# Patient Record
Sex: FEMALE | Race: WHITE | Employment: OTHER | URBAN - NONMETROPOLITAN AREA
[De-identification: names, ages, dates, MRNs, and addresses within clinical notes are randomized per-mention and may not be internally consistent; named-entity substitution may affect disease eponyms.]

---

## 2017-07-07 RX ORDER — ESOMEPRAZOLE MAGNESIUM 40 MG/1
40 CAPSULE, DELAYED RELEASE ORAL
COMMUNITY
End: 2017-11-03 | Stop reason: SDUPTHER

## 2017-07-07 RX ORDER — ACYCLOVIR 50 MG/G
OINTMENT TOPICAL 4 TIMES DAILY PRN
COMMUNITY
End: 2017-09-11

## 2017-07-07 RX ORDER — GABAPENTIN 100 MG/1
CAPSULE ORAL NIGHTLY
COMMUNITY
End: 2017-08-11 | Stop reason: SDUPTHER

## 2017-07-07 RX ORDER — LOSARTAN POTASSIUM AND HYDROCHLOROTHIAZIDE 12.5; 1 MG/1; MG/1
1 TABLET ORAL DAILY
COMMUNITY
End: 2017-08-13 | Stop reason: SDUPTHER

## 2017-07-07 RX ORDER — EZETIMIBE AND SIMVASTATIN 10; 20 MG/1; MG/1
1 TABLET ORAL NIGHTLY
COMMUNITY
End: 2017-07-26 | Stop reason: SDUPTHER

## 2017-07-07 RX ORDER — CLOPIDOGREL BISULFATE 75 MG/1
75 TABLET ORAL DAILY
COMMUNITY
End: 2018-03-19 | Stop reason: SDUPTHER

## 2017-07-07 RX ORDER — PAROXETINE HYDROCHLORIDE 20 MG/1
20 TABLET, FILM COATED ORAL EVERY MORNING
COMMUNITY
End: 2017-12-13 | Stop reason: SDUPTHER

## 2017-07-07 RX ORDER — LEVOCETIRIZINE DIHYDROCHLORIDE 5 MG/1
5 TABLET, FILM COATED ORAL NIGHTLY
COMMUNITY
End: 2018-03-19 | Stop reason: SDUPTHER

## 2017-07-07 RX ORDER — CHLORDIAZEPOXIDE HYDROCHLORIDE AND CLIDINIUM BROMIDE 5; 2.5 MG/1; MG/1
1 CAPSULE ORAL 2 TIMES DAILY PRN
COMMUNITY
End: 2017-07-11 | Stop reason: SDUPTHER

## 2017-07-07 RX ORDER — TRAZODONE HYDROCHLORIDE 50 MG/1
50 TABLET ORAL NIGHTLY
COMMUNITY
End: 2018-01-09 | Stop reason: SDUPTHER

## 2017-07-07 RX ORDER — MULTIVIT WITH MINERALS/LUTEIN
TABLET ORAL DAILY
COMMUNITY

## 2017-07-07 RX ORDER — ONDANSETRON 4 MG/1
4 TABLET, ORALLY DISINTEGRATING ORAL EVERY 8 HOURS PRN
COMMUNITY
End: 2021-10-19 | Stop reason: SDUPTHER

## 2017-07-11 ENCOUNTER — OFFICE VISIT (OUTPATIENT)
Dept: INTERNAL MEDICINE | Age: 82
End: 2017-07-11
Payer: MEDICARE

## 2017-07-11 VITALS
SYSTOLIC BLOOD PRESSURE: 130 MMHG | OXYGEN SATURATION: 93 % | BODY MASS INDEX: 31.01 KG/M2 | DIASTOLIC BLOOD PRESSURE: 84 MMHG | HEART RATE: 81 BPM | HEIGHT: 63 IN | WEIGHT: 175 LBS

## 2017-07-11 DIAGNOSIS — I11.9 HYPERTENSIVE HEART DISEASE WITHOUT CHF: ICD-10-CM

## 2017-07-11 DIAGNOSIS — Z91.81 RISK FOR FALLS: ICD-10-CM

## 2017-07-11 DIAGNOSIS — G60.9 IDIOPATHIC PERIPHERAL NEUROPATHY: ICD-10-CM

## 2017-07-11 DIAGNOSIS — F33.1 MAJOR DEPRESSIVE DISORDER, RECURRENT EPISODE, MODERATE (HCC): ICD-10-CM

## 2017-07-11 PROCEDURE — G8419 CALC BMI OUT NRM PARAM NOF/U: HCPCS | Performed by: INTERNAL MEDICINE

## 2017-07-11 PROCEDURE — G8400 PT W/DXA NO RESULTS DOC: HCPCS | Performed by: INTERNAL MEDICINE

## 2017-07-11 PROCEDURE — 1036F TOBACCO NON-USER: CPT | Performed by: INTERNAL MEDICINE

## 2017-07-11 PROCEDURE — 1090F PRES/ABSN URINE INCON ASSESS: CPT | Performed by: INTERNAL MEDICINE

## 2017-07-11 PROCEDURE — 4040F PNEUMOC VAC/ADMIN/RCVD: CPT | Performed by: INTERNAL MEDICINE

## 2017-07-11 PROCEDURE — 1123F ACP DISCUSS/DSCN MKR DOCD: CPT | Performed by: INTERNAL MEDICINE

## 2017-07-11 PROCEDURE — 99213 OFFICE O/P EST LOW 20 MIN: CPT | Performed by: INTERNAL MEDICINE

## 2017-07-11 PROCEDURE — G8427 DOCREV CUR MEDS BY ELIG CLIN: HCPCS | Performed by: INTERNAL MEDICINE

## 2017-07-11 RX ORDER — CHLORDIAZEPOXIDE HYDROCHLORIDE AND CLIDINIUM BROMIDE 5; 2.5 MG/1; MG/1
1 CAPSULE ORAL 2 TIMES DAILY PRN
Qty: 120 CAPSULE | Refills: 1 | Status: SHIPPED | OUTPATIENT
Start: 2017-07-11 | End: 2017-09-11

## 2017-07-11 ASSESSMENT — PATIENT HEALTH QUESTIONNAIRE - PHQ9
2. FEELING DOWN, DEPRESSED OR HOPELESS: 0
SUM OF ALL RESPONSES TO PHQ9 QUESTIONS 1 & 2: 0
1. LITTLE INTEREST OR PLEASURE IN DOING THINGS: 0
SUM OF ALL RESPONSES TO PHQ QUESTIONS 1-9: 0

## 2017-07-11 ASSESSMENT — ENCOUNTER SYMPTOMS
RHINORRHEA: 0
COUGH: 0
NAUSEA: 0
SORE THROAT: 0
ABDOMINAL PAIN: 0
SHORTNESS OF BREATH: 0
TROUBLE SWALLOWING: 0

## 2017-07-19 ENCOUNTER — TELEPHONE (OUTPATIENT)
Dept: INTERNAL MEDICINE | Age: 82
End: 2017-07-19

## 2017-08-14 RX ORDER — GABAPENTIN 100 MG/1
100 CAPSULE ORAL NIGHTLY
Qty: 90 CAPSULE | Refills: 1 | Status: SHIPPED | OUTPATIENT
Start: 2017-08-14 | End: 2017-09-11 | Stop reason: SDUPTHER

## 2017-09-11 ENCOUNTER — OFFICE VISIT (OUTPATIENT)
Dept: INTERNAL MEDICINE | Age: 82
End: 2017-09-11
Payer: MEDICARE

## 2017-09-11 VITALS
HEIGHT: 66 IN | HEART RATE: 86 BPM | WEIGHT: 177 LBS | SYSTOLIC BLOOD PRESSURE: 122 MMHG | BODY MASS INDEX: 28.45 KG/M2 | DIASTOLIC BLOOD PRESSURE: 78 MMHG | OXYGEN SATURATION: 92 %

## 2017-09-11 DIAGNOSIS — E78.2 MIXED HYPERLIPIDEMIA: ICD-10-CM

## 2017-09-11 DIAGNOSIS — F41.8 ANXIETY ASSOCIATED WITH DEPRESSION: ICD-10-CM

## 2017-09-11 DIAGNOSIS — I10 HYPERTENSION, ESSENTIAL: ICD-10-CM

## 2017-09-11 PROCEDURE — G8399 PT W/DXA RESULTS DOCUMENT: HCPCS | Performed by: INTERNAL MEDICINE

## 2017-09-11 PROCEDURE — G8417 CALC BMI ABV UP PARAM F/U: HCPCS | Performed by: INTERNAL MEDICINE

## 2017-09-11 PROCEDURE — 99213 OFFICE O/P EST LOW 20 MIN: CPT | Performed by: INTERNAL MEDICINE

## 2017-09-11 PROCEDURE — G8427 DOCREV CUR MEDS BY ELIG CLIN: HCPCS | Performed by: INTERNAL MEDICINE

## 2017-09-11 PROCEDURE — 4040F PNEUMOC VAC/ADMIN/RCVD: CPT | Performed by: INTERNAL MEDICINE

## 2017-09-11 PROCEDURE — 1036F TOBACCO NON-USER: CPT | Performed by: INTERNAL MEDICINE

## 2017-09-11 PROCEDURE — 1123F ACP DISCUSS/DSCN MKR DOCD: CPT | Performed by: INTERNAL MEDICINE

## 2017-09-11 PROCEDURE — 1090F PRES/ABSN URINE INCON ASSESS: CPT | Performed by: INTERNAL MEDICINE

## 2017-09-11 RX ORDER — LORAZEPAM 0.5 MG/1
0.5 TABLET ORAL DAILY PRN
COMMUNITY
End: 2017-09-11 | Stop reason: SDUPTHER

## 2017-09-11 RX ORDER — GABAPENTIN 100 MG/1
100 CAPSULE ORAL NIGHTLY
Qty: 90 CAPSULE | Refills: 1 | Status: SHIPPED | OUTPATIENT
Start: 2017-09-11 | End: 2018-03-19 | Stop reason: SINTOL

## 2017-09-11 RX ORDER — LORAZEPAM 0.5 MG/1
0.5 TABLET ORAL DAILY PRN
Qty: 30 TABLET | Refills: 0 | Status: SHIPPED | OUTPATIENT
Start: 2017-09-11 | End: 2017-12-04 | Stop reason: SDUPTHER

## 2017-09-11 ASSESSMENT — ENCOUNTER SYMPTOMS
SHORTNESS OF BREATH: 0
RHINORRHEA: 0
ABDOMINAL PAIN: 0
TROUBLE SWALLOWING: 0
COUGH: 0
SORE THROAT: 0
NAUSEA: 0

## 2017-10-16 ENCOUNTER — TELEPHONE (OUTPATIENT)
Dept: INTERNAL MEDICINE | Age: 82
End: 2017-10-16

## 2017-10-30 ENCOUNTER — TELEPHONE (OUTPATIENT)
Dept: INTERNAL MEDICINE | Age: 82
End: 2017-10-30

## 2017-10-30 NOTE — TELEPHONE ENCOUNTER
Virgil from [facility] called stating that pt's insurance is requesting that she try either pravastatin, simvastatin, atorvastatin, or rouvastatin instead of vytorin 10/20. Phone # [phone], ref # [id number]. Please advise.

## 2017-10-31 RX ORDER — ATORVASTATIN CALCIUM 20 MG/1
20 TABLET, FILM COATED ORAL DAILY
Qty: 90 TABLET | Refills: 3 | Status: SHIPPED | OUTPATIENT
Start: 2017-10-31 | End: 2018-10-16 | Stop reason: SDUPTHER

## 2017-11-03 ENCOUNTER — TELEPHONE (OUTPATIENT)
Dept: INTERNAL MEDICINE | Age: 82
End: 2017-11-03

## 2017-11-03 RX ORDER — ESOMEPRAZOLE MAGNESIUM 40 MG/1
40 CAPSULE, DELAYED RELEASE ORAL DAILY
Qty: 90 CAPSULE | Refills: 3 | Status: SHIPPED | OUTPATIENT
Start: 2017-11-03 | End: 2019-10-17 | Stop reason: SDUPTHER

## 2017-11-09 ENCOUNTER — NURSE ONLY (OUTPATIENT)
Dept: INTERNAL MEDICINE | Age: 82
End: 2017-11-09
Payer: MEDICARE

## 2017-11-09 DIAGNOSIS — Z23 NEED FOR INFLUENZA VACCINATION: Primary | ICD-10-CM

## 2017-11-09 PROCEDURE — G0008 ADMIN INFLUENZA VIRUS VAC: HCPCS | Performed by: INTERNAL MEDICINE

## 2017-11-09 PROCEDURE — 90662 IIV NO PRSV INCREASED AG IM: CPT | Performed by: INTERNAL MEDICINE

## 2017-12-04 DIAGNOSIS — I10 HYPERTENSION, ESSENTIAL: ICD-10-CM

## 2017-12-04 DIAGNOSIS — E78.2 MIXED HYPERLIPIDEMIA: ICD-10-CM

## 2017-12-04 DIAGNOSIS — F41.8 ANXIETY ASSOCIATED WITH DEPRESSION: ICD-10-CM

## 2017-12-04 RX ORDER — LORAZEPAM 0.5 MG/1
0.5 TABLET ORAL DAILY PRN
Qty: 30 TABLET | Refills: 1 | Status: SHIPPED | OUTPATIENT
Start: 2017-12-04 | End: 2018-02-14 | Stop reason: SDUPTHER

## 2017-12-13 RX ORDER — PAROXETINE HYDROCHLORIDE 20 MG/1
TABLET, FILM COATED ORAL
Qty: 90 TABLET | Refills: 3 | Status: SHIPPED | OUTPATIENT
Start: 2017-12-13 | End: 2018-07-10 | Stop reason: ALTCHOICE

## 2018-01-03 ENCOUNTER — TELEPHONE (OUTPATIENT)
Dept: INTERNAL MEDICINE | Age: 83
End: 2018-01-03

## 2018-01-03 RX ORDER — BENZONATATE 100 MG/1
100 CAPSULE ORAL 3 TIMES DAILY PRN
Qty: 30 CAPSULE | Refills: 0 | Status: SHIPPED | OUTPATIENT
Start: 2018-01-03 | End: 2018-02-07 | Stop reason: SDUPTHER

## 2018-01-10 ENCOUNTER — TELEPHONE (OUTPATIENT)
Dept: INTERNAL MEDICINE | Age: 83
End: 2018-01-10

## 2018-01-19 ENCOUNTER — TELEPHONE (OUTPATIENT)
Dept: INTERNAL MEDICINE | Age: 83
End: 2018-01-19

## 2018-01-19 RX ORDER — DOXYCYCLINE HYCLATE 100 MG
100 TABLET ORAL 2 TIMES DAILY
Qty: 14 TABLET | Refills: 0 | Status: SHIPPED | OUTPATIENT
Start: 2018-01-19 | End: 2018-01-26

## 2018-01-19 NOTE — TELEPHONE ENCOUNTER
C/o sinus infection, teeth and ears are hurting, she has been taking mucinex and it is not helping, sorethroat also, request [facility]

## 2018-01-23 ENCOUNTER — TELEPHONE (OUTPATIENT)
Dept: INTERNAL MEDICINE | Age: 83
End: 2018-01-23

## 2018-01-23 RX ORDER — LEVOFLOXACIN 500 MG/1
500 TABLET, FILM COATED ORAL DAILY
Qty: 7 TABLET | Refills: 0 | Status: SHIPPED | OUTPATIENT
Start: 2018-01-23 | End: 2018-01-30

## 2018-01-29 ENCOUNTER — OFFICE VISIT (OUTPATIENT)
Dept: INTERNAL MEDICINE | Age: 83
End: 2018-01-29
Payer: MEDICARE

## 2018-01-29 VITALS
HEART RATE: 88 BPM | BODY MASS INDEX: 29.32 KG/M2 | DIASTOLIC BLOOD PRESSURE: 72 MMHG | SYSTOLIC BLOOD PRESSURE: 118 MMHG | WEIGHT: 176 LBS | HEIGHT: 65 IN | OXYGEN SATURATION: 94 % | RESPIRATION RATE: 18 BRPM

## 2018-01-29 DIAGNOSIS — K21.9 GASTROESOPHAGEAL REFLUX DISEASE WITHOUT ESOPHAGITIS: ICD-10-CM

## 2018-01-29 DIAGNOSIS — J01.10 ACUTE FRONTAL SINUSITIS, RECURRENCE NOT SPECIFIED: ICD-10-CM

## 2018-01-29 DIAGNOSIS — H53.9 VISUAL CHANGES: ICD-10-CM

## 2018-01-29 DIAGNOSIS — J01.00 ACUTE MAXILLARY SINUSITIS, RECURRENCE NOT SPECIFIED: Primary | ICD-10-CM

## 2018-01-29 PROCEDURE — 1036F TOBACCO NON-USER: CPT | Performed by: NURSE PRACTITIONER

## 2018-01-29 PROCEDURE — 99214 OFFICE O/P EST MOD 30 MIN: CPT | Performed by: NURSE PRACTITIONER

## 2018-01-29 PROCEDURE — 1123F ACP DISCUSS/DSCN MKR DOCD: CPT | Performed by: NURSE PRACTITIONER

## 2018-01-29 PROCEDURE — G8399 PT W/DXA RESULTS DOCUMENT: HCPCS | Performed by: NURSE PRACTITIONER

## 2018-01-29 PROCEDURE — G8427 DOCREV CUR MEDS BY ELIG CLIN: HCPCS | Performed by: NURSE PRACTITIONER

## 2018-01-29 PROCEDURE — 4040F PNEUMOC VAC/ADMIN/RCVD: CPT | Performed by: NURSE PRACTITIONER

## 2018-01-29 PROCEDURE — 96372 THER/PROPH/DIAG INJ SC/IM: CPT | Performed by: NURSE PRACTITIONER

## 2018-01-29 PROCEDURE — G8484 FLU IMMUNIZE NO ADMIN: HCPCS | Performed by: NURSE PRACTITIONER

## 2018-01-29 PROCEDURE — G8417 CALC BMI ABV UP PARAM F/U: HCPCS | Performed by: NURSE PRACTITIONER

## 2018-01-29 PROCEDURE — 1090F PRES/ABSN URINE INCON ASSESS: CPT | Performed by: NURSE PRACTITIONER

## 2018-01-29 RX ORDER — METHYLPREDNISOLONE ACETATE 80 MG/ML
80 INJECTION, SUSPENSION INTRA-ARTICULAR; INTRALESIONAL; INTRAMUSCULAR; SOFT TISSUE ONCE
Status: COMPLETED | OUTPATIENT
Start: 2018-01-29 | End: 2018-01-29

## 2018-01-29 RX ADMIN — METHYLPREDNISOLONE ACETATE 80 MG: 80 INJECTION, SUSPENSION INTRA-ARTICULAR; INTRALESIONAL; INTRAMUSCULAR; SOFT TISSUE at 12:47

## 2018-01-29 ASSESSMENT — ENCOUNTER SYMPTOMS
TROUBLE SWALLOWING: 0
ABDOMINAL DISTENTION: 0
EYE DISCHARGE: 0
SHORTNESS OF BREATH: 0
COUGH: 0
SINUS PRESSURE: 1
BLOOD IN STOOL: 0
ABDOMINAL PAIN: 0
CHOKING: 0
COLOR CHANGE: 0
EYE ITCHING: 0
STRIDOR: 0
CONSTIPATION: 0
DIARRHEA: 0
WHEEZING: 0
VOMITING: 0
NAUSEA: 0
SINUS PAIN: 1
SORE THROAT: 0

## 2018-01-29 NOTE — PROGRESS NOTES
Ataxia     Benign diastolic hypertension     Carpal tunnel syndrome     idiopathic peripheral    Cellulitis of face     Chronic back pain     COPD (chronic obstructive pulmonary disease) (HCC)     Depression     Disc degeneration, lumbosacral     Diverticulosis     Dysphagia     Esophageal reflux     Fever blister     Herpes simplex     Herpes zoster     Hypertension, essential     Hypertensive heart disease without CHF     Hypoxia     Idiopathic peripheral neuropathy     2017    Insomnia     Major depressive disorder, recurrent episode, moderate (HCC)     Mixed hyperlipidemia     Osteoporosis     Ovarian failure     Pulmonary fibrosis ([facility])     Sciatica     Sleep apnea     Transient cerebral ischemic attack     Urinary incontinence     Weakness of both legs       Past Surgical History:   Procedure Laterality Date    COLONOSCOPY  06/12/2015    Aubree, repeat as needed    HYSTERECTOMY      LUNG SEGMENTECTOMY      lung segmental resection       Family History   Problem Relation Age of Onset    Heart Failure Mother     Diabetes Mother     Heart Disease Mother     Diabetes Father     Heart Disease Father        Social History   Substance Use Topics    Smoking status: Never Smoker    Smokeless tobacco: Never Used    Alcohol use No      Current Outpatient Prescriptions   Medication Sig Dispense Refill    levofloxacin (LEVAQUIN) 500 MG tablet Take 1 tablet by mouth daily for 7 days 7 tablet 0    traZODone (DESYREL) 50 MG tablet TAKE 2 TABLETS AT BEDTIME 180 tablet 1    PARoxetine (PAXIL) 20 MG tablet TAKE 1 TABLET DAILY AS DIRECTED 90 tablet 3    LORazepam (ATIVAN) 0.5 MG tablet Take 1 tablet by mouth daily as needed for Anxiety .  30 tablet 1    esomeprazole (NEXIUM) 40 MG delayed release capsule Take 1 capsule by mouth daily 90 capsule 3    atorvastatin (LIPITOR) 20 MG tablet Take 1 tablet by mouth daily 90 tablet 3    gabapentin (NEURONTIN) 100 MG capsule Take 1 capsule by

## 2018-02-07 RX ORDER — BENZONATATE 100 MG/1
100 CAPSULE ORAL 3 TIMES DAILY PRN
Qty: 30 CAPSULE | Refills: 0 | Status: SHIPPED | OUTPATIENT
Start: 2018-02-07 | End: 2018-02-17

## 2018-02-14 DIAGNOSIS — I10 HYPERTENSION, ESSENTIAL: ICD-10-CM

## 2018-02-14 DIAGNOSIS — F41.8 ANXIETY ASSOCIATED WITH DEPRESSION: ICD-10-CM

## 2018-02-14 DIAGNOSIS — E78.2 MIXED HYPERLIPIDEMIA: ICD-10-CM

## 2018-02-14 RX ORDER — LORAZEPAM 0.5 MG/1
0.5 TABLET ORAL DAILY PRN
Qty: 30 TABLET | Refills: 1 | Status: SHIPPED | OUTPATIENT
Start: 2018-02-14 | End: 2018-03-19 | Stop reason: SDUPTHER

## 2018-03-06 NOTE — TELEPHONE ENCOUNTER
She has been taking her husbands voltaren gel, Dr Ami Rodarte had suggested she try it for her legs, she now wants a script for herself

## 2018-03-13 ENCOUNTER — TELEPHONE (OUTPATIENT)
Dept: INTERNAL MEDICINE | Age: 83
End: 2018-03-13

## 2018-03-13 RX ORDER — FLUCONAZOLE 150 MG/1
150 TABLET ORAL ONCE
Qty: 1 TABLET | Refills: 0 | Status: SHIPPED | OUTPATIENT
Start: 2018-03-13 | End: 2018-03-13

## 2018-03-19 ENCOUNTER — OFFICE VISIT (OUTPATIENT)
Dept: INTERNAL MEDICINE | Age: 83
End: 2018-03-19
Payer: MEDICARE

## 2018-03-19 VITALS
DIASTOLIC BLOOD PRESSURE: 86 MMHG | HEART RATE: 75 BPM | OXYGEN SATURATION: 94 % | HEIGHT: 65 IN | BODY MASS INDEX: 29.32 KG/M2 | WEIGHT: 176 LBS | SYSTOLIC BLOOD PRESSURE: 134 MMHG | RESPIRATION RATE: 18 BRPM

## 2018-03-19 DIAGNOSIS — I10 HYPERTENSION, ESSENTIAL: ICD-10-CM

## 2018-03-19 DIAGNOSIS — I11.9 HYPERTENSIVE HEART DISEASE WITHOUT CHF: Primary | ICD-10-CM

## 2018-03-19 DIAGNOSIS — R15.9 INCONTINENCE OF FECES, UNSPECIFIED FECAL INCONTINENCE TYPE: ICD-10-CM

## 2018-03-19 DIAGNOSIS — E78.2 MIXED HYPERLIPIDEMIA: ICD-10-CM

## 2018-03-19 DIAGNOSIS — F41.8 ANXIETY ASSOCIATED WITH DEPRESSION: ICD-10-CM

## 2018-03-19 DIAGNOSIS — I11.9 HYPERTENSIVE HEART DISEASE WITHOUT CHF: ICD-10-CM

## 2018-03-19 DIAGNOSIS — R41.3 DECLINE IN VERBAL MEMORY: ICD-10-CM

## 2018-03-19 DIAGNOSIS — R29.6 FALLS FREQUENTLY: ICD-10-CM

## 2018-03-19 DIAGNOSIS — F51.5 NIGHTMARES: ICD-10-CM

## 2018-03-19 LAB
ALBUMIN SERPL-MCNC: 4.2 G/DL (ref 3.5–5.2)
ALP BLD-CCNC: 63 U/L (ref 35–104)
ALT SERPL-CCNC: 23 U/L (ref 5–33)
ANION GAP SERPL CALCULATED.3IONS-SCNC: 11 MMOL/L (ref 7–19)
AST SERPL-CCNC: 41 U/L (ref 5–32)
BACTERIA: ABNORMAL /HPF
BILIRUB SERPL-MCNC: 0.5 MG/DL (ref 0.2–1.2)
BILIRUBIN URINE: ABNORMAL
BLOOD, URINE: NEGATIVE
BUN BLDV-MCNC: 20 MG/DL (ref 8–23)
CALCIUM SERPL-MCNC: 9.6 MG/DL (ref 8.8–10.2)
CHLORIDE BLD-SCNC: 102 MMOL/L (ref 98–111)
CHOLESTEROL, TOTAL: 216 MG/DL (ref 160–199)
CLARITY: ABNORMAL
CO2: 29 MMOL/L (ref 22–29)
COLOR: ABNORMAL
CREAT SERPL-MCNC: 0.7 MG/DL (ref 0.5–0.9)
EPITHELIAL CELLS, UA: 6 /HPF (ref 0–5)
GFR NON-AFRICAN AMERICAN: >60
GLUCOSE BLD-MCNC: 91 MG/DL (ref 74–109)
GLUCOSE URINE: NEGATIVE MG/DL
HCT VFR BLD CALC: 39.1 % (ref 37–47)
HDLC SERPL-MCNC: 71 MG/DL (ref 65–121)
HEMOGLOBIN: 12.8 G/DL (ref 12–16)
HYALINE CASTS: 7 /HPF (ref 0–8)
KETONES, URINE: NEGATIVE MG/DL
LDL CHOLESTEROL CALCULATED: 114 MG/DL
LEUKOCYTE ESTERASE, URINE: ABNORMAL
MCH RBC QN AUTO: 30.5 PG (ref 27–31)
MCHC RBC AUTO-ENTMCNC: 32.7 G/DL (ref 33–37)
MCV RBC AUTO: 93.1 FL (ref 81–99)
NITRITE, URINE: NEGATIVE
PDW BLD-RTO: 13.6 % (ref 11.5–14.5)
PH UA: 6
PLATELET # BLD: 299 K/UL (ref 130–400)
PMV BLD AUTO: 10.5 FL (ref 9.4–12.3)
POTASSIUM SERPL-SCNC: 4.6 MMOL/L (ref 3.5–5)
PROTEIN UA: NEGATIVE MG/DL
RBC # BLD: 4.2 M/UL (ref 4.2–5.4)
RBC UA: 12 /HPF (ref 0–4)
SODIUM BLD-SCNC: 142 MMOL/L (ref 136–145)
SPECIFIC GRAVITY UA: 1.02
TOTAL PROTEIN: 7.3 G/DL (ref 6.6–8.7)
TRIGL SERPL-MCNC: 153 MG/DL (ref 0–149)
TSH SERPL DL<=0.05 MIU/L-ACNC: 3.02 UIU/ML (ref 0.27–4.2)
URINE REFLEX TO CULTURE: YES
UROBILINOGEN, URINE: 0.2 E.U./DL
WBC # BLD: 8.6 K/UL (ref 4.8–10.8)
WBC UA: 11 /HPF (ref 0–5)

## 2018-03-19 PROCEDURE — 99214 OFFICE O/P EST MOD 30 MIN: CPT | Performed by: NURSE PRACTITIONER

## 2018-03-19 PROCEDURE — 1090F PRES/ABSN URINE INCON ASSESS: CPT | Performed by: NURSE PRACTITIONER

## 2018-03-19 PROCEDURE — 1036F TOBACCO NON-USER: CPT | Performed by: NURSE PRACTITIONER

## 2018-03-19 PROCEDURE — G8399 PT W/DXA RESULTS DOCUMENT: HCPCS | Performed by: NURSE PRACTITIONER

## 2018-03-19 PROCEDURE — G8427 DOCREV CUR MEDS BY ELIG CLIN: HCPCS | Performed by: NURSE PRACTITIONER

## 2018-03-19 PROCEDURE — 4040F PNEUMOC VAC/ADMIN/RCVD: CPT | Performed by: NURSE PRACTITIONER

## 2018-03-19 PROCEDURE — G8482 FLU IMMUNIZE ORDER/ADMIN: HCPCS | Performed by: NURSE PRACTITIONER

## 2018-03-19 PROCEDURE — G8417 CALC BMI ABV UP PARAM F/U: HCPCS | Performed by: NURSE PRACTITIONER

## 2018-03-19 PROCEDURE — 1123F ACP DISCUSS/DSCN MKR DOCD: CPT | Performed by: NURSE PRACTITIONER

## 2018-03-19 RX ORDER — CHLORDIAZEPOXIDE HYDROCHLORIDE AND CLIDINIUM BROMIDE 5; 2.5 MG/1; MG/1
1 CAPSULE ORAL NIGHTLY
Qty: 90 CAPSULE | Refills: 1 | Status: SHIPPED | OUTPATIENT
Start: 2018-03-19 | End: 2018-10-02 | Stop reason: SDUPTHER

## 2018-03-19 RX ORDER — CLOPIDOGREL BISULFATE 75 MG/1
75 TABLET ORAL DAILY
Qty: 90 TABLET | Refills: 1 | Status: SHIPPED | OUTPATIENT
Start: 2018-03-19 | End: 2018-11-01 | Stop reason: SDUPTHER

## 2018-03-19 RX ORDER — TRAZODONE HYDROCHLORIDE 50 MG/1
TABLET ORAL
Qty: 180 TABLET | Refills: 1 | Status: SHIPPED | OUTPATIENT
Start: 2018-03-19 | End: 2019-10-01 | Stop reason: ALTCHOICE

## 2018-03-19 RX ORDER — LEVOCETIRIZINE DIHYDROCHLORIDE 5 MG/1
5 TABLET, FILM COATED ORAL NIGHTLY
Qty: 90 TABLET | Refills: 1 | Status: SHIPPED | OUTPATIENT
Start: 2018-03-19 | End: 2018-09-15 | Stop reason: SDUPTHER

## 2018-03-19 RX ORDER — CHLORDIAZEPOXIDE HYDROCHLORIDE AND CLIDINIUM BROMIDE 5; 2.5 MG/1; MG/1
CAPSULE ORAL
COMMUNITY
Start: 2018-02-12 | End: 2018-03-19 | Stop reason: SDUPTHER

## 2018-03-19 RX ORDER — LORAZEPAM 0.5 MG/1
TABLET ORAL
Qty: 60 TABLET | Refills: 1 | Status: SHIPPED | OUTPATIENT
Start: 2018-03-19 | End: 2018-03-20 | Stop reason: SDUPTHER

## 2018-03-19 ASSESSMENT — ENCOUNTER SYMPTOMS
CONSTIPATION: 0
DIARRHEA: 0
BLOOD IN STOOL: 0
SHORTNESS OF BREATH: 0
COLOR CHANGE: 0
EYE DISCHARGE: 0
TROUBLE SWALLOWING: 0
EYE ITCHING: 0
STRIDOR: 0
VOMITING: 0
WHEEZING: 0
ABDOMINAL PAIN: 0
COUGH: 0
ABDOMINAL DISTENTION: 0
NAUSEA: 0
SORE THROAT: 0
CHOKING: 0

## 2018-03-19 NOTE — PROGRESS NOTES
[facility]  [facility] [facility] [address]  Dept: [phone]  Dept Fax: [phone]: [phone]    Jhoana Keyes is a 80 y.o. female who presents today for her medical conditions/complaints as noted below. Jhoana Keyes is c/o of Hypertension (Patient had had spells after physical therapy he BP has been elevated. Patient has been going for dizziness.)        HPI:     HPI   1.  HTN;  b/p has been going high; up to 175/100;  Usually after water therapy   2. Falls ;she has falln 4 times getting up at night;   she says she has been taking too much meds; Her daughter is now getting her in PT at [facility] with [facility] and she is now helping her with her meds;   3. Memory impairment -  This has worsened;    4. Side effects of the meds;  Nightmares/night terrors  5. Bowel incontinence; She has bowel incontinence when she is under stress more like IBS  But she thinks it is food related   6. Anxiety; She is using 1/2 of the ativan only at night to help her go to sleep   Chief Complaint   Patient presents with    Hypertension     Patient had had spells after physical therapy he BP has been elevated. Patient has been going for dizziness.        Past Medical History:   Diagnosis Date    Abnormal mammogram     Anxiety     Anxiety associated with depression     Artery disease, cerebral     Ataxia     Benign diastolic hypertension     Carpal tunnel syndrome     idiopathic peripheral    Cellulitis of face     Chronic back pain     COPD [facility]chronic obstructive pulmonary disease) ([facility])     Depression     Disc degeneration, lumbosacral     Diverticulosis     Dysphagia     Esophageal reflux     Fever blister     Herpes simplex     Herpes zoster     Hypertension, essential     Hypertensive heart disease without CHF     Hypoxia     Idiopathic peripheral neuropathy     2017    Insomnia     Major depressive disorder, recurrent losartan-hydrochlorothiazide (HYZAAR) 100-12.5 MG per tablet TAKE 1 TABLET DAILY 90 tablet 3    VYTORIN 10-20 MG per tablet TAKE 1 TABLET DAILY 90 tablet 3    aspirin 81 MG tablet Take 81 mg by mouth 2 times daily       Multiple Vitamins-Minerals (CENTRUM SILVER) TABS Take by mouth daily      diclofenac 1.5 % SOLN Place 40 drops onto the skin 3 times daily Apply 40 drops to lower extremity joints      ondansetron (ZOFRAN-ODT) 4 MG disintegrating tablet Take 4 mg by mouth every 8 hours as needed for Nausea or Vomiting      OXYGEN Inhale into the lungs 2L at [facility]       No current facility-administered medications for this visit. Allergies   Allergen Reactions    Augmentin [Amoxicillin-Pot Clavulanate]     Cefdinir     Effexor [Venlafaxine]     Erythromycin     Lortab [Hydrocodone-Acetaminophen]     Naprosyn [Naproxen]        Health Maintenance   Topic Date Due    Potassium monitoring  12/18/1932    Creatinine monitoring  12/18/1932    DTaP/Tdap/Td vaccine (1 - Tdap) 12/18/1951    Shingles Vaccine (1 of 2 - 2 Dose Series) 12/18/1982    DEXA (modify frequency per FRAX score)  Completed    Flu vaccine  Completed    Pneumococcal low/med risk  Completed       Subjective:      Review of Systems   Constitutional: Negative for fatigue, fever and unexpected weight change. HENT: Negative for ear discharge, ear pain, mouth sores, sore throat and trouble swallowing. Eyes: Negative for discharge, itching and visual disturbance. Respiratory: Negative for cough, choking, shortness of breath, wheezing and stridor. Cardiovascular: Negative for chest pain, palpitations and leg swelling. Gastrointestinal: Negative for abdominal distention, abdominal pain, blood in stool, constipation, diarrhea, nausea and vomiting. IBS   Endocrine: Negative for cold intolerance, polydipsia and polyuria. Genitourinary: Negative for difficulty urinating, dysuria, frequency and urgency.    Musculoskeletal: will help this and you need to use her rolling walker that they have gotten any  3. Memory impairment ; This has worsened but with the other meds changes we will wait on adding other meds  4. Nightmares; We will stop the gabapentin that should help with several things  5. Bowel incontinence  Likely stress related monitor what you eat anything or having increased stress use the when necessary lorazepam  6. Anxiety/ insomnia; We will taper off the trazadone and add routine lorazepam   Start tonight with 1/2 lorazepam and 1 trazadone; Do that for a week, then start 1 lorazepam and no trazadone;     You can use additional 1/2 of the lorazepam twice during the day for anxiety, elevation of b/p , stress or IBS     Fu in 4 weeks  With labs     Electronically signed by SONIA Vila on 3/19/2018 at 4:26 PM

## 2018-03-20 ENCOUNTER — TELEPHONE (OUTPATIENT)
Dept: INTERNAL MEDICINE | Age: 83
End: 2018-03-20

## 2018-03-20 DIAGNOSIS — I10 HYPERTENSION, ESSENTIAL: ICD-10-CM

## 2018-03-20 DIAGNOSIS — F41.8 ANXIETY ASSOCIATED WITH DEPRESSION: ICD-10-CM

## 2018-03-20 DIAGNOSIS — E78.2 MIXED HYPERLIPIDEMIA: ICD-10-CM

## 2018-03-20 RX ORDER — LORAZEPAM 0.5 MG/1
TABLET ORAL
Qty: 60 TABLET | Refills: 0 | Status: SHIPPED | OUTPATIENT
Start: 2018-03-20 | End: 2018-03-21 | Stop reason: SDUPTHER

## 2018-03-21 DIAGNOSIS — I10 HYPERTENSION, ESSENTIAL: ICD-10-CM

## 2018-03-21 DIAGNOSIS — F41.8 ANXIETY ASSOCIATED WITH DEPRESSION: ICD-10-CM

## 2018-03-21 DIAGNOSIS — E78.2 MIXED HYPERLIPIDEMIA: ICD-10-CM

## 2018-03-21 DIAGNOSIS — N30.00 ACUTE CYSTITIS WITHOUT HEMATURIA: Primary | ICD-10-CM

## 2018-03-21 LAB
ORGANISM: ABNORMAL
URINE CULTURE, ROUTINE: ABNORMAL
URINE CULTURE, ROUTINE: ABNORMAL

## 2018-03-21 RX ORDER — LORAZEPAM 0.5 MG/1
TABLET ORAL
Qty: 60 TABLET | Refills: 0 | Status: SHIPPED | OUTPATIENT
Start: 2018-03-21 | End: 2018-06-01 | Stop reason: SDUPTHER

## 2018-05-30 ENCOUNTER — TELEPHONE (OUTPATIENT)
Dept: INTERNAL MEDICINE | Age: 83
End: 2018-05-30

## 2018-05-30 RX ORDER — LEVOFLOXACIN 250 MG/1
250 TABLET ORAL DAILY
Qty: 7 TABLET | Refills: 0 | Status: SHIPPED | OUTPATIENT
Start: 2018-05-30 | End: 2018-06-06

## 2018-06-01 DIAGNOSIS — I10 HYPERTENSION, ESSENTIAL: ICD-10-CM

## 2018-06-01 DIAGNOSIS — E78.2 MIXED HYPERLIPIDEMIA: ICD-10-CM

## 2018-06-01 DIAGNOSIS — F41.8 ANXIETY ASSOCIATED WITH DEPRESSION: ICD-10-CM

## 2018-06-01 RX ORDER — LORAZEPAM 0.5 MG/1
TABLET ORAL
Qty: 30 TABLET | Refills: 0 | Status: SHIPPED | OUTPATIENT
Start: 2018-06-01 | End: 2018-06-19 | Stop reason: SDUPTHER

## 2018-06-19 DIAGNOSIS — F41.8 ANXIETY ASSOCIATED WITH DEPRESSION: ICD-10-CM

## 2018-06-19 DIAGNOSIS — E78.2 MIXED HYPERLIPIDEMIA: ICD-10-CM

## 2018-06-19 DIAGNOSIS — I10 HYPERTENSION, ESSENTIAL: ICD-10-CM

## 2018-06-19 RX ORDER — LORAZEPAM 0.5 MG/1
TABLET ORAL
Qty: 60 TABLET | Refills: 0 | Status: SHIPPED | OUTPATIENT
Start: 2018-06-19 | End: 2018-07-19 | Stop reason: SDUPTHER

## 2018-06-25 ENCOUNTER — OFFICE VISIT (OUTPATIENT)
Dept: INTERNAL MEDICINE | Age: 83
End: 2018-06-25
Payer: MEDICARE

## 2018-06-25 VITALS
BODY MASS INDEX: 29.25 KG/M2 | SYSTOLIC BLOOD PRESSURE: 128 MMHG | WEIGHT: 182 LBS | RESPIRATION RATE: 16 BRPM | HEIGHT: 66 IN | OXYGEN SATURATION: 95 % | HEART RATE: 86 BPM | DIASTOLIC BLOOD PRESSURE: 78 MMHG

## 2018-06-25 DIAGNOSIS — Z00.00 HEALTH CARE MAINTENANCE: ICD-10-CM

## 2018-06-25 DIAGNOSIS — G47.09 OTHER INSOMNIA: ICD-10-CM

## 2018-06-25 DIAGNOSIS — F33.1 MAJOR DEPRESSIVE DISORDER, RECURRENT EPISODE, MODERATE (HCC): Primary | ICD-10-CM

## 2018-06-25 DIAGNOSIS — K58.0 IRRITABLE BOWEL SYNDROME WITH DIARRHEA: ICD-10-CM

## 2018-06-25 DIAGNOSIS — I11.9 HYPERTENSIVE HEART DISEASE WITHOUT CHF: ICD-10-CM

## 2018-06-25 DIAGNOSIS — F41.8 ANXIETY ASSOCIATED WITH DEPRESSION: ICD-10-CM

## 2018-06-25 DIAGNOSIS — M89.9 DISORDER OF BONE: ICD-10-CM

## 2018-06-25 DIAGNOSIS — E55.9 VITAMIN D DEFICIENCY: ICD-10-CM

## 2018-06-25 PROCEDURE — 1123F ACP DISCUSS/DSCN MKR DOCD: CPT | Performed by: NURSE PRACTITIONER

## 2018-06-25 PROCEDURE — 1090F PRES/ABSN URINE INCON ASSESS: CPT | Performed by: NURSE PRACTITIONER

## 2018-06-25 PROCEDURE — 4040F PNEUMOC VAC/ADMIN/RCVD: CPT | Performed by: NURSE PRACTITIONER

## 2018-06-25 PROCEDURE — G8427 DOCREV CUR MEDS BY ELIG CLIN: HCPCS | Performed by: NURSE PRACTITIONER

## 2018-06-25 PROCEDURE — G8399 PT W/DXA RESULTS DOCUMENT: HCPCS | Performed by: NURSE PRACTITIONER

## 2018-06-25 PROCEDURE — 99214 OFFICE O/P EST MOD 30 MIN: CPT | Performed by: NURSE PRACTITIONER

## 2018-06-25 PROCEDURE — 1036F TOBACCO NON-USER: CPT | Performed by: NURSE PRACTITIONER

## 2018-06-25 PROCEDURE — G8417 CALC BMI ABV UP PARAM F/U: HCPCS | Performed by: NURSE PRACTITIONER

## 2018-06-25 RX ORDER — MIRTAZAPINE 7.5 MG/1
30 TABLET, FILM COATED ORAL NIGHTLY
COMMUNITY
End: 2019-10-01 | Stop reason: SDUPTHER

## 2018-06-25 RX ORDER — ESCITALOPRAM OXALATE 5 MG/1
5 TABLET ORAL DAILY
COMMUNITY
End: 2019-10-17 | Stop reason: SDUPTHER

## 2018-06-25 RX ORDER — MONTELUKAST SODIUM 4 MG/1
1 TABLET, CHEWABLE ORAL 2 TIMES DAILY
Qty: 60 TABLET | Refills: 3 | Status: SHIPPED | OUTPATIENT
Start: 2018-06-25 | End: 2019-03-26 | Stop reason: SDUPTHER

## 2018-06-25 ASSESSMENT — ENCOUNTER SYMPTOMS
COLOR CHANGE: 0
CONSTIPATION: 0
WHEEZING: 0
NAUSEA: 0
CHOKING: 0
SHORTNESS OF BREATH: 0
DIARRHEA: 0
STRIDOR: 0
EYE ITCHING: 0
EYE DISCHARGE: 0
BLOOD IN STOOL: 0
SORE THROAT: 0
ABDOMINAL DISTENTION: 0
VOMITING: 0
TROUBLE SWALLOWING: 0
ABDOMINAL PAIN: 0
COUGH: 0

## 2018-06-26 ENCOUNTER — HOSPITAL ENCOUNTER (OUTPATIENT)
Dept: GENERAL RADIOLOGY | Facility: HOSPITAL | Age: 83
Discharge: HOME | End: 2018-06-26
Attending: FAMILY MEDICINE

## 2018-06-26 PROCEDURE — 71046 X-RAY EXAM CHEST 2 VIEWS: CPT

## 2018-07-10 ENCOUNTER — TELEPHONE (OUTPATIENT)
Dept: INTERNAL MEDICINE | Age: 83
End: 2018-07-10

## 2018-07-10 DIAGNOSIS — N39.0 URINARY TRACT INFECTION WITH HEMATURIA, SITE UNSPECIFIED: Primary | ICD-10-CM

## 2018-07-10 DIAGNOSIS — R31.9 URINARY TRACT INFECTION WITH HEMATURIA, SITE UNSPECIFIED: Primary | ICD-10-CM

## 2018-07-10 NOTE — TELEPHONE ENCOUNTER
Did they get a culture? If now we need to get homw; She already has allergy to 3 abx;   So I dont want her to start something and then have to change it;    I would rather bring a urine for culture and treat after that is back on Friday as long as she is not having fever or chills

## 2018-07-10 NOTE — TELEPHONE ENCOUNTER
Daughter aware and is going to bring her tomorrow for culture. Daughter sates since [facility] started her on Remeron and stopped Paxil she has noticed the patient is having increased depression and anger issues. Daughter states she is not physically mean but very verbally mean. Daughter also states she is not sleeping at night.   Please advise

## 2018-07-12 DIAGNOSIS — N39.0 URINARY TRACT INFECTION WITH HEMATURIA, SITE UNSPECIFIED: ICD-10-CM

## 2018-07-12 DIAGNOSIS — R31.9 URINARY TRACT INFECTION WITH HEMATURIA, SITE UNSPECIFIED: ICD-10-CM

## 2018-07-14 LAB — URINE CULTURE, ROUTINE: NORMAL

## 2018-07-19 DIAGNOSIS — F41.8 ANXIETY ASSOCIATED WITH DEPRESSION: ICD-10-CM

## 2018-07-20 RX ORDER — LORAZEPAM 0.5 MG/1
TABLET ORAL
Qty: 60 TABLET | Refills: 0 | Status: SHIPPED | OUTPATIENT
Start: 2018-07-20 | End: 2019-10-01

## 2018-07-24 ENCOUNTER — TELEPHONE (OUTPATIENT)
Dept: INTERNAL MEDICINE | Age: 83
End: 2018-07-24

## 2018-09-05 RX ORDER — LOSARTAN POTASSIUM AND HYDROCHLOROTHIAZIDE 12.5; 1 MG/1; MG/1
1 TABLET ORAL DAILY
Qty: 90 TABLET | Refills: 3 | Status: SHIPPED | OUTPATIENT
Start: 2018-09-05 | End: 2020-01-06 | Stop reason: SDUPTHER

## 2018-09-05 NOTE — TELEPHONE ENCOUNTER
Pt needs a refill on the following medication which has been pended for you.     Requested Prescriptions     Pending Prescriptions Disp Refills    losartan-hydrochlorothiazide (HYZAAR) 100-12.5 MG per tablet 90 tablet 3     Sig: Take 1 tablet by mouth daily     Last appointment: 06/25/2018  Next appointment: 09/18/2018

## 2018-09-17 RX ORDER — LEVOCETIRIZINE DIHYDROCHLORIDE 5 MG/1
TABLET, FILM COATED ORAL
Qty: 90 TABLET | Refills: 1 | Status: SHIPPED | OUTPATIENT
Start: 2018-09-17 | End: 2019-03-15 | Stop reason: SDUPTHER

## 2018-10-16 RX ORDER — ATORVASTATIN CALCIUM 20 MG/1
20 TABLET, FILM COATED ORAL DAILY
Qty: 90 TABLET | Refills: 3 | Status: SHIPPED | OUTPATIENT
Start: 2018-10-16 | End: 2019-12-08 | Stop reason: SDUPTHER

## 2018-11-01 DIAGNOSIS — F41.8 ANXIETY ASSOCIATED WITH DEPRESSION: ICD-10-CM

## 2018-11-02 RX ORDER — CLOPIDOGREL BISULFATE 75 MG/1
TABLET ORAL
Qty: 90 TABLET | Refills: 1 | Status: SHIPPED | OUTPATIENT
Start: 2018-11-02 | End: 2020-01-06 | Stop reason: SDUPTHER

## 2018-11-02 RX ORDER — LORAZEPAM 0.5 MG/1
TABLET ORAL
Qty: 30 TABLET | Refills: 0 | Status: SHIPPED | OUTPATIENT
Start: 2018-11-02 | End: 2019-10-01 | Stop reason: SDUPTHER

## 2018-11-26 ENCOUNTER — TELEPHONE (OUTPATIENT)
Dept: INTERNAL MEDICINE | Age: 83
End: 2018-11-26

## 2018-11-26 DIAGNOSIS — Z12.39 SCREENING FOR MALIGNANT NEOPLASM OF BREAST: Primary | ICD-10-CM

## 2018-11-26 DIAGNOSIS — Z78.0 POST-MENOPAUSE: Primary | ICD-10-CM

## 2019-03-18 RX ORDER — LEVOCETIRIZINE DIHYDROCHLORIDE 5 MG/1
TABLET, FILM COATED ORAL
Qty: 90 TABLET | Refills: 1 | Status: SHIPPED | OUTPATIENT
Start: 2019-03-18 | End: 2020-01-06 | Stop reason: SDUPTHER

## 2019-03-26 RX ORDER — MONTELUKAST SODIUM 4 MG/1
1 TABLET, CHEWABLE ORAL 2 TIMES DAILY
Qty: 60 TABLET | Refills: 3 | Status: SHIPPED | OUTPATIENT
Start: 2019-03-26 | End: 2020-01-24

## 2019-04-05 ENCOUNTER — TELEPHONE (OUTPATIENT)
Dept: INTERNAL MEDICINE | Age: 84
End: 2019-04-05

## 2019-04-05 NOTE — TELEPHONE ENCOUNTER
Pain management called stating patient is going to start getting injections and need a brief letter from you stating it is ok to stop Plavix 7 days prior to injections

## 2019-04-09 LAB
ALBUMIN SERPL-MCNC: 4 G/DL (ref 3.5–5.2)
ALP BLD-CCNC: 66 U/L (ref 35–104)
ALT SERPL-CCNC: 20 U/L (ref 5–33)
ANION GAP SERPL CALCULATED.3IONS-SCNC: 14 MMOL/L (ref 7–19)
AST SERPL-CCNC: 42 U/L (ref 5–32)
BASOPHILS ABSOLUTE: 0 K/UL (ref 0–0.2)
BASOPHILS RELATIVE PERCENT: 0.2 % (ref 0–1)
BILIRUB SERPL-MCNC: 0.5 MG/DL (ref 0.2–1.2)
BUN BLDV-MCNC: 14 MG/DL (ref 8–23)
CALCIUM SERPL-MCNC: 9.3 MG/DL (ref 8.8–10.2)
CHLORIDE BLD-SCNC: 103 MMOL/L (ref 98–111)
CHOLESTEROL, TOTAL: 185 MG/DL (ref 160–199)
CO2: 25 MMOL/L (ref 22–29)
CREAT SERPL-MCNC: 0.8 MG/DL (ref 0.5–0.9)
EOSINOPHILS ABSOLUTE: 0.3 K/UL (ref 0–0.6)
EOSINOPHILS RELATIVE PERCENT: 2.3 % (ref 0–5)
GFR NON-AFRICAN AMERICAN: >60
GLUCOSE BLD-MCNC: 115 MG/DL (ref 74–109)
HCT VFR BLD CALC: 41.2 % (ref 37–47)
HDLC SERPL-MCNC: 49 MG/DL (ref 65–121)
HEMOGLOBIN: 13.1 G/DL (ref 12–16)
LDL CHOLESTEROL CALCULATED: 96 MG/DL
LYMPHOCYTES ABSOLUTE: 3.3 K/UL (ref 1.1–4.5)
LYMPHOCYTES RELATIVE PERCENT: 27.7 % (ref 20–40)
MCH RBC QN AUTO: 29.8 PG (ref 27–31)
MCHC RBC AUTO-ENTMCNC: 31.8 G/DL (ref 33–37)
MCV RBC AUTO: 93.6 FL (ref 81–99)
MONOCYTES ABSOLUTE: 1.2 K/UL (ref 0–0.9)
MONOCYTES RELATIVE PERCENT: 9.8 % (ref 0–10)
NEUTROPHILS ABSOLUTE: 7.1 K/UL (ref 1.5–7.5)
NEUTROPHILS RELATIVE PERCENT: 59.7 % (ref 50–65)
PDW BLD-RTO: 13.7 % (ref 11.5–14.5)
PLATELET # BLD: 305 K/UL (ref 130–400)
PMV BLD AUTO: 10.4 FL (ref 9.4–12.3)
POTASSIUM SERPL-SCNC: 3.7 MMOL/L (ref 3.5–5)
RBC # BLD: 4.4 M/UL (ref 4.2–5.4)
SODIUM BLD-SCNC: 142 MMOL/L (ref 136–145)
TOTAL PROTEIN: 7.4 G/DL (ref 6.6–8.7)
TRIGL SERPL-MCNC: 202 MG/DL (ref 0–149)
TSH SERPL DL<=0.05 MIU/L-ACNC: 4.34 UIU/ML (ref 0.27–4.2)
WBC # BLD: 11.9 K/UL (ref 4.8–10.8)

## 2019-04-26 ENCOUNTER — TRANSCRIBE ORDERS (OUTPATIENT)
Dept: ADMINISTRATIVE | Facility: HOSPITAL | Age: 84
End: 2019-04-26

## 2019-04-26 DIAGNOSIS — J44.9 OBSTRUCTIVE CHRONIC BRONCHITIS WITHOUT EXACERBATION (HCC): Primary | ICD-10-CM

## 2019-04-26 LAB
C-REACTIVE PROTEIN: 0.41 MG/DL (ref 0–0.5)
HBA1C MFR BLD: 5.8 % (ref 4–6)
SEDIMENTATION RATE, ERYTHROCYTE: 31 MM/HR (ref 0–25)

## 2019-05-01 ENCOUNTER — HOSPITAL ENCOUNTER (OUTPATIENT)
Dept: PULMONOLOGY | Facility: HOSPITAL | Age: 84
Discharge: HOME | End: 2019-05-01
Admitting: INTERNAL MEDICINE

## 2019-05-01 PROCEDURE — 94729 DIFFUSING CAPACITY: CPT | Performed by: INTERNAL MEDICINE

## 2019-05-01 PROCEDURE — 94729 DIFFUSING CAPACITY: CPT

## 2019-05-01 PROCEDURE — 94727 GAS DIL/WSHOT DETER LNG VOL: CPT

## 2019-05-01 PROCEDURE — 94727 GAS DIL/WSHOT DETER LNG VOL: CPT | Performed by: INTERNAL MEDICINE

## 2019-05-01 PROCEDURE — 94060 EVALUATION OF WHEEZING: CPT | Performed by: INTERNAL MEDICINE

## 2019-05-01 PROCEDURE — 94060 EVALUATION OF WHEEZING: CPT

## 2019-05-01 RX ORDER — ALBUTEROL SULFATE 2.5 MG/3ML
2.5 SOLUTION RESPIRATORY (INHALATION) ONCE
Status: COMPLETED | OUTPATIENT
Start: 2019-05-01 | End: 2019-05-01

## 2019-05-01 RX ADMIN — ALBUTEROL SULFATE 2.5 MG: 2.5 SOLUTION RESPIRATORY (INHALATION) at 11:10

## 2019-09-27 ENCOUNTER — NURSE TRIAGE (OUTPATIENT)
Dept: CALL CENTER | Facility: HOSPITAL | Age: 84
End: 2019-09-27

## 2019-09-27 NOTE — TELEPHONE ENCOUNTER
"Wanting to let Dr. Woo know they found lorazepam prescription and she is filling it. Was up all night hallucinating after took lunesta. She will call office Monday when open to discuss further. Is not going to take it anymore.     Reason for Disposition  • Caller has NON-URGENT medication question about med that PCP prescribed and triager unable to answer question    Additional Information  • Negative: Drug overdose and nurse unable to answer question  • Negative: Caller requesting information not related to medicine  • Negative: Caller requesting a prescription for Strep throat and has a positive culture result  • Negative: Rash while taking a medication or within 3 days of stopping it  • Negative: Immunization reaction suspected  • Negative: [1] Asthma and [2] having symptoms of asthma (cough, wheezing, etc)  • Negative: MORE THAN A DOUBLE DOSE of a prescription or over-the-counter (OTC) drug  • Negative: [1] DOUBLE DOSE (an extra dose or lesser amount) of over-the-counter (OTC) drug AND [2] any symptoms (e.g., dizziness, nausea, pain, sleepiness)  • Negative: [1] DOUBLE DOSE (an extra dose or lesser amount) of prescription drug AND [2] any symptoms (e.g., dizziness, nausea, pain, sleepiness)  • Negative: Took another person's prescription drug  • Negative: [1] DOUBLE DOSE (an extra dose or lesser amount) of prescription drug AND [2] NO symptoms (Exception: a double dose of antibiotics)  • Negative: Diabetes drug error or overdose (e.g., insulin or extra dose)  • Negative: [1] Request for URGENT new prescription or refill of \"essential\" medication (i.e., likelihood of harm to patient if not taken) AND [2] triager unable to fill per unit policy  • Negative: [1] Prescription not at pharmacy AND [2] was prescribed today by PCP  • Negative: Pharmacy calling with prescription questions and triager unable to answer question  • Negative: Caller has URGENT medication question about med that PCP prescribed and triager " "unable to answer question    Answer Assessment - Initial Assessment Questions  1. SYMPTOMS: \"Do you have any symptoms?\"      Hallucinations on lunesta for sleep aid and wanted to let MD know found and filled script had lost for lorazepam  2. SEVERITY: If symptoms are present, ask \"Are they mild, moderate or severe?\"      NA    Protocols used: MEDICATION QUESTION CALL-ADULT-      "

## 2019-10-01 ENCOUNTER — OFFICE VISIT (OUTPATIENT)
Dept: INTERNAL MEDICINE | Age: 84
End: 2019-10-01
Payer: MEDICARE

## 2019-10-01 VITALS
WEIGHT: 189 LBS | BODY MASS INDEX: 30.37 KG/M2 | DIASTOLIC BLOOD PRESSURE: 82 MMHG | RESPIRATION RATE: 18 BRPM | OXYGEN SATURATION: 96 % | HEART RATE: 84 BPM | SYSTOLIC BLOOD PRESSURE: 136 MMHG | HEIGHT: 66 IN

## 2019-10-01 DIAGNOSIS — E03.9 HYPOTHYROIDISM, UNSPECIFIED TYPE: Primary | ICD-10-CM

## 2019-10-01 DIAGNOSIS — M15.9 PRIMARY OSTEOARTHRITIS INVOLVING MULTIPLE JOINTS: ICD-10-CM

## 2019-10-01 DIAGNOSIS — Z00.00 HEALTHCARE MAINTENANCE: ICD-10-CM

## 2019-10-01 DIAGNOSIS — F41.8 ANXIETY ASSOCIATED WITH DEPRESSION: ICD-10-CM

## 2019-10-01 DIAGNOSIS — E78.2 MIXED HYPERLIPIDEMIA: ICD-10-CM

## 2019-10-01 DIAGNOSIS — I11.9 HYPERTENSIVE HEART DISEASE WITHOUT CHF: ICD-10-CM

## 2019-10-01 DIAGNOSIS — N32.81 OVERACTIVE BLADDER: ICD-10-CM

## 2019-10-01 DIAGNOSIS — Z23 NEED FOR INFLUENZA VACCINATION: ICD-10-CM

## 2019-10-01 DIAGNOSIS — Z78.0 POST-MENOPAUSAL: ICD-10-CM

## 2019-10-01 DIAGNOSIS — R41.3 MEMORY IMPAIRMENT: ICD-10-CM

## 2019-10-01 DIAGNOSIS — F51.01 PRIMARY INSOMNIA: Primary | ICD-10-CM

## 2019-10-01 DIAGNOSIS — Z12.31 ENCOUNTER FOR SCREENING MAMMOGRAM FOR MALIGNANT NEOPLASM OF BREAST: ICD-10-CM

## 2019-10-01 DIAGNOSIS — Z12.39 SCREENING FOR BREAST CANCER: ICD-10-CM

## 2019-10-01 DIAGNOSIS — G47.09 OTHER INSOMNIA: ICD-10-CM

## 2019-10-01 DIAGNOSIS — M89.9 DISORDER OF BONE, UNSPECIFIED: ICD-10-CM

## 2019-10-01 PROBLEM — M15.0 PRIMARY OSTEOARTHRITIS INVOLVING MULTIPLE JOINTS: Status: ACTIVE | Noted: 2019-10-01

## 2019-10-01 LAB
ALBUMIN SERPL-MCNC: 4.1 G/DL (ref 3.5–5.2)
ALP BLD-CCNC: 70 U/L (ref 35–104)
ALT SERPL-CCNC: 24 U/L (ref 5–33)
ANION GAP SERPL CALCULATED.3IONS-SCNC: 18 MMOL/L (ref 7–19)
AST SERPL-CCNC: 44 U/L (ref 5–32)
BACTERIA: NEGATIVE /HPF
BASOPHILS ABSOLUTE: 0 K/UL (ref 0–0.2)
BASOPHILS RELATIVE PERCENT: 0.4 % (ref 0–1)
BILIRUB SERPL-MCNC: 0.3 MG/DL (ref 0.2–1.2)
BILIRUBIN URINE: NEGATIVE
BLOOD, URINE: NEGATIVE
BUN BLDV-MCNC: 16 MG/DL (ref 8–23)
CALCIUM SERPL-MCNC: 9.7 MG/DL (ref 8.8–10.2)
CHLORIDE BLD-SCNC: 104 MMOL/L (ref 98–111)
CHOLESTEROL, TOTAL: 202 MG/DL (ref 160–199)
CLARITY: CLEAR
CO2: 25 MMOL/L (ref 22–29)
COLOR: ABNORMAL
CREAT SERPL-MCNC: 0.8 MG/DL (ref 0.5–0.9)
EOSINOPHILS ABSOLUTE: 0.3 K/UL (ref 0–0.6)
EOSINOPHILS RELATIVE PERCENT: 2.8 % (ref 0–5)
EPITHELIAL CELLS, UA: 3 /HPF (ref 0–5)
GFR NON-AFRICAN AMERICAN: >60
GLUCOSE BLD-MCNC: 128 MG/DL (ref 74–109)
GLUCOSE URINE: NEGATIVE MG/DL
HCT VFR BLD CALC: 40 % (ref 37–47)
HDLC SERPL-MCNC: 48 MG/DL (ref 65–121)
HEMOGLOBIN: 12.6 G/DL (ref 12–16)
HYALINE CASTS: 3 /HPF (ref 0–8)
IMMATURE GRANULOCYTES #: 0 K/UL
KETONES, URINE: NEGATIVE MG/DL
LDL CHOLESTEROL CALCULATED: 105 MG/DL
LEUKOCYTE ESTERASE, URINE: ABNORMAL
LYMPHOCYTES ABSOLUTE: 3.5 K/UL (ref 1.1–4.5)
LYMPHOCYTES RELATIVE PERCENT: 36 % (ref 20–40)
MCH RBC QN AUTO: 29.7 PG (ref 27–31)
MCHC RBC AUTO-ENTMCNC: 31.5 G/DL (ref 33–37)
MCV RBC AUTO: 94.3 FL (ref 81–99)
MONOCYTES ABSOLUTE: 1.1 K/UL (ref 0–0.9)
MONOCYTES RELATIVE PERCENT: 11 % (ref 0–10)
NEUTROPHILS ABSOLUTE: 4.8 K/UL (ref 1.5–7.5)
NEUTROPHILS RELATIVE PERCENT: 49.4 % (ref 50–65)
NITRITE, URINE: NEGATIVE
PDW BLD-RTO: 13.3 % (ref 11.5–14.5)
PH UA: 6 (ref 5–8)
PLATELET # BLD: 295 K/UL (ref 130–400)
PMV BLD AUTO: 10.6 FL (ref 9.4–12.3)
POTASSIUM SERPL-SCNC: 3.6 MMOL/L (ref 3.5–5)
PROTEIN UA: NEGATIVE MG/DL
RBC # BLD: 4.24 M/UL (ref 4.2–5.4)
RBC UA: 2 /HPF (ref 0–4)
SODIUM BLD-SCNC: 147 MMOL/L (ref 136–145)
SPECIFIC GRAVITY UA: 1.02 (ref 1–1.03)
TOTAL PROTEIN: 7.3 G/DL (ref 6.6–8.7)
TRIGL SERPL-MCNC: 245 MG/DL (ref 0–149)
TSH SERPL DL<=0.05 MIU/L-ACNC: 7.71 UIU/ML (ref 0.27–4.2)
URINE REFLEX TO CULTURE: YES
UROBILINOGEN, URINE: 0.2 E.U./DL
VITAMIN D 25-HYDROXY: 33.6 NG/ML
WBC # BLD: 9.7 K/UL (ref 4.8–10.8)
WBC UA: 13 /HPF (ref 0–5)

## 2019-10-01 PROCEDURE — 1090F PRES/ABSN URINE INCON ASSESS: CPT | Performed by: NURSE PRACTITIONER

## 2019-10-01 PROCEDURE — G8417 CALC BMI ABV UP PARAM F/U: HCPCS | Performed by: NURSE PRACTITIONER

## 2019-10-01 PROCEDURE — 1123F ACP DISCUSS/DSCN MKR DOCD: CPT | Performed by: NURSE PRACTITIONER

## 2019-10-01 PROCEDURE — 99214 OFFICE O/P EST MOD 30 MIN: CPT | Performed by: NURSE PRACTITIONER

## 2019-10-01 PROCEDURE — G0008 ADMIN INFLUENZA VIRUS VAC: HCPCS | Performed by: NURSE PRACTITIONER

## 2019-10-01 PROCEDURE — G8482 FLU IMMUNIZE ORDER/ADMIN: HCPCS | Performed by: NURSE PRACTITIONER

## 2019-10-01 PROCEDURE — 4040F PNEUMOC VAC/ADMIN/RCVD: CPT | Performed by: NURSE PRACTITIONER

## 2019-10-01 PROCEDURE — 90653 IIV ADJUVANT VACCINE IM: CPT | Performed by: NURSE PRACTITIONER

## 2019-10-01 PROCEDURE — 1036F TOBACCO NON-USER: CPT | Performed by: NURSE PRACTITIONER

## 2019-10-01 PROCEDURE — G8427 DOCREV CUR MEDS BY ELIG CLIN: HCPCS | Performed by: NURSE PRACTITIONER

## 2019-10-01 RX ORDER — LORAZEPAM 1 MG/1
TABLET ORAL
Qty: 30 TABLET | Refills: 0 | Status: SHIPPED | OUTPATIENT
Start: 2019-10-01 | End: 2019-10-31 | Stop reason: SDUPTHER

## 2019-10-01 RX ORDER — LEVOTHYROXINE SODIUM 0.03 MG/1
25 TABLET ORAL DAILY
Qty: 90 TABLET | Refills: 1 | Status: SHIPPED | OUTPATIENT
Start: 2019-10-01 | End: 2019-10-01 | Stop reason: SDUPTHER

## 2019-10-01 RX ORDER — ESZOPICLONE 1 MG/1
1 TABLET, FILM COATED ORAL NIGHTLY
COMMUNITY
End: 2019-10-01

## 2019-10-01 RX ORDER — TIZANIDINE 4 MG/1
4 TABLET ORAL EVERY 6 HOURS PRN
COMMUNITY
End: 2020-01-24

## 2019-10-01 RX ORDER — LEVOTHYROXINE SODIUM 0.03 MG/1
25 TABLET ORAL DAILY
Qty: 90 TABLET | Refills: 1 | Status: SHIPPED | OUTPATIENT
Start: 2019-10-01 | End: 2020-01-06 | Stop reason: SDUPTHER

## 2019-10-01 RX ORDER — MIRTAZAPINE 30 MG/1
30 TABLET, FILM COATED ORAL NIGHTLY
Qty: 90 TABLET | Refills: 1 | Status: SHIPPED | OUTPATIENT
Start: 2019-10-01 | End: 2020-01-06 | Stop reason: SDUPTHER

## 2019-10-01 ASSESSMENT — ENCOUNTER SYMPTOMS
CHOKING: 0
ABDOMINAL PAIN: 0
DIARRHEA: 0
SHORTNESS OF BREATH: 0
CONSTIPATION: 0
COLOR CHANGE: 0
BLOOD IN STOOL: 0
WHEEZING: 0
VOMITING: 0
NAUSEA: 0
EYE DISCHARGE: 0
STRIDOR: 0
ABDOMINAL DISTENTION: 0
TROUBLE SWALLOWING: 0
COUGH: 0
SORE THROAT: 0
EYE ITCHING: 0

## 2019-10-03 ENCOUNTER — TELEPHONE (OUTPATIENT)
Dept: INTERNAL MEDICINE | Age: 84
End: 2019-10-03

## 2019-10-03 LAB — URINE CULTURE, ROUTINE: NORMAL

## 2019-10-07 ENCOUNTER — TELEPHONE (OUTPATIENT)
Dept: INTERNAL MEDICINE | Age: 84
End: 2019-10-07

## 2019-10-17 ENCOUNTER — OFFICE VISIT (OUTPATIENT)
Dept: INTERNAL MEDICINE | Age: 84
End: 2019-10-17
Payer: MEDICARE

## 2019-10-17 VITALS — SYSTOLIC BLOOD PRESSURE: 134 MMHG | DIASTOLIC BLOOD PRESSURE: 86 MMHG | HEART RATE: 93 BPM | OXYGEN SATURATION: 96 %

## 2019-10-17 DIAGNOSIS — F33.1 MAJOR DEPRESSIVE DISORDER, RECURRENT EPISODE, MODERATE (HCC): ICD-10-CM

## 2019-10-17 DIAGNOSIS — G47.09 OTHER INSOMNIA: ICD-10-CM

## 2019-10-17 DIAGNOSIS — F33.1 MAJOR DEPRESSIVE DISORDER, RECURRENT EPISODE, MODERATE (HCC): Primary | ICD-10-CM

## 2019-10-17 DIAGNOSIS — N32.81 OVERACTIVE BLADDER: ICD-10-CM

## 2019-10-17 DIAGNOSIS — F41.8 ANXIETY ASSOCIATED WITH DEPRESSION: ICD-10-CM

## 2019-10-17 PROCEDURE — G8417 CALC BMI ABV UP PARAM F/U: HCPCS | Performed by: NURSE PRACTITIONER

## 2019-10-17 PROCEDURE — 99214 OFFICE O/P EST MOD 30 MIN: CPT | Performed by: NURSE PRACTITIONER

## 2019-10-17 PROCEDURE — G8482 FLU IMMUNIZE ORDER/ADMIN: HCPCS | Performed by: NURSE PRACTITIONER

## 2019-10-17 PROCEDURE — 1090F PRES/ABSN URINE INCON ASSESS: CPT | Performed by: NURSE PRACTITIONER

## 2019-10-17 PROCEDURE — 1036F TOBACCO NON-USER: CPT | Performed by: NURSE PRACTITIONER

## 2019-10-17 PROCEDURE — 1123F ACP DISCUSS/DSCN MKR DOCD: CPT | Performed by: NURSE PRACTITIONER

## 2019-10-17 PROCEDURE — 4040F PNEUMOC VAC/ADMIN/RCVD: CPT | Performed by: NURSE PRACTITIONER

## 2019-10-17 PROCEDURE — G8427 DOCREV CUR MEDS BY ELIG CLIN: HCPCS | Performed by: NURSE PRACTITIONER

## 2019-10-17 RX ORDER — ESCITALOPRAM OXALATE 10 MG/1
10 TABLET ORAL DAILY
Qty: 90 TABLET | Refills: 0 | Status: SHIPPED | OUTPATIENT
Start: 2019-10-17 | End: 2019-12-10 | Stop reason: SDUPTHER

## 2019-10-17 RX ORDER — ESOMEPRAZOLE MAGNESIUM 40 MG/1
40 CAPSULE, DELAYED RELEASE ORAL DAILY
Qty: 90 CAPSULE | Refills: 3 | Status: SHIPPED | OUTPATIENT
Start: 2019-10-17 | End: 2020-01-06 | Stop reason: SDUPTHER

## 2019-10-17 ASSESSMENT — ENCOUNTER SYMPTOMS
ABDOMINAL PAIN: 0
ABDOMINAL DISTENTION: 0
WHEEZING: 0
EYE ITCHING: 0
SORE THROAT: 0
BLOOD IN STOOL: 0
COUGH: 0
COLOR CHANGE: 0
SHORTNESS OF BREATH: 0
DIARRHEA: 0
EYE DISCHARGE: 0
VOMITING: 0
TROUBLE SWALLOWING: 0
CHOKING: 0
STRIDOR: 0
CONSTIPATION: 0
NAUSEA: 0

## 2019-10-30 ENCOUNTER — TELEPHONE (OUTPATIENT)
Dept: INTERNAL MEDICINE | Age: 84
End: 2019-10-30

## 2019-10-30 ENCOUNTER — OFFICE VISIT (OUTPATIENT)
Dept: INTERNAL MEDICINE | Age: 84
End: 2019-10-30
Payer: MEDICARE

## 2019-10-30 VITALS
HEIGHT: 66 IN | SYSTOLIC BLOOD PRESSURE: 134 MMHG | DIASTOLIC BLOOD PRESSURE: 76 MMHG | WEIGHT: 191 LBS | RESPIRATION RATE: 18 BRPM | HEART RATE: 85 BPM | BODY MASS INDEX: 30.7 KG/M2 | OXYGEN SATURATION: 92 %

## 2019-10-30 DIAGNOSIS — R55 SYNCOPE, UNSPECIFIED SYNCOPE TYPE: Primary | ICD-10-CM

## 2019-10-30 DIAGNOSIS — R41.82 ALTERED MENTAL STATUS, UNSPECIFIED ALTERED MENTAL STATUS TYPE: ICD-10-CM

## 2019-10-30 PROCEDURE — G8417 CALC BMI ABV UP PARAM F/U: HCPCS | Performed by: NURSE PRACTITIONER

## 2019-10-30 PROCEDURE — 4040F PNEUMOC VAC/ADMIN/RCVD: CPT | Performed by: NURSE PRACTITIONER

## 2019-10-30 PROCEDURE — 99213 OFFICE O/P EST LOW 20 MIN: CPT | Performed by: NURSE PRACTITIONER

## 2019-10-30 PROCEDURE — G8427 DOCREV CUR MEDS BY ELIG CLIN: HCPCS | Performed by: NURSE PRACTITIONER

## 2019-10-30 PROCEDURE — 1090F PRES/ABSN URINE INCON ASSESS: CPT | Performed by: NURSE PRACTITIONER

## 2019-10-30 PROCEDURE — 1036F TOBACCO NON-USER: CPT | Performed by: NURSE PRACTITIONER

## 2019-10-30 PROCEDURE — G8482 FLU IMMUNIZE ORDER/ADMIN: HCPCS | Performed by: NURSE PRACTITIONER

## 2019-10-30 PROCEDURE — 1123F ACP DISCUSS/DSCN MKR DOCD: CPT | Performed by: NURSE PRACTITIONER

## 2019-10-30 RX ORDER — ASPIRIN 81 MG/1
81 TABLET ORAL DAILY
Qty: 90 TABLET | Refills: 1
Start: 2019-10-30 | End: 2020-01-24

## 2019-10-30 ASSESSMENT — ENCOUNTER SYMPTOMS
SHORTNESS OF BREATH: 0
EYE DISCHARGE: 0
VOMITING: 0
COLOR CHANGE: 0
BLOOD IN STOOL: 0
EYE ITCHING: 0
TROUBLE SWALLOWING: 0
NAUSEA: 0
WHEEZING: 0
CONSTIPATION: 0
COUGH: 0
ABDOMINAL PAIN: 0
CHOKING: 0
SORE THROAT: 0
STRIDOR: 0
ABDOMINAL DISTENTION: 0
DIARRHEA: 0

## 2019-10-31 DIAGNOSIS — F51.01 PRIMARY INSOMNIA: ICD-10-CM

## 2019-11-04 RX ORDER — LORAZEPAM 1 MG/1
TABLET ORAL
Qty: 45 TABLET | Refills: 0 | Status: SHIPPED | OUTPATIENT
Start: 2019-11-04 | End: 2019-12-02 | Stop reason: SDUPTHER

## 2019-11-26 ENCOUNTER — HOSPITAL ENCOUNTER (OUTPATIENT)
Dept: MRI IMAGING | Age: 84
Discharge: HOME | End: 2019-11-26
Payer: MEDICARE

## 2019-11-26 ENCOUNTER — HOSPITAL ENCOUNTER (OUTPATIENT)
Dept: NEUROLOGY | Age: 84
Discharge: HOME | End: 2019-11-26
Payer: MEDICARE

## 2019-11-26 DIAGNOSIS — R55 SYNCOPE, UNSPECIFIED SYNCOPE TYPE: ICD-10-CM

## 2019-11-26 DIAGNOSIS — R41.82 ALTERED MENTAL STATUS, UNSPECIFIED ALTERED MENTAL STATUS TYPE: ICD-10-CM

## 2019-11-26 PROCEDURE — 70547 MR ANGIOGRAPHY NECK W/O DYE: CPT

## 2019-11-26 PROCEDURE — 70544 MR ANGIOGRAPHY HEAD W/O DYE: CPT

## 2019-12-01 ENCOUNTER — TELEPHONE (OUTPATIENT)
Dept: INTERNAL MEDICINE | Age: 84
End: 2019-12-01

## 2019-12-01 DIAGNOSIS — R11.2 NON-INTRACTABLE VOMITING WITH NAUSEA, UNSPECIFIED VOMITING TYPE: Primary | ICD-10-CM

## 2019-12-01 RX ORDER — PROMETHAZINE HYDROCHLORIDE 25 MG/1
25 TABLET ORAL EVERY 6 HOURS PRN
Qty: 12 TABLET | Refills: 1 | Status: SHIPPED | OUTPATIENT
Start: 2019-12-01 | End: 2019-12-08

## 2019-12-02 DIAGNOSIS — F51.01 PRIMARY INSOMNIA: ICD-10-CM

## 2019-12-02 RX ORDER — LORAZEPAM 1 MG/1
TABLET ORAL
Qty: 45 TABLET | Refills: 0 | Status: SHIPPED | OUTPATIENT
Start: 2019-12-02 | End: 2019-12-10 | Stop reason: SDUPTHER

## 2019-12-04 ENCOUNTER — HOSPITAL ENCOUNTER (OUTPATIENT)
Dept: NEUROLOGY | Age: 84
Discharge: HOME | End: 2019-12-04
Payer: MEDICARE

## 2019-12-04 DIAGNOSIS — F51.01 PRIMARY INSOMNIA: ICD-10-CM

## 2019-12-04 PROCEDURE — 95816 EEG AWAKE AND DROWSY: CPT

## 2019-12-04 PROCEDURE — 95816 EEG AWAKE AND DROWSY: CPT | Performed by: PSYCHIATRY & NEUROLOGY

## 2019-12-04 RX ORDER — LORAZEPAM 1 MG/1
TABLET ORAL
Qty: 45 TABLET | Refills: 0 | Status: SHIPPED | OUTPATIENT
Start: 2019-12-04 | End: 2020-01-06 | Stop reason: SDUPTHER

## 2019-12-09 RX ORDER — ATORVASTATIN CALCIUM 20 MG/1
10 TABLET, FILM COATED ORAL DAILY
Qty: 90 TABLET | Refills: 1 | Status: SHIPPED | OUTPATIENT
Start: 2019-12-09 | End: 2020-01-06 | Stop reason: SDUPTHER

## 2019-12-10 DIAGNOSIS — F51.01 PRIMARY INSOMNIA: ICD-10-CM

## 2019-12-10 RX ORDER — ESCITALOPRAM OXALATE 20 MG/1
20 TABLET ORAL DAILY
Qty: 90 TABLET | Refills: 1 | Status: SHIPPED | OUTPATIENT
Start: 2019-12-10 | End: 2020-01-06 | Stop reason: SDUPTHER

## 2019-12-10 RX ORDER — LORAZEPAM 1 MG/1
TABLET ORAL
Qty: 45 TABLET | Refills: 0 | Status: SHIPPED | OUTPATIENT
Start: 2019-12-10 | End: 2020-01-06 | Stop reason: SDUPTHER

## 2020-01-06 ENCOUNTER — OFFICE VISIT (OUTPATIENT)
Dept: INTERNAL MEDICINE | Age: 85
End: 2020-01-06
Payer: MEDICARE

## 2020-01-06 VITALS
WEIGHT: 186 LBS | HEART RATE: 84 BPM | BODY MASS INDEX: 29.89 KG/M2 | HEIGHT: 66 IN | OXYGEN SATURATION: 94 % | DIASTOLIC BLOOD PRESSURE: 85 MMHG | SYSTOLIC BLOOD PRESSURE: 138 MMHG

## 2020-01-06 DIAGNOSIS — E78.2 MIXED HYPERLIPIDEMIA: ICD-10-CM

## 2020-01-06 DIAGNOSIS — E03.9 HYPOTHYROIDISM, UNSPECIFIED TYPE: ICD-10-CM

## 2020-01-06 DIAGNOSIS — I11.9 HYPERTENSIVE HEART DISEASE WITHOUT CHF: ICD-10-CM

## 2020-01-06 PROBLEM — E03.8 OTHER SPECIFIED HYPOTHYROIDISM: Status: ACTIVE | Noted: 2020-01-06

## 2020-01-06 LAB
ALBUMIN SERPL-MCNC: 4.2 G/DL (ref 3.5–5.2)
ALP BLD-CCNC: 71 U/L (ref 35–104)
ALT SERPL-CCNC: 19 U/L (ref 5–33)
ANION GAP SERPL CALCULATED.3IONS-SCNC: 16 MMOL/L (ref 7–19)
AST SERPL-CCNC: 42 U/L (ref 5–32)
BILIRUB SERPL-MCNC: 0.5 MG/DL (ref 0.2–1.2)
BUN BLDV-MCNC: 15 MG/DL (ref 8–23)
CALCIUM SERPL-MCNC: 9.5 MG/DL (ref 8.8–10.2)
CHLORIDE BLD-SCNC: 103 MMOL/L (ref 98–111)
CO2: 27 MMOL/L (ref 22–29)
CREAT SERPL-MCNC: 0.8 MG/DL (ref 0.5–0.9)
GFR NON-AFRICAN AMERICAN: >60
GLUCOSE BLD-MCNC: 110 MG/DL (ref 74–109)
POTASSIUM SERPL-SCNC: 4.1 MMOL/L (ref 3.5–5)
SODIUM BLD-SCNC: 146 MMOL/L (ref 136–145)
TOTAL PROTEIN: 7.3 G/DL (ref 6.6–8.7)
TRIGL SERPL-MCNC: 190 MG/DL (ref 0–149)
TSH SERPL DL<=0.05 MIU/L-ACNC: 2.24 UIU/ML (ref 0.27–4.2)

## 2020-01-06 PROCEDURE — G8482 FLU IMMUNIZE ORDER/ADMIN: HCPCS | Performed by: NURSE PRACTITIONER

## 2020-01-06 PROCEDURE — 1123F ACP DISCUSS/DSCN MKR DOCD: CPT | Performed by: NURSE PRACTITIONER

## 2020-01-06 PROCEDURE — 4040F PNEUMOC VAC/ADMIN/RCVD: CPT | Performed by: NURSE PRACTITIONER

## 2020-01-06 PROCEDURE — 1036F TOBACCO NON-USER: CPT | Performed by: NURSE PRACTITIONER

## 2020-01-06 PROCEDURE — G0439 PPPS, SUBSEQ VISIT: HCPCS | Performed by: NURSE PRACTITIONER

## 2020-01-06 PROCEDURE — G8427 DOCREV CUR MEDS BY ELIG CLIN: HCPCS | Performed by: NURSE PRACTITIONER

## 2020-01-06 PROCEDURE — 1090F PRES/ABSN URINE INCON ASSESS: CPT | Performed by: NURSE PRACTITIONER

## 2020-01-06 PROCEDURE — G8417 CALC BMI ABV UP PARAM F/U: HCPCS | Performed by: NURSE PRACTITIONER

## 2020-01-06 PROCEDURE — 99214 OFFICE O/P EST MOD 30 MIN: CPT | Performed by: NURSE PRACTITIONER

## 2020-01-06 RX ORDER — LEVOTHYROXINE SODIUM 0.03 MG/1
25 TABLET ORAL DAILY
Qty: 90 TABLET | Refills: 1 | Status: SHIPPED | OUTPATIENT
Start: 2020-01-06 | End: 2020-05-21 | Stop reason: SDUPTHER

## 2020-01-06 RX ORDER — ATORVASTATIN CALCIUM 20 MG/1
10 TABLET, FILM COATED ORAL DAILY
Qty: 90 TABLET | Refills: 1 | Status: SHIPPED | OUTPATIENT
Start: 2020-01-06 | End: 2021-09-13 | Stop reason: SDUPTHER

## 2020-01-06 RX ORDER — LEVOCETIRIZINE DIHYDROCHLORIDE 5 MG/1
TABLET, FILM COATED ORAL
Qty: 90 TABLET | Refills: 1 | Status: SHIPPED | OUTPATIENT
Start: 2020-01-06 | End: 2020-07-27

## 2020-01-06 RX ORDER — MIRTAZAPINE 30 MG/1
30 TABLET, FILM COATED ORAL NIGHTLY
Qty: 90 TABLET | Refills: 1 | Status: SHIPPED | OUTPATIENT
Start: 2020-01-06 | End: 2020-09-02

## 2020-01-06 RX ORDER — CLOPIDOGREL BISULFATE 75 MG/1
TABLET ORAL
Qty: 90 TABLET | Refills: 1 | Status: SHIPPED | OUTPATIENT
Start: 2020-01-06 | End: 2020-04-12 | Stop reason: SDUPTHER

## 2020-01-06 RX ORDER — ESCITALOPRAM OXALATE 20 MG/1
20 TABLET ORAL DAILY
Qty: 90 TABLET | Refills: 1 | Status: SHIPPED | OUTPATIENT
Start: 2020-01-06 | End: 2020-05-21 | Stop reason: SDUPTHER

## 2020-01-06 RX ORDER — LORAZEPAM 1 MG/1
TABLET ORAL
Qty: 45 TABLET | Refills: 0 | Status: SHIPPED | OUTPATIENT
Start: 2020-01-06 | End: 2020-02-04 | Stop reason: SDUPTHER

## 2020-01-06 RX ORDER — ESOMEPRAZOLE MAGNESIUM 40 MG/1
40 CAPSULE, DELAYED RELEASE ORAL DAILY
Qty: 90 CAPSULE | Refills: 3 | Status: SHIPPED | OUTPATIENT
Start: 2020-01-06 | End: 2020-03-16 | Stop reason: SDUPTHER

## 2020-01-06 RX ORDER — LOSARTAN POTASSIUM AND HYDROCHLOROTHIAZIDE 12.5; 1 MG/1; MG/1
1 TABLET ORAL DAILY
Qty: 90 TABLET | Refills: 3 | Status: SHIPPED | OUTPATIENT
Start: 2020-01-06 | End: 2021-02-08

## 2020-01-06 ASSESSMENT — ENCOUNTER SYMPTOMS
VOMITING: 0
EYE DISCHARGE: 0
CONSTIPATION: 0
WHEEZING: 0
TROUBLE SWALLOWING: 0
CHOKING: 0
ABDOMINAL DISTENTION: 0
EYE ITCHING: 0
DIARRHEA: 0
ABDOMINAL PAIN: 0
STRIDOR: 0
COUGH: 0
SORE THROAT: 0
BLOOD IN STOOL: 0
NAUSEA: 0
SHORTNESS OF BREATH: 0
COLOR CHANGE: 0

## 2020-01-06 ASSESSMENT — LIFESTYLE VARIABLES: HOW OFTEN DO YOU HAVE A DRINK CONTAINING ALCOHOL: 0

## 2020-01-06 ASSESSMENT — PATIENT HEALTH QUESTIONNAIRE - PHQ9
SUM OF ALL RESPONSES TO PHQ QUESTIONS 1-9: 0
SUM OF ALL RESPONSES TO PHQ QUESTIONS 1-9: 0

## 2020-01-06 NOTE — PROGRESS NOTES
Pulmonary fibrosis ([facility] [address]     Sciatica     Sleep apnea     Transient cerebral ischemic attack     Urinary incontinence     Weakness of both legs       Past Surgical History:   Procedure Laterality Date    COLONOSCOPY  06/12/2015    Shiben, repeat as needed    HYSTERECTOMY      LUNG SEGMENTECTOMY      lung segmental resection       Vitals 1/6/2020 1/6/2020 10/30/2019 10/17/2019 10/1/2019 5/51/8906   SYSTOLIC [phone]   DIASTOLIC 85 85 76 86 82 78   Site - - - - - -   Pulse - 84 85 93 84 86   Resp - - 18 - 18 16   SpO2 - 94 92 96 96 95   Weight - 186 lb 191 lb - 189 lb 182 lb   Height - 5' 6\" 5' 6\" - 5' 6\" 5' 6\"   BMI (wt*703/ht~2) - 30.02 kg/m2 30.82 kg/m2 - 30.5 kg/m2 29.37 kg/m2       Family History   Problem Relation Age of Onset    Heart Failure Mother     Diabetes Mother     Heart Disease Mother     Diabetes Father     Heart Disease Father        Social History     Tobacco Use    Smoking status: Never Smoker    Smokeless tobacco: Never Used   Substance Use Topics    Alcohol use: No      Current Outpatient Medications   Medication Sig Dispense Refill    mirabegron (MYRBETRIQ) 50 MG TB24 Take 50 mg by mouth daily 90 tablet 1    levothyroxine (SYNTHROID) 25 MCG tablet Take 1 tablet by mouth daily Take on an empty stomach and you have to wait an hour after you take the medication before you can eat or drink anything or take other medications 90 tablet 1    escitalopram (LEXAPRO) 20 MG tablet Take 1 tablet by mouth daily 90 tablet 1    esomeprazole (NEXIUM) 40 MG delayed release capsule Take 1 capsule by mouth daily 90 capsule 3    losartan-hydrochlorothiazide (HYZAAR) 100-12.5 MG per tablet Take 1 tablet by mouth daily 90 tablet 3    clopidogrel (PLAVIX) 75 MG tablet TAKE 1 TABLET DAILY 90 tablet 1    atorvastatin (LIPITOR) 20 MG tablet Take 0.5 tablets by mouth daily 90 tablet 1    levocetirizine (XYZAL) 5 MG tablet TAKE 1 TABLET NIGHTLY 90 tablet 1    mirtazapine (REMERON) 30 pain, blood in stool, constipation, diarrhea, nausea and vomiting. Endocrine: Negative for cold intolerance, polydipsia and polyuria. Genitourinary: Negative for difficulty urinating, dysuria, frequency and urgency. Musculoskeletal: Negative for arthralgias and gait problem. Skin: Positive for rash. Negative for color change. Allergic/Immunologic: Negative for food allergies and immunocompromised state. Neurological: Negative for dizziness, tremors, syncope, speech difficulty, weakness and headaches. Hematological: Negative for adenopathy. Does not bruise/bleed easily. Psychiatric/Behavioral: Negative for confusion and hallucinations. Objective:     Physical Exam  Constitutional:       General: She is not in acute distress. Appearance: She is well-developed. HENT:      Head: Normocephalic and atraumatic. Eyes:      General: No scleral icterus. Right eye: No discharge. Left eye: No discharge. Pupils: Pupils are equal, round, and reactive to light. Neck:      Musculoskeletal: Normal range of motion and neck supple. Thyroid: No thyromegaly. Vascular: No JVD. Cardiovascular:      Rate and Rhythm: Normal rate and regular rhythm. Heart sounds: Normal heart sounds. No murmur. Pulmonary:      Effort: Pulmonary effort is normal. No respiratory distress. Breath sounds: Normal breath sounds. No wheezing or rales. Abdominal:      General: Bowel sounds are normal. There is no distension. Palpations: Abdomen is soft. There is no mass. Tenderness: There is no tenderness. There is no guarding or rebound. Musculoskeletal: Normal range of motion. General: No tenderness. Skin:     General: Skin is warm and dry. Findings: Rash present. No erythema. Comments: Has the pattern and similar appearance of shingles.   The lesions are very mild she has had her first shingles injection so this may just be a mild case as she escitalopram (LEXAPRO) 20 MG tablet     Sig: Take 1 tablet by mouth daily     Dispense:  90 tablet     Refill:  1    esomeprazole (NEXIUM) 40 MG delayed release capsule     Sig: Take 1 capsule by mouth daily     Dispense:  90 capsule     Refill:  3    losartan-hydrochlorothiazide (HYZAAR) 100-12.5 MG per tablet     Sig: Take 1 tablet by mouth daily     Dispense:  90 tablet     Refill:  3    clopidogrel (PLAVIX) 75 MG tablet     Sig: TAKE 1 TABLET DAILY     Dispense:  90 tablet     Refill:  1    atorvastatin (LIPITOR) 20 MG tablet     Sig: Take 0.5 tablets by mouth daily     Dispense:  90 tablet     Refill:  1    levocetirizine (XYZAL) 5 MG tablet     Sig: TAKE 1 TABLET NIGHTLY     Dispense:  90 tablet     Refill:  1    mirtazapine (REMERON) 30 MG tablet     Sig: Take 1 tablet by mouth nightly     Dispense:  90 tablet     Refill:  1    LORazepam (ATIVAN) 1 MG tablet     Sig: TAKE 1/2 TABLET BY MOUTH IN THE MORNING AND 1 TABLET BY MOUTH AT BEDTIME     Dispense:  45 tablet     Refill:  0         ORDERS:  Orders Placed This Encounter   Procedures    US THYROID    CBC Auto Differential    Comprehensive Metabolic Panel    Lipid Panel    Vitamin D 25 Hydroxy    Urinalysis Reflex to Culture    TSH without Reflex       Follow-up:  No follow-ups on file. PATIENT INSTRUCTIONS:  Patient Instructions   1. Rash; We are going to try shingles b gone   2. Anxiety stable on current dose of lorazepam   3. Hypothyroidism  Stable getting labs today   4. Htn;  Stable for now;   5. Depression; Stable for now, no changes     Electronically signed by SONIA Traore on 1/6/2020 at 3:06 PM    EMRDragon/transcription disclaimer:  Much of this encounter note is electronic transcription/translation of spoken language to printed texts. The electronic translation of spoken language may be erroneous, or at times,nonsensical words or phrases may be inadvertently transcribed.   Although I have reviewed the note for such ready to increase his/her physical activity level at this time    Hearing/Vision:  No exam data present  Hearing/Vision  Do you or your family notice any trouble with your hearing?: (!) Yes  Do you have difficulty driving, watching TV, or doing any of your daily activities because of your eyesight?: (!) Yes  Have you had an eye exam within the past year?: (!) No  Hearing/Vision Interventions:  · Hearing concerns:  patient declines any further evaluation/treatment for hearing issues    ADL:  ADLs  In the past 7 days, did you need help from others to perform any of the following everyday activities? Eating, dressing, grooming, bathing, toileting, or walking/balance?: None  In the past 7 days, did you need help from others to take care of any of the following?  Laundry, housekeeping, banking/finances, shopping, telephone use, food preparation, transportation, or taking medications?: (!) Laundry, Housekeeping, Banking/Finances, Shopping, Food Preparation, Transportation, Taking Medications  ADL Interventions:  · Patient declines any further evaluation/treatment for this issue  · her sister daughter and grandaukellyter are helping her    Personalized Preventive Plan   Current Health Maintenance Status  Immunization History   Administered Date(s) Administered    Influenza Virus Vaccine 11/02/2016    Influenza, High Dose (Fluzone 65 yrs and older) 11/09/2017    Influenza, Triv, inactivated, subunit, adjuvanted, IM (Fluad 65 yrs and older) 10/01/2019    Pneumococcal Conjugate 13-valent (Alejandro Culver) 11/02/2016    Pneumococcal Polysaccharide ([id number]) 09/16/2002, 12/01/2015        Health Maintenance   Topic Date Due    Annual Wellness Visit (AWV)  06/20/2019    DTaP/Tdap/Td vaccine (1 - Tdap) 01/23/2020 (Originally 12/18/1943)    Shingles Vaccine (1 of 2) 01/02/2021 (Originally 12/18/1982)    Lipid screen  10/01/2020    TSH testing  10/01/2020    Potassium monitoring  10/01/2020    Creatinine monitoring  10/01/2020  Flu vaccine  Completed    Pneumococcal 65+ years Vaccine  Completed     Recommendations for Preventive Services Due: see orders and patient instructions/AVS.  . Recommended screening schedule for the next 5-10 years is provided to the patient in written form: see Patient Liudmila Kwong was seen today for medicare awv and anxiety. Diagnoses and all orders for this visit:    Hypertension, essential  -     CBC Auto Differential; Future  -     Comprehensive Metabolic Panel; Future  -     Urinalysis Reflex to Culture; Future    Primary insomnia  -     LORazepam (ATIVAN) 1 MG tablet; TAKE 1/2 TABLET BY MOUTH IN THE MORNING AND 1 TABLET BY MOUTH AT BEDTIME    Major depressive disorder, recurrent episode, moderate (HCC)    Other specified hypothyroidism  -     TSH without Reflex; Future  -     US THYROID; Future    Mixed hyperlipidemia  -     Lipid Panel; Future    Primary osteoarthritis involving multiple joints  -     Vitamin D 25 Hydroxy; Future    Disorder of bone, unspecified   -     Vitamin D 25 Hydroxy; Future    Other orders  -     mirabegron (MYRBETRIQ) 50 MG TB24; Take 50 mg by mouth daily  -     levothyroxine (SYNTHROID) 25 MCG tablet; Take 1 tablet by mouth daily Take on an empty stomach and you have to wait an hour after you take the medication before you can eat or drink anything or take other medications  -     escitalopram (LEXAPRO) 20 MG tablet; Take 1 tablet by mouth daily  -     esomeprazole (NEXIUM) 40 MG delayed release capsule; Take 1 capsule by mouth daily  -     losartan-hydrochlorothiazide (HYZAAR) 100-12.5 MG per tablet; Take 1 tablet by mouth daily  -     clopidogrel (PLAVIX) 75 MG tablet; TAKE 1 TABLET DAILY  -     atorvastatin (LIPITOR) 20 MG tablet; Take 0.5 tablets by mouth daily  -     levocetirizine (XYZAL) 5 MG tablet; TAKE 1 TABLET NIGHTLY  -     mirtazapine (REMERON) 30 MG tablet;  Take 1 tablet by mouth nightly

## 2020-01-10 ENCOUNTER — HOSPITAL ENCOUNTER (OUTPATIENT)
Dept: ULTRASOUND IMAGING | Age: 85
Discharge: HOME | End: 2020-01-10
Payer: MEDICARE

## 2020-01-10 PROCEDURE — 76536 US EXAM OF HEAD AND NECK: CPT

## 2020-01-16 ENCOUNTER — TELEPHONE (OUTPATIENT)
Dept: OTOLARYNGOLOGY | Age: 85
End: 2020-01-16

## 2020-01-17 ENCOUNTER — OFFICE VISIT (OUTPATIENT)
Dept: OTOLARYNGOLOGY | Age: 85
End: 2020-01-17
Payer: MEDICARE

## 2020-01-17 VITALS
BODY MASS INDEX: 31.4 KG/M2 | HEIGHT: 65 IN | WEIGHT: 188.5 LBS | TEMPERATURE: 97.7 F | DIASTOLIC BLOOD PRESSURE: 82 MMHG | SYSTOLIC BLOOD PRESSURE: 132 MMHG

## 2020-01-17 PROCEDURE — G8417 CALC BMI ABV UP PARAM F/U: HCPCS | Performed by: NURSE PRACTITIONER

## 2020-01-17 PROCEDURE — G8482 FLU IMMUNIZE ORDER/ADMIN: HCPCS | Performed by: NURSE PRACTITIONER

## 2020-01-17 PROCEDURE — 1036F TOBACCO NON-USER: CPT | Performed by: NURSE PRACTITIONER

## 2020-01-17 PROCEDURE — 1123F ACP DISCUSS/DSCN MKR DOCD: CPT | Performed by: NURSE PRACTITIONER

## 2020-01-17 PROCEDURE — 4040F PNEUMOC VAC/ADMIN/RCVD: CPT | Performed by: NURSE PRACTITIONER

## 2020-01-17 PROCEDURE — G8427 DOCREV CUR MEDS BY ELIG CLIN: HCPCS | Performed by: NURSE PRACTITIONER

## 2020-01-17 PROCEDURE — 1090F PRES/ABSN URINE INCON ASSESS: CPT | Performed by: NURSE PRACTITIONER

## 2020-01-17 PROCEDURE — 99213 OFFICE O/P EST LOW 20 MIN: CPT | Performed by: NURSE PRACTITIONER

## 2020-01-17 ASSESSMENT — ENCOUNTER SYMPTOMS
GASTROINTESTINAL NEGATIVE: 1
EYES NEGATIVE: 1
RESPIRATORY NEGATIVE: 1
ALLERGIC/IMMUNOLOGIC NEGATIVE: 1

## 2020-01-17 NOTE — PROGRESS NOTES
Office Visit     Patient Care Team: Patient Care Team:  SONIA Quinteros as PCP - General (Nurse Practitioner Acute Care)  SONIA Quinteros as PCP - [facility] Empaneled Provider  Amy Oralia Gaucher, APRN - CNP as Nurse Practitioner (Otolaryngology)  Surgery: No surgery found No surgery found    Chief Complaint:  Thyroid Problem      Ivana Scott is a 80 y.o. female who is here for {AB follow up:76508}. These symptoms initially started {NUMBER:96024} {[place] [address] [address] (DAYS WEEK  MONTH):[id number]}. The symptoms have been *** in nature. The patient symptoms have been {Improving/worsening/no change:79052}. The symptoms are aggravated by {nothin}. The symptoms are alleviated by  {nothin}. ***     DATA REVIEWED THIS VISIT:   {ab review:03665}  ***     This data has been reviewed by SONIA Serna and treatment implemented as necessary.            Past Medical History:   Diagnosis Date    Abnormal mammogram     Anxiety     Anxiety associated with depression     Artery disease, cerebral     Ataxia     Benign diastolic hypertension     Carpal tunnel syndrome     idiopathic peripheral    Cellulitis of face     Chronic back pain     COPD (chronic obstructive pulmonary disease) (HCC)     Depression     Disc degeneration, lumbosacral     Diverticulosis     Dysphagia     Esophageal reflux     Fever blister     Herpes simplex     Herpes zoster     Hypertension, essential     Hypertensive heart disease without CHF     Hypoxia     Idiopathic peripheral neuropathy     2017    Insomnia     Major depressive disorder, recurrent episode, moderate (HCC)     Mixed hyperlipidemia     Osteoporosis     Ovarian failure     Pulmonary fibrosis (HCC)     Sciatica     Sleep apnea     Transient cerebral ischemic attack     Urinary incontinence     Weakness of both legs           Past Surgical History:   Procedure Laterality Date    COLONOSCOPY  2015    Aubree, repeat as needed    HYSTERECTOMY      LUNG SEGMENTECTOMY      lung segmental resection          Allergies   Allergen Reactions    Augmentin [Amoxicillin-Pot Clavulanate]     Cefdinir     Effexor [Venlafaxine]     Erythromycin     Lortab [Hydrocodone-Acetaminophen]     Naprosyn [Naproxen]           Family History   Problem Relation Age of Onset    Heart Failure Mother     Diabetes Mother     Heart Disease Mother     Diabetes Father     Heart Disease Father           Social History     Socioeconomic History    Marital status:       Spouse name: None    Number of children: None    Years of education: None    Highest education level: None   Occupational History    None   Social Needs    Financial resource strain: None    Food insecurity:     Worry: None     Inability: None    Transportation needs:     Medical: None     Non-medical: None   Tobacco Use    Smoking status: Never Smoker    Smokeless tobacco: Never Used   Substance and Sexual Activity    Alcohol use: No    Drug use: No    Sexual activity: None   Lifestyle    Physical activity:     Days per week: None     Minutes per session: None    Stress: None   Relationships    Social connections:     Talks on phone: None     Gets together: None     Attends Jewish service: None     Active member of club or organization: None     Attends meetings of clubs or organizations: None     Relationship status: None    Intimate partner violence:     Fear of current or ex partner: None     Emotionally abused: None     Physically abused: None     Forced sexual activity: None   Other Topics Concern    None   Social History Narrative    None          Current Outpatient Medications   Medication Sig Dispense Refill    mirabegron (MYRBETRIQ) 50 MG TB24 Take 50 mg by mouth daily 90 tablet 1    levothyroxine (SYNTHROID) 25 MCG tablet Take 1 tablet by mouth daily Take on an empty stomach and you have to wait an hour after you take the medication before you can eat or drink anything or take other medications 90 tablet 1    escitalopram (LEXAPRO) 20 MG tablet Take 1 tablet by mouth daily 90 tablet 1    esomeprazole (NEXIUM) 40 MG delayed release capsule Take 1 capsule by mouth daily 90 capsule 3    losartan-hydrochlorothiazide (HYZAAR) 100-12.5 MG per tablet Take 1 tablet by mouth daily 90 tablet 3    clopidogrel (PLAVIX) 75 MG tablet TAKE 1 TABLET DAILY 90 tablet 1    atorvastatin (LIPITOR) 20 MG tablet Take 0.5 tablets by mouth daily 90 tablet 1    levocetirizine (XYZAL) 5 MG tablet TAKE 1 TABLET NIGHTLY 90 tablet 1    mirtazapine (REMERON) 30 MG tablet Take 1 tablet by mouth nightly 90 tablet 1    LORazepam (ATIVAN) 1 MG tablet TAKE 1/2 TABLET BY MOUTH IN THE MORNING AND 1 TABLET BY MOUTH AT BEDTIME 45 tablet 0    chlordiazePOXIDE-clidinium (LIBRAX) 5-2.5 MG per capsule TAKE 1 CAPSULE BY MOUTH AT BEDTIME 30 capsule 0    Multiple Vitamins-Minerals (CENTRUM SILVER) TABS Take by mouth daily      OXYGEN Inhale into the lungs 2L at hs      aspirin EC 81 MG EC tablet Take 1 tablet by mouth daily (Patient not taking: Reported on 1/17/2020) 90 tablet 1    tiZANidine (ZANAFLEX) 4 MG tablet Take 4 mg by mouth every 6 hours as needed      diclofenac sodium (VOLTAREN) 1 % GEL Apply 4 g topically 3 times daily as needed for Pain (Patient not taking: Reported on 1/17/2020) 5 Tube 1    colestipol (COLESTID) 1 g tablet Take 1 tablet by mouth 2 times daily (Patient not taking: Reported on 1/17/2020) 60 tablet 3    aspirin 81 MG tablet Take 81 mg by mouth 2 times daily       diclofenac 1.5 % SOLN Place 40 drops onto the skin 3 times daily Apply 40 drops to lower extremity joints      ondansetron (ZOFRAN-ODT) 4 MG disintegrating tablet Take 4 mg by mouth every 8 hours as needed for Nausea or Vomiting       No current facility-administered medications for this visit.          Review of Systems    Physical Exam  /82   Temp 97.7 °F (36.5 °C)   Ht 5' 5\" (1.651 m)   Wt 188 lb 8 oz (85.5 kg)   BMI 31.37 kg/m²     CONSTITUTIONAL: well nourished, well-developed, alert, oriented, in no acute distress     COMMUNICATION AND VOICE: able to communicate normally, normal voice quality    HEAD: normocephalic, no lesions, atraumatic, no tenderness, no masses     EYES: ocular motility normal, eyelids normal, orbits normal, no proptosis, conjunctiva normal , pupils equal, round     FACE: appearance normal, no lesions, no tenderness, no deformities, facial motion symmetric. Kamaljit [employer]: parotid glands with no tenderness, no swelling, no masses, submandibular glands with normal size, nontender    EARS:  Hearing: hearing to conversational voice intact bilaterally     Ears:     Right Ear:     External: {ears- external:47799}     Otoscopy Ear Canal: {ears- otoscopy:96927}     Otoscopy TM: {TM- Speach L0262482       Left Ear:     External: {ears- external:69986}     Otoscopy Ear Canal: {ears- otoscopy:45154}     Otoscopy TM: {TM- Speach UTD:68086}      NOSE:    External nose is without deformity    Nose: {nose:55202}      Nasal membranes are without lesions, vestibule within normal limits. ORAL:    Oral:    Lips: {lips:83971} upper and lower lips without lesion    Teeth: {teeth:68696}    Oropharynx:{oropharynx:35977} Tonsils are normal to inspection without masses or lesions. Palate and uvula are normal.  Posterior pharynx without lesions or erythema. Oral         mucosa is moist without lesions. Tongue: {tongue:20377} tongue is normal to inspection without lesions, normal tongue mobility, lingual mucosa normal    Floor of mouth: Warthin ducts patent, mucosa normal       LARYNGEAL:      Hypopharynx: {hypopharynx:57240}     Larynx: {larynx- pe:29412}       NECK:     Inspection and Palpation: neck appearance normal, no masses or       Tenderness. Thyroid is normal without enlargement or nodules palpated. LYMPHATIC: No cervical lymphadenopathy noted.       CHEST/RESPIRATORY: normal respiratory effort, no shortness of breath. CARDIOVASCULAR: no cyanosis or edema       NEUROLOGICAL/PSYCHIATRIC: oriented to time, place and person,       mood normal, affect appropriate, CN II-XII intact grossly      Assessment/ Plan:       Diagnosis Orders   1. Thyroid nodule  US THYROID         Orders Placed This Encounter   Procedures    US THYROID     Standing Status:   Future     Standing Expiration Date:   7/17/2020     Order Specific Question:   Reason for exam:     Answer:   thyroid nodule       No orders of the defined types were placed in this encounter. There are no Patient Instructions on file for this visit. Return in about 6 months (around 7/17/2020).

## 2020-01-17 NOTE — PATIENT INSTRUCTIONS
Medical vs surgical options discussed - surgical management is not indicated     Due to size of nodules (subcentimeter), we will defer a biopsy at this time and check serial ultrasound in 6 months to determine stability of nodules.      Continue Synthroid    Call for problems or worsening symptoms

## 2020-01-17 NOTE — PROGRESS NOTES
Office Visit     Patient Care Team: Patient Care Team:  SONIA Quinteros as PCP - General (Nurse Practitioner Acute Care)  SONIA Qu[facility]os as PCP [facility]  Amy Oralia Gaucher, APRN - CNP as Nurse Practitioner (Otolaryngology)  Surgery: No surgery found No surgery found    Chief Complaint:  Thyroid Problem      Ivana Scott is a 80 y.o. female who is here for for evaluation of thyroid nodules. These symptoms initially started 1 week(s). The symptoms have been moderate in nature. The patient symptoms have been improving. The symptoms are aggravated by hypothyroidism. The symptoms are alleviated by  Synthroid. The patient is accompanied by her daughter today who returned from [place] a few months ago with her mom having a lot of problems. Her TSH was elevated and she was started on Synthroid. Due to her concern about working at [employer] and risk of cancer, a thyroid US was performed to evaluate for thyroid nodules as well. US revealed subcentimeter nodules without a prior comparison US. She denies globus sensation. She denies a family history of thyroid cancer but has had thyroid issues in her family. She had also had some weight gain but that has stabilized since starting Synthroid. DATA REVIEWED THIS VISIT:   Ultrasound, Labs    Narrative   EXAMINATION: US THYROID 1/10/2020 2:43 PM   HISTORY: Hypothyroidism   COMPARISON: None   FINDINGS:   Sonographic evaluation of the thyroid gland was performed in the   transverse and longitudinal projections. The right thyroid lobe is heterogeneous in appearance and measures 1.7   x 3.8 x 0.9 cm in size. No discrete nodules are demonstrated. The thyroid isthmus measures 2 mm in AP diameter and appears grossly   normal.   The left thyroid lobe is heterogeneous in appearance and measures 2.9   x 0.9 x 1.3 cm in size. A hypoechoic mass with an area of mural   nodularity measures 4 x 3 x 5 mm in size. An adjacent cyst measures 5   mm in size. Hypoechoic nodule inferiorly measures 6 x 6 x 6 mm in   size. Additional tiny nodules are noted on the left.       Impression   Marked   1. Small, heterogeneous thyroid gland. 2. Multiple left thyroid nodules, the largest of which measures   approximate 6 mm in size. Signed by Dr Glendy Thorne on 1/10/2020 2:45 PM     1/6/2020  2:34 PM - Ragini Salmeron Incoming Lab Results From [employer] Value Ref Range & Units Status Collected Lab   TSH 2.240                 This data has been reviewed by SONIA Tai and treatment implemented as necessary.            Past Medical History:   Diagnosis Date    Abnormal mammogram     Anxiety     Anxiety associated with depression     Artery disease, cerebral     Ataxia     Benign diastolic hypertension     Carpal tunnel syndrome     idiopathic peripheral    Cellulitis of face     Chronic back pain     COPD (chronic obstructive pulmonary disease) (HCC)     Depression     Disc degeneration, lumbosacral     Diverticulosis     Dysphagia     Esophageal reflux     Fever blister     Herpes simplex     Herpes zoster     Hypertension, essential     Hypertensive heart disease without CHF     Hypoxia     Idiopathic peripheral neuropathy     2017    Insomnia     Major depressive disorder, recurrent episode, moderate (HCC)     Mixed hyperlipidemia     Osteoporosis     Ovarian failure     Pulmonary fibrosis (HCC)     Sciatica     Sleep apnea     Transient cerebral ischemic attack     Urinary incontinence     Weakness of both legs           Past Surgical History:   Procedure Laterality Date    COLONOSCOPY  06/12/2015    Aubree, repeat as needed    HYSTERECTOMY      LUNG SEGMENTECTOMY      lung segmental resection          Allergies   Allergen Reactions    Augmentin [Amoxicillin-Pot Clavulanate]     Cefdinir     Effexor [Venlafaxine]     Erythromycin     Lortab [Hydrocodone-Acetaminophen]     Naprosyn [Naproxen]           Family History   Problem mouth daily 90 tablet 3    clopidogrel (PLAVIX) 75 MG tablet TAKE 1 TABLET DAILY 90 tablet 1    atorvastatin (LIPITOR) 20 MG tablet Take 0.5 tablets by mouth daily 90 tablet 1    levocetirizine (XYZAL) 5 MG tablet TAKE 1 TABLET NIGHTLY 90 tablet 1    mirtazapine (REMERON) 30 MG tablet Take 1 tablet by mouth nightly 90 tablet 1    LORazepam (ATIVAN) 1 MG tablet TAKE 1/2 TABLET BY MOUTH IN THE MORNING AND 1 TABLET BY MOUTH AT BEDTIME 45 tablet 0    chlordiazePOXIDE-clidinium (LIBRAX) 5-2.5 MG per capsule TAKE 1 CAPSULE BY MOUTH AT BEDTIME 30 capsule 0    Multiple Vitamins-Minerals (CENTRUM SILVER) TABS Take by mouth daily      OXYGEN Inhale into the lungs 2L at qhs      aspirin EC 81 MG EC tablet Take 1 tablet by mouth daily (Patient not taking: Reported on 1/17/2020) 90 tablet 1    tiZANidine (ZANAFLEX) 4 MG tablet Take 4 mg by mouth every 6 hours as needed      diclofenac sodium (VOLTAREN) 1 % GEL Apply 4 g topically 3 times daily as needed for Pain (Patient not taking: Reported on 1/17/2020) 5 Tube 1    colestipol (COLESTID) 1 g tablet Take 1 tablet by mouth 2 times daily (Patient not taking: Reported on 1/17/2020) 60 tablet 3    aspirin 81 MG tablet Take 81 mg by mouth 2 times daily       diclofenac 1.5 % SOLN Place 40 drops onto the skin 3 times daily Apply 40 drops to lower extremity joints      ondansetron (ZOFRAN-ODT) 4 MG disintegrating tablet Take 4 mg by mouth every 8 hours as needed for Nausea or Vomiting       No current facility-administered medications for this visit. Review of Systems   Constitutional: Positive for fatigue and unexpected weight change. HENT:        See HPI   Eyes: Negative. Respiratory: Negative. Cardiovascular: Negative. Gastrointestinal: Negative. Endocrine: Negative. Genitourinary: Negative. Musculoskeletal: Negative. Skin: Negative. Allergic/Immunologic: Negative. Neurological: Negative. Hematological: Negative. LYMPHATIC: No cervical lymphadenopathy noted. CHEST/RESPIRATORY: normal respiratory effort, no shortness of breath. CARDIOVASCULAR: no cyanosis or edema       NEUROLOGICAL/PSYCHIATRIC: oriented to time, place and person,       mood normal, affect appropriate, CN II-XII intact grossly      Assessment/ Plan:       Diagnosis Orders   1. Thyroid nodule - left  US THYROID   2. Hypothyroidism, unspecified type           Orders Placed This Encounter   Procedures    US THYROID     Standing Status:   Future     Standing Expiration Date:   7/17/2020     Order Specific Question:   Reason for exam:     Answer:   thyroid nodule       No orders of the defined types were placed in this encounter. Patient Instructions   Medical vs surgical options discussed - surgical management is not indicated     Due to size of nodules (subcentimeter), we will defer a biopsy at this time and check serial ultrasound in 6 months to determine stability of nodules. Continue Synthroid    Call for problems or worsening symptoms            Return in about 6 months (around 7/17/2020).

## 2020-01-24 ENCOUNTER — OFFICE VISIT (OUTPATIENT)
Dept: INTERNAL MEDICINE | Age: 85
End: 2020-01-24
Payer: MEDICARE

## 2020-01-24 VITALS — OXYGEN SATURATION: 92 % | HEART RATE: 82 BPM | SYSTOLIC BLOOD PRESSURE: 143 MMHG | DIASTOLIC BLOOD PRESSURE: 82 MMHG

## 2020-01-24 PROBLEM — H11.30 SUBCONJUNCTIVAL HEMORRHAGE: Status: ACTIVE | Noted: 2020-01-24

## 2020-01-24 PROCEDURE — 1123F ACP DISCUSS/DSCN MKR DOCD: CPT | Performed by: INTERNAL MEDICINE

## 2020-01-24 PROCEDURE — 1036F TOBACCO NON-USER: CPT | Performed by: INTERNAL MEDICINE

## 2020-01-24 PROCEDURE — 4040F PNEUMOC VAC/ADMIN/RCVD: CPT | Performed by: INTERNAL MEDICINE

## 2020-01-24 PROCEDURE — 1090F PRES/ABSN URINE INCON ASSESS: CPT | Performed by: INTERNAL MEDICINE

## 2020-01-24 PROCEDURE — G8417 CALC BMI ABV UP PARAM F/U: HCPCS | Performed by: INTERNAL MEDICINE

## 2020-01-24 PROCEDURE — G8427 DOCREV CUR MEDS BY ELIG CLIN: HCPCS | Performed by: INTERNAL MEDICINE

## 2020-01-24 PROCEDURE — 99213 OFFICE O/P EST LOW 20 MIN: CPT | Performed by: INTERNAL MEDICINE

## 2020-01-24 PROCEDURE — G8482 FLU IMMUNIZE ORDER/ADMIN: HCPCS | Performed by: INTERNAL MEDICINE

## 2020-01-24 ASSESSMENT — ENCOUNTER SYMPTOMS
EYE REDNESS: 1
EYE ITCHING: 1

## 2020-01-24 NOTE — PROGRESS NOTES
[facility] INTERNAL MEDICINE  [address] [address]  [address] [address]  Dept: [phone]  Dept Fax: [phone]: [phone]      Visit Date: 1/24/2020    Mendel Gilmore a 80 y.o. female who presents today for:  Chief Complaint   Patient presents with    Other     right eye red when she woke up this am, does have lense in that eye         HPI:     Marlena Valencia is in with a problem of the right eye. She woke up this morning and the whole white part of her eye was red. Her vision has been spared. She is got no evidence of any problems with that but it was quite concerned when her eye was so bloodshot this morning.     Past Medical History:   Diagnosis Date    Abnormal mammogram     Anxiety     Anxiety associated with depression     Artery disease, cerebral     Ataxia     Benign diastolic hypertension     Carpal tunnel syndrome     idiopathic peripheral    Cellulitis of face     Chronic back pain     COPD (chronic obstructive pulmonary disease) (HCC)     Depression     Disc degeneration, lumbosacral     Diverticulosis     Dysphagia     Esophageal reflux     Fever blister     Herpes simplex     Herpes zoster     Hypertension, essential     Hypertensive heart disease without CHF     Hypoxia     Idiopathic peripheral neuropathy     2017    Insomnia     Major depressive disorder, recurrent episode, moderate (HCC)     Mixed hyperlipidemia     Osteoporosis     Ovarian failure     Pulmonary fibrosis (HCC)     Sciatica     Sleep apnea     Transient cerebral ischemic attack     Urinary incontinence     Weakness of both legs       Past Surgical History:   Procedure Laterality Date    COLONOSCOPY  06/12/2015    Aubree, repeat as needed    HYSTERECTOMY      LUNG SEGMENTECTOMY      lung segmental resection       Family History   Problem Relation Age of Onset    Heart Failure Mother     Diabetes Mother     Heart Disease Mother     Diabetes Allergies   Allergen Reactions    Augmentin [Amoxicillin-Pot Clavulanate]     Cefdinir     Effexor [Venlafaxine]     Erythromycin     Lortab [Hydrocodone-Acetaminophen]     Naprosyn [Naproxen]          Subjective:     Review of Systems   Eyes: Positive for redness and itching. Objective:      BP (!) 143/82   Pulse 82   SpO2 92%    Physical Exam  Eyes:      General:         Left eye: No discharge. Extraocular Movements: Extraocular movements intact. Pupils: Pupils are equal, round, and reactive to light. Comments: Right eye conjunctival hemorrhage          Assessment:      Diagnosis Orders   1. Subconjunctival hemorrhage of right eye              Plan:     She is got a sub-conjunctival hemorrhage of the right eye most likely related to being on Plavix. Her vision is preserved. I am going to get her an appointment with the ophthalmology group for follow-up and I told her if something changes that her vision should change between now and the first of the week when she can get into see them to go to the ER and 1 of the group will be there to see her since they are on call but. Otherwise there is not anything other than to that to do she can stop her Plavix due to the fact that she has mini strokes and this will list have to be absorbed and I told her to take it easy the next few days. No follow-ups on file. Patient given educational materials- see patient instructions. Discussed use, benefit, and side effects of prescribedmedications. All patient questions answered. Pt voiced understanding. Reviewedhealth maintenance. Instructed to continue current medications, diet and exercise. Patient agreed with treatment plan. **This report has been created usingvoice recognition software. It may contain minor errors which are inherent in voicerecognition technology. **    Electronically signed by Christiane Espinoza MD on 1/24/2020 at 4:03 PM

## 2020-02-03 ENCOUNTER — TELEPHONE (OUTPATIENT)
Dept: INTERNAL MEDICINE CLINIC | Age: 85
End: 2020-02-03

## 2020-02-03 NOTE — TELEPHONE ENCOUNTER
[address] [address] nurse reporting that pt was not admitted to homecare due to not meeting the homebound requirement. Besides not being homebound, patient also did not want the homecare services that are provided by this agency. She stated that all she wants or needed help with was light housekeeping and someone to come wash her back.    Pt not admitted to [facility]

## 2020-02-04 RX ORDER — LORAZEPAM 1 MG/1
TABLET ORAL
Qty: 45 TABLET | Refills: 0 | Status: SHIPPED | OUTPATIENT
Start: 2020-02-04 | End: 2020-03-02 | Stop reason: SDUPTHER

## 2020-02-04 NOTE — TELEPHONE ENCOUNTER
[facility] Coad called requesting a refill of the below medication which has been pended for you:     Requested Prescriptions     Pending Prescriptions Disp Refills    LORazepam (ATIVAN) 1 MG tablet 45 tablet 0     Sig: TAKE 1/2 TABLET BY MOUTH IN THE MORNING AND 1 TABLET BY MOUTH AT BEDTIME       Last Appointment Date: 1/24/2020  Next Appointment Date: 4/6/2020    Allergies   Allergen Reactions    Augmentin [Amoxicillin-Pot Clavulanate]     Cefdinir     Effexor [Venlafaxine]     Erythromycin     Lortab [Hydrocodone-Acetaminophen]     Naprosyn [Naproxen]

## 2020-03-02 NOTE — TELEPHONE ENCOUNTER
Rosanna Gurrola called requesting a refill of the below medication which has been pended for you:     Requested Prescriptions     Pending Prescriptions Disp Refills    LORazepam (ATIVAN) 1 MG tablet 45 tablet 0     Sig: TAKE 1/2 TABLET BY MOUTH IN THE MORNING AND 1 TABLET BY MOUTH AT BEDTIME       Last Appointment Date: 1/24/2020  Next Appointment Date: 4/6/2020    Allergies   Allergen Reactions    Augmentin [Amoxicillin-Pot Clavulanate]     Cefdinir     Effexor [Venlafaxine]     Erythromycin     Lortab [Hydrocodone-Acetaminophen]     Naprosyn [Naproxen]

## 2020-03-03 RX ORDER — LORAZEPAM 1 MG/1
TABLET ORAL
Qty: 45 TABLET | Refills: 0 | Status: SHIPPED | OUTPATIENT
Start: 2020-03-03 | End: 2020-04-07 | Stop reason: SDUPTHER

## 2020-03-16 RX ORDER — ESOMEPRAZOLE MAGNESIUM 40 MG/1
40 CAPSULE, DELAYED RELEASE ORAL DAILY
Qty: 90 CAPSULE | Refills: 3 | Status: SHIPPED | OUTPATIENT
Start: 2020-03-16 | End: 2020-08-18 | Stop reason: SDUPTHER

## 2020-04-07 PROBLEM — K21.9 GERD (GASTROESOPHAGEAL REFLUX DISEASE): Status: ACTIVE | Noted: 2020-04-07

## 2020-04-07 PROBLEM — J43.8 OTHER EMPHYSEMA (HCC): Status: ACTIVE | Noted: 2020-04-07

## 2020-04-13 ENCOUNTER — TELEPHONE (OUTPATIENT)
Dept: INTERNAL MEDICINE | Age: 85
End: 2020-04-13

## 2020-04-13 RX ORDER — CLOPIDOGREL BISULFATE 75 MG/1
TABLET ORAL
Qty: 90 TABLET | Refills: 1 | Status: SHIPPED | OUTPATIENT
Start: 2020-04-13 | End: 2020-10-12

## 2020-04-27 ENCOUNTER — TELEPHONE (OUTPATIENT)
Dept: NEUROSURGERY | Age: 85
End: 2020-04-27

## 2020-04-29 ENCOUNTER — TELEMEDICINE (OUTPATIENT)
Dept: NEUROSURGERY | Age: 85
End: 2020-04-29
Payer: MEDICARE

## 2020-04-29 PROCEDURE — 1123F ACP DISCUSS/DSCN MKR DOCD: CPT | Performed by: NURSE PRACTITIONER

## 2020-04-29 PROCEDURE — 99214 OFFICE O/P EST MOD 30 MIN: CPT | Performed by: NURSE PRACTITIONER

## 2020-04-29 PROCEDURE — 4040F PNEUMOC VAC/ADMIN/RCVD: CPT | Performed by: NURSE PRACTITIONER

## 2020-04-29 PROCEDURE — G8427 DOCREV CUR MEDS BY ELIG CLIN: HCPCS | Performed by: NURSE PRACTITIONER

## 2020-04-29 PROCEDURE — 1090F PRES/ABSN URINE INCON ASSESS: CPT | Performed by: NURSE PRACTITIONER

## 2020-04-29 RX ORDER — ACETAMINOPHEN 500 MG
500 TABLET ORAL 2 TIMES DAILY
COMMUNITY

## 2020-04-29 NOTE — PROGRESS NOTES
file     Emotionally abused: Not on file     Physically abused: Not on file     Forced sexual activity: Not on file   Other Topics Concern    Not on file   Social History Narrative    Not on file       Current Outpatient Medications   Medication Sig Dispense Refill    acetaminophen (TYLENOL) 500 MG tablet Take 500 mg by mouth 2 times daily      clopidogrel (PLAVIX) 75 MG tablet TAKE 1 TABLET DAILY 90 tablet 1    mirabegron (MYRBETRIQ) 50 MG TB24 Take 50 mg by mouth daily 90 tablet 1    LORazepam (ATIVAN) 1 MG tablet TAKE 1/2 TABLET BY MOUTH IN THE MORNING AND 1 TABLET BY MOUTH AT BEDTIME 45 tablet 0    esomeprazole (NEXIUM) 40 MG delayed release capsule Take 1 capsule by mouth daily 90 capsule 3    levothyroxine (SYNTHROID) 25 MCG tablet Take 1 tablet by mouth daily Take on an empty stomach and you have to wait an hour after you take the medication before you can eat or drink anything or take other medications 90 tablet 1    escitalopram (LEXAPRO) 20 MG tablet Take 1 tablet by mouth daily 90 tablet 1    losartan-hydrochlorothiazide (HYZAAR) 100-12.5 MG per tablet Take 1 tablet by mouth daily 90 tablet 3    atorvastatin (LIPITOR) 20 MG tablet Take 0.5 tablets by mouth daily 90 tablet 1    levocetirizine (XYZAL) 5 MG tablet TAKE 1 TABLET NIGHTLY 90 tablet 1    mirtazapine (REMERON) 30 MG tablet Take 1 tablet by mouth nightly 90 tablet 1    diclofenac sodium (VOLTAREN) 1 % GEL Apply 4 g topically 3 times daily as needed for Pain 5 Tube 1    Multiple Vitamins-Minerals (CENTRUM SILVER) TABS Take by mouth daily      ondansetron (ZOFRAN-ODT) 4 MG disintegrating tablet Take 4 mg by mouth every 8 hours as needed for Nausea or Vomiting      OXYGEN Inhale into the lungs 2L at qhs      chlordiazePOXIDE-clidinium (LIBRAX) 5-2.5 MG per capsule TAKE 1 CAPSULE BY MOUTH AT BEDTIME (Patient not taking: Reported on 4/29/2020) 30 capsule 0    diclofenac 1.5 % SOLN Place 40 drops onto the skin 3 times daily Apply 40 drops to lower extremity joints       No current facility-administered medications for this visit. Allergies   Allergen Reactions    Augmentin [Amoxicillin-Pot Clavulanate]     Cefdinir     Effexor [Venlafaxine]     Erythromycin     Lortab [Hydrocodone-Acetaminophen]     Naprosyn [Naproxen]        PHYSICAL EXAMINATION:    Constitutional -   General appearance: No acute distress   EYES -   Conjunctiva normal  ENT-    No scars, masses, or lesions over external nose or ears  Cardiovascular -   No clubbing, cyanosis, or edema   Pulmonary-   Good expansion, normal effort without use of accessory muscles  Musculoskeletal -   No significant wasting of muscles noted  Gait as below, see gait exam in the neurologic exam  No gross bony deformities  Skin -   No rash, erythema, or pallor  Psychiatric -   Mood, affect, and behavior appear normal    Memory as below see mental status examination in the neurologic exam    NEUROLOGICAL EXAM    Mental status   [x]Awake, alert, oriented   [x]Affect attention and concentration appear appropriate  [x]Recent and remote memory appears unremarkable  [x]Speech normal without dysarthria or aphasia, comprehension and repetition intact. COMMENTS:    Cranial Nerves [x] PER, EOMI, no nystagmus, conjugate eye movements, no ptosis  [x]Face symmetric  [x]Tongue midline   [x]Shoulder shrug normal bilaterally  COMMENTS:   Motor   [x]Antigravity x 4 extremities  [x]Normal bulk and tone  [x]No tremor present  COMMENTS:        Coordination [x] HTS normal bilaterally   COMMENTS:       Gait                  []Normal steady gait    []Ataxic    []Spastic     []Magnetic     []Shuffling  COMMENTS: cautious, unsteady     [] OTHER:      Due to this being a TeleHealth encounter, evaluation of the following organ systems is limited: Vitals/Constitutional/EENT/Resp/CV/GI//MS/Neuro/Skin/Heme-Lymph-Imm.       LABS RECORD AND IMAGING REVIEW (As below and per HPI)    No results found for: [id number]  Lab Results   Component Value Date    WBC 9.7 10/01/2019    HGB 12.6 10/01/2019    HCT 40.0 10/01/2019    MCV 94.3 10/01/2019     10/01/2019     Lab Results   Component Value Date     (H) 01/06/2020    K 4.1 01/06/2020     01/06/2020    CO2 27 01/06/2020    BUN 15 01/06/2020    CREATININE 0.8 01/06/2020    GLUCOSE 110 (H) 01/06/2020    CALCIUM 9.5 01/06/2020    PROT 7.3 01/06/2020    LABALBU 4.2 01/06/2020    BILITOT 0.5 01/06/2020    ALKPHOS 71 01/06/2020    AST 42 (H) 01/06/2020    ALT 19 01/06/2020    LABGLOM >60 01/06/2020       Us Thyroid    Result Date: 1/10/2020  EXAMINATION: US THYROID 1/10/2020 2:43 PM HISTORY: Hypothyroidism COMPARISON: None FINDINGS: Sonographic evaluation of the thyroid gland was performed in the transverse and longitudinal projections. The right thyroid lobe is heterogeneous in appearance and measures 1.7 x 3.8 x 0.9 cm in size. No discrete nodules are demonstrated. The thyroid isthmus measures 2 mm in AP diameter and appears grossly normal. The left thyroid lobe is heterogeneous in appearance and measures 2.9 x 0.9 x 1.3 cm in size. A hypoechoic mass with an area of mural nodularity measures 4 x 3 x 5 mm in size. An adjacent cyst measures 5 mm in size. Hypoechoic nodule inferiorly measures 6 x 6 x 6 mm in size. Additional tiny nodules are noted on the left. Marked 1. Small, heterogeneous thyroid gland. 2. Multiple left thyroid nodules, the largest of which measures approximate 6 mm in size. Signed by Dr Jb Camarena on 1/10/2020 2:45 PM    EEG (12/2019)- normal     MRA head and neck (11/2019)- normal for age     ASSESSMENT:    Ash Chavez is a 80y.o. year old female evaluated via Telehealth encounter for evaluation of confusional episodes and left leg pain. Seizures felt less likely given normal EEG and history. Will plan for MRI brain to ensure no other changes but do not feel strongly about starting an AED.  Will plan for formal memory testing at

## 2020-05-04 ENCOUNTER — TELEPHONE (OUTPATIENT)
Dept: INTERNAL MEDICINE | Age: 85
End: 2020-05-04

## 2020-05-04 RX ORDER — LORAZEPAM 1 MG/1
TABLET ORAL
Qty: 45 TABLET | Refills: 0 | Status: SHIPPED | OUTPATIENT
Start: 2020-05-09 | End: 2020-06-14 | Stop reason: SDUPTHER

## 2020-05-11 ENCOUNTER — VIRTUAL VISIT (OUTPATIENT)
Dept: INTERNAL MEDICINE | Age: 85
End: 2020-05-11
Payer: MEDICARE

## 2020-05-11 PROCEDURE — 1036F TOBACCO NON-USER: CPT | Performed by: NURSE PRACTITIONER

## 2020-05-11 PROCEDURE — 1090F PRES/ABSN URINE INCON ASSESS: CPT | Performed by: NURSE PRACTITIONER

## 2020-05-11 PROCEDURE — G8417 CALC BMI ABV UP PARAM F/U: HCPCS | Performed by: NURSE PRACTITIONER

## 2020-05-11 PROCEDURE — 4040F PNEUMOC VAC/ADMIN/RCVD: CPT | Performed by: NURSE PRACTITIONER

## 2020-05-11 PROCEDURE — 1123F ACP DISCUSS/DSCN MKR DOCD: CPT | Performed by: NURSE PRACTITIONER

## 2020-05-11 PROCEDURE — 99213 OFFICE O/P EST LOW 20 MIN: CPT | Performed by: NURSE PRACTITIONER

## 2020-05-11 PROCEDURE — G8428 CUR MEDS NOT DOCUMENT: HCPCS | Performed by: NURSE PRACTITIONER

## 2020-05-11 RX ORDER — MECLIZINE HYDROCHLORIDE 25 MG/1
25 TABLET ORAL 3 TIMES DAILY PRN
Qty: 30 TABLET | Refills: 0 | Status: SHIPPED | OUTPATIENT
Start: 2020-05-11 | End: 2020-05-21

## 2020-05-11 RX ORDER — PROMETHAZINE HYDROCHLORIDE 25 MG/1
25 TABLET ORAL 4 TIMES DAILY PRN
Qty: 20 TABLET | Refills: 0 | Status: SHIPPED | OUTPATIENT
Start: 2020-05-11 | End: 2020-07-15 | Stop reason: SDUPTHER

## 2020-05-11 ASSESSMENT — ENCOUNTER SYMPTOMS
EYE DISCHARGE: 0
BLOOD IN STOOL: 0
VOMITING: 1
TROUBLE SWALLOWING: 0
CONSTIPATION: 0
SORE THROAT: 0
CHOKING: 0
EYE ITCHING: 0
COLOR CHANGE: 0
WHEEZING: 0
STRIDOR: 0
NAUSEA: 1
DIARRHEA: 1
ABDOMINAL PAIN: 0
SHORTNESS OF BREATH: 0
ABDOMINAL DISTENTION: 0
COUGH: 0

## 2020-05-11 NOTE — PROGRESS NOTES
[facility] INTERNAL MEDICINE  [address] [address]  [address] [address]  Dept: [phone]  Dept Fax: [phone]: [phone]    Fiona Ramos is a 80 y.o. female who were are performing tele health communication  for her medical conditions/complaints as noted below. Currently, our state is under umbrella of national state of emergency and we are using alternative methods of taking care of the needs of our patients so they are not exposed in our office; The patient has consented to this form of communication  Fiona Ramos is c/teresa   Dizziness    THE patient is at home  Edwige Service is at office  Others in attendance are her daughter PAt     HPI:     HPI   1. Dizziness  For the week she has had dizziness; Then yesterday she started with nausea with vomiting;   They have been using promethazine     Chief Complaint   Patient presents with    Dizziness       Past Medical History:   Diagnosis Date    Abnormal mammogram     Anxiety     Anxiety associated with depression     Artery disease, cerebral     Ataxia     Benign diastolic hypertension     Carpal tunnel syndrome     idiopathic peripheral    Cellulitis of face     Chronic back pain     COPD (chronic obstructive pulmonary disease) (HCC)     Depression     Disc degeneration, lumbosacral     Diverticulosis     Dysphagia     Esophageal reflux     Fever blister     Herpes simplex     Herpes zoster     Hypertension, essential     Hypertensive heart disease without CHF     Hypoxia     Idiopathic peripheral neuropathy     2017    Insomnia     Major depressive disorder, recurrent episode, moderate (HCC)     Mixed hyperlipidemia     Osteoporosis     Ovarian failure     Pulmonary fibrosis (HCC)     Sciatica     Sleep apnea     Transient cerebral ischemic attack     Urinary incontinence     Weakness of both legs       Past Surgical History:   Procedure Laterality Date    COLONOSCOPY

## 2020-05-13 ENCOUNTER — HOSPITAL ENCOUNTER (OUTPATIENT)
Dept: MRI IMAGING | Age: 85
Discharge: HOME | End: 2020-05-13
Payer: MEDICARE

## 2020-05-13 ENCOUNTER — HOSPITAL ENCOUNTER (OUTPATIENT)
Dept: GENERAL RADIOLOGY | Age: 85
Discharge: HOME | End: 2020-05-13
Payer: MEDICARE

## 2020-05-13 ENCOUNTER — APPOINTMENT (OUTPATIENT)
Dept: PAIN MANAGEMENT | Age: 85
End: 2020-05-13
Payer: MEDICARE

## 2020-05-13 DIAGNOSIS — E78.2 MIXED HYPERLIPIDEMIA: ICD-10-CM

## 2020-05-13 DIAGNOSIS — E55.9 VITAMIN D DEFICIENCY: ICD-10-CM

## 2020-05-13 DIAGNOSIS — I10 HYPERTENSION, ESSENTIAL: ICD-10-CM

## 2020-05-13 LAB
ALBUMIN SERPL-MCNC: 4.1 G/DL (ref 3.5–5.2)
ALP BLD-CCNC: 80 U/L (ref 35–104)
ALT SERPL-CCNC: 21 U/L (ref 5–33)
ANION GAP SERPL CALCULATED.3IONS-SCNC: 16 MMOL/L (ref 7–19)
AST SERPL-CCNC: 47 U/L (ref 5–32)
BACTERIA: NEGATIVE /HPF
BASOPHILS ABSOLUTE: 0 K/UL (ref 0–0.2)
BASOPHILS RELATIVE PERCENT: 0.2 % (ref 0–1)
BILIRUB SERPL-MCNC: 0.3 MG/DL (ref 0.2–1.2)
BILIRUBIN URINE: NEGATIVE
BLOOD, URINE: NEGATIVE
BUN BLDV-MCNC: 17 MG/DL (ref 8–23)
CALCIUM SERPL-MCNC: 9.4 MG/DL (ref 8.8–10.2)
CHLORIDE BLD-SCNC: 100 MMOL/L (ref 98–111)
CHOLESTEROL, TOTAL: 204 MG/DL (ref 160–199)
CLARITY: CLEAR
CO2: 25 MMOL/L (ref 22–29)
COLOR: YELLOW
CREAT SERPL-MCNC: 0.9 MG/DL (ref 0.5–0.9)
EOSINOPHILS ABSOLUTE: 0.3 K/UL (ref 0–0.6)
EOSINOPHILS RELATIVE PERCENT: 3 % (ref 0–5)
EPITHELIAL CELLS, UA: 1 /HPF (ref 0–5)
GFR NON-AFRICAN AMERICAN: 59
GLUCOSE BLD-MCNC: 140 MG/DL (ref 74–109)
GLUCOSE URINE: NEGATIVE MG/DL
HCT VFR BLD CALC: 40.6 % (ref 37–47)
HDLC SERPL-MCNC: 44 MG/DL (ref 65–121)
HEMOGLOBIN: 12.6 G/DL (ref 12–16)
HYALINE CASTS: 2 /HPF (ref 0–8)
IMMATURE GRANULOCYTES #: 0 K/UL
KETONES, URINE: NEGATIVE MG/DL
LDL CHOLESTEROL CALCULATED: 107 MG/DL
LEUKOCYTE ESTERASE, URINE: ABNORMAL
LYMPHOCYTES ABSOLUTE: 4.5 K/UL (ref 1.1–4.5)
LYMPHOCYTES RELATIVE PERCENT: 39.9 % (ref 20–40)
MCH RBC QN AUTO: 29 PG (ref 27–31)
MCHC RBC AUTO-ENTMCNC: 31 G/DL (ref 33–37)
MCV RBC AUTO: 93.5 FL (ref 81–99)
MONOCYTES ABSOLUTE: 1 K/UL (ref 0–0.9)
MONOCYTES RELATIVE PERCENT: 8.9 % (ref 0–10)
NEUTROPHILS ABSOLUTE: 5.4 K/UL (ref 1.5–7.5)
NEUTROPHILS RELATIVE PERCENT: 47.6 % (ref 50–65)
NITRITE, URINE: NEGATIVE
PDW BLD-RTO: 13.6 % (ref 11.5–14.5)
PH UA: 5.5 (ref 5–8)
PLATELET # BLD: 303 K/UL (ref 130–400)
PMV BLD AUTO: 10.5 FL (ref 9.4–12.3)
POTASSIUM SERPL-SCNC: 4 MMOL/L (ref 3.5–5)
PROTEIN UA: NEGATIVE MG/DL
RBC # BLD: 4.34 M/UL (ref 4.2–5.4)
RBC UA: 1 /HPF (ref 0–4)
SODIUM BLD-SCNC: 141 MMOL/L (ref 136–145)
SPECIFIC GRAVITY UA: 1.01 (ref 1–1.03)
TOTAL PROTEIN: 7.3 G/DL (ref 6.6–8.7)
TRIGL SERPL-MCNC: 265 MG/DL (ref 0–149)
TSH SERPL DL<=0.05 MIU/L-ACNC: 9.04 UIU/ML (ref 0.27–4.2)
UROBILINOGEN, URINE: 0.2 E.U./DL
VITAMIN D 25-HYDROXY: 28.9 NG/ML
WBC # BLD: 11.3 K/UL (ref 4.8–10.8)
WBC UA: 8 /HPF (ref 0–5)

## 2020-05-13 PROCEDURE — 73502 X-RAY EXAM HIP UNI 2-3 VIEWS: CPT

## 2020-05-13 PROCEDURE — 6360000004 HC RX CONTRAST MEDICATION: Performed by: NURSE PRACTITIONER

## 2020-05-13 PROCEDURE — 70553 MRI BRAIN STEM W/O & W/DYE: CPT

## 2020-05-13 PROCEDURE — A9577 INJ MULTIHANCE: HCPCS | Performed by: NURSE PRACTITIONER

## 2020-05-13 PROCEDURE — 73560 X-RAY EXAM OF KNEE 1 OR 2: CPT

## 2020-05-13 RX ADMIN — GADOBENATE DIMEGLUMINE 15 ML: 529 INJECTION, SOLUTION INTRAVENOUS at 12:45

## 2020-05-19 ENCOUNTER — TELEPHONE (OUTPATIENT)
Dept: NEUROLOGY | Age: 85
End: 2020-05-19

## 2020-05-21 RX ORDER — LEVOTHYROXINE SODIUM 0.03 MG/1
TABLET ORAL
Qty: 390 TABLET | Refills: 1 | Status: SHIPPED | OUTPATIENT
Start: 2020-05-21 | End: 2021-07-27

## 2020-05-21 RX ORDER — LEVOTHYROXINE SODIUM 0.03 MG/1
25 TABLET ORAL DAILY
Qty: 390 TABLET | Refills: 1 | Status: SHIPPED | OUTPATIENT
Start: 2020-05-21 | End: 2020-05-21 | Stop reason: SDUPTHER

## 2020-05-21 RX ORDER — ESCITALOPRAM OXALATE 20 MG/1
20 TABLET ORAL DAILY
Qty: 15 TABLET | Refills: 0 | Status: SHIPPED | OUTPATIENT
Start: 2020-05-21 | End: 2020-08-18 | Stop reason: SDUPTHER

## 2020-06-03 ENCOUNTER — TELEPHONE (OUTPATIENT)
Dept: INTERNAL MEDICINE | Age: 85
End: 2020-06-03

## 2020-06-03 NOTE — TELEPHONE ENCOUNTER
Harley Long called stating pt is still experiencing nausea and dizziness. Wants to know if pt needs to come in to get her thyroid levels checked again.

## 2020-06-03 NOTE — TELEPHONE ENCOUNTER
She has not really been on the thyroid medicine long enough to recheck to make any difference. And a thyroid level no higher than hers was is not going to really cause her to have nausea and dizziness.   Does she have Antivert and Phenergan that she can take at home you can certainly do that also asked Brodie Garcia what her mom's blood pressure is

## 2020-06-15 RX ORDER — LORAZEPAM 1 MG/1
TABLET ORAL
Qty: 45 TABLET | Refills: 0 | Status: SHIPPED | OUTPATIENT
Start: 2020-06-15 | End: 2020-07-15 | Stop reason: SDUPTHER

## 2020-07-15 RX ORDER — LORAZEPAM 1 MG/1
TABLET ORAL
Qty: 45 TABLET | Refills: 0 | Status: SHIPPED | OUTPATIENT
Start: 2020-07-15 | End: 2020-08-18 | Stop reason: SDUPTHER

## 2020-07-15 RX ORDER — PROMETHAZINE HYDROCHLORIDE 25 MG/1
25 TABLET ORAL 4 TIMES DAILY PRN
Qty: 20 TABLET | Refills: 0 | Status: SHIPPED | OUTPATIENT
Start: 2020-07-15 | End: 2020-07-22

## 2020-07-15 NOTE — TELEPHONE ENCOUNTER
Javy Borges called requesting a refill of the below medication which has been pended for you:     Requested Prescriptions      No prescriptions requested or ordered in this encounter       Last Appointment Date: 1/24/2020  Next Appointment Date: 8/12/2020    Allergies   Allergen Reactions    Augmentin [Amoxicillin-Pot Clavulanate]     Cefdinir     Effexor [Venlafaxine]     Erythromycin     Lortab [Hydrocodone-Acetaminophen]     Naprosyn [Naproxen]

## 2020-07-17 DIAGNOSIS — I10 HYPERTENSION, ESSENTIAL: ICD-10-CM

## 2020-07-17 DIAGNOSIS — R10.9 ABDOMINAL PAIN, UNSPECIFIED ABDOMINAL LOCATION: ICD-10-CM

## 2020-07-17 LAB
ALBUMIN SERPL-MCNC: 4.2 G/DL (ref 3.5–5.2)
ALP BLD-CCNC: 67 U/L (ref 35–104)
ALT SERPL-CCNC: 22 U/L (ref 5–33)
AMYLASE: 51 U/L (ref 28–100)
ANION GAP SERPL CALCULATED.3IONS-SCNC: 13 MMOL/L (ref 7–19)
AST SERPL-CCNC: 42 U/L (ref 5–32)
BILIRUB SERPL-MCNC: 0.4 MG/DL (ref 0.2–1.2)
BUN BLDV-MCNC: 19 MG/DL (ref 8–23)
CALCIUM SERPL-MCNC: 9.2 MG/DL (ref 8.8–10.2)
CHLORIDE BLD-SCNC: 104 MMOL/L (ref 98–111)
CO2: 28 MMOL/L (ref 22–29)
CREAT SERPL-MCNC: 0.8 MG/DL (ref 0.5–0.9)
GFR AFRICAN AMERICAN: >59
GFR NON-AFRICAN AMERICAN: >60
GLUCOSE BLD-MCNC: 118 MG/DL (ref 74–109)
HCT VFR BLD CALC: 39.3 % (ref 37–47)
HEMOGLOBIN: 12.7 G/DL (ref 12–16)
LIPASE: 37 U/L (ref 13–60)
MCH RBC QN AUTO: 30 PG (ref 27–31)
MCHC RBC AUTO-ENTMCNC: 32.3 G/DL (ref 33–37)
MCV RBC AUTO: 92.9 FL (ref 81–99)
PDW BLD-RTO: 14 % (ref 11.5–14.5)
PLATELET # BLD: 293 K/UL (ref 130–400)
PMV BLD AUTO: 11 FL (ref 9.4–12.3)
POTASSIUM SERPL-SCNC: 3.9 MMOL/L (ref 3.5–5)
RBC # BLD: 4.23 M/UL (ref 4.2–5.4)
SODIUM BLD-SCNC: 145 MMOL/L (ref 136–145)
TOTAL PROTEIN: 6.9 G/DL (ref 6.6–8.7)
WBC # BLD: 10.5 K/UL (ref 4.8–10.8)

## 2020-07-20 ENCOUNTER — OFFICE VISIT (OUTPATIENT)
Dept: INTERNAL MEDICINE | Age: 85
End: 2020-07-20
Payer: MEDICARE

## 2020-07-20 VITALS
DIASTOLIC BLOOD PRESSURE: 85 MMHG | WEIGHT: 192 LBS | SYSTOLIC BLOOD PRESSURE: 167 MMHG | BODY MASS INDEX: 32.78 KG/M2 | HEART RATE: 88 BPM | HEIGHT: 64 IN

## 2020-07-20 DIAGNOSIS — E03.8 OTHER SPECIFIED HYPOTHYROIDISM: ICD-10-CM

## 2020-07-20 LAB — TSH SERPL DL<=0.05 MIU/L-ACNC: 3.95 UIU/ML (ref 0.27–4.2)

## 2020-07-20 PROCEDURE — G8417 CALC BMI ABV UP PARAM F/U: HCPCS | Performed by: NURSE PRACTITIONER

## 2020-07-20 PROCEDURE — 1036F TOBACCO NON-USER: CPT | Performed by: NURSE PRACTITIONER

## 2020-07-20 PROCEDURE — G8427 DOCREV CUR MEDS BY ELIG CLIN: HCPCS | Performed by: NURSE PRACTITIONER

## 2020-07-20 PROCEDURE — 1123F ACP DISCUSS/DSCN MKR DOCD: CPT | Performed by: NURSE PRACTITIONER

## 2020-07-20 PROCEDURE — 4040F PNEUMOC VAC/ADMIN/RCVD: CPT | Performed by: NURSE PRACTITIONER

## 2020-07-20 PROCEDURE — 99214 OFFICE O/P EST MOD 30 MIN: CPT | Performed by: NURSE PRACTITIONER

## 2020-07-20 PROCEDURE — 1090F PRES/ABSN URINE INCON ASSESS: CPT | Performed by: NURSE PRACTITIONER

## 2020-07-20 RX ORDER — MONTELUKAST SODIUM 4 MG/1
1 TABLET, CHEWABLE ORAL 2 TIMES DAILY
Qty: 60 TABLET | Refills: 3 | Status: SHIPPED | OUTPATIENT
Start: 2020-07-20 | End: 2020-08-24 | Stop reason: SDUPTHER

## 2020-07-20 ASSESSMENT — ENCOUNTER SYMPTOMS
WHEEZING: 0
CHOKING: 0
EYE ITCHING: 0
CONSTIPATION: 0
ABDOMINAL DISTENTION: 0
NAUSEA: 1
BLOOD IN STOOL: 0
SHORTNESS OF BREATH: 0
TROUBLE SWALLOWING: 0
COLOR CHANGE: 0
SORE THROAT: 0
VOMITING: 1
STRIDOR: 0
COUGH: 0
EYE DISCHARGE: 0
ABDOMINAL PAIN: 0
DIARRHEA: 1

## 2020-07-20 NOTE — PROGRESS NOTES
[facility] INTERNAL MEDICINE  [address] [address] [address]  Dept: [phone]  Dept Fax: [phone]: [phone]    Jose Johnson is a 80 y.o. female who presents today for her medical conditions/complaints as noted below. Jose Johnson is c/teresa Hypertension (Patient is here for routine follow up visit and review of labs done 7/17/2020)        HPI:     HPI   1. Hemoptysis only 1 day last week; She reports a clot the size of a chicken liver   She has had this in the past with sinus infections but she has not had any issues; She has had increased SOB;    2. Hypertension blood pressure is little elevated today but she is kind of anxious about having the mask on being her in the office her daughter reports her blood pressure readings have been okay at home on losartan hydrochlorothiazide 100-12 0.5 daily  3. Vomiting for now reason;  3-4 days in a row she would have to take phenergan; Not really food related; mostly it is bile;   4.  Right shoulder pain that comes and goes;   5.  Jaundice in her eyes; her daughter says comes and goes;    6. Incontinent of stool ; She says everything she eats anything she has to have BM; At one time she was on colestid at one time ; she says she stopped it but she doesn't remember why she stopped;    7. Memory impairment; She is concerned about this as well but we do not want to add a medication right now with all the other symptoms she is having going on  8. Anxiety she is currently stable on lorazepam 1 mg half in the morning and 1 at bedtime  Controlled Substance Monitoring:    Acute and Chronic Pain Monitoring:   RX Monitoring 7/20/2020   Attestation The Prescription Monitoring Report for this patient was reviewed today. Periodic Controlled Substance Monitoring No signs of potential drug abuse or diversion identified. ;Possible medication side effects, risk of tolerance/dependence & alternative treatments discussed.          9.  Hypothyroidism  She is on 25 mcg  Takes 2 tabs 4 days a and 1 the other day   Chief Complaint   Patient presents with    Hypertension     Patient is here for routine follow up visit and review of labs done 7/17/2020       Past Medical History:   Diagnosis Date    Abnormal mammogram     Anxiety     Anxiety associated with depression     Artery disease, cerebral     Ataxia     Benign diastolic hypertension     Carpal tunnel syndrome     idiopathic peripheral    Cellulitis of face     Chronic back pain     COPD (chronic obstructive pulmonary disease) (HCC)     Depression     Disc degeneration, lumbosacral     Diverticulosis     Dysphagia     Esophageal reflux     Fever blister     Herpes simplex     Herpes zoster     Hypertension, essential     Hypertensive heart disease without CHF     Hypoxia     Idiopathic peripheral neuropathy     2017    Insomnia     Major depressive disorder, recurrent episode, moderate (HCC)     Mixed hyperlipidemia     Osteoporosis     Ovarian failure     Pulmonary fibrosis (HCC)     Sciatica     Sleep apnea     Transient cerebral ischemic attack     Urinary incontinence     Weakness of both legs       Past Surgical History:   Procedure Laterality Date    COLONOSCOPY  06/12/2015    Aubree, repeat as needed    HYSTERECTOMY      LUNG SEGMENTECTOMY      lung segmental resection       Vitals 7/20/2020 4/7/2020 1/24/2020 1/17/2020 1/6/2020 0/7/2410   SYSTOLIC 536 [phone] 658 272   DIASTOLIC 85 97 82 82 85 85   Site - - - - - -   Pulse 88 79 82 - - 84   Temp - - - 97.7 - -   Resp - - - - - -   SpO2 - - 92 - - 94   Weight 192 lb 186 lb - 188 lb 8 oz - 186 lb   Height 5' 4\" 5' 4\" - 5' 5\" - 5' 6\"   BMI (wt*703/ht~2) 32.95 kg/m2 31.92 kg/m2 - 31.36 kg/m2 - 30.02 kg/m2       Family History   Problem Relation Age of Onset    Heart Failure Mother     Diabetes Mother     Heart Disease Mother     Diabetes Father     Heart Disease facility-administered medications for this visit.       Allergies   Allergen Reactions    Augmentin [Amoxicillin-Pot Clavulanate]     Cefdinir     Effexor [Venlafaxine]     Erythromycin     Lortab [Hydrocodone-Acetaminophen]     Naprosyn [Naproxen]        Health Maintenance   Topic Date Due    DTaP/Tdap/Td vaccine (1 - Tdap) 08/10/2020 (Originally 12/18/1951)    Shingles Vaccine (1 of 2) 01/02/2021 (Originally 12/18/1982)    Flu vaccine (1) 09/01/2020    Annual Wellness Visit (AWV)  01/06/2021    Lipid screen  05/13/2021    TSH testing  05/13/2021    Potassium monitoring  07/17/2021    Creatinine monitoring  07/17/2021    Pneumococcal 65+ years Vaccine  Completed    Hepatitis A vaccine  Aged Out    Hepatitis B vaccine  Aged Out    Hib vaccine  Aged Out    Meningococcal (ACWY) vaccine  Aged Out       Lab Results   Component Value Date    LABA1C 5.8 04/26/2019     No results found for: PSA, PSADIA  TSH   Date Value Ref Range Status   05/13/2020 9.040 (H) 0.270 - 4.200 uIU/mL Final   ]  Lab Results   Component Value Date     07/17/2020    K 3.9 07/17/2020     07/17/2020    CO2 28 07/17/2020    BUN 19 07/17/2020    CREATININE 0.8 07/17/2020    GLUCOSE 118 (H) 07/17/2020    CALCIUM 9.2 07/17/2020    PROT 6.9 07/17/2020    LABALBU 4.2 07/17/2020    BILITOT 0.4 07/17/2020    ALKPHOS 67 07/17/2020    AST 42 (H) 07/17/2020    ALT 22 07/17/2020    LABGLOM >60 07/17/2020    GFRAA >59 07/17/2020     Lab Results   Component Value Date    CHOL 204 (H) 05/13/2020    CHOL 202 (H) 10/01/2019    CHOL 185 04/09/2019     Lab Results   Component Value Date    TRIG 265 (H) 05/13/2020    TRIG 190 (H) 01/06/2020    TRIG 245 (H) 10/01/2019     Lab Results   Component Value Date    HDL 44 (L) 05/13/2020    HDL 48 (L) 10/01/2019    HDL 49 (L) 04/09/2019     Lab Results   Component Value Date    LDLCALC 107 05/13/2020    LDLCALC 105 10/01/2019    LDLCALC 96 04/09/2019     Lab Results   Component Value Date    NA 145 07/17/2020    K 3.9 07/17/2020     07/17/2020    CO2 28 07/17/2020    BUN 19 07/17/2020    CREATININE 0.8 07/17/2020    GLUCOSE 118 07/17/2020    CALCIUM 9.2 07/17/2020      Lab Results   Component Value Date    WBC 10.5 07/17/2020    HGB 12.7 07/17/2020    HCT 39.3 07/17/2020    MCV 92.9 07/17/2020     07/17/2020    LABLYMP 2.86 09/25/2014    LYMPHOPCT 39.9 05/13/2020    RBC 4.23 07/17/2020    MCH 30.0 07/17/2020    MCHC 32.3 (L) 07/17/2020    RDW 14.0 07/17/2020     Lab Results   Component Value Date    VITD25 28.9 (L) 05/13/2020       Subjective:      Review of Systems   Constitutional: Negative for fatigue, fever and unexpected weight change. HENT: Negative for ear discharge, ear pain, mouth sores, sore throat and trouble swallowing. Eyes: Negative for discharge, itching and visual disturbance. Respiratory: Negative for cough, choking, shortness of breath, wheezing and stridor. Hemoptysis   Cardiovascular: Negative for chest pain, palpitations and leg swelling. Gastrointestinal: Positive for diarrhea, nausea and vomiting. Negative for abdominal distention, abdominal pain, blood in stool and constipation. Endocrine: Negative for cold intolerance, polydipsia and polyuria. Genitourinary: Negative for difficulty urinating, dysuria, frequency and urgency. Musculoskeletal: Positive for arthralgias. Negative for gait problem. Skin: Negative for color change and rash. Allergic/Immunologic: Negative for food allergies and immunocompromised state. Neurological: Positive for syncope. Negative for dizziness, tremors, speech difficulty, weakness and headaches. Hematological: Negative for adenopathy. Does not bruise/bleed easily. Psychiatric/Behavioral: Negative for confusion and hallucinations. The patient is nervous/anxious. Objective:     Physical Exam  Constitutional:       General: She is not in acute distress. Appearance: She is well-developed.    HENT: Head: Normocephalic and atraumatic. Eyes:      General: No scleral icterus. Right eye: No discharge. Left eye: No discharge. Pupils: Pupils are equal, round, and reactive to light. Neck:      Musculoskeletal: Normal range of motion and neck supple. Thyroid: No thyromegaly. Vascular: No JVD. Cardiovascular:      Rate and Rhythm: Normal rate and regular rhythm. Heart sounds: Normal heart sounds. No murmur. Pulmonary:      Effort: Pulmonary effort is normal. No respiratory distress. Breath sounds: Normal breath sounds. No wheezing or rales. Abdominal:      General: Bowel sounds are normal. There is no distension. Palpations: Abdomen is soft. There is no mass. Tenderness: There is no abdominal tenderness. There is no guarding or rebound. Musculoskeletal: Normal range of motion. General: No tenderness. Skin:     General: Skin is warm and dry. Findings: No erythema or rash. Neurological:      Mental Status: She is alert and oriented to person, place, and time. Cranial Nerves: No cranial nerve deficit. Coordination: Coordination normal.      Deep Tendon Reflexes: Reflexes are normal and symmetric. Reflexes normal.   Psychiatric:         Mood and Affect: Mood is not depressed. Behavior: Behavior normal.         Thought Content: Thought content normal.         Judgment: Judgment normal.       BP (!) 167/85   Pulse 88   Ht 5' 4\" (1.626 m)   Wt 192 lb (87.1 kg)   BMI 32.96 kg/m²     Assessment:       Diagnosis Orders   1. Diarrhea, unspecified type  CT ABDOMEN PELVIS W WO CONTRAST Additional Contrast? Oral   2. Hemoptysis  CT CHEST W WO CONTRAST   3. Acute pain of right shoulder  US GALLBLADDER RUQ   4. Other specified hypothyroidism  TSH without Reflex   5. Hypertension, essential     6. Nausea and vomiting, intractability of vomiting not specified, unspecified vomiting type     7. Memory impairment     8.  Anxiety associated with depression       Labs reviewed from 7/17/2020    Plan:        Patient given educational materials - see patient instructions. Discussed use, benefit, and side effects of prescribed medications. Allpatient questions answered. Pt voiced understanding. Reviewed health maintenance. Instructed to continue current medications, diet and exercise. Patient agreed with treatment plan. Follow up as directed. MEDICATIONS:  Orders Placed This Encounter   Medications    colestipol (COLESTID) 1 g tablet     Sig: Take 1 tablet by mouth 2 times daily     Dispense:  60 tablet     Refill:  3         ORDERS:  Orders Placed This Encounter   Procedures    CT ABDOMEN PELVIS W WO CONTRAST Additional Contrast? Oral    CT CHEST W WO CONTRAST    US GALLBLADDER RUQ    TSH without Reflex       Follow-up:  Return for keep fu appt, have labs done prior to appt. PATIENT INSTRUCTIONS:  Patient Instructions   1. Hemoptysis we will set up a CT of the chest  2. Hypertension we will just monitor at home for now especially with the recent syncopal episode  3. Vomiting continue use of Phenergan  4. Right shoulder pain we will further evaluate this with gallbladder testing  5. Jaundice labs are okay today  6. Incontinent of stool continue with Imodium and Colestid as needed we are going to do gallbladder testing to see if that study is reason for your diarrhea  7. Memory impairment we will just monitor for now would want to start any new medicines with all the medical issues she has going on  8. Anxiety continue with lorazepam as you are currently doing    Electronically signed by SONIA Hinojosa on 7/20/2020 at 12:18 PM    EMRDragon/transcription disclaimer:  Much of this encounter note is electronic transcription/translation of spoken language to printed texts. The electronic translation of spoken language may be erroneous, or at times,nonsensical words or phrases may be inadvertently transcribed.   Although I have reviewed the note for such errors, some may still exist.

## 2020-07-20 NOTE — PATIENT INSTRUCTIONS
1. Hemoptysis we will set up a CT of the chest  2. Hypertension we will just monitor at home for now especially with the recent syncopal episode  3. Vomiting continue use of Phenergan  4. Right shoulder pain we will further evaluate this with gallbladder testing  5. Jaundice labs are okay today  6. Incontinent of stool continue with Imodium and Colestid as needed we are going to do gallbladder testing to see if that study is reason for your diarrhea  7. Memory impairment we will just monitor for now would want to start any new medicines with all the medical issues she has going on  8.   Anxiety continue with lorazepam as you are currently doing

## 2020-07-24 ENCOUNTER — HOSPITAL ENCOUNTER (OUTPATIENT)
Dept: CT IMAGING | Age: 85
Discharge: HOME | End: 2020-07-24
Payer: MEDICARE

## 2020-07-24 PROCEDURE — 74178 CT ABD&PLV WO CNTR FLWD CNTR: CPT

## 2020-07-24 PROCEDURE — 6360000004 HC RX CONTRAST MEDICATION: Performed by: NURSE PRACTITIONER

## 2020-07-24 PROCEDURE — 71270 CT THORAX DX C-/C+: CPT

## 2020-07-24 RX ADMIN — IOPAMIDOL 75 ML: 755 INJECTION, SOLUTION INTRAVENOUS at 09:43

## 2020-07-24 NOTE — TELEPHONE ENCOUNTER
Kelsey Childers called requesting a refill of the below medication which has been pended for you:     Requested Prescriptions     Pending Prescriptions Disp Refills    levocetirizine (XYZAL) 5 MG tablet [Pharmacy Med Name: LEVOCETIRIZINE DIHYDROCHLORIDE TABS 5MG] 90 tablet 3     Sig: TAKE 1 TABLET NIGHTLY       Last Appointment Date: 7/20/2020  Next Appointment Date: 8/12/2020    Allergies   Allergen Reactions    Augmentin [Amoxicillin-Pot Clavulanate]     Cefdinir     Effexor [Venlafaxine]     Erythromycin     Lortab [Hydrocodone-Acetaminophen]     Naprosyn [Naproxen]

## 2020-07-27 ENCOUNTER — HOSPITAL ENCOUNTER (OUTPATIENT)
Dept: ULTRASOUND IMAGING | Age: 85
Discharge: HOME | End: 2020-07-27
Payer: MEDICARE

## 2020-07-27 PROCEDURE — 76536 US EXAM OF HEAD AND NECK: CPT

## 2020-07-27 PROCEDURE — 76705 ECHO EXAM OF ABDOMEN: CPT

## 2020-07-27 RX ORDER — LEVOCETIRIZINE DIHYDROCHLORIDE 5 MG/1
TABLET, FILM COATED ORAL
Qty: 90 TABLET | Refills: 3 | Status: SHIPPED | OUTPATIENT
Start: 2020-07-27 | End: 2021-01-20

## 2020-07-28 ENCOUNTER — TELEPHONE (OUTPATIENT)
Dept: NEUROSURGERY | Age: 85
End: 2020-07-28

## 2020-07-28 NOTE — TELEPHONE ENCOUNTER
Kait Block daughter  requests that nurse  return their call. The best time to reach her is Anytime. Patient daughter would like to get the patient test results. Thank you.

## 2020-08-17 ENCOUNTER — OFFICE VISIT (OUTPATIENT)
Dept: INTERNAL MEDICINE | Age: 85
End: 2020-08-17
Payer: MEDICARE

## 2020-08-17 VITALS
OXYGEN SATURATION: 90 % | HEIGHT: 64 IN | SYSTOLIC BLOOD PRESSURE: 142 MMHG | WEIGHT: 192 LBS | HEART RATE: 77 BPM | BODY MASS INDEX: 32.78 KG/M2 | DIASTOLIC BLOOD PRESSURE: 83 MMHG

## 2020-08-17 PROCEDURE — 1123F ACP DISCUSS/DSCN MKR DOCD: CPT | Performed by: NURSE PRACTITIONER

## 2020-08-17 PROCEDURE — 1036F TOBACCO NON-USER: CPT | Performed by: NURSE PRACTITIONER

## 2020-08-17 PROCEDURE — 99214 OFFICE O/P EST MOD 30 MIN: CPT | Performed by: NURSE PRACTITIONER

## 2020-08-17 PROCEDURE — 4040F PNEUMOC VAC/ADMIN/RCVD: CPT | Performed by: NURSE PRACTITIONER

## 2020-08-17 PROCEDURE — 1090F PRES/ABSN URINE INCON ASSESS: CPT | Performed by: NURSE PRACTITIONER

## 2020-08-17 PROCEDURE — G8427 DOCREV CUR MEDS BY ELIG CLIN: HCPCS | Performed by: NURSE PRACTITIONER

## 2020-08-17 PROCEDURE — G8417 CALC BMI ABV UP PARAM F/U: HCPCS | Performed by: NURSE PRACTITIONER

## 2020-08-17 NOTE — PATIENT INSTRUCTIONS
1. Hypertension; Likely anxiety   2. Anxiety ; buspar 5 mg 2 times a day as needed for anxiety   Continue with lorazepam   3.   SOB;  Stable no changes   4.  N&V;  HIDA scan set up

## 2020-08-17 NOTE — PROGRESS NOTES
[facility] INTERNAL MEDICINE  [address] [facility] [id number] [address]  Dept: [phone]  Dept Fax: [phone]: [phone]    Jose Johnson is a 80 y.o. female who presents today for her medical conditions/complaints as noted below. Jose Johnson is c/teresa Hypertension (Patient is here for routine follow up visit.) and Anxiety (Patient is here for routine follow up visit.)        HPI:     HPI   1. Hypertension; blood pressure is okay today up a little bit due to family situation she is currently taking losartan 100/12.5 daily  2. Anxiety ; she takes 1 mg of Ativan half in the morning and a tablet at bedtime this has helped her be calmer during the day and be able to rest at night  3. SOB; this is chronic for her she sees pulmonology she has inhalers  4. Nausea and vomiting her daughter thinks is this is mostly anxiety she did have a normal CT of her abdomen so we will get a HIDA scan to ensure that her gallbladder is functioning properly  5. Hyperlipidemia she is on Lipitor 20 daily no side effects of the meds she takes as directed  Chief Complaint   Patient presents with    Hypertension     Patient is here for routine follow up visit.  Anxiety     Patient is here for routine follow up visit.        Past Medical History:   Diagnosis Date    Abnormal mammogram     Anxiety     Anxiety associated with depression     Artery disease, cerebral     Ataxia     Benign diastolic hypertension     Carpal tunnel syndrome     idiopathic peripheral    Cellulitis of face     Chronic back pain     COPD (chronic obstructive pulmonary disease) (HCC)     Depression     Disc degeneration, lumbosacral     Diverticulosis     Dysphagia     Esophageal reflux     Fever blister     Herpes simplex     Herpes zoster     Hypertension, essential     Hypertensive heart disease without CHF     Hypoxia     Idiopathic peripheral neuropathy     2017    Insomnia     Major depressive disorder, recurrent episode, moderate (HCC)     Mixed hyperlipidemia     Osteoporosis     Ovarian failure     Pulmonary fibrosis ([facility] [address]     Sciatica     Sleep apnea     Transient cerebral ischemic attack     Urinary incontinence     Weakness of both legs       Past Surgical History:   Procedure Laterality Date    COLONOSCOPY  06/12/2015    Aubree, repeat as needed    HYSTERECTOMY      LUNG SEGMENTECTOMY      lung segmental resection       Vitals 8/17/2020 7/20/2020 4/7/2020 1/24/2020 1/17/2020 1/1/2392   SYSTOLIC [phone] 228 139 202   DIASTOLIC 83 85 97 82 82 85   Site - - - - - -   Pulse 77 88 79 82 - -   Temp - - - - 97.7 -   Resp - - - - - -   SpO2 90 - - 92 - -   Weight 192 lb 192 lb 186 lb - 188 lb 8 oz -   Height 5' 4\" 5' 4\" 5' 4\" - 5' 5\" -   BMI (wt*703/ht~2) 32.95 kg/m2 32.95 kg/m2 31.92 kg/m2 - 31.36 kg/m2 -       Family History   Problem Relation Age of Onset    Heart Failure Mother     Diabetes Mother     Heart Disease Mother     Diabetes Father     Heart Disease Father        Social History     Tobacco Use    Smoking status: Never Smoker    Smokeless tobacco: Never Used   Substance Use Topics    Alcohol use: No      Current Outpatient Medications   Medication Sig Dispense Refill    esomeprazole (NEXIUM) 40 MG delayed release capsule Take 1 capsule by mouth daily 90 capsule 3    escitalopram (LEXAPRO) 20 MG tablet Take 1 tablet by mouth daily for 15 days 15 tablet 0    LORazepam (ATIVAN) 1 MG tablet TAKE 1/2 TABLET BY MOUTH IN THE MORNING AND 1 TABLET BY MOUTH AT BEDTIME 45 tablet 0    busPIRone (BUSPAR) 5 MG tablet Take 1 tablet by mouth 2 times daily as needed (anxiety) 60 tablet 0    levocetirizine (XYZAL) 5 MG tablet TAKE 1 TABLET NIGHTLY 90 tablet 3    colestipol (COLESTID) 1 g tablet Take 1 tablet by mouth 2 times daily 60 tablet 3    levothyroxine (SYNTHROID) 25 MCG tablet 3 days a week take 1 tablet and 4 days a week take 2 tablets 390 tablet 1    leg swelling. Gastrointestinal: Negative for abdominal distention, abdominal pain, blood in stool, constipation, diarrhea, nausea and vomiting. Endocrine: Negative for cold intolerance, polydipsia and polyuria. Genitourinary: Negative for difficulty urinating, dysuria, frequency and urgency. Musculoskeletal: Positive for arthralgias. Negative for gait problem. Skin: Negative for color change and rash. Allergic/Immunologic: Negative for food allergies and immunocompromised state. Neurological: Negative for dizziness, tremors, syncope, speech difficulty, weakness and headaches. Hematological: Negative for adenopathy. Does not bruise/bleed easily. Psychiatric/Behavioral: Negative for confusion and hallucinations. The patient is nervous/anxious. Objective:     Physical Exam  Constitutional:       General: She is not in acute distress. Appearance: She is well-developed. HENT:      Head: Normocephalic and atraumatic. Eyes:      General: No scleral icterus. Right eye: No discharge. Left eye: No discharge. Pupils: Pupils are equal, round, and reactive to light. Neck:      Musculoskeletal: Normal range of motion and neck supple. Thyroid: No thyromegaly. Vascular: No JVD. Cardiovascular:      Rate and Rhythm: Normal rate and regular rhythm. Heart sounds: Normal heart sounds. No murmur. Pulmonary:      Effort: Pulmonary effort is normal. No respiratory distress. Breath sounds: Normal breath sounds. No wheezing or rales. Abdominal:      General: Bowel sounds are normal. There is no distension. Palpations: Abdomen is soft. There is no mass. Tenderness: There is no abdominal tenderness. There is no guarding or rebound. Musculoskeletal: Normal range of motion. General: No tenderness. Skin:     General: Skin is warm and dry. Findings: No erythema or rash.    Neurological:      Mental Status: She is alert and oriented to person, place, and time. Cranial Nerves: No cranial nerve deficit. Coordination: Coordination normal.      Deep Tendon Reflexes: Reflexes are normal and symmetric. Reflexes normal.   Psychiatric:         Mood and Affect: Mood is not depressed. Behavior: Behavior normal.         Thought Content: Thought content normal.         Judgment: Judgment normal.       BP (!) 142/83   Pulse 77   Ht 5' 4\" (1.626 m)   Wt 192 lb (87.1 kg)   SpO2 90%   BMI 32.96 kg/m²     Assessment:       Diagnosis Orders   1. Hypertension, essential  CBC Auto Differential    Comprehensive Metabolic Panel    Urinalysis Reflex to Culture    TSH without Reflex   2. Primary insomnia  LORazepam (ATIVAN) 1 MG tablet   3. Post-menopausal  ROLANDO DIGITAL SCREEN W OR WO CAD BILATERAL    DEXA BONE DENSITY 2 SITES   4. Mixed hyperlipidemia  Lipid Panel   5. Vitamin D deficiency  Vitamin D 25 Hydroxy   6. Encounter for screening mammogram for breast cancer  ROLANDO DIGITAL SCREEN W OR WO CAD BILATERAL     Labs reviewedfrom 7/17/2020  Diagnostics reviewed from *2015  Plan:        Patient given educational materials - see patient instructions. Discussed use, benefit, and side effects of prescribed medications. Allpatient questions answered. Pt voiced understanding. Reviewed health maintenance. Instructed to continue current medications, diet and exercise. Patient agreed with treatment plan. Follow up as directed.    MEDICATIONS:  Orders Placed This Encounter   Medications    esomeprazole (NEXIUM) 40 MG delayed release capsule     Sig: Take 1 capsule by mouth daily     Dispense:  90 capsule     Refill:  3    escitalopram (LEXAPRO) 20 MG tablet     Sig: Take 1 tablet by mouth daily for 15 days     Dispense:  15 tablet     Refill:  0    LORazepam (ATIVAN) 1 MG tablet     Sig: TAKE 1/2 TABLET BY MOUTH IN THE MORNING AND 1 TABLET BY MOUTH AT BEDTIME     Dispense:  45 tablet     Refill:  0    busPIRone (BUSPAR) 5 MG tablet     Sig: Take 1 tablet by mouth 2 times daily as needed (anxiety)     Dispense:  60 tablet     Refill:  0         ORDERS:  Orders Placed This Encounter   Procedures    ROLANDO DIGITAL SCREEN W OR WO CAD BILATERAL    DEXA BONE DENSITY 2 SITES    CBC Auto Differential    Comprehensive Metabolic Panel    Lipid Panel    Vitamin D 25 Hydroxy    Urinalysis Reflex to Culture    TSH without Reflex       Follow-up:  Return in about 3 months (around 11/17/2020) for have labs done prior to appt. PATIENT INSTRUCTIONS:  Patient Instructions   1. Hypertension; Likely anxiety   2. Anxiety ; buspar 5 mg 2 times a day as needed for anxiety   Continue with lorazepam   3. SOB;  Stable no changes   4.  N&V;  HIDA scan set up     Electronically signed by SONIA Stock on 8/18/2020 at 7:25 AM    EMRDragon/transcription disclaimer:  Much of this encounter note is electronic transcription/translation of spoken language to printed texts. The electronic translation of spoken language may be erroneous, or at times,nonsensical words or phrases may be inadvertently transcribed.   Although I have reviewed the note for such errors, some may still exist.

## 2020-08-18 ENCOUNTER — OFFICE VISIT (OUTPATIENT)
Dept: OTOLARYNGOLOGY | Age: 85
End: 2020-08-18
Payer: MEDICARE

## 2020-08-18 VITALS
DIASTOLIC BLOOD PRESSURE: 78 MMHG | WEIGHT: 198 LBS | SYSTOLIC BLOOD PRESSURE: 138 MMHG | HEIGHT: 64 IN | BODY MASS INDEX: 33.8 KG/M2

## 2020-08-18 PROBLEM — E04.1 THYROID NODULE: Status: ACTIVE | Noted: 2020-08-18

## 2020-08-18 PROCEDURE — G8417 CALC BMI ABV UP PARAM F/U: HCPCS | Performed by: PHYSICIAN ASSISTANT

## 2020-08-18 PROCEDURE — 1090F PRES/ABSN URINE INCON ASSESS: CPT | Performed by: PHYSICIAN ASSISTANT

## 2020-08-18 PROCEDURE — 1036F TOBACCO NON-USER: CPT | Performed by: PHYSICIAN ASSISTANT

## 2020-08-18 PROCEDURE — 4040F PNEUMOC VAC/ADMIN/RCVD: CPT | Performed by: PHYSICIAN ASSISTANT

## 2020-08-18 PROCEDURE — 1123F ACP DISCUSS/DSCN MKR DOCD: CPT | Performed by: PHYSICIAN ASSISTANT

## 2020-08-18 PROCEDURE — 99213 OFFICE O/P EST LOW 20 MIN: CPT | Performed by: PHYSICIAN ASSISTANT

## 2020-08-18 PROCEDURE — G8427 DOCREV CUR MEDS BY ELIG CLIN: HCPCS | Performed by: PHYSICIAN ASSISTANT

## 2020-08-18 RX ORDER — BUSPIRONE HYDROCHLORIDE 5 MG/1
5 TABLET ORAL 2 TIMES DAILY PRN
Qty: 60 TABLET | Refills: 0 | Status: SHIPPED | OUTPATIENT
Start: 2020-08-18 | End: 2020-08-27

## 2020-08-18 RX ORDER — ESCITALOPRAM OXALATE 20 MG/1
20 TABLET ORAL DAILY
Qty: 15 TABLET | Refills: 0 | Status: SHIPPED | OUTPATIENT
Start: 2020-08-18 | End: 2020-08-19

## 2020-08-18 RX ORDER — ESOMEPRAZOLE MAGNESIUM 40 MG/1
40 CAPSULE, DELAYED RELEASE ORAL DAILY
Qty: 90 CAPSULE | Refills: 3 | Status: SHIPPED | OUTPATIENT
Start: 2020-08-18 | End: 2020-09-15 | Stop reason: SDUPTHER

## 2020-08-18 RX ORDER — LORAZEPAM 1 MG/1
TABLET ORAL
Qty: 45 TABLET | Refills: 0 | Status: SHIPPED | OUTPATIENT
Start: 2020-08-18 | End: 2020-09-15 | Stop reason: SDUPTHER

## 2020-08-18 ASSESSMENT — ENCOUNTER SYMPTOMS
RHINORRHEA: 0
EYE DISCHARGE: 0
SINUS PAIN: 0
SORE THROAT: 0
STRIDOR: 0
TROUBLE SWALLOWING: 0
DIARRHEA: 0
EYE PAIN: 0
SHORTNESS OF BREATH: 0
CONSTIPATION: 0
WHEEZING: 0
COLOR CHANGE: 0
FACIAL SWELLING: 0
NAUSEA: 0
TROUBLE SWALLOWING: 0
COUGH: 0
ABDOMINAL PAIN: 0
VOICE CHANGE: 0
CHOKING: 0
ABDOMINAL DISTENTION: 0
SINUS PRESSURE: 0
VOMITING: 0
EYE DISCHARGE: 0
SORE THROAT: 0
EYE ITCHING: 0
BLOOD IN STOOL: 0

## 2020-08-18 NOTE — PROGRESS NOTES
times daily Apply 40 drops to lower extremity joints      ondansetron (ZOFRAN-ODT) 4 MG disintegrating tablet Take 4 mg by mouth every 8 hours as needed for Nausea or Vomiting      OXYGEN Inhale into the lungs 2L at qhs      atorvastatin (LIPITOR) 20 MG tablet Take 0.5 tablets by mouth daily 90 tablet 1    mirtazapine (REMERON) 30 MG tablet Take 1 tablet by mouth nightly 90 tablet 1    diclofenac sodium (VOLTAREN) 1 % GEL Apply 4 g topically 3 times daily as needed for Pain 5 Tube 1     No current facility-administered medications for this visit.         Past Surgical History:   Procedure Laterality Date    COLONOSCOPY  06/12/2015    Aubree, repeat as needed    HYSTERECTOMY      LUNG SEGMENTECTOMY      lung segmental resection       Past Medical History:   Diagnosis Date    Abnormal mammogram     Anxiety     Anxiety associated with depression     Artery disease, cerebral     Ataxia     Benign diastolic hypertension     Carpal tunnel syndrome     idiopathic peripheral    Cellulitis of face     Chronic back pain     COPD (chronic obstructive pulmonary disease) (HCC)     Depression     Disc degeneration, lumbosacral     Diverticulosis     Dysphagia     Esophageal reflux     Fever blister     Herpes simplex     Herpes zoster     Hypertension, essential     Hypertensive heart disease without CHF     Hypoxia     Idiopathic peripheral neuropathy     2017    Insomnia     Major depressive disorder, recurrent episode, moderate (HCC)     Mixed hyperlipidemia     Osteoporosis     Ovarian failure     Pulmonary fibrosis (HCC)     Sciatica     Sleep apnea     Transient cerebral ischemic attack     Urinary incontinence     Weakness of both legs        Family History   Problem Relation Age of Onset    Heart Failure Mother     Diabetes Mother     Heart Disease Mother     Diabetes Father     Heart Disease Father        Social History     Tobacco Use    Smoking status: Never Smoker    Smokeless tobacco: Never Used   Substance Use Topics    Alcohol use: No           REVIEW OF SYSTEMS:  all other systems reviewed and are negative  Review of Systems   Constitutional: Negative for chills and fever. HENT: Negative for congestion, dental problem, ear discharge, ear pain, facial swelling, hearing loss, nosebleeds, postnasal drip, rhinorrhea, sinus pressure, sinus pain, sneezing, sore throat, tinnitus, trouble swallowing and voice change. Eyes: Negative for pain and discharge. Respiratory:        + for hemoptysis           Comments:     PHYSICAL EXAM:    /78   Ht 5' 4\" (1.626 m)   Wt 198 lb (89.8 kg)   BMI 33.99 kg/m²   Body mass index is 33.99 kg/m². General Appearance: well developed  and well nourished  Head/ Face: normocephalic and atraumatic  Vocal Quality: good/ normal  Ears: Right Ear: External: external ears normal Otoscopy Ear Canal: canal clear Otoscopy TM: TM's normal and TM's mobile Left Ear: External: external ears normal Otoscopy Ear Canal: canal clear Otoscopy TM: TM's normal and TM's mobile  Hearing: grossly intact  Nose: nares normal and septum midline  Neck: supple and mass No  Thyroid: normal, tender No and nodules No    Assessment & Plan:    Problem List Items Addressed This Visit     Thyroid nodule - Primary     Stable left thyroid nodules compared to ultrasound 6 months ago  Plan: Due to the ultrasound remaining stable would recommend a follow-up ultrasound in 1 year with office visit for reevaluation. She is reminded to call if she has any questions or problems. No orders of the defined types were placed in this encounter. No orders of the defined types were placed in this encounter. Electronically signed by Rossana Rae PA-C on 8/18/20 at 11:40 AM CDT          Please note that this chart was generated using dragon dictation software.   Although every effort was made to ensure the accuracy of this automated transcription, some

## 2020-08-18 NOTE — ASSESSMENT & PLAN NOTE
Stable left thyroid nodules compared to ultrasound 6 months ago  Plan: Due to the ultrasound remaining stable would recommend a follow-up ultrasound in 1 year with office visit for reevaluation. She is reminded to call if she has any questions or problems.

## 2020-08-19 RX ORDER — ESCITALOPRAM OXALATE 20 MG/1
TABLET ORAL
Qty: 90 TABLET | Refills: 3 | Status: SHIPPED | OUTPATIENT
Start: 2020-08-19 | End: 2021-08-09

## 2020-08-19 NOTE — TELEPHONE ENCOUNTER
Tyson Jack called requesting a refill of the below medication which has been pended for you:     Requested Prescriptions     Pending Prescriptions Disp Refills    escitalopram (LEXAPRO) 20 MG tablet [Pharmacy Med Name: ESCITALOPRAM TABS 20MG] 90 tablet 3     Sig: TAKE 1 TABLET DAILY       Last Appointment Date: 8/17/2020  Next Appointment Date: 11/18/2020    Allergies   Allergen Reactions    Augmentin [Amoxicillin-Pot Clavulanate]     Cefdinir     Effexor [Venlafaxine]     Erythromycin     Lortab [Hydrocodone-Acetaminophen]     Naprosyn [Naproxen]

## 2020-08-24 ENCOUNTER — HOSPITAL ENCOUNTER (OUTPATIENT)
Dept: NUCLEAR MEDICINE | Age: 85
Discharge: HOME | End: 2020-08-26
Payer: MEDICARE

## 2020-08-24 PROCEDURE — A9537 TC99M MEBROFENIN: HCPCS | Performed by: NURSE PRACTITIONER

## 2020-08-24 PROCEDURE — 78227 HEPATOBIL SYST IMAGE W/DRUG: CPT

## 2020-08-24 PROCEDURE — 3430000000 HC RX DIAGNOSTIC RADIOPHARMACEUTICAL: Performed by: NURSE PRACTITIONER

## 2020-08-24 RX ORDER — MONTELUKAST SODIUM 4 MG/1
1 TABLET, CHEWABLE ORAL 2 TIMES DAILY
Qty: 60 TABLET | Refills: 3 | Status: SHIPPED | OUTPATIENT
Start: 2020-08-24 | End: 2021-01-19 | Stop reason: SDUPTHER

## 2020-08-24 RX ADMIN — Medication 5 MILLICURIE: at 13:53

## 2020-08-27 RX ORDER — BUSPIRONE HYDROCHLORIDE 5 MG/1
TABLET ORAL
Qty: 60 TABLET | Refills: 11 | Status: SHIPPED | OUTPATIENT
Start: 2020-08-27 | End: 2021-01-02 | Stop reason: SDUPTHER

## 2020-08-27 NOTE — TELEPHONE ENCOUNTER
Isidro Abdi called requesting a refill of the below medication which has been pended for you:     Requested Prescriptions     Pending Prescriptions Disp Refills    busPIRone (BUSPAR) 5 MG tablet [Pharmacy Med Name: BUSPIRONE HCL TABS 5MG] 60 tablet 11     Sig: TAKE 1 TABLET TWICE A DAY AS NEEDED FOR ANXIETY       Last Appointment Date: 8/17/2020  Next Appointment Date: 11/18/2020    Allergies   Allergen Reactions    Augmentin [Amoxicillin-Pot Clavulanate]     Cefdinir     Effexor [Venlafaxine]     Erythromycin     Lortab [Hydrocodone-Acetaminophen]     Naprosyn [Naproxen]

## 2020-08-31 ENCOUNTER — HOSPITAL ENCOUNTER (OUTPATIENT)
Dept: WOMENS IMAGING | Age: 85
Discharge: HOME | End: 2020-08-31
Payer: MEDICARE

## 2020-08-31 PROCEDURE — 77080 DXA BONE DENSITY AXIAL: CPT

## 2020-08-31 PROCEDURE — 77063 BREAST TOMOSYNTHESIS BI: CPT

## 2020-09-02 RX ORDER — MIRTAZAPINE 30 MG/1
TABLET, FILM COATED ORAL
Qty: 90 TABLET | Refills: 3 | Status: SHIPPED | OUTPATIENT
Start: 2020-09-02 | End: 2021-08-30

## 2020-09-02 NOTE — TELEPHONE ENCOUNTER
Aravind Chavez called requesting a refill of the below medication which has been pended for you:     Requested Prescriptions     Pending Prescriptions Disp Refills    mirtazapine (REMERON) 30 MG tablet [Pharmacy Med Name: MIRTAZAPINE TABS 30MG] 90 tablet 3     Sig: TAKE 1 TABLET NIGHTLY       Last Appointment Date: 8/17/2020  Next Appointment Date: 11/18/2020    Allergies   Allergen Reactions    Augmentin [Amoxicillin-Pot Clavulanate]     Cefdinir     Effexor [Venlafaxine]     Erythromycin     Lortab [Hydrocodone-Acetaminophen]     Naprosyn [Naproxen]

## 2020-09-15 ENCOUNTER — PATIENT MESSAGE (OUTPATIENT)
Dept: INTERNAL MEDICINE | Age: 85
End: 2020-09-15

## 2020-09-15 RX ORDER — LORAZEPAM 1 MG/1
TABLET ORAL
Qty: 45 TABLET | Refills: 0 | Status: SHIPPED | OUTPATIENT
Start: 2020-09-15 | End: 2020-10-19 | Stop reason: SDUPTHER

## 2020-09-15 NOTE — TELEPHONE ENCOUNTER
From: Colletta Chimes  To: SONIA Acevedo  Sent: 9/15/2020 2:48 PM CDT  Subject: Test Results Question    This message is being sent by Chris Mcmahon on behalf of Colletta Chimes. Could you please to me a favor and send Dr Jesenia Dunham a copy of all mom's test to have in her file. Also her insurance is refusing to fill her nexium until they hear from you that she can not take the generic.  I know you have send it before but they want it again

## 2020-09-15 NOTE — TELEPHONE ENCOUNTER
Krystal Dee called requesting a refill of the below medication which has been pended for you:     Requested Prescriptions     Pending Prescriptions Disp Refills    LORazepam (ATIVAN) 1 MG tablet 45 tablet 0     Sig: TAKE 1/2 TABLET BY MOUTH IN THE MORNING AND 1 TABLET BY MOUTH AT BEDTIME       Last Appointment Date: 8/17/2020  Next Appointment Date: 11/18/2020    Allergies   Allergen Reactions    Augmentin [Amoxicillin-Pot Clavulanate]     Biaxin [Clarithromycin]     Cefdinir     Effexor [Venlafaxine]     Erythromycin     Lortab [Hydrocodone-Acetaminophen]     Naprosyn [Naproxen]

## 2020-09-22 ENCOUNTER — OFFICE VISIT (OUTPATIENT)
Dept: GASTROENTEROLOGY | Age: 85
End: 2020-09-22
Payer: MEDICARE

## 2020-09-22 VITALS
BODY MASS INDEX: 31.65 KG/M2 | SYSTOLIC BLOOD PRESSURE: 150 MMHG | WEIGHT: 190 LBS | OXYGEN SATURATION: 93 % | HEIGHT: 65 IN | DIASTOLIC BLOOD PRESSURE: 80 MMHG | HEART RATE: 99 BPM

## 2020-09-22 PROBLEM — R19.5 DARK STOOLS: Status: ACTIVE | Noted: 2020-09-22

## 2020-09-22 PROBLEM — R15.9 INCONTINENCE OF FECES: Status: ACTIVE | Noted: 2020-09-22

## 2020-09-22 PROBLEM — R11.0 CHRONIC NAUSEA: Status: ACTIVE | Noted: 2020-09-22

## 2020-09-22 PROBLEM — R11.10 CHRONIC VOMITING: Status: ACTIVE | Noted: 2020-09-22

## 2020-09-22 PROBLEM — R19.8 RECTAL PRESSURE: Status: ACTIVE | Noted: 2020-09-22

## 2020-09-22 PROBLEM — Z79.02 ANTIPLATELET OR ANTITHROMBOTIC LONG-TERM USE: Status: ACTIVE | Noted: 2020-09-22

## 2020-09-22 PROBLEM — K52.9 CHRONIC DIARRHEA: Status: ACTIVE | Noted: 2020-09-22

## 2020-09-22 PROCEDURE — 1090F PRES/ABSN URINE INCON ASSESS: CPT | Performed by: NURSE PRACTITIONER

## 2020-09-22 PROCEDURE — 1036F TOBACCO NON-USER: CPT | Performed by: NURSE PRACTITIONER

## 2020-09-22 PROCEDURE — 99213 OFFICE O/P EST LOW 20 MIN: CPT | Performed by: NURSE PRACTITIONER

## 2020-09-22 PROCEDURE — 1123F ACP DISCUSS/DSCN MKR DOCD: CPT | Performed by: NURSE PRACTITIONER

## 2020-09-22 PROCEDURE — G8427 DOCREV CUR MEDS BY ELIG CLIN: HCPCS | Performed by: NURSE PRACTITIONER

## 2020-09-22 PROCEDURE — G8417 CALC BMI ABV UP PARAM F/U: HCPCS | Performed by: NURSE PRACTITIONER

## 2020-09-22 PROCEDURE — 4040F PNEUMOC VAC/ADMIN/RCVD: CPT | Performed by: NURSE PRACTITIONER

## 2020-09-22 RX ORDER — ASPIRIN 81 MG/1
81 TABLET ORAL DAILY
COMMUNITY

## 2020-09-22 ASSESSMENT — ENCOUNTER SYMPTOMS
ANAL BLEEDING: 0
VOMITING: 1
SORE THROAT: 0
ABDOMINAL PAIN: 0
DIARRHEA: 1
CONSTIPATION: 0
NAUSEA: 1
COUGH: 0
ABDOMINAL DISTENTION: 0
SHORTNESS OF BREATH: 1
BACK PAIN: 0
RECTAL PAIN: 0
TROUBLE SWALLOWING: 0
BLOOD IN STOOL: 0
VOICE CHANGE: 0

## 2020-09-22 NOTE — PROGRESS NOTES
Subjective:      Fiordaliza Lincoln is a80 y.o. female  Chief Complaint   Patient presents with    New Patient     rectal problems and nausea    Nausea       HPI  PCP: SONIA Bella  Referring Provider: Brandon Larkin MD  New pt referral.  From Sonja Dumont MD  For multiple GI complaints. C/o nausea and vomiting. Waxes and wanes. Started at Christmas 2019. Has been taking phenergan, this doesn't help. No identifiable triggers. Sometimes will come on first thing in the morning. Sometimes it comes on mid day. Reports she had GB testing and was advised this was all normal.    Reports she had an episode of throat clearing and spit up a blood clot. This happened about 3 months ago and has not happened since this time. Sees Dr Herminia Benites at [facility] for this. She has pulmonary fibrosis, wears oxygen with exertion and when she sleeps. C/o dark stools. This occurred a few months ago, and lasted for 3-4 weeks and resolved on its own. Stools were not sticky/tarry. Just dark in colon. Does not take oral iron and pepto bismol. CBC 7/2020 reveals normal H/H      C/o diarrhea and stool incontinence. Chronic for 10+ years. Currently on colestipol BID imodium prn and this is working great to keep stools formed. C/o rectal pressure. And feels she needs to constatnly have a BM. Even when she doesn't have to have a BM. This started 3-4 months ago. And is concerning to her. On plavix for hx of CVA/TIAs. This is prescribed by her PCP. Increased risk for anesthesia, with her hx of pulmonary fibrosis, oxygen dependency, COPD, HTN, and hx of CVAs requiring chronic antiplatelet therapy. GI scope reports in history per MA per OV policy, I reviewed this. Family HX:    Pt denies family hx of colon polyps, colon CA, inflammatory bowel dx, gastric CA and esophageal CA.     Past Medical History:   Diagnosis Date    Abnormal mammogram     Anxiety     Anxiety associated with depression     Artery disease, cerebral     Ataxia     Benign diastolic hypertension     Carpal tunnel syndrome     idiopathic peripheral    Cellulitis of face     Chronic back pain     COPD (chronic obstructive pulmonary disease) (HCC)     Depression     Disc degeneration, lumbosacral     Diverticulosis     Dysphagia     Esophageal reflux     Fever blister     Herpes simplex     Herpes zoster     Hypertension, essential     Hypertensive heart disease without CHF     Hypoxia     Idiopathic peripheral neuropathy     2017    Insomnia     Major depressive disorder, recurrent episode, moderate (HCC)     Mixed hyperlipidemia     Osteoporosis     Ovarian failure     Pulmonary fibrosis (HCC)     Sciatica     Sleep apnea     Transient cerebral ischemic attack     Urinary incontinence     Weakness of both legs           Past Surgical History:   Procedure Laterality Date    COLONOSCOPY  06/12/2015    Aubree, repeat as needed    HYSTERECTOMY      LUNG SEGMENTECTOMY      lung segmental resection       Social History     Socioeconomic History    Marital status:       Spouse name: None    Number of children: None    Years of education: None    Highest education level: None   Occupational History    None   Social Needs    Financial resource strain: None    Food insecurity     Worry: None     Inability: None    Transportation needs     Medical: None     Non-medical: None   Tobacco Use    Smoking status: Never Smoker    Smokeless tobacco: Never Used   Substance and Sexual Activity    Alcohol use: No    Drug use: No    Sexual activity: None   Lifestyle    Physical activity     Days per week: None     Minutes per session: None    Stress: None   Relationships    Social connections     Talks on phone: None     Gets together: None     Attends Anabaptism service: None     Active member of club or organization: None     Attends meetings of clubs or organizations: None     Relationship status: None    Intimate partner violence     Fear of current or ex partner: None     Emotionally abused: None     Physically abused: None     Forced sexual activity: None   Other Topics Concern    None   Social History Narrative    None       Allergies   Allergen Reactions    Augmentin [Amoxicillin-Pot Clavulanate]     Biaxin [Clarithromycin]     Cefdinir     Effexor [Venlafaxine]     Erythromycin     Lortab [Hydrocodone-Acetaminophen]     Naprosyn [Naproxen]        Current Outpatient Medications   Medication Sig Dispense Refill    aspirin 81 MG EC tablet Take 81 mg by mouth daily      LORazepam (ATIVAN) 1 MG tablet TAKE 1/2 TABLET BY MOUTH IN THE MORNING AND 1 TABLET BY MOUTH AT BEDTIME 45 tablet 0    NEXIUM 40 MG delayed release capsule Take 1 capsule by mouth daily 90 capsule 3    mirtazapine (REMERON) 30 MG tablet TAKE 1 TABLET NIGHTLY 90 tablet 3    busPIRone (BUSPAR) 5 MG tablet TAKE 1 TABLET TWICE A DAY AS NEEDED FOR ANXIETY 60 tablet 11    colestipol (COLESTID) 1 g tablet Take 1 tablet by mouth 2 times daily 60 tablet 3    escitalopram (LEXAPRO) 20 MG tablet TAKE 1 TABLET DAILY 90 tablet 3    levocetirizine (XYZAL) 5 MG tablet TAKE 1 TABLET NIGHTLY 90 tablet 3    levothyroxine (SYNTHROID) 25 MCG tablet 3 days a week take 1 tablet and 4 days a week take 2 tablets 390 tablet 1    acetaminophen (TYLENOL) 500 MG tablet Take 500 mg by mouth 2 times daily      clopidogrel (PLAVIX) 75 MG tablet TAKE 1 TABLET DAILY 90 tablet 1    losartan-hydrochlorothiazide (HYZAAR) 100-12.5 MG per tablet Take 1 tablet by mouth daily 90 tablet 3    atorvastatin (LIPITOR) 20 MG tablet Take 0.5 tablets by mouth daily 90 tablet 1    diclofenac sodium (VOLTAREN) 1 % GEL Apply 4 g topically 3 times daily as needed for Pain 5 Tube 1    Multiple Vitamins-Minerals (CENTRUM SILVER) TABS Take by mouth daily      diclofenac 1.5 % SOLN Place 40 drops onto the skin 3 times daily Apply 40 drops to lower extremity joints      ondansetron (ZOFRAN-ODT) 4 MG disintegrating tablet Take 4 mg by mouth every 8 hours as needed for Nausea or Vomiting      OXYGEN Inhale into the lungs 2L at [facility]       No current facility-administered medications for this visit. Review of Systems   Constitutional: Positive for fatigue (extreme per pt). Negative for appetite change, fever and unexpected weight change. HENT: Negative for sore throat, trouble swallowing and voice change. Respiratory: Positive for shortness of breath. Negative for cough. Cardiovascular: Negative for chest pain, palpitations and leg swelling. Gastrointestinal: Positive for diarrhea (chr/controlled), nausea and vomiting. Negative for abdominal distention, abdominal pain, anal bleeding, blood in stool, constipation and rectal pain. Genitourinary: Negative for hematuria. Musculoskeletal: Positive for arthralgias. Negative for back pain and neck pain. Neurological: Positive for weakness (generalized). Negative for dizziness, light-headedness and headaches. Hematological: Bruises/bleeds easily. Psychiatric/Behavioral: Negative for dysphoric mood and sleep disturbance. The patient is not nervous/anxious. All other systems reviewed and are negative. Objective:     Physical Exam  Vitals signs and nursing note reviewed. Constitutional:       Appearance: She is well-developed. Comments: BP (!) 150/80   Pulse 99   Ht 5' 5\" (1.651 m)   Wt 190 lb (86.2 kg)   SpO2 93%   BMI 31.62 kg/m²   O2 intact per n/c   Eyes:      General: No scleral icterus. Conjunctiva/sclera: Conjunctivae normal.      Pupils: Pupils are equal, round, and reactive to light. Neck:      Musculoskeletal: Normal range of motion and neck supple. Thyroid: No thyromegaly. Cardiovascular:      Rate and Rhythm: Normal rate and regular rhythm. Heart sounds: Normal heart sounds. No murmur. No friction rub. No gallop.     Pulmonary:      Effort: Pulmonary effort is normal. No

## 2020-09-22 NOTE — PATIENT INSTRUCTIONS
You are going to have an Endoscopy and here are some basic instructions:    Nothing to eat or drink after midnight EXCEPT:  PLEASE TAKE MEDICATION(S) FOR HIGH BLOOD PRESSURE, SEIZURES, HEART, AND THYROID WITH A SIP OF WATER AT LEAST 2 HOURS PRIOR TO ARRIVAL TIME.   YOU MAY ALSO TAKE ANY INHALERS YOU ARE PRESCRIBED. You will not be able to drive for 24 hours after the procedure due to sedation. Bring a  with you the day of the procedure. No aspirin, ibuprofen, naproxen, fish oil or vitamin E for 5 days before procedure. Continue current medications. If you are on blood thinners, clearance from the prescribing physician will be obtained before your procedure is scheduled. [email or web address] may be associated with discontinuation of your blood thinner and include, but not limited to, stroke, TIA, or cardiac event. If biopsies are taken during the procedure they will be sent to a pathologist for analysis. You will be notified by mail of the pathology results in 2-3 weeks. Your physician may also schedule a follow up appointment with the nurse practitioner to discuss pathology, symptoms or to check if you have had any problems related to your procedure. If you prefer not to return to the office after your procedure please discuss this with your physician on the day of your procedure. You are going to have a colonoscopy and here are some instructions: You will be given specific directions regarding restrictions to diet and bowel prep instructions including laxatives. Please read these instructions one week prior to your scheduled procedure to ensure that you are prepared. Follow prep instructions provided for bowel prep. Take all of the bowel prep as directed. If you are having problems with nausea, stop your prep for 30-45 min to allow the nausea to subside before resuming your prep.      Nothing to eat or drink after midnight the day of the procedure EXCEPT:  PLEASE TAKE MEDICATION(S) FOR HIGH BLOOD PRESSURE, THYROID, SEIZURES, AND HEART THE MORNING OF THE PROCEDURE WITH A SIP OF WATER  AT LEAST 2 HOURS PRIOR TO ARRIVAL TIME.   YOU MAY ALSO TAKE ANY INHALERS YOU ARE PRESCRIBED. You will not be able to drive for 24 hours after the procedure due to sedation. Bring a  with you the day of the procedure. No aspirin, ibuprofen, naproxen, fish oil or vitamin E for 5 days before procedure. If you are on blood thinners, clearance from the prescribing physician will be obtained before your procedure is scheduled. [email or web address] may be associated with discontinuation of your blood thinner and include, but not limited to, stroke, TIA, or cardiac event. If polyps are removed during the procedure they will be sent to a pathologist for analysis. You will be notified by mail of the pathology results in 2-3 weeks. Your physician may also schedule a follow up appointment with the nurse practitioner to discuss pathology, symptoms or to check if you have had any problems related to your procedure. If you prefer not to return to the office after your procedure please discuss this with your physician on the day of your colonoscopy. Final recommendations are based on the pathologist report. Your diet before a colonoscopy bowel preparation is very important to ensure a successful colon exam. It is recommended to consider certain changes to your diet three to four days prior to the procedure. Remember that your bowels need to be empty for the exam.    What foods are good to eat? Cut down on heavy solid foods three to four days before the procedure and start introducing lighter meals to your diet. The following food suggestions are a good part of your diet before a colonoscopy bowel preparation.   Light meat that is easily digestible such as chicken (without the skin)   Potatoes without skin   Cheese   Eggs   A light meal of steamed white fish   Light clear

## 2020-10-05 RX ORDER — MIRABEGRON 50 MG/1
TABLET, FILM COATED, EXTENDED RELEASE ORAL
Qty: 90 TABLET | Refills: 3 | Status: SHIPPED | OUTPATIENT
Start: 2020-10-05 | End: 2021-09-13 | Stop reason: SDUPTHER

## 2020-10-08 ENCOUNTER — TELEPHONE (OUTPATIENT)
Dept: GASTROENTEROLOGY | Age: 85
End: 2020-10-08

## 2020-10-08 NOTE — TELEPHONE ENCOUNTER
Patient: Long Wagner    YOB: 1932      Clearance for Endoscopy / Colonoscopy was received on October 8, 2020. Patient may discontinue the use of Plavix for five days prior to the procedure. There is NOT a Lovenox requirement. See attached clearance and scheduling forms.

## 2020-10-12 RX ORDER — CLOPIDOGREL BISULFATE 75 MG/1
TABLET ORAL
Qty: 90 TABLET | Refills: 3 | Status: SHIPPED | OUTPATIENT
Start: 2020-10-12 | End: 2021-09-13 | Stop reason: SDUPTHER

## 2020-10-16 ENCOUNTER — OFFICE VISIT (OUTPATIENT)
Age: 85
End: 2020-10-16

## 2020-10-16 VITALS — OXYGEN SATURATION: 94 % | TEMPERATURE: 97.5 F | HEART RATE: 91 BPM

## 2020-10-16 DIAGNOSIS — E78.2 MIXED HYPERLIPIDEMIA: ICD-10-CM

## 2020-10-16 DIAGNOSIS — E55.9 VITAMIN D DEFICIENCY: ICD-10-CM

## 2020-10-16 DIAGNOSIS — I10 HYPERTENSION, ESSENTIAL: ICD-10-CM

## 2020-10-16 LAB
ALBUMIN SERPL-MCNC: 4.4 G/DL (ref 3.5–5.2)
ALP BLD-CCNC: 73 U/L (ref 35–104)
ALT SERPL-CCNC: 24 U/L (ref 5–33)
ANION GAP SERPL CALCULATED.3IONS-SCNC: 14 MMOL/L (ref 7–19)
AST SERPL-CCNC: 43 U/L (ref 5–32)
BACTERIA: NEGATIVE /HPF
BASOPHILS ABSOLUTE: 0 K/UL (ref 0–0.2)
BASOPHILS RELATIVE PERCENT: 0.3 % (ref 0–1)
BILIRUB SERPL-MCNC: 0.3 MG/DL (ref 0.2–1.2)
BILIRUBIN URINE: NEGATIVE
BLOOD, URINE: NEGATIVE
BUN BLDV-MCNC: 19 MG/DL (ref 8–23)
CALCIUM SERPL-MCNC: 9.9 MG/DL (ref 8.8–10.2)
CHLORIDE BLD-SCNC: 103 MMOL/L (ref 98–111)
CHOLESTEROL, TOTAL: 189 MG/DL (ref 160–199)
CLARITY: ABNORMAL
CO2: 26 MMOL/L (ref 22–29)
COLOR: YELLOW
CREAT SERPL-MCNC: 0.8 MG/DL (ref 0.5–0.9)
CRYSTALS, UA: ABNORMAL /HPF
EOSINOPHILS ABSOLUTE: 0.3 K/UL (ref 0–0.6)
EOSINOPHILS RELATIVE PERCENT: 2.9 % (ref 0–5)
EPITHELIAL CELLS, UA: 3 /HPF (ref 0–5)
GFR AFRICAN AMERICAN: >59
GFR NON-AFRICAN AMERICAN: >60
GLUCOSE BLD-MCNC: 121 MG/DL (ref 74–109)
GLUCOSE URINE: NEGATIVE MG/DL
HCT VFR BLD CALC: 38.8 % (ref 37–47)
HDLC SERPL-MCNC: 50 MG/DL (ref 65–121)
HEMOGLOBIN: 12.6 G/DL (ref 12–16)
HYALINE CASTS: 5 /HPF (ref 0–8)
IMMATURE GRANULOCYTES #: 0 K/UL
KETONES, URINE: NEGATIVE MG/DL
LDL CHOLESTEROL CALCULATED: 89 MG/DL
LEUKOCYTE ESTERASE, URINE: ABNORMAL
LYMPHOCYTES ABSOLUTE: 2.9 K/UL (ref 1.1–4.5)
LYMPHOCYTES RELATIVE PERCENT: 32.4 % (ref 20–40)
MCH RBC QN AUTO: 29.9 PG (ref 27–31)
MCHC RBC AUTO-ENTMCNC: 32.5 G/DL (ref 33–37)
MCV RBC AUTO: 91.9 FL (ref 81–99)
MONOCYTES ABSOLUTE: 1 K/UL (ref 0–0.9)
MONOCYTES RELATIVE PERCENT: 10.8 % (ref 0–10)
NEUTROPHILS ABSOLUTE: 4.7 K/UL (ref 1.5–7.5)
NEUTROPHILS RELATIVE PERCENT: 53.3 % (ref 50–65)
NITRITE, URINE: NEGATIVE
PDW BLD-RTO: 13.2 % (ref 11.5–14.5)
PH UA: 5 (ref 5–8)
PLATELET # BLD: 300 K/UL (ref 130–400)
PMV BLD AUTO: 10.8 FL (ref 9.4–12.3)
POTASSIUM SERPL-SCNC: 3.8 MMOL/L (ref 3.5–5)
PROTEIN UA: NEGATIVE MG/DL
RBC # BLD: 4.22 M/UL (ref 4.2–5.4)
RBC UA: 5 /HPF (ref 0–4)
SODIUM BLD-SCNC: 143 MMOL/L (ref 136–145)
SPECIFIC GRAVITY UA: 1.02 (ref 1–1.03)
TOTAL PROTEIN: 7.3 G/DL (ref 6.6–8.7)
TRIGL SERPL-MCNC: 248 MG/DL (ref 0–149)
TSH SERPL DL<=0.05 MIU/L-ACNC: 3.64 UIU/ML (ref 0.27–4.2)
UROBILINOGEN, URINE: 0.2 E.U./DL
VITAMIN D 25-HYDROXY: 39.2 NG/ML
WBC # BLD: 8.9 K/UL (ref 4.8–10.8)
WBC UA: 73 /HPF (ref 0–5)

## 2020-10-18 LAB
ORGANISM: ABNORMAL
SARS-COV-2, NAA: NOT DETECTED
URINE CULTURE, ROUTINE: ABNORMAL
URINE CULTURE, ROUTINE: ABNORMAL

## 2020-10-19 ENCOUNTER — ANESTHESIA EVENT (OUTPATIENT)
Dept: ENDOSCOPY | Age: 85
End: 2020-10-19
Payer: MEDICARE

## 2020-10-19 ENCOUNTER — OFFICE VISIT (OUTPATIENT)
Dept: INTERNAL MEDICINE | Age: 85
End: 2020-10-19
Payer: MEDICARE

## 2020-10-19 VITALS
DIASTOLIC BLOOD PRESSURE: 68 MMHG | BODY MASS INDEX: 30.79 KG/M2 | HEART RATE: 90 BPM | WEIGHT: 185 LBS | SYSTOLIC BLOOD PRESSURE: 150 MMHG

## 2020-10-19 PROCEDURE — 1036F TOBACCO NON-USER: CPT | Performed by: NURSE PRACTITIONER

## 2020-10-19 PROCEDURE — G8484 FLU IMMUNIZE NO ADMIN: HCPCS | Performed by: NURSE PRACTITIONER

## 2020-10-19 PROCEDURE — G0008 ADMIN INFLUENZA VIRUS VAC: HCPCS | Performed by: NURSE PRACTITIONER

## 2020-10-19 PROCEDURE — 1123F ACP DISCUSS/DSCN MKR DOCD: CPT | Performed by: NURSE PRACTITIONER

## 2020-10-19 PROCEDURE — 4040F PNEUMOC VAC/ADMIN/RCVD: CPT | Performed by: NURSE PRACTITIONER

## 2020-10-19 PROCEDURE — 99214 OFFICE O/P EST MOD 30 MIN: CPT | Performed by: NURSE PRACTITIONER

## 2020-10-19 PROCEDURE — G8417 CALC BMI ABV UP PARAM F/U: HCPCS | Performed by: NURSE PRACTITIONER

## 2020-10-19 PROCEDURE — 90653 IIV ADJUVANT VACCINE IM: CPT | Performed by: NURSE PRACTITIONER

## 2020-10-19 PROCEDURE — 1090F PRES/ABSN URINE INCON ASSESS: CPT | Performed by: NURSE PRACTITIONER

## 2020-10-19 PROCEDURE — G8427 DOCREV CUR MEDS BY ELIG CLIN: HCPCS | Performed by: NURSE PRACTITIONER

## 2020-10-19 RX ORDER — VANCOMYCIN HYDROCHLORIDE 125 MG/1
125 CAPSULE ORAL 4 TIMES DAILY
Qty: 40 CAPSULE | Refills: 0 | Status: SHIPPED | OUTPATIENT
Start: 2020-10-19 | End: 2020-10-19

## 2020-10-19 RX ORDER — PANTOPRAZOLE SODIUM 40 MG/1
40 TABLET, DELAYED RELEASE ORAL
Qty: 30 TABLET | Refills: 5 | Status: SHIPPED | OUTPATIENT
Start: 2020-10-19 | End: 2020-11-19

## 2020-10-19 RX ORDER — VANCOMYCIN HYDROCHLORIDE 125 MG/1
125 CAPSULE ORAL 4 TIMES DAILY
Qty: 28 CAPSULE | Refills: 0 | Status: SHIPPED | OUTPATIENT
Start: 2020-10-19 | End: 2020-10-26

## 2020-10-19 RX ORDER — LORAZEPAM 1 MG/1
TABLET ORAL
Qty: 45 TABLET | Refills: 0 | Status: SHIPPED | OUTPATIENT
Start: 2020-10-19 | End: 2020-11-30 | Stop reason: SDUPTHER

## 2020-10-19 ASSESSMENT — ENCOUNTER SYMPTOMS
ABDOMINAL PAIN: 1
EYE ITCHING: 0
SORE THROAT: 0
WHEEZING: 0
TROUBLE SWALLOWING: 0
SHORTNESS OF BREATH: 1
BLOOD IN STOOL: 0
CONSTIPATION: 0
VOMITING: 0
ABDOMINAL DISTENTION: 0
COLOR CHANGE: 0
CHOKING: 0
EYE DISCHARGE: 0
NAUSEA: 0
COUGH: 0
DIARRHEA: 0
STRIDOR: 0

## 2020-10-19 NOTE — PROGRESS NOTES
[address] [facility]  [id number] [address] [id number] [place] [address]  Dept: [phone]  Dept Fax: [phone]: [phone]    Colin Rogers is a 80 y.o. female who presents today for her medical conditions/complaints as noted below. Colin Rogers is c/teresa Other (lab reviews)        HPI:     HPI   1. Anxiety stable on lorazepam;    2. Low abd pain last night; Today is not hurting at all;    3. Pulmonary fibrosis; she uses oxygen most of the time   4.  UTI; She has enterococcus   UTI;   This was likely the cause   Chief Complaint   Patient presents with    Other     lab reviews       Past Medical History:   Diagnosis Date    Abnormal mammogram     Anxiety     Anxiety associated with depression     Artery disease, cerebral     Ataxia     Benign diastolic hypertension     Carpal tunnel syndrome     idiopathic peripheral    Cellulitis of face     Chronic back pain     COPD (chronic obstructive pulmonary disease) (HCC)     Depression     Disc degeneration, lumbosacral     Diverticulosis     Dysphagia     Esophageal reflux     Fever blister     Herpes simplex     Herpes zoster     Hypertension, essential     Hypertensive heart disease without CHF     Hypoxia     Idiopathic peripheral neuropathy     2017    Insomnia     Major depressive disorder, recurrent episode, moderate (HCC)     Mixed hyperlipidemia     Osteoporosis     Ovarian failure     Pulmonary fibrosis (HCC)     Sciatica     Sleep apnea     Transient cerebral ischemic attack     Urinary incontinence     Weakness of both legs       Past Surgical History:   Procedure Laterality Date    COLONOSCOPY  06/12/2015    Aubree, repeat as needed    HYSTERECTOMY      LUNG SEGMENTECTOMY      lung segmental resection       Vitals 10/19/2020 10/16/2020 9/22/2020 9/22/2020 8/18/2020 1/41/2083   SYSTOLIC 906 - 237 [phone]   DIASTOLIC 68 - 80 80 78 83   Site - - - - - -   Pulse 90 91 - 99 - 77   Temp - 97.5 - - - -   Resp - - - - - -   SpO2 - 94 - 93 - 90   Weight 185 lb - - 190 lb 198 lb 192 lb   Height - - - 5' 5\" 5' 4\" 5' 4\"   Body mass index - - - 31.61 kg/m2 33.98 kg/m2 32.95 kg/m2       Family History   Problem Relation Age of Onset    Heart Failure Mother     Diabetes Mother     Heart Disease Mother     Diabetes Father     Heart Disease Father     Colon Polyps Neg Hx     Colon Cancer Neg Hx        Social History     Tobacco Use    Smoking status: Never Smoker    Smokeless tobacco: Never Used   Substance Use Topics    Alcohol use: No      Current Outpatient Medications   Medication Sig Dispense Refill    LORazepam (ATIVAN) 1 MG tablet TAKE 1/2 TABLET BY MOUTH IN THE MORNING AND 1 TABLET BY MOUTH AT BEDTIME 45 tablet 0    pantoprazole (PROTONIX) 40 MG tablet Take 1 tablet by mouth every morning (before breakfast) 30 tablet 5    vancomycin (VANCOCIN) 125 MG capsule Take 1 capsule by mouth 4 times daily for 7 days 28 capsule 0    clopidogrel (PLAVIX) 75 MG tablet TAKE 1 TABLET DAILY 90 tablet 3    MYRBETRIQ 50 MG TB24 TAKE 1 TABLET DAILY 90 tablet 3    aspirin 81 MG EC tablet Take 81 mg by mouth daily      NEXIUM 40 MG delayed release capsule Take 1 capsule by mouth daily 90 capsule 3    mirtazapine (REMERON) 30 MG tablet TAKE 1 TABLET NIGHTLY 90 tablet 3    busPIRone (BUSPAR) 5 MG tablet TAKE 1 TABLET TWICE A DAY AS NEEDED FOR ANXIETY 60 tablet 11    colestipol (COLESTID) 1 g tablet Take 1 tablet by mouth 2 times daily 60 tablet 3    escitalopram (LEXAPRO) 20 MG tablet TAKE 1 TABLET DAILY 90 tablet 3    levocetirizine (XYZAL) 5 MG tablet TAKE 1 TABLET NIGHTLY 90 tablet 3    levothyroxine (SYNTHROID) 25 MCG tablet 3 days a week take 1 tablet and 4 days a week take 2 tablets 390 tablet 1    acetaminophen (TYLENOL) 500 MG tablet Take 500 mg by mouth 2 times daily      losartan-hydrochlorothiazide (HYZAAR) 100-12.5 MG per tablet Take 1 tablet by mouth daily 90 tablet 3    atorvastatin (LIPITOR) 20 MG tablet Take 0.5 tablets by mouth daily 90 tablet 1    diclofenac sodium (VOLTAREN) 1 % GEL Apply 4 g topically 3 times daily as needed for Pain 5 Tube 1    Multiple Vitamins-Minerals (CENTRUM SILVER) TABS Take by mouth daily      diclofenac 1.5 % SOLN Place 40 drops onto the skin 3 times daily Apply 40 drops to lower extremity joints      ondansetron (ZOFRAN-ODT) 4 MG disintegrating tablet Take 4 mg by mouth every 8 hours as needed for Nausea or Vomiting      OXYGEN Inhale into the lungs 2L at [facility]       No current facility-administered medications for this visit.       Allergies   Allergen Reactions    Augmentin [Amoxicillin-Pot Clavulanate]     Biaxin [Clarithromycin]     Cefdinir     Effexor [Venlafaxine]     Erythromycin     Lortab [Hydrocodone-Acetaminophen]     Naprosyn [Naproxen]        Health Maintenance   Topic Date Due    DTaP/Tdap/Td vaccine (1 - Tdap) 12/18/1951    Flu vaccine (1) 09/01/2020    Shingles Vaccine (1 of 2) 01/02/2021 (Originally 12/18/1982)    Annual Wellness Visit (AWV)  01/06/2021    TSH testing  10/16/2021    Potassium monitoring  10/16/2021    Creatinine monitoring  10/16/2021    Pneumococcal 65+ years Vaccine  Completed    Hepatitis A vaccine  Aged Out    Hepatitis B vaccine  Aged Out    Hib vaccine  Aged Out    Meningococcal (ACWY) vaccine  Aged Out       Lab Results   Component Value Date    LABA1C 5.8 04/26/2019     No results found for: PSA, PSADIA  TSH   Date Value Ref Range Status   10/16/2020 3.640 0.270 - 4.200 uIU/mL Final   ]  Lab Results   Component Value Date     10/16/2020    K 3.8 10/16/2020     10/16/2020    CO2 26 10/16/2020    BUN 19 10/16/2020    CREATININE 0.8 10/16/2020    GLUCOSE 121 (H) 10/16/2020    CALCIUM 9.9 10/16/2020    PROT 7.3 10/16/2020    LABALBU 4.4 10/16/2020    BILITOT 0.3 10/16/2020    ALKPHOS 73 10/16/2020    AST 43 (H) 10/16/2020    ALT 24 10/16/2020    LABGLOM >60 10/16/2020 GFRAA >59 10/16/2020     Lab Results   Component Value Date    CHOL 189 10/16/2020    CHOL 204 (H) 05/13/2020    CHOL 202 (H) 10/01/2019     Lab Results   Component Value Date    TRIG 248 (H) 10/16/2020    TRIG 265 (H) 05/13/2020    TRIG 190 (H) 01/06/2020     Lab Results   Component Value Date    HDL 50 (L) 10/16/2020    HDL 44 (L) 05/13/2020    HDL 48 (L) 10/01/2019     Lab Results   Component Value Date    LDLCALC 89 10/16/2020    LDLCALC 107 05/13/2020    LDLCALC 105 10/01/2019     Lab Results   Component Value Date     10/16/2020    K 3.8 10/16/2020     10/16/2020    CO2 26 10/16/2020    BUN 19 10/16/2020    CREATININE 0.8 10/16/2020    GLUCOSE 121 10/16/2020    CALCIUM 9.9 10/16/2020      Lab Results   Component Value Date    WBC 8.9 10/16/2020    HGB 12.6 10/16/2020    HCT 38.8 10/16/2020    MCV 91.9 10/16/2020     10/16/2020    LABLYMP 2.86 09/25/2014    LYMPHOPCT 32.4 10/16/2020    RBC 4.22 10/16/2020    MCH 29.9 10/16/2020    MCHC 32.5 (L) 10/16/2020    RDW 13.2 10/16/2020     Lab Results   Component Value Date    VITD25 39.2 10/16/2020       Subjective:      Review of Systems   Constitutional: Negative for fatigue, fever and unexpected weight change. HENT: Negative for ear discharge, ear pain, mouth sores, sore throat and trouble swallowing. Eyes: Negative for discharge, itching and visual disturbance. Respiratory: Positive for shortness of breath. Negative for cough, choking, wheezing and stridor. Cardiovascular: Negative for chest pain, palpitations and leg swelling. Gastrointestinal: Positive for abdominal pain. Negative for abdominal distention, blood in stool, constipation, diarrhea, nausea and vomiting. Endocrine: Negative for cold intolerance, polydipsia and polyuria. Genitourinary: Negative for difficulty urinating, dysuria, frequency and urgency. UTI   Musculoskeletal: Negative for arthralgias and gait problem. Skin: Negative for color change and rash. Allergic/Immunologic: Negative for food allergies and immunocompromised state. Neurological: Negative for dizziness, tremors, syncope, speech difficulty, weakness and headaches. Hematological: Negative for adenopathy. Does not bruise/bleed easily. Psychiatric/Behavioral: Negative for confusion and hallucinations. The patient is nervous/anxious. Depression       Objective:     Physical Exam  Constitutional:       General: She is not in acute distress. Appearance: She is well-developed. HENT:      Head: Normocephalic and atraumatic. Eyes:      General: No scleral icterus. Right eye: No discharge. Left eye: No discharge. Pupils: Pupils are equal, round, and reactive to light. Neck:      Musculoskeletal: Normal range of motion and neck supple. Thyroid: No thyromegaly. Vascular: No JVD. Cardiovascular:      Rate and Rhythm: Normal rate and regular rhythm. Heart sounds: Normal heart sounds. No murmur. Pulmonary:      Effort: Pulmonary effort is normal. No respiratory distress. Breath sounds: Normal breath sounds. No wheezing or rales. Abdominal:      General: Bowel sounds are normal. There is no distension. Palpations: Abdomen is soft. There is no mass. Tenderness: There is no abdominal tenderness. There is no guarding or rebound. Musculoskeletal: Normal range of motion. General: No tenderness. Skin:     General: Skin is warm and dry. Findings: No erythema or rash. Neurological:      Mental Status: She is alert and oriented to person, place, and time. Cranial Nerves: No cranial nerve deficit. Coordination: Coordination normal.      Deep Tendon Reflexes: Reflexes are normal and symmetric. Reflexes normal.   Psychiatric:         Mood and Affect: Mood is not depressed. Behavior: Behavior normal.         Thought Content:  Thought content normal.         Judgment: Judgment normal.       BP (!) 150/68   Pulse 90   Wt 185 lb (83.9 kg)   BMI 30.79 kg/m²     Assessment:       Diagnosis Orders   1. Urinary tract infection without hematuria, site unspecified  Comprehensive Metabolic Panel   2. Primary insomnia  LORazepam (ATIVAN) 1 MG tablet   3. Generalized abdominal pain     4. Anxiety associated with depression       Labs reviewed from 10/16/2020    Plan:        Patient given educational materials - see patient instructions. Discussed use, benefit, and side effects of prescribed medications. Allpatient questions answered. Pt voiced understanding. Reviewed health maintenance. Instructed to continue current medications, diet and exercise. Patient agreed with treatment plan. Follow up as directed. MEDICATIONS:  Orders Placed This Encounter   Medications    LORazepam (ATIVAN) 1 MG tablet     Sig: TAKE 1/2 TABLET BY MOUTH IN THE MORNING AND 1 TABLET BY MOUTH AT BEDTIME     Dispense:  45 tablet     Refill:  0    pantoprazole (PROTONIX) 40 MG tablet     Sig: Take 1 tablet by mouth every morning (before breakfast)     Dispense:  30 tablet     Refill:  5    DISCONTD: vancomycin (VANCOCIN) 125 MG capsule     Sig: Take 1 capsule by mouth 4 times daily for 7 days     Dispense:  40 capsule     Refill:  0    vancomycin (VANCOCIN) 125 MG capsule     Sig: Take 1 capsule by mouth 4 times daily for 7 days     Dispense:  28 capsule     Refill:  0         ORDERS:  Orders Placed This Encounter   Procedures    Comprehensive Metabolic Panel       Follow-up:  No follow-ups on file. PATIENT INSTRUCTIONS:  Patient Instructions   1. Anxiety; Refill lorazepam  2.  abd pain; has c-scope this week   3. Pulmonary fibrosis;   Stable   4.  UTI; vancomycin 125  QID for 7 days   Get [facility] Wednesday when you get your scope done;      Electronically signed by SONIA Ryan on 10/19/2020 at 2:22 PM    [employer]agon/transcription disclaimer:  Much of this encounter note is electronic transcription/translation of spoken language to printed texts.  The electronic translation of spoken language may be erroneous, or at times,nonsensical words or phrases may be inadvertently transcribed.   Although I have reviewed the note for such errors, some may still exist.

## 2020-10-19 NOTE — PATIENT INSTRUCTIONS
1.  Anxiety; Refill lorazepam  2.  abd pain; has c-scope this week   3. Pulmonary fibrosis;   Stable   4.  UTI; vancomycin 125  QID for 7 days   Get [facility] Wednesday when you get your scope done;

## 2020-10-21 ENCOUNTER — HOSPITAL ENCOUNTER (OUTPATIENT)
Age: 85
Setting detail: OUTPATIENT SURGERY
Discharge: HOME | End: 2020-10-21
Attending: INTERNAL MEDICINE | Admitting: INTERNAL MEDICINE
Payer: MEDICARE

## 2020-10-21 ENCOUNTER — ANESTHESIA (OUTPATIENT)
Dept: ENDOSCOPY | Age: 85
End: 2020-10-21
Payer: MEDICARE

## 2020-10-21 VITALS
OXYGEN SATURATION: 96 % | RESPIRATION RATE: 1 BRPM | DIASTOLIC BLOOD PRESSURE: 87 MMHG | SYSTOLIC BLOOD PRESSURE: 188 MMHG

## 2020-10-21 VITALS
BODY MASS INDEX: 30.49 KG/M2 | TEMPERATURE: 98.3 F | HEIGHT: 65 IN | SYSTOLIC BLOOD PRESSURE: 179 MMHG | RESPIRATION RATE: 20 BRPM | HEART RATE: 75 BPM | DIASTOLIC BLOOD PRESSURE: 81 MMHG | OXYGEN SATURATION: 96 % | WEIGHT: 183 LBS

## 2020-10-21 PROCEDURE — 2580000003 HC RX 258: Performed by: INTERNAL MEDICINE

## 2020-10-21 PROCEDURE — 88305 TISSUE EXAM BY PATHOLOGIST: CPT

## 2020-10-21 PROCEDURE — 2709999900 HC NON-CHARGEABLE SUPPLY: Performed by: INTERNAL MEDICINE

## 2020-10-21 PROCEDURE — 45380 COLONOSCOPY AND BIOPSY: CPT | Performed by: INTERNAL MEDICINE

## 2020-10-21 PROCEDURE — 88342 IMHCHEM/IMCYTCHM 1ST ANTB: CPT

## 2020-10-21 PROCEDURE — 2500000003 HC RX 250 WO HCPCS: Performed by: NURSE ANESTHETIST, CERTIFIED REGISTERED

## 2020-10-21 PROCEDURE — 7100000011 HC PHASE II RECOVERY - ADDTL 15 MIN: Performed by: INTERNAL MEDICINE

## 2020-10-21 PROCEDURE — 7100000010 HC PHASE II RECOVERY - FIRST 15 MIN: Performed by: INTERNAL MEDICINE

## 2020-10-21 PROCEDURE — 3700000001 HC ADD 15 MINUTES (ANESTHESIA): Performed by: INTERNAL MEDICINE

## 2020-10-21 PROCEDURE — 3609012400 HC EGD TRANSORAL BIOPSY SINGLE/MULTIPLE: Performed by: INTERNAL MEDICINE

## 2020-10-21 PROCEDURE — 3700000000 HC ANESTHESIA ATTENDED CARE: Performed by: INTERNAL MEDICINE

## 2020-10-21 PROCEDURE — 43239 EGD BIOPSY SINGLE/MULTIPLE: CPT | Performed by: INTERNAL MEDICINE

## 2020-10-21 PROCEDURE — 3609027000 HC COLONOSCOPY: Performed by: INTERNAL MEDICINE

## 2020-10-21 RX ORDER — SODIUM CHLORIDE, SODIUM LACTATE, POTASSIUM CHLORIDE, CALCIUM CHLORIDE 600; 310; 30; 20 MG/100ML; MG/100ML; MG/100ML; MG/100ML
INJECTION, SOLUTION INTRAVENOUS CONTINUOUS
Status: DISCONTINUED | OUTPATIENT
Start: 2020-10-21 | End: 2020-10-21 | Stop reason: HOSPADM

## 2020-10-21 RX ORDER — LIDOCAINE HYDROCHLORIDE 20 MG/ML
INJECTION, SOLUTION INFILTRATION; PERINEURAL PRN
Status: DISCONTINUED | OUTPATIENT
Start: 2020-10-21 | End: 2020-10-21 | Stop reason: SDUPTHER

## 2020-10-21 RX ADMIN — LIDOCAINE HYDROCHLORIDE 40 MG: 20 INJECTION, SOLUTION INFILTRATION; PERINEURAL at 09:26

## 2020-10-21 RX ADMIN — SODIUM CHLORIDE, POTASSIUM CHLORIDE, SODIUM LACTATE AND CALCIUM CHLORIDE: 600; 310; 30; 20 INJECTION, SOLUTION INTRAVENOUS at 08:15

## 2020-10-21 ASSESSMENT — PAIN SCALES - GENERAL
PAINLEVEL_OUTOF10: 0
PAINLEVEL_OUTOF10: 0

## 2020-10-21 ASSESSMENT — PAIN - FUNCTIONAL ASSESSMENT: PAIN_FUNCTIONAL_ASSESSMENT: 0-10

## 2020-10-21 ASSESSMENT — ENCOUNTER SYMPTOMS: SHORTNESS OF BREATH: 1

## 2020-10-21 NOTE — ANESTHESIA PRE PROCEDURE
BP Readings from Last 3 Encounters:   10/21/20 (!) 169/80   10/19/20 (!) 150/68   09/22/20 (!) 150/80       NPO Status: Time of last liquid consumption: 0415                        Time of last solid consumption: 1200                        Date of last liquid consumption: 10/21/20                        Date of last solid food consumption: 10/19/20    BMI:   Wt Readings from Last 3 Encounters:   10/21/20 183 lb (83 kg)   10/19/20 185 lb (83.9 kg)   09/22/20 190 lb (86.2 kg)     Body mass index is 30.93 kg/m². CBC:   Lab Results   Component Value Date    WBC 8.9 10/16/2020    RBC 4.22 10/16/2020    HGB 12.6 10/16/2020    HCT 38.8 10/16/2020    MCV 91.9 10/16/2020    RDW 13.2 10/16/2020     10/16/2020       CMP:   Lab Results   Component Value Date     10/16/2020    K 3.8 10/16/2020     10/16/2020    CO2 26 10/16/2020    BUN 19 10/16/2020    CREATININE 0.8 10/16/2020    GFRAA >59 10/16/2020    LABGLOM >60 10/16/2020    GLUCOSE 121 10/16/2020    PROT 7.3 10/16/2020    CALCIUM 9.9 10/16/2020    BILITOT 0.3 10/16/2020    ALKPHOS 73 10/16/2020    AST 43 10/16/2020    ALT 24 10/16/2020       POC Tests: No results for input(s): POCGLU, POCNA, POCK, POCCL, POCBUN, POCHEMO, POCHCT in the last 72 hours.     Coags:   Lab Results   Component Value Date    PROTIME 13.2 09/25/2014    INR 1.03 09/25/2014       HCG (If Applicable): No results found for: PREGTESTUR, PREGSERUM, HCG, HCGQUANT     ABGs: No results found for: PHART, PO2ART, GHB0GON, CZS6GSP, BEART, J3KUQZDD     Type & Screen (If Applicable):  No results found for: LABABO, LABRH    Drug/Infectious Status (If Applicable):  No results found for: HIV, HEPCAB    COVID-19 Screening (If Applicable):   Lab Results   Component Value Date    COVID19 NOT DETECTED 10/16/2020         Anesthesia Evaluation  Patient summary reviewed and Nursing notes reviewed no history of anesthetic complications:   Airway: Mallampati: II  TM distance: >3 FB   Neck ROM: full  Mouth opening: < 3 FB Dental: normal exam         Pulmonary:normal exam    (+) COPD:  shortness of breath (chronic with exerction ):  sleep apnea:                            ROS comment: Lung wedge resection  Home O2 at night and with activity  Pulmonary fibrosis    Cardiovascular:    (+) hypertension:, CHF:, DIXON:,          Beta Blocker:  Not on Beta Blocker         Neuro/Psych:   (+) TIA, depression/anxiety              ROS comment: Peripheral neuropathy GI/Hepatic/Renal:   (+) GERD:, bowel prep,          ROS comment: bmi 30. Endo/Other:    (+) hypothyroidism, blood dyscrasia (plavix 10/14/20): anticoagulation therapy:., .                 Abdominal:           Vascular: negative vascular ROS. Anesthesia Plan      MAC     ASA 4       Induction: intravenous. Anesthetic plan and risks discussed with patient.                     SONIA Helms - CRNA   10/21/2020

## 2020-10-21 NOTE — OP NOTE
Endoscopic Procedure Note    Patient: Radha Cancel: 12/18/1932  Med Rec#: [id number] Acc#: [id number]     Primary Care Provider SONIA Gonzales  Referring Provider: Garcia SCOTT    Endoscopist: Amelia Tan MD    Date of Procedure:  10/21/2020    Procedure:   1. EGD with biopsy    Indications:   1. Dyspepsia   2. Diarrhea    Anesthesia:  Sedation was administered by anesthesia who monitored the patient during the procedure. Estimated Blood Loss: minimal    Procedure:   After reviewing the patient's chart and obtaining informed consent, the patient was placed in the left lateral decubitus position. A forward-viewing Olympus endoscope was lubricated and inserted through the mouth into the oropharynx. Under direct visualization, the upper esophagus was intubated. The scope was advanced to the level of the third portion of duodenum. Scope was slowly withdrawn with careful inspection of the mucosal surfaces. The scope was retroflexed for inspection of the gastric fundus and incisura. Findings and maneuvers are listed in impression below. The patient tolerated the procedure well. The scope was removed. There were no immediate complications. Findings:   Esophagus: normal  There is no hiatal hernia present. Stomach:  abnormal: mild mucosal changes suggestive of gastritis noted. There were multiple benign appearing polyps in the proximal stomach c/w fundic gland polyps. Not biopsied due to benign appearance. Gastric biopsies were taken from the antrum and body to rule out Helicobacter pylori infection. Duodenum: normal appearance - biopsied for histology. IMPRESSION:  1. S/p gastric and duodenal biopsies as above. RECOMMENDATIONS:    1. Await path results, the patient will be contacted in 7-10 days with biopsy results. 2.  Colonoscopy today  3. Continue current meds  4. Schedule f/u OV with HUGO SCOTT in 2-4 weeks to re-evaluate symptoms.      The results were discussed with

## 2020-10-21 NOTE — OP NOTE
Patient: Julissa Magallanes: 12/18/1932  Med Rec#: [id number] Acc#: [id number]   Primary Care Provider SONIA Feliz    Date of Procedure:  10/21/2020    Endoscopist: Alisha Omalley MD    Referring Provider: SONIA Feliz K Glendora Fear APRN    Operation Performed: Colonoscopy with biopsy    Indications: diarrhea    Anesthesia:  Sedation was administered by anesthesia who monitored the patient during the procedure. I met with Rodney Moya prior to procedure. We discussed the procedure itself, and I have discussed the risks of endoscopy (including-- but not limited to-- pain, discomfort, bleeding potentially requiring second endoscopic procedure and/or blood transfusion, organ perforation requiring operative repair, damage to organs near the colon, infection, aspiration, cardiopulmonary/allergic reaction), benefits, indications to endoscopy. Additionally, we discussed options other than colonoscopy. The patient expressed understanding. All questions answered. The patient decided to proceed with the procedure. Signed informed consent was placed on the chart. Blood Loss: minimal    Withdrawal time: n/a  Bowel Prep: adequate     Complications: no immediate complications    DESCRIPTION OF PROCEDURE:     A time out was performed. After written informed consent was obtained, the patient was placed in the left lateral position. The perianal area was inspected, and a digital rectal exam was performed. A rectal exam was performed: normal tone, no palpable lesions. At this point, a forward viewing Olympus colonoscope was inserted into the anus and carefully advanced to the cecum. The cecum was identified by the ileocecal valve and the appendiceal orifice. The colonoscope was then slowly withdrawn with careful inspection of the mucosa in a linear and circumferential fashion. The scope was retroflexed in the rectum. Suction was utilized during the procedure to remove as much air as possible from the bowel. The colonoscope was removed from the patient, and the procedure was terminated. Findings are listed below. Findings: The mucosa appeared normal throughout the entire examined colon  Random biopsies obtained for histology to r/o microscopic colitis. There was evidence of diverticular disease throughout the left colon. Retroflexion in the rectum was normal and revealed no further abnormalities        Recommendations:  1. Repeat colonoscopy: pending pathology - if all biopsied negative, I would treat for IBS-D and dietary changes to improve stool consistency and symptoms. 2. Await biopsy results-you will receive a letter with your results within 7-10 days    Findings and recommendations were discussed w/ the patient. A copy of the images was provided.     Pankaj Delcid MD  10/21/2020  9:59 AM

## 2020-10-21 NOTE — ANESTHESIA POSTPROCEDURE EVALUATION
Department of Anesthesiology  Postprocedure Note    Patient: Baron Fraire  MRN: [id number]  YOB: 1932  Date of evaluation: 10/21/2020  Time:  0949    Procedure Summary     Date:  10/21/20 Room / Location:  [address]    Anesthesia Start:  9883 Anesthesia Stop:      Procedures:       EGD BIOPSY (N/A )      COLONOSCOPY DIAGNOSTIC (N/A ) Diagnosis:  (NAUSEA, DARK STOOLS, RECTAL PRESSURE, CHR DIARRHEA / Waldo Yasmeen)    Surgeon:  Paxton Palacios MD Responsible Provider:  SONIA Lloyd CRNA    Anesthesia Type:  general, TIVA ASA Status:  3          Anesthesia Type: general, TIVA    Marbiel Phase I: Maribel Score: 10    Maribel Phase II:      Last vitals: Reviewed and per EMR flowsheets.        Anesthesia Post Evaluation    Patient location during evaluation: bedside  Patient participation: complete - patient participated  Level of consciousness: sleepy but conscious  Pain score: 0  Airway patency: patent  Nausea & Vomiting: no nausea and no vomiting  Complications: no  Cardiovascular status: hemodynamically stable and blood pressure returned to baseline  Respiratory status: acceptable and nasal cannula  Hydration status: stable

## 2020-10-23 NOTE — H&P
Patient Name: Fatuma Garner  : 1932  MRN: [id number]  DATE: 10/21/20    Allergies: Allergies   Allergen Reactions    Augmentin [Amoxicillin-Pot Clavulanate]     Biaxin [Clarithromycin]     Cefdinir     Effexor [Venlafaxine]     Erythromycin     Lortab [Hydrocodone-Acetaminophen]     Naprosyn [Naproxen]         ENDOSCOPY  History and Physical    Procedure:    [x] Diagnostic Colonoscopy       [] Screening Colonoscopy  [x] EGD      [] ERCP      [] EUS       [] Other    [x] Previous office notes/History and Physical reviewed from the patients chart. Please see EMR for further details of HPI. I have examined the patient's status immediately prior to the procedure and:      Indications/HPI:    []Abdominal Pain   []Cancer- GI/Lung     []Fhx of colon CA/polyps  []History of Polyps  []Barretts            []Melena  []Abnormal Imaging              []Dysphagia              []Persistent Pneumonia   []Anemia                            []Food Impaction        []History of Polyps  [] GI Bleed             []Pulmonary nodule/Mass   []Change in bowel habits []Heartburn/Reflux  []Rectal Bleed (BRBPR)  []Chest Pain - Non Cardiac []Heme (+) Stool []Ulcers  []Constipation  []Hemoptysis  []Varices  [x]Diarrhea  []Hypoxemia    []Nausea/Vomiting   []Screening   []Crohns/Colitis  []Other:     Anesthesia:   [x] MAC [] Moderate Sedation   [] General   [] None     ROS: 12 pt Review of Symptoms was negative unless mentioned above    Medications:   Prior to Admission medications    Medication Sig Start Date End Date Taking?  Authorizing Provider   LORazepam (ATIVAN) 1 MG tablet TAKE 1/2 TABLET BY MOUTH IN THE MORNING AND 1 TABLET BY MOUTH AT BEDTIME 10/19/20 11/20/20 Yes SONIA Zepeda   vancomycin (VANCOCIN) 125 MG capsule Take 1 capsule by mouth 4 times daily for 7 days 10/19/20 10/26/20 Yes SONIA Zepeda   MYRBETRIQ 50 MG TB24 TAKE 1 TABLET DAILY 10/5/20  Yes SONIA Zepeda   NEXIUM 40 MG delayed release capsule Take 1 capsule by mouth daily 9/15/20  Yes SONIA Pena   mirtazapine (REMERON) 30 MG tablet TAKE 1 TABLET NIGHTLY 9/2/20  Yes SONIA Pena   busPIRone (BUSPAR) 5 MG tablet TAKE 1 TABLET TWICE A DAY AS NEEDED FOR ANXIETY 8/27/20  Yes SONIA Pena   escitalopram (LEXAPRO) 20 MG tablet TAKE 1 TABLET DAILY 8/19/20  Yes SONIA Pena   levocetirizine (XYZAL) 5 MG tablet TAKE 1 TABLET NIGHTLY 7/27/20  Yes SONIA Pena   levothyroxine (SYNTHROID) 25 MCG tablet 3 days a week take 1 tablet and 4 days a week take 2 tablets 5/21/20  Yes SONIA Pena   acetaminophen (TYLENOL) 500 MG tablet Take 500 mg by mouth 2 times daily   Yes Historical Provider, MD   Multiple Vitamins-Minerals (CENTRUM SILVER) TABS Take by mouth daily   Yes Historical Provider, MD   diclofenac 1.5 % SOLN Place 40 drops onto the skin 3 times daily Apply 40 drops to lower extremity joints   Yes Historical Provider, MD   ondansetron (ZOFRAN-ODT) 4 MG disintegrating tablet Take 4 mg by mouth every 8 hours as needed for Nausea or Vomiting   Yes Historical Provider, MD   OXYGEN Inhale into the lungs 2L at [facility]   Yes Historical Provider, MD   pantoprazole (PROTONIX) 40 MG tablet Take 1 tablet by mouth every morning (before breakfast) 10/19/20   SONIA Pena   clopidogrel (PLAVIX) 75 MG tablet TAKE 1 TABLET DAILY 10/12/20   SONIA Pena   aspirin 81 MG EC tablet Take 81 mg by mouth daily    Historical Provider, MD   colestipol (COLESTID) 1 g tablet Take 1 tablet by mouth 2 times daily 8/24/20   SONIA Pena   losartan-hydrochlorothiazide [facility]) 100-12.5 MG per tablet Take 1 tablet by mouth daily 1/6/20   SONIA Pena   atorvastatin (LIPITOR) 20 MG tablet Take 0.5 tablets by mouth daily 1/6/20 9/22/20  SONIA Pena   diclofenac sodium (VOLTAREN) 1 % GEL Apply 4 g topically 3 times daily as needed for Pain 10/1/19 9/22/20  SONIA Pena       Past Medical History:  Past Medical History:   Diagnosis Date    Abnormal mammogram     Anxiety     Anxiety associated with depression     Artery disease, cerebral     Ataxia     Benign diastolic hypertension     Carpal tunnel syndrome     idiopathic peripheral    Cellulitis of face     Chronic back pain     COPD (chronic obstructive pulmonary disease) (HCC)     Depression     Disc degeneration, lumbosacral     Diverticulosis     Dysphagia     Esophageal reflux     Fever blister     Herpes simplex     Herpes zoster     Hypertension, essential     Hypertensive heart disease without CHF     Hypoxia     Idiopathic peripheral neuropathy     2017    Insomnia     Major depressive disorder, recurrent episode, moderate (HCC)     Mixed hyperlipidemia     Osteoporosis     Ovarian failure     Pulmonary fibrosis (HCC)     Sciatica     Sleep apnea     Transient cerebral ischemic attack     Urinary incontinence     Weakness of both legs        Past Surgical History:  Past Surgical History:   Procedure Laterality Date    COLONOSCOPY  06/12/2015    [place], repeat as needed    COLONOSCOPY  10/21/2020    Dr REANNA Spicer-Diverticular disease    COLONOSCOPY N/A 10/21/2020    COLONOSCOPY DIAGNOSTIC performed by Ortega Pelayo MD at [address] [facility]    HYSTERECTOMY      LUNG SEGMENTECTOMY      lung segmental resection    UPPER GASTROINTESTINAL ENDOSCOPY  10/21/2020    Dr Demarcus Spicer-Gastritis    UPPER GASTROINTESTINAL ENDOSCOPY N/A 10/21/2020    EGD BIOPSY performed by Ortega Pelayo MD at [address] [facility]       Social History:  Social History     Tobacco Use    Smoking status: Never Smoker    Smokeless tobacco: Never Used   Substance Use Topics    Alcohol use: No    Drug use: No       Vital Signs:   Vitals:    10/21/20 1005   BP: (!) 179/81   Pulse: 75   Resp: 20   Temp:    SpO2: 96%        Physical Exam:  Cardiac:  [x]WNL  []Comments:  Pulmonary:  [x]WNL   []Comments:  Neuro/Mental Status:  [x]WNL

## 2020-11-04 RX ORDER — ESOMEPRAZOLE MAGNESIUM 40 MG/1
40 CAPSULE, DELAYED RELEASE ORAL DAILY
Qty: 90 CAPSULE | Refills: 3 | Status: SHIPPED | OUTPATIENT
Start: 2020-11-04 | End: 2021-09-13 | Stop reason: SDUPTHER

## 2020-11-30 RX ORDER — LORAZEPAM 1 MG/1
TABLET ORAL
Qty: 45 TABLET | Refills: 0 | Status: SHIPPED | OUTPATIENT
Start: 2020-11-30 | End: 2021-01-02 | Stop reason: SDUPTHER

## 2020-12-14 RX ORDER — VALACYCLOVIR HYDROCHLORIDE 500 MG/1
500 TABLET, FILM COATED ORAL 2 TIMES DAILY
Qty: 14 TABLET | Refills: 2 | Status: SHIPPED | OUTPATIENT
Start: 2020-12-14 | End: 2021-04-08 | Stop reason: SDUPTHER

## 2020-12-14 RX ORDER — ACYCLOVIR 50 MG/G
OINTMENT TOPICAL
Qty: 5 G | Refills: 5 | Status: SHIPPED | OUTPATIENT
Start: 2020-12-14 | End: 2020-12-21

## 2021-01-02 DIAGNOSIS — F51.01 PRIMARY INSOMNIA: ICD-10-CM

## 2021-01-04 RX ORDER — BUSPIRONE HYDROCHLORIDE 5 MG/1
5 TABLET ORAL 2 TIMES DAILY PRN
Qty: 60 TABLET | Refills: 11 | Status: SHIPPED | OUTPATIENT
Start: 2021-01-04 | End: 2021-01-20 | Stop reason: SDUPTHER

## 2021-01-04 RX ORDER — LORAZEPAM 1 MG/1
TABLET ORAL
Qty: 45 TABLET | Refills: 0 | Status: SHIPPED | OUTPATIENT
Start: 2021-01-04 | End: 2021-02-02 | Stop reason: SDUPTHER

## 2021-01-04 NOTE — TELEPHONE ENCOUNTER
Guy Everett called to request a refill on her medication. Last office visit : 10/19/2020   Next office visit : 1/19/2021     Last UDS: No results found for: Othel Loge, LABBENZ, BUPRENUR, COCAIMETSCRU, GABAPENTIN, MDMA, METAMPU, OPIATESCREENURINE, OXTCOSU, PHENCYCLIDINESCREENURINE, PROPOXYPHENE, THCSCREENUR, Ladena Cocker 10/22/2020       Drug Contract 7/20/2020      Requested Prescriptions     Pending Prescriptions Disp Refills    busPIRone (BUSPAR) 5 MG tablet 60 tablet 11     Sig: Take 1 tablet by mouth 2 times daily as needed (anxiety)    LORazepam (ATIVAN) 1 MG tablet 45 tablet 0     Sig: TAKE 1/2 TABLET BY MOUTH IN THE MORNING AND 1 TABLET BY MOUTH AT BEDTIME         Please approve or refuse this medication.    Albert Armendariz

## 2021-01-07 DIAGNOSIS — N39.0 URINARY TRACT INFECTION WITHOUT HEMATURIA, SITE UNSPECIFIED: ICD-10-CM

## 2021-01-07 LAB
BACTERIA: ABNORMAL /HPF
BILIRUBIN URINE: NEGATIVE
BLOOD, URINE: ABNORMAL
CLARITY: ABNORMAL
COLOR: YELLOW
CRYSTALS, UA: ABNORMAL /HPF
EPITHELIAL CELLS, UA: 1 /HPF (ref 0–5)
GLUCOSE URINE: NEGATIVE MG/DL
HYALINE CASTS: 0 /HPF (ref 0–8)
KETONES, URINE: NEGATIVE MG/DL
LEUKOCYTE ESTERASE, URINE: ABNORMAL
NITRITE, URINE: NEGATIVE
PH UA: 5 (ref 5–8)
PROTEIN UA: ABNORMAL MG/DL
RBC UA: 3 /HPF (ref 0–4)
SPECIFIC GRAVITY UA: 1.02 (ref 1–1.03)
UROBILINOGEN, URINE: 0.2 E.U./DL
WBC UA: 239 /HPF (ref 0–5)

## 2021-01-08 ENCOUNTER — TELEPHONE (OUTPATIENT)
Dept: INTERNAL MEDICINE | Age: 86
End: 2021-01-08

## 2021-01-08 ENCOUNTER — TELEPHONE (OUTPATIENT)
Dept: GASTROENTEROLOGY | Age: 86
End: 2021-01-08

## 2021-01-08 RX ORDER — NITROFURANTOIN 25; 75 MG/1; MG/1
100 CAPSULE ORAL 2 TIMES DAILY
Qty: 20 CAPSULE | Refills: 0 | Status: SHIPPED | OUTPATIENT
Start: 2021-01-08 | End: 2021-01-18

## 2021-01-08 NOTE — TELEPHONE ENCOUNTER
Pt called about no show on 12/30 after receiving her no show letter. Pt stated she didn't know about appt because she usually has someone else make her appts for her. I told her to disregard the $30 payment and she said she will call back if she needs us.

## 2021-01-09 LAB
ORGANISM: ABNORMAL
URINE CULTURE, ROUTINE: ABNORMAL
URINE CULTURE, ROUTINE: ABNORMAL

## 2021-01-14 RX ORDER — FLUCONAZOLE 100 MG/1
100 TABLET ORAL DAILY
Qty: 4 TABLET | Refills: 0 | Status: SHIPPED | OUTPATIENT
Start: 2021-01-14 | End: 2021-01-16

## 2021-01-15 DIAGNOSIS — E78.2 MIXED HYPERLIPIDEMIA: ICD-10-CM

## 2021-01-15 DIAGNOSIS — E55.9 VITAMIN D DEFICIENCY: ICD-10-CM

## 2021-01-15 DIAGNOSIS — N39.0 URINARY TRACT INFECTION WITHOUT HEMATURIA, SITE UNSPECIFIED: ICD-10-CM

## 2021-01-15 DIAGNOSIS — F41.8 ANXIETY ASSOCIATED WITH DEPRESSION: ICD-10-CM

## 2021-01-15 DIAGNOSIS — B00.9 HERPES SIMPLEX: ICD-10-CM

## 2021-01-15 LAB
ALBUMIN SERPL-MCNC: 4.3 G/DL (ref 3.5–5.2)
ALP BLD-CCNC: 79 U/L (ref 35–104)
ALT SERPL-CCNC: 21 U/L (ref 5–33)
ANION GAP SERPL CALCULATED.3IONS-SCNC: 14 MMOL/L (ref 7–19)
AST SERPL-CCNC: 46 U/L (ref 5–32)
BASOPHILS ABSOLUTE: 0 K/UL (ref 0–0.2)
BASOPHILS RELATIVE PERCENT: 0.2 % (ref 0–1)
BILIRUB SERPL-MCNC: 0.4 MG/DL (ref 0.2–1.2)
BUN BLDV-MCNC: 19 MG/DL (ref 8–23)
CALCIUM SERPL-MCNC: 9.6 MG/DL (ref 8.8–10.2)
CHLORIDE BLD-SCNC: 103 MMOL/L (ref 98–111)
CO2: 26 MMOL/L (ref 22–29)
CREAT SERPL-MCNC: 0.8 MG/DL (ref 0.5–0.9)
EOSINOPHILS ABSOLUTE: 0.3 K/UL (ref 0–0.6)
EOSINOPHILS RELATIVE PERCENT: 2.8 % (ref 0–5)
GFR AFRICAN AMERICAN: >59
GFR NON-AFRICAN AMERICAN: >60
GLUCOSE BLD-MCNC: 119 MG/DL (ref 74–109)
HCT VFR BLD CALC: 39.9 % (ref 37–47)
HEMOGLOBIN: 12.7 G/DL (ref 12–16)
IMMATURE GRANULOCYTES #: 0 K/UL
LYMPHOCYTES ABSOLUTE: 3.6 K/UL (ref 1.1–4.5)
LYMPHOCYTES RELATIVE PERCENT: 34.8 % (ref 20–40)
MCH RBC QN AUTO: 30.5 PG (ref 27–31)
MCHC RBC AUTO-ENTMCNC: 31.8 G/DL (ref 33–37)
MCV RBC AUTO: 95.9 FL (ref 81–99)
MONOCYTES ABSOLUTE: 1 K/UL (ref 0–0.9)
MONOCYTES RELATIVE PERCENT: 9.1 % (ref 0–10)
NEUTROPHILS ABSOLUTE: 5.5 K/UL (ref 1.5–7.5)
NEUTROPHILS RELATIVE PERCENT: 52.8 % (ref 50–65)
PDW BLD-RTO: 13.7 % (ref 11.5–14.5)
PLATELET # BLD: 303 K/UL (ref 130–400)
PMV BLD AUTO: 11.2 FL (ref 9.4–12.3)
POTASSIUM SERPL-SCNC: 4.3 MMOL/L (ref 3.5–5)
RBC # BLD: 4.16 M/UL (ref 4.2–5.4)
SODIUM BLD-SCNC: 143 MMOL/L (ref 136–145)
TOTAL PROTEIN: 7.2 G/DL (ref 6.6–8.7)
TRIGL SERPL-MCNC: 183 MG/DL (ref 0–149)
TSH SERPL DL<=0.05 MIU/L-ACNC: 1.99 UIU/ML (ref 0.27–4.2)
VITAMIN D 25-HYDROXY: 38.8 NG/ML
WBC # BLD: 10.4 K/UL (ref 4.8–10.8)

## 2021-01-18 DIAGNOSIS — I10 HYPERTENSION, ESSENTIAL: ICD-10-CM

## 2021-01-18 LAB
BACTERIA: NEGATIVE /HPF
BILIRUBIN URINE: NEGATIVE
BLOOD, URINE: NEGATIVE
CLARITY: CLEAR
COLOR: YELLOW
CRYSTALS, UA: ABNORMAL /HPF
EPITHELIAL CELLS, UA: 1 /HPF (ref 0–5)
GLUCOSE URINE: NEGATIVE MG/DL
HYALINE CASTS: 2 /HPF (ref 0–8)
KETONES, URINE: NEGATIVE MG/DL
LEUKOCYTE ESTERASE, URINE: ABNORMAL
NITRITE, URINE: NEGATIVE
PH UA: 5 (ref 5–8)
PROTEIN UA: NEGATIVE MG/DL
RBC UA: 1 /HPF (ref 0–4)
SPECIFIC GRAVITY UA: 1.02 (ref 1–1.03)
UROBILINOGEN, URINE: 0.2 E.U./DL
WBC UA: 7 /HPF (ref 0–5)

## 2021-01-19 ENCOUNTER — PATIENT MESSAGE (OUTPATIENT)
Dept: INTERNAL MEDICINE | Age: 86
End: 2021-01-19

## 2021-01-19 RX ORDER — MONTELUKAST SODIUM 4 MG/1
1 TABLET, CHEWABLE ORAL 2 TIMES DAILY
Qty: 60 TABLET | Refills: 3 | Status: SHIPPED | OUTPATIENT
Start: 2021-01-19 | End: 2021-01-20 | Stop reason: SDUPTHER

## 2021-01-20 ENCOUNTER — OFFICE VISIT (OUTPATIENT)
Dept: INTERNAL MEDICINE | Age: 86
End: 2021-01-20
Payer: MEDICARE

## 2021-01-20 VITALS
HEART RATE: 85 BPM | BODY MASS INDEX: 30.66 KG/M2 | DIASTOLIC BLOOD PRESSURE: 60 MMHG | WEIGHT: 184 LBS | SYSTOLIC BLOOD PRESSURE: 118 MMHG | HEIGHT: 65 IN | RESPIRATION RATE: 20 BRPM | OXYGEN SATURATION: 95 %

## 2021-01-20 DIAGNOSIS — F33.41 RECURRENT MAJOR DEPRESSIVE DISORDER, IN PARTIAL REMISSION (HCC): ICD-10-CM

## 2021-01-20 DIAGNOSIS — I10 HYPERTENSION, ESSENTIAL: Primary | ICD-10-CM

## 2021-01-20 DIAGNOSIS — E78.2 MIXED HYPERLIPIDEMIA: ICD-10-CM

## 2021-01-20 DIAGNOSIS — F41.8 ANXIETY ASSOCIATED WITH DEPRESSION: ICD-10-CM

## 2021-01-20 DIAGNOSIS — Z00.00 ROUTINE GENERAL MEDICAL EXAMINATION AT A HEALTH CARE FACILITY: ICD-10-CM

## 2021-01-20 PROCEDURE — 1090F PRES/ABSN URINE INCON ASSESS: CPT | Performed by: NURSE PRACTITIONER

## 2021-01-20 PROCEDURE — G8482 FLU IMMUNIZE ORDER/ADMIN: HCPCS | Performed by: NURSE PRACTITIONER

## 2021-01-20 PROCEDURE — G8427 DOCREV CUR MEDS BY ELIG CLIN: HCPCS | Performed by: NURSE PRACTITIONER

## 2021-01-20 PROCEDURE — 1123F ACP DISCUSS/DSCN MKR DOCD: CPT | Performed by: NURSE PRACTITIONER

## 2021-01-20 PROCEDURE — 1036F TOBACCO NON-USER: CPT | Performed by: NURSE PRACTITIONER

## 2021-01-20 PROCEDURE — 99214 OFFICE O/P EST MOD 30 MIN: CPT | Performed by: NURSE PRACTITIONER

## 2021-01-20 PROCEDURE — G8417 CALC BMI ABV UP PARAM F/U: HCPCS | Performed by: NURSE PRACTITIONER

## 2021-01-20 PROCEDURE — 4040F PNEUMOC VAC/ADMIN/RCVD: CPT | Performed by: NURSE PRACTITIONER

## 2021-01-20 PROCEDURE — G0439 PPPS, SUBSEQ VISIT: HCPCS | Performed by: NURSE PRACTITIONER

## 2021-01-20 RX ORDER — FEXOFENADINE HCL 180 MG/1
180 TABLET ORAL DAILY
Qty: 30 TABLET | Refills: 0 | Status: SHIPPED | OUTPATIENT
Start: 2021-01-20 | End: 2021-02-23 | Stop reason: SDUPTHER

## 2021-01-20 RX ORDER — BUSPIRONE HYDROCHLORIDE 5 MG/1
5 TABLET ORAL 2 TIMES DAILY PRN
Qty: 60 TABLET | Refills: 11 | Status: SHIPPED | OUTPATIENT
Start: 2021-01-20 | End: 2021-04-26 | Stop reason: SDUPTHER

## 2021-01-20 RX ORDER — MONTELUKAST SODIUM 4 MG/1
1 TABLET, CHEWABLE ORAL 2 TIMES DAILY
Qty: 60 TABLET | Refills: 3 | Status: SHIPPED | OUTPATIENT
Start: 2021-01-20 | End: 2021-04-01

## 2021-01-20 ASSESSMENT — ENCOUNTER SYMPTOMS
TROUBLE SWALLOWING: 0
DIARRHEA: 0
CONSTIPATION: 0
COLOR CHANGE: 0
SHORTNESS OF BREATH: 0
EYE DISCHARGE: 0
SORE THROAT: 0
EYE ITCHING: 0
COUGH: 0
WHEEZING: 0
CHOKING: 0
BLOOD IN STOOL: 0
ABDOMINAL PAIN: 0
RHINORRHEA: 1
VOMITING: 0
NAUSEA: 0
STRIDOR: 0
ABDOMINAL DISTENTION: 0

## 2021-01-20 ASSESSMENT — PATIENT HEALTH QUESTIONNAIRE - PHQ9
SUM OF ALL RESPONSES TO PHQ QUESTIONS 1-9: 0
2. FEELING DOWN, DEPRESSED OR HOPELESS: 0
SUM OF ALL RESPONSES TO PHQ9 QUESTIONS 1 & 2: 0
1. LITTLE INTEREST OR PLEASURE IN DOING THINGS: 0
SUM OF ALL RESPONSES TO PHQ QUESTIONS 1-9: 0

## 2021-01-20 NOTE — PATIENT INSTRUCTIONS
Personalized Preventive Plan for Jannet Iglesias - 1/20/2021  Medicare offers a range of preventive health benefits. Some of the tests and screenings are paid in full while other may be subject to a deductible, co-insurance, and/or copay. Some of these benefits include a comprehensive review of your medical history including lifestyle, illnesses that may run in your family, and various assessments and screenings as appropriate. After reviewing your medical record and screening and assessments performed today your provider may have ordered immunizations, labs, imaging, and/or referrals for you. A list of these orders (if applicable) as well as your Preventive Care list are included within your After Visit Summary for your review. Other Preventive Recommendations:    · A preventive eye exam performed by an eye specialist is recommended every 1-2 years to screen for glaucoma; cataracts, macular degeneration, and other eye disorders. · A preventive dental visit is recommended every 6 months. · Try to get at least 150 minutes of exercise per week or 10,000 steps per day on a pedometer . · Order or download the FREE \"Exercise & Physical Activity: Your Everyday Guide\" from [employer]. Call 7-339.526.7716 or search [employer] online. · You need 8111-7469 mg of calcium and 5459-0612 IU of vitamin D per day. It is possible to meet your calcium requirement with diet alone, but a vitamin D supplement is usually necessary to meet this goal.  · When exposed to the sun, use a sunscreen that protects against both UVA and UVB radiation with an SPF of 30 or greater. Reapply every 2 to 3 hours or after sweating, drying off with a towel, or swimming. · Always wear a seat belt when traveling in a car. Always wear a helmet when riding a bicycle or motorcycle.

## 2021-01-20 NOTE — PROGRESS NOTES
Formerly [facility] PHYSICIAN SERVICES  [facility] INTERNAL MEDICINE  [address] [address] [place] [address]  Dept: [phone]  Dept Fax: [phone]: Tj Watkins (:  1932) is a 80 y.o. female,Established patient, here for evaluation of the following chief complaint(s): Medicare AWV      Royal Fabry is a 80 y.o. female who presents today for her medical conditions/complaints as noted below. Royal Fabry is c/teresa Medicare AWV        HPI:     HPI   1. Persistent sinus drainage; She spits up drainage all day; she is on xyal but it is not working any longer;   2. Anxiety  It is working ok; 1/2 in the am and 1 at bedtime   3. Depression; She is taking lexapro daily   4. Hypertension; She is taking losartan with hctz;    5. Hyperlipidemia;   She is on lipitor 20 daily   Chief Complaint   Patient presents with    Medicare AWV       Past Medical History:   Diagnosis Date    Abnormal mammogram     Anxiety     Anxiety associated with depression     Artery disease, cerebral     Ataxia     Benign diastolic hypertension     Carpal tunnel syndrome     idiopathic peripheral    Cellulitis of face     Chronic back pain     COPD (chronic obstructive pulmonary disease) (HCC)     Depression     Disc degeneration, lumbosacral     Diverticulosis     Dysphagia     Esophageal reflux     Fever blister     Herpes simplex     Herpes zoster     Hypertension, essential     Hypertensive heart disease without CHF     Hypoxia     Idiopathic peripheral neuropathy         Insomnia     Major depressive disorder, recurrent episode, moderate (HCC)     Mixed hyperlipidemia     Osteoporosis     Ovarian failure     Pulmonary fibrosis (HCC)     Sciatica     Sleep apnea     Transient cerebral ischemic attack     Urinary incontinence     Weakness of both legs       Past Surgical History:   Procedure Laterality Date    COLONOSCOPY  2015 [place], repeat as needed    COLONOSCOPY N/A 10/21/2020    Dr REANNA Spicer-Diverticular disease, random colon bx negative    HYSTERECTOMY      LUNG SEGMENTECTOMY      lung segmental resection    UPPER GASTROINTESTINAL ENDOSCOPY N/A 10/21/2020    Dr Goukl Ma Gastritis, otherwise normal, NEG h pylori, NEG celiac       Vitals 1/20/2021 10/21/2020 10/21/2020 10/21/2020 10/21/2020 88/85/8544   SYSTOLIC 422 004 - - 297 -   DIASTOLIC 60 81 - - 76 -   Pulse 85 75 - - 80 -   Temp - - - - - -   Resp 20 20 1 1 20 1   SpO2 95 96 - - 96 -   Weight 184 lb - - - - -   Height 5' 4.5\" - - - - -   Body mass index 31.09 kg/m2 - - - - -   Pain Level - 0 - - 0 -   Some recent data might be hidden       Family History   Problem Relation Age of Onset    Heart Failure Mother     Diabetes Mother     Heart Disease Mother     Diabetes Father     Heart Disease Father     Colon Polyps Neg Hx     Colon Cancer Neg Hx        Social History     Tobacco Use    Smoking status: Never Smoker    Smokeless tobacco: Never Used   Substance Use Topics    Alcohol use: No      Current Outpatient Medications   Medication Sig Dispense Refill    busPIRone (BUSPAR) 5 MG tablet Take 1 tablet by mouth 2 times daily as needed (anxiety) 60 tablet 11    colestipol (COLESTID) 1 g tablet Take 1 tablet by mouth 2 times daily 60 tablet 3    fexofenadine (ALLEGRA) 180 MG tablet Take 1 tablet by mouth daily 30 tablet 0    LORazepam (ATIVAN) 1 MG tablet TAKE 1/2 TABLET BY MOUTH IN THE MORNING AND 1 TABLET BY MOUTH AT BEDTIME 45 tablet 0    esomeprazole (NEXIUM) 40 MG delayed release capsule Take 1 capsule by mouth daily 90 capsule 3    clopidogrel (PLAVIX) 75 MG tablet TAKE 1 TABLET DAILY 90 tablet 3    MYRBETRIQ 50 MG TB24 TAKE 1 TABLET DAILY 90 tablet 3    aspirin 81 MG EC tablet Take 81 mg by mouth daily      mirtazapine (REMERON) 30 MG tablet TAKE 1 TABLET NIGHTLY 90 tablet 3    escitalopram (LEXAPRO) 20 MG tablet TAKE 1 TABLET DAILY 90 tablet 3  levothyroxine (SYNTHROID) 25 MCG tablet 3 days a week take 1 tablet and 4 days a week take 2 tablets 390 tablet 1    acetaminophen (TYLENOL) 500 MG tablet Take 500 mg by mouth 2 times daily 2 TABLET IN THE AM & 2 TABLETS PM      losartan-hydrochlorothiazide (HYZAAR) 100-12.5 MG per tablet Take 1 tablet by mouth daily 90 tablet 3    atorvastatin (LIPITOR) 20 MG tablet Take 0.5 tablets by mouth daily 90 tablet 1    diclofenac sodium (VOLTAREN) 1 % GEL Apply 4 g topically 3 times daily as needed for Pain 5 Tube 1    Multiple Vitamins-Minerals (CENTRUM SILVER) TABS Take by mouth daily      ondansetron (ZOFRAN-ODT) 4 MG disintegrating tablet Take 4 mg by mouth every 8 hours as needed for Nausea or Vomiting      OXYGEN Inhale into the lungs 2L at [facility]       No current facility-administered medications for this visit.       Allergies   Allergen Reactions    Augmentin [Amoxicillin-Pot Clavulanate]     Biaxin [Clarithromycin]     Cefdinir     Effexor [Venlafaxine]     Erythromycin     Lortab [Hydrocodone-Acetaminophen]     Naprosyn [Naproxen]        Health Maintenance   Topic Date Due    COVID-19 Vaccine (1 of 2) 12/18/1948    DTaP/Tdap/Td vaccine (1 - Tdap) 12/18/1951    Shingles Vaccine (1 of 2) 12/18/1982    Annual Wellness Visit (AWV)  06/20/2019    Lipid screen  10/16/2021    TSH testing  01/15/2022    Potassium monitoring  01/15/2022    Creatinine monitoring  01/15/2022    Flu vaccine  Completed    Pneumococcal 65+ years Vaccine  Completed    Hepatitis A vaccine  Aged Out    Hepatitis B vaccine  Aged Out    Hib vaccine  Aged Out    Meningococcal (ACWY) vaccine  Aged Out       Lab Results   Component Value Date    LABA1C 5.8 04/26/2019     No results found for: PSA, PSADIA  TSH   Date Value Ref Range Status   01/15/2021 1.990 0.270 - 4.200 uIU/mL Final   ]  Lab Results   Component Value Date     01/15/2021    K 4.3 01/15/2021     01/15/2021    CO2 26 01/15/2021 BUN 19 01/15/2021    CREATININE 0.8 01/15/2021    GLUCOSE 119 (H) 01/15/2021    CALCIUM 9.6 01/15/2021    PROT 7.2 01/15/2021    LABALBU 4.3 01/15/2021    BILITOT 0.4 01/15/2021    ALKPHOS 79 01/15/2021    AST 46 (H) 01/15/2021    ALT 21 01/15/2021    LABGLOM >60 01/15/2021    GFRAA >59 01/15/2021     Lab Results   Component Value Date    CHOL 189 10/16/2020    CHOL 204 (H) 05/13/2020    CHOL 202 (H) 10/01/2019     Lab Results   Component Value Date    TRIG 183 (H) 01/15/2021    TRIG 248 (H) 10/16/2020    TRIG 265 (H) 05/13/2020     Lab Results   Component Value Date    HDL 50 (L) 10/16/2020    HDL 44 (L) 05/13/2020    HDL 48 (L) 10/01/2019     Lab Results   Component Value Date    LDLCALC 89 10/16/2020    LDLCALC 107 05/13/2020    LDLCALC 105 10/01/2019     Lab Results   Component Value Date     01/15/2021    K 4.3 01/15/2021     01/15/2021    CO2 26 01/15/2021    BUN 19 01/15/2021    CREATININE 0.8 01/15/2021    GLUCOSE 119 01/15/2021    CALCIUM 9.6 01/15/2021      Lab Results   Component Value Date    WBC 10.4 01/15/2021    HGB 12.7 01/15/2021    HCT 39.9 01/15/2021    MCV 95.9 01/15/2021     01/15/2021    LABLYMP 2.86 09/25/2014    LYMPHOPCT 34.8 01/15/2021    RBC 4.16 (L) 01/15/2021    MCH 30.5 01/15/2021    MCHC 31.8 (L) 01/15/2021    RDW 13.7 01/15/2021     Lab Results   Component Value Date    VITD25 38.8 01/15/2021       Subjective:      Review of Systems   Constitutional: Negative for fatigue, fever and unexpected weight change. HENT: Positive for rhinorrhea. Negative for ear discharge, ear pain, mouth sores, sore throat and trouble swallowing. Eyes: Negative for discharge, itching and visual disturbance. Respiratory: Negative for cough, choking, shortness of breath, wheezing and stridor. Cardiovascular: Negative for chest pain, palpitations and leg swelling. Gastrointestinal: Negative for abdominal distention, abdominal pain, blood in stool, constipation, diarrhea, nausea and vomiting. Endocrine: Negative for cold intolerance, polydipsia and polyuria. Genitourinary: Negative for difficulty urinating, dysuria, frequency and urgency. Musculoskeletal: Negative for arthralgias and gait problem. Skin: Negative for color change and rash. Allergic/Immunologic: Negative for food allergies and immunocompromised state. Neurological: Negative for dizziness, tremors, syncope, speech difficulty, weakness and headaches. Hematological: Negative for adenopathy. Does not bruise/bleed easily. Psychiatric/Behavioral: Negative for confusion and hallucinations. Objective:     Physical Exam  Constitutional:       General: She is not in acute distress. Appearance: She is well-developed. HENT:      Head: Normocephalic and atraumatic. Eyes:      General: No scleral icterus. Right eye: No discharge. Left eye: No discharge. Pupils: Pupils are equal, round, and reactive to light. Neck:      Musculoskeletal: Normal range of motion and neck supple. Thyroid: No thyromegaly. Vascular: No JVD. Cardiovascular:      Rate and Rhythm: Normal rate and regular rhythm. Heart sounds: Normal heart sounds. No murmur. Pulmonary:      Effort: Pulmonary effort is normal. No respiratory distress. Breath sounds: Normal breath sounds. No wheezing or rales. Abdominal:      General: Bowel sounds are normal. There is no distension. Palpations: Abdomen is soft. There is no mass. Tenderness: There is no abdominal tenderness. There is no guarding or rebound. Musculoskeletal: Normal range of motion. General: No tenderness. Skin:     General: Skin is warm and dry. Findings: No erythema or rash. Neurological:      Mental Status: She is alert and oriented to person, place, and time. Cranial Nerves: No cranial nerve deficit. Coordination: Coordination normal.      Deep Tendon Reflexes: Reflexes are normal and symmetric. Reflexes normal.   Psychiatric:         Mood and Affect: Mood is not depressed. Behavior: Behavior normal.         Thought Content: Thought content normal.         Judgment: Judgment normal.       /60   Pulse 85   Resp 20   Ht 5' 4.5\" (1.638 m)   Wt 184 lb (83.5 kg)   SpO2 95%   BMI 31.10 kg/m²     Assessment:       Diagnosis Orders   1. Hypertension, essential     2. Anxiety associated with depression     3. Mixed hyperlipidemia     4. [facility]       Labs reviewed from 1/15/2020  D  Plan:        Patient given educational materials - see patient instructions. Discussed use, benefit, and side effects of prescribed medications. Allpatient questions answered. Pt voiced understanding. Reviewed health maintenance. Instructed to continue current medications, diet and exercise. Patient agreed with treatment plan. Follow up as directed. MEDICATIONS:  Orders Placed This Encounter   Medications    busPIRone (BUSPAR) 5 MG tablet     Sig: Take 1 tablet by mouth 2 times daily as needed (anxiety)     Dispense:  60 tablet     Refill:  11    colestipol (COLESTID) 1 g tablet     Sig: Take 1 tablet by mouth 2 times daily     Dispense:  60 tablet     Refill:  3    fexofenadine (ALLEGRA) 180 MG tablet     Sig: Take 1 tablet by mouth daily     Dispense:  30 tablet     Refill:  0         ORDERS:  No orders of the defined types were placed in this encounter. Follow-up:  Return in about 3 months (around 4/20/2021) for have labs done prior to appt. PATIENT INSTRUCTIONS:  There are no Patient Instructions on file for this visit.   Electronically signed by SONIA Marte on 1/20/2021 at 3:42 PM EMR[employer]agon/transcription disclaimer:  Much of this encounter note is electronic transcription/translation of spoken language to printed texts. The electronic translation of spoken language may be erroneous, or at times,nonsensical words or phrases may be inadvertently transcribed. Although I have reviewed the note for such errors, some may still exist.  Medicare Annual Wellness Visit  Name: Maricruz Malone Date: 2021   MRN: [id number] Sex: Female   Age: 80 y.o. Ethnicity: Non-/Non    : 1932 Race: Radha Coates is here for Medicare AWV    Screenings for behavioral, psychosocial and functional/safety risks, and cognitive dysfunction are all negative except as indicated below. These results, as well as other patient data from the [address] form, are documented in Flowsheets linked to this Encounter. Allergies   Allergen Reactions    Augmentin [Amoxicillin-Pot Clavulanate]     Biaxin [Clarithromycin]     Cefdinir     Effexor [Venlafaxine]     Erythromycin     Lortab [Hydrocodone-Acetaminophen]     Naprosyn [Naproxen]        Prior to Visit Medications    Medication Sig Taking?  Authorizing Provider   busPIRone (BUSPAR) 5 MG tablet Take 1 tablet by mouth 2 times daily as needed (anxiety) Yes SONIA Orozco   colestipol (COLESTID) 1 g tablet Take 1 tablet by mouth 2 times daily Yes SONIA Orozco   fexofenadine (ALLEGRA) 180 MG tablet Take 1 tablet by mouth daily Yes SONIA Orozco   LORazepam (ATIVAN) 1 MG tablet TAKE 1/2 TABLET BY MOUTH IN THE MORNING AND 1 TABLET BY MOUTH AT BEDTIME Yes SONIA Orozco   esomeprazole (NEXIUM) 40 MG delayed release capsule Take 1 capsule by mouth daily Yes SONIA Orozco   clopidogrel (PLAVIX) 75 MG tablet TAKE 1 TABLET DAILY Yes SONIA Orozco   MYRBETRIQ 50 MG TB24 TAKE 1 TABLET DAILY Yes SONIA Orozco aspirin 81 MG EC tablet Take 81 mg by mouth daily Yes Historical Provider, MD   mirtazapine (REMERON) 30 MG tablet TAKE 1 TABLET NIGHTLY Yes SONIA Orozco   escitalopram (LEXAPRO) 20 MG tablet TAKE 1 TABLET DAILY Yes SONIA Orozco   levothyroxine (SYNTHROID) 25 MCG tablet 3 days a week take 1 tablet and 4 days a week take 2 tablets Yes SONIA Orozco   acetaminophen (TYLENOL) 500 MG tablet Take 500 mg by mouth 2 times daily 2 TABLET IN THE AM & 2 TABLETS PM Yes Historical Provider, MD   losartan-hydrochlorothiazide (HYZAAR) 100-12.5 MG per tablet Take 1 tablet by mouth daily Yes SONIA Orozco   atorvastatin (LIPITOR) 20 MG tablet Take 0.5 tablets by mouth daily Yes SONIA Orozco   diclofenac sodium (VOLTAREN) 1 % GEL Apply 4 g topically 3 times daily as needed for Pain Yes SONIA Orozco   Multiple Vitamins-Minerals (CENTRUM SILVER) TABS Take by mouth daily Yes Historical Provider, MD   ondansetron (ZOFRAN-ODT) 4 MG disintegrating tablet Take 4 mg by mouth every 8 hours as needed for Nausea or Vomiting Yes Historical Provider, MD   OXYGEN Inhale into the lungs 2L at [facility] Yes Historical Provider, MD       Past Medical History:   Diagnosis Date    Abnormal mammogram     Anxiety     Anxiety associated with depression     Artery disease, cerebral     Ataxia     Benign diastolic hypertension     Carpal tunnel syndrome     idiopathic peripheral    Cellulitis of face     Chronic back pain     COPD (chronic obstructive pulmonary disease) ([address]     Depression     Disc degeneration, lumbosacral     Diverticulosis     Dysphagia     Esophageal reflux     Fever blister     Herpes simplex     Herpes zoster     Hypertension, essential     Hypertensive heart disease without CHF     Hypoxia     Idiopathic peripheral neuropathy     2017    Insomnia     Major depressive disorder, recurrent episode, moderate (HCC)     Mixed hyperlipidemia     Osteoporosis  Ovarian failure     Pulmonary fibrosis (HCC)     Sciatica     Sleep apnea     Transient cerebral ischemic attack     Urinary incontinence     Weakness of both legs        Past Surgical History:   Procedure Laterality Date    COLONOSCOPY  06/12/2015    [place], repeat as needed    COLONOSCOPY N/A 10/21/2020    Dr REANNA Spicer-Diverticular disease, random colon bx negative    HYSTERECTOMY      LUNG SEGMENTECTOMY      lung segmental resection    UPPER GASTROINTESTINAL ENDOSCOPY N/A 10/21/2020    Dr Eliezer Spicer-mild Gastritis, otherwise normal, NEG h pylori, NEG celiac       Family History   Problem Relation Age of Onset    Heart Failure Mother     Diabetes Mother     Heart Disease Mother     Diabetes Father     Heart Disease Father     Colon Polyps Neg Hx     Colon Cancer Neg Hx        CareTeam (Including outside providers/suppliers regularly involved in providing care):   Patient Care Team:  SONIA Godinez as PCP - General (Nurse Practitioner Acute Care)  SONIA God[facility]  SONIA Bettencourt as Advanced Practice Nurse (Neurology)  [facility] Readings from Last 3 Encounters:   01/20/21 184 lb (83.5 kg)   10/21/20 183 lb (83 kg)   10/19/20 185 lb (83.9 kg)     Vitals:    01/20/21 1502   BP: 118/60   Pulse: 85   Resp: 20   SpO2: 95%   Weight: 184 lb (83.5 kg)   Height: 5' 4.5\" (1.638 m)     Body mass index is 31.1 kg/m². Based upon direct observation of the patient, evaluation of cognition reveals global memory impairment noted.     General Appearance: alert and oriented to person, place and time, well developed and well- nourished, in no acute distress  Skin: warm and dry, no rash or erythema  Head: normocephalic and atraumatic  Eyes: pupils equal, round, and reactive to light, extraocular eye movements intact, conjunctivae normal ENT: tympanic membrane, external ear and ear canal normal bilaterally, nose without deformity, nasal mucosa and turbinates normal without polyps  Neck: supple and non-tender without mass, no thyromegaly or thyroid nodules, no cervical lymphadenopathy  Pulmonary/Chest: clear to auscultation bilaterally- no wheezes, rales or rhonchi, normal air movement, no respiratory distress  Cardiovascular: normal rate, regular rhythm, normal S1 and S2, no murmurs, rubs, clicks, or gallops, distal pulses intact, no carotid bruits  Abdomen: soft, non-tender, non-distended, normal bowel sounds, no masses or organomegaly  Breast: appear normal, no suspicious masses, no skin or nipple changes or axillary nodes  Extremities: no cyanosis, clubbing or edema  Musculoskeletal: normal range of motion, no joint swelling, deformity or tenderness  Neurologic: reflexes normal and symmetric, no cranial nerve deficit, gait, coordination and speech normal    Patient's complete Health Risk Assessment and screening values have been reviewed and are found in Flowsheets. The following problems were reviewed today and where indicated follow up appointments were made and/or referrals ordered. Positive Risk Factor Screenings with Interventions:     Fall Risk:  Timed Up and Go Test > 12 seconds? (Complete if either Fall Risk answers are Yes): (!) yes  2 or more falls in past year?: (!) yes  Fall with injury in past year?: no  Fall Risk Interventions:    · Home safety tips provided        General Health and ACP:  General  In general, how would you say your health is?: Fair  In the past 7 days, have you experienced any of the following?  New or Increased Pain, New or Increased Fatigue, Loneliness, Social Isolation, Stress or Anger?: None of These  Do you get the social and emotional support that you need?: Yes  Do you have a Living Will?: Yes  Advance Directives     Power of  Living Will ACP-Advance Directive ACP-Power of [employer] Not on File Filed on 10/21/20 Filed Not on File      General Health Risk Interventions:  · Social isolation: na    Health Habits/Nutrition:  Health Habits/Nutrition  Do you exercise for at least 20 minutes 2-3 times per week?: (!) No  Have you lost any weight without trying in the past 3 months?: No  Have you seen a dentist within the past year?: (!) No  Body mass index: (!) 31.09  Health Habits/Nutrition Interventions:  · Inadequate physical activity:  she is 88    Hearing/Vision:  No exam data present  Hearing/Vision  Do you or your family notice any trouble with your hearing?: (!) Yes  Do you have difficulty driving, watching TV, or doing any of your daily activities because of your eyesight?: (!) Yes  Have you had an eye exam within the past year?: (!) No  Hearing/Vision Interventions:  · Hearing concerns:  patient declines any further evaluation/treatment for hearing issues     ADL:  ADLs  In the past 7 days, did you need help from others to perform any of the following everyday activities? Eating, dressing, grooming, bathing, toileting, or walking/balance?: None  In the past 7 days, did you need help from others to take care of any of the following?  Laundry, housekeeping, banking/finances, shopping, telephone use, food preparation, transportation, or taking medications?: (!) Transportation, Taking Medications  ADL Interventions:  · Patient declines any further evaluation/treatment for this issue  She lives with her daughter who does everything for her   Personalized Preventive Plan   Current Health Maintenance Status  Immunization History   Administered Date(s) Administered    Influenza Virus Vaccine 11/02/2016    Influenza, High Dose (Fluzone 65 yrs and older) 11/09/2017    Influenza, Triv, inactivated, subunit, adjuvanted, IM (Fluad 65 yrs and older) 10/01/2019, 10/19/2020    Pneumococcal Conjugate 13-valent ([id number]) 11/02/2016    Pneumococcal Polysaccharide ([id number]) 09/16/2002, 12/01/2015 Health Maintenance   Topic Date Due    COVID-19 Vaccine (1 of 2) 12/18/1948    DTaP/Tdap/Td vaccine (1 - Tdap) 12/18/1951    Shingles Vaccine (1 of 2) 12/18/1982    Annual Wellness Visit (AWV)  06/20/2019    Lipid screen  10/16/2021    TSH testing  01/15/2022    Potassium monitoring  01/15/2022    Creatinine monitoring  01/15/2022    Flu vaccine  Completed    Pneumococcal 65+ years Vaccine  Completed    Hepatitis A vaccine  Aged Out    Hepatitis B vaccine  Aged Out    Hib vaccine  Aged Out    Meningococcal (ACWY) vaccine  Aged Out     Recommendations for [place] Due: see orders and patient instructions/AVS.  . Recommended screening schedule for the next 5-10 years is provided to the patient in written form: see Patient Stanislaw Tamez was seen today for medicare awv. Diagnoses and all orders for this visit:    Hypertension, essential    Anxiety associated with depression    Mixed hyperlipidemia    Recurrent major depressive disorder, in partial remission ([facility]    Other orders  -     busPIRone (BUSPAR) 5 MG tablet; Take 1 tablet by mouth 2 times daily as needed (anxiety)  -     colestipol (COLESTID) 1 g tablet; Take 1 tablet by mouth 2 times daily  -     fexofenadine (ALLEGRA) 180 MG tablet;  Take 1 tablet by mouth daily

## 2021-02-02 DIAGNOSIS — F51.01 PRIMARY INSOMNIA: ICD-10-CM

## 2021-02-02 RX ORDER — LORAZEPAM 1 MG/1
TABLET ORAL
Qty: 45 TABLET | Refills: 0 | Status: SHIPPED | OUTPATIENT
Start: 2021-02-02 | End: 2021-02-28 | Stop reason: SDUPTHER

## 2021-02-02 NOTE — TELEPHONE ENCOUNTER
[facility] called to request a refill on her medication. Last office visit : 1/20/2021   Next office visit : 4/20/2021     Last UDS: No results found for: Lorita Batch, LABBENZ, BUPRENUR, COCAIMETSCRU, GABAPENTIN, MDMA, METAMPU, OPIATESCREENURINE, OXTCOSU, PHENCYCLIDINESCREENURINE, PROPOXYPHENE, THCSCREENUR, TRICYUR    Last Joaquin: 02/02/2021  Medication Contract: 07/20/2020    Requested Prescriptions     Pending Prescriptions Disp Refills    LORazepam (ATIVAN) 1 MG tablet 45 tablet 0     Sig: TAKE 1/2 TABLET BY MOUTH IN THE MORNING AND 1 TABLET BY MOUTH AT BEDTIME         Please approve or refuse this medication.    Jaskaran Dietz

## 2021-02-08 RX ORDER — LOSARTAN POTASSIUM AND HYDROCHLOROTHIAZIDE 12.5; 1 MG/1; MG/1
TABLET ORAL
Qty: 90 TABLET | Refills: 3 | Status: SHIPPED | OUTPATIENT
Start: 2021-02-08 | End: 2021-09-13 | Stop reason: SDUPTHER

## 2021-02-08 NOTE — TELEPHONE ENCOUNTER
[facility] called requesting a refill of the below medication which has been pended for you:     Requested Prescriptions     Pending Prescriptions Disp Refills    losartan-hydroCHLOROthiazide (HYZAAR) 100-12.5 MG per tablet [Pharmacy Med Name: LOSARTAN/HCTZ TABS 100/12.5MG] 90 tablet 3     Sig: TAKE 1 TABLET DAILY       Last Appointment Date: 1/20/2021  Next Appointment Date: 4/20/2021    Allergies   Allergen Reactions    Augmentin [Amoxicillin-Pot Clavulanate]     Biaxin [Clarithromycin]     Cefdinir     Effexor [Venlafaxine]     Erythromycin     Lortab [Hydrocodone-Acetaminophen]     Naprosyn [Naproxen]

## 2021-02-14 ENCOUNTER — IMMUNIZATION (OUTPATIENT)
Age: 86
End: 2021-02-14
Payer: MEDICARE

## 2021-02-14 PROCEDURE — 0001A PR IMM ADMN SARSCOV2 30MCG/0.3ML DIL RECON 1ST DOSE: CPT | Performed by: FAMILY MEDICINE

## 2021-02-14 PROCEDURE — 91300 COVID-19, PFIZER VACCINE 30MCG/0.3ML DOSE: CPT | Performed by: FAMILY MEDICINE

## 2021-02-23 ENCOUNTER — PATIENT MESSAGE (OUTPATIENT)
Dept: INTERNAL MEDICINE | Age: 86
End: 2021-02-23

## 2021-02-23 RX ORDER — FEXOFENADINE HCL 180 MG/1
180 TABLET ORAL DAILY
Qty: 30 TABLET | Refills: 2 | Status: SHIPPED | OUTPATIENT
Start: 2021-02-23 | End: 2021-02-26 | Stop reason: SDUPTHER

## 2021-02-26 NOTE — TELEPHONE ENCOUNTER
Sasha Yost called requesting a refill of the below medication which has been pended for you:     Requested Prescriptions     Pending Prescriptions Disp Refills    fexofenadine (ALLEGRA) 180 MG tablet 90 tablet 2     Sig: Take 1 tablet by mouth daily       Last Appointment Date: 1/20/2021  Next Appointment Date: 4/20/2021    Allergies   Allergen Reactions    Augmentin [Amoxicillin-Pot Clavulanate]     Biaxin [Clarithromycin]     Cefdinir     Effexor [Venlafaxine]     Erythromycin     Lortab [Hydrocodone-Acetaminophen]     Naprosyn [Naproxen]

## 2021-02-28 DIAGNOSIS — F51.01 PRIMARY INSOMNIA: ICD-10-CM

## 2021-03-01 RX ORDER — FEXOFENADINE HCL 180 MG/1
180 TABLET ORAL DAILY
Qty: 90 TABLET | Refills: 2 | Status: SHIPPED | OUTPATIENT
Start: 2021-03-01 | End: 2021-05-30

## 2021-03-01 RX ORDER — LORAZEPAM 1 MG/1
TABLET ORAL
Qty: 45 TABLET | Refills: 0 | Status: SHIPPED | OUTPATIENT
Start: 2021-03-01 | End: 2021-04-03 | Stop reason: SDUPTHER

## 2021-03-07 ENCOUNTER — IMMUNIZATION (OUTPATIENT)
Age: 86
End: 2021-03-07
Payer: MEDICARE

## 2021-03-07 PROCEDURE — 0002A PR IMM ADMN SARSCOV2 30MCG/0.3ML DIL RECON 2ND DOSE: CPT | Performed by: FAMILY MEDICINE

## 2021-03-07 PROCEDURE — 91300 COVID-19, PFIZER VACCINE 30MCG/0.3ML DOSE: CPT | Performed by: FAMILY MEDICINE

## 2021-03-15 ENCOUNTER — TELEPHONE (OUTPATIENT)
Dept: INTERNAL MEDICINE | Age: 86
End: 2021-03-15

## 2021-03-15 RX ORDER — CIPROFLOXACIN 500 MG/1
500 TABLET, FILM COATED ORAL 2 TIMES DAILY
Qty: 14 TABLET | Refills: 0 | Status: SHIPPED | OUTPATIENT
Start: 2021-03-15 | End: 2021-03-22

## 2021-04-01 RX ORDER — MONTELUKAST SODIUM 4 MG/1
TABLET, CHEWABLE ORAL
Qty: 60 TABLET | Refills: 11 | Status: SHIPPED | OUTPATIENT
Start: 2021-04-01 | End: 2021-09-13 | Stop reason: SDUPTHER

## 2021-04-01 NOTE — TELEPHONE ENCOUNTER
Cecilio Cabrera called requesting a refill of the below medication which has been pended for you:     Requested Prescriptions     Pending Prescriptions Disp Refills    colestipol (COLESTID) 1 g tablet [Pharmacy Med Name: COLESTIPOL HCL TABS 1GM] 60 tablet 11     Sig: TAKE 1 TABLET TWICE A DAY       Last Appointment Date: 1/20/2021  Next Appointment Date: 4/20/2021    Allergies   Allergen Reactions    Augmentin [Amoxicillin-Pot Clavulanate]     Biaxin [Clarithromycin]     Cefdinir     Effexor [Venlafaxine]     Erythromycin     Lortab [Hydrocodone-Acetaminophen]     Naprosyn [Naproxen]

## 2021-04-03 DIAGNOSIS — F51.01 PRIMARY INSOMNIA: ICD-10-CM

## 2021-04-05 RX ORDER — LORAZEPAM 1 MG/1
TABLET ORAL
Qty: 45 TABLET | Refills: 0 | Status: SHIPPED | OUTPATIENT
Start: 2021-04-05 | End: 2021-05-13 | Stop reason: SDUPTHER

## 2021-04-05 NOTE — TELEPHONE ENCOUNTER
Ludy Almaguer called requesting a refill of the below medication which has been pended for you:     Requested Prescriptions     Pending Prescriptions Disp Refills    LORazepam (ATIVAN) 1 MG tablet 45 tablet 0     Sig: TAKE 1/2 TABLET BY MOUTH IN THE MORNING AND 1 TABLET BY MOUTH AT BEDTIME       Last Appointment Date: 1/20/2021  Next Appointment Date: 4/20/2021    Allergies   Allergen Reactions    Augmentin [Amoxicillin-Pot Clavulanate]     Biaxin [Clarithromycin]     Cefdinir     Effexor [Venlafaxine]     Erythromycin     Lortab [Hydrocodone-Acetaminophen]     Naprosyn [Naproxen]

## 2021-04-08 RX ORDER — VALACYCLOVIR HYDROCHLORIDE 500 MG/1
500 TABLET, FILM COATED ORAL 2 TIMES DAILY
Qty: 14 TABLET | Refills: 2 | Status: SHIPPED | OUTPATIENT
Start: 2021-04-08 | End: 2021-04-15

## 2021-04-08 NOTE — TELEPHONE ENCOUNTER
Ave called requesting a refill of the below medication which has been pended for you:     Requested Prescriptions      No prescriptions requested or ordered in this encounter       Last Appointment Date: 1/20/2021  Next Appointment Date: 4/26/2021    Allergies   Allergen Reactions    Augmentin [Amoxicillin-Pot Clavulanate]     Biaxin [Clarithromycin]     Cefdinir     Effexor [Venlafaxine]     Erythromycin     Lortab [Hydrocodone-Acetaminophen]     Naprosyn [Naproxen]

## 2021-04-26 ENCOUNTER — OFFICE VISIT (OUTPATIENT)
Dept: INTERNAL MEDICINE | Age: 86
End: 2021-04-26
Payer: MEDICARE

## 2021-04-26 VITALS
BODY MASS INDEX: 31.16 KG/M2 | DIASTOLIC BLOOD PRESSURE: 78 MMHG | HEART RATE: 95 BPM | SYSTOLIC BLOOD PRESSURE: 118 MMHG | HEIGHT: 65 IN | WEIGHT: 187 LBS | OXYGEN SATURATION: 94 %

## 2021-04-26 DIAGNOSIS — G47.34 NOCTURNAL HYPOXIA: ICD-10-CM

## 2021-04-26 DIAGNOSIS — R56.9 CONVULSIONS, UNSPECIFIED CONVULSION TYPE (HCC): ICD-10-CM

## 2021-04-26 DIAGNOSIS — R41.3 MEMORY LOSS: ICD-10-CM

## 2021-04-26 DIAGNOSIS — E03.8 OTHER SPECIFIED HYPOTHYROIDISM: ICD-10-CM

## 2021-04-26 DIAGNOSIS — E55.9 VITAMIN D DEFICIENCY: ICD-10-CM

## 2021-04-26 DIAGNOSIS — I10 HYPERTENSION, ESSENTIAL: Primary | ICD-10-CM

## 2021-04-26 DIAGNOSIS — I10 HYPERTENSION, ESSENTIAL: ICD-10-CM

## 2021-04-26 DIAGNOSIS — J43.8 OTHER EMPHYSEMA (HCC): ICD-10-CM

## 2021-04-26 LAB
ALBUMIN SERPL-MCNC: 4.2 G/DL (ref 3.5–5.2)
ALP BLD-CCNC: 70 U/L (ref 35–104)
ALT SERPL-CCNC: 17 U/L (ref 5–33)
ANION GAP SERPL CALCULATED.3IONS-SCNC: 12 MMOL/L (ref 7–19)
AST SERPL-CCNC: 38 U/L (ref 5–32)
BASOPHILS ABSOLUTE: 0 K/UL (ref 0–0.2)
BASOPHILS RELATIVE PERCENT: 0.2 % (ref 0–1)
BILIRUB SERPL-MCNC: 0.4 MG/DL (ref 0.2–1.2)
BUN BLDV-MCNC: 23 MG/DL (ref 8–23)
CALCIUM SERPL-MCNC: 9.6 MG/DL (ref 8.8–10.2)
CHLORIDE BLD-SCNC: 102 MMOL/L (ref 98–111)
CO2: 30 MMOL/L (ref 22–29)
CREAT SERPL-MCNC: 0.8 MG/DL (ref 0.5–0.9)
EOSINOPHILS ABSOLUTE: 0.3 K/UL (ref 0–0.6)
EOSINOPHILS RELATIVE PERCENT: 2.6 % (ref 0–5)
GFR AFRICAN AMERICAN: >59
GFR NON-AFRICAN AMERICAN: >60
GLUCOSE BLD-MCNC: 117 MG/DL (ref 74–109)
HCT VFR BLD CALC: 41.3 % (ref 37–47)
HEMOGLOBIN: 13 G/DL (ref 12–16)
IMMATURE GRANULOCYTES #: 0 K/UL
LYMPHOCYTES ABSOLUTE: 3.6 K/UL (ref 1.1–4.5)
LYMPHOCYTES RELATIVE PERCENT: 34.3 % (ref 20–40)
MCH RBC QN AUTO: 29.6 PG (ref 27–31)
MCHC RBC AUTO-ENTMCNC: 31.5 G/DL (ref 33–37)
MCV RBC AUTO: 94.1 FL (ref 81–99)
MONOCYTES ABSOLUTE: 1 K/UL (ref 0–0.9)
MONOCYTES RELATIVE PERCENT: 9.5 % (ref 0–10)
NEUTROPHILS ABSOLUTE: 5.6 K/UL (ref 1.5–7.5)
NEUTROPHILS RELATIVE PERCENT: 53 % (ref 50–65)
PDW BLD-RTO: 13.8 % (ref 11.5–14.5)
PLATELET # BLD: 327 K/UL (ref 130–400)
PMV BLD AUTO: 10.4 FL (ref 9.4–12.3)
POTASSIUM SERPL-SCNC: 3.6 MMOL/L (ref 3.5–5)
RBC # BLD: 4.39 M/UL (ref 4.2–5.4)
SODIUM BLD-SCNC: 144 MMOL/L (ref 136–145)
TOTAL PROTEIN: 7.5 G/DL (ref 6.6–8.7)
TSH SERPL DL<=0.05 MIU/L-ACNC: 3.48 UIU/ML (ref 0.27–4.2)
VITAMIN D 25-HYDROXY: 45.8 NG/ML
WBC # BLD: 10.6 K/UL (ref 4.8–10.8)

## 2021-04-26 PROCEDURE — 1123F ACP DISCUSS/DSCN MKR DOCD: CPT | Performed by: NURSE PRACTITIONER

## 2021-04-26 PROCEDURE — 99214 OFFICE O/P EST MOD 30 MIN: CPT | Performed by: NURSE PRACTITIONER

## 2021-04-26 PROCEDURE — G8417 CALC BMI ABV UP PARAM F/U: HCPCS | Performed by: NURSE PRACTITIONER

## 2021-04-26 PROCEDURE — 3023F SPIROM DOC REV: CPT | Performed by: NURSE PRACTITIONER

## 2021-04-26 PROCEDURE — 1090F PRES/ABSN URINE INCON ASSESS: CPT | Performed by: NURSE PRACTITIONER

## 2021-04-26 PROCEDURE — G8427 DOCREV CUR MEDS BY ELIG CLIN: HCPCS | Performed by: NURSE PRACTITIONER

## 2021-04-26 PROCEDURE — G8926 SPIRO NO PERF OR DOC: HCPCS | Performed by: NURSE PRACTITIONER

## 2021-04-26 PROCEDURE — 4040F PNEUMOC VAC/ADMIN/RCVD: CPT | Performed by: NURSE PRACTITIONER

## 2021-04-26 PROCEDURE — 1036F TOBACCO NON-USER: CPT | Performed by: NURSE PRACTITIONER

## 2021-04-26 RX ORDER — BUSPIRONE HYDROCHLORIDE 5 MG/1
5 TABLET ORAL 2 TIMES DAILY PRN
Qty: 60 TABLET | Refills: 11 | Status: SHIPPED | OUTPATIENT
Start: 2021-04-26 | End: 2021-07-22 | Stop reason: SDUPTHER

## 2021-04-26 RX ORDER — DONEPEZIL HYDROCHLORIDE 5 MG/1
5 TABLET, FILM COATED ORAL NIGHTLY
Qty: 30 TABLET | Refills: 0 | Status: SHIPPED | OUTPATIENT
Start: 2021-04-26 | End: 2021-06-09 | Stop reason: SDUPTHER

## 2021-04-26 ASSESSMENT — ENCOUNTER SYMPTOMS
BLOOD IN STOOL: 0
NAUSEA: 0
VOMITING: 0
CHOKING: 0
SORE THROAT: 0
ABDOMINAL PAIN: 0
WHEEZING: 0
TROUBLE SWALLOWING: 0
COLOR CHANGE: 0
ABDOMINAL DISTENTION: 0
EYE ITCHING: 0
EYE DISCHARGE: 0
SHORTNESS OF BREATH: 0
DIARRHEA: 0
COUGH: 0
STRIDOR: 0
CONSTIPATION: 0

## 2021-04-26 NOTE — PROGRESS NOTES
[address] [facility]  [address] [address]  [address] [address]  Dept: [phone]  Dept Fax: [phone]: [phone]    Gurdeep Anne (:  1932) is a 80 y.o. female,Established patient, here for evaluation of the following chief complaint(s): Hypertension (Patient is here for routine follow up visit. Labs done today)      Gurdeep Anne is a 80 y.o. female who presents today for her medical conditions/complaints as noted below. Gurdeep Anne is c/teresa Hypertension (Patient is here for routine follow up visit. Labs done today)        HPI:     Chief Complaint   Patient presents with    Hypertension     Patient is here for routine follow up visit. Labs done today     HPI   1. Hypertension; she si on no meds;   2. Sleep apnea ; sleep apnea  She never was able to tolerate it so she just quit trying   3. Memory loss; she is being forgetful of ST memory   4,  Anxiety  Stable on current dose of buspar; And lorazepam  CONTROLLED SUBSTANCE  Drug lorazepam  Diagnosis insomnia  Last [facility] 3/11/2021  Last UDS none age out  Pain contract last date 2020  Effective dose   yes     Changes this visit  none     5. Convulsions; But this was resolved and ruled out;     Past Medical History:   Diagnosis Date    Abnormal mammogram     Anxiety     Anxiety associated with depression     Artery disease, cerebral     Ataxia     Benign diastolic hypertension     Carpal tunnel syndrome     idiopathic peripheral    Cellulitis of face     Chronic back pain     COPD (chronic obstructive pulmonary disease) (HCC)     Depression     Disc degeneration, lumbosacral     Diverticulosis     Dysphagia     Esophageal reflux     Fever blister     Herpes simplex     Herpes zoster     Hypertension, essential     Hypertensive heart disease without CHF     Hypoxia     Idiopathic peripheral neuropathy     2017    Insomnia     Major depressive disorder, recurrent episode, moderate (HCC)     Mixed hyperlipidemia     Osteoporosis     Ovarian failure     Pulmonary fibrosis (HCC)     Sciatica     Sleep apnea     Transient cerebral ischemic attack     Urinary incontinence     Weakness of both legs       Past Surgical History:   Procedure Laterality Date    COLONOSCOPY  06/12/2015    Aubree, repeat as needed    COLONOSCOPY N/A 10/21/2020    Dr REANNA Spicer-Diverticular disease, random colon bx negative    HYSTERECTOMY      LUNG SEGMENTECTOMY      lung segmental resection    UPPER GASTROINTESTINAL ENDOSCOPY N/A 10/21/2020    Dr Emily Ames Gastritis, otherwise normal, NEG h pylori, NEG celiac       Vitals 4/26/2021 1/20/2021 10/21/2020 10/21/2020 10/21/2020 37/70/8003   SYSTOLIC 309 781 213 - - 999   DIASTOLIC 78 60 81 - - 76   Pulse 95 85 75 - - 80   Temp - - - - - -   Resp - 20 20 1 1 20   SpO2 94 95 96 - - 96   Weight 187 lb 184 lb - - - -   Height 5' 4.5\" 5' 4.5\" - - - -   Body mass index 31.6 kg/m2 31.09 kg/m2 - - - -   Pain Level - - 0 - - 0   Some recent data might be hidden       Family History   Problem Relation Age of Onset    Heart Failure Mother     Diabetes Mother     Heart Disease Mother     Diabetes Father     Heart Disease Father     Colon Polyps Neg Hx     Colon Cancer Neg Hx        Social History     Tobacco Use    Smoking status: Never Smoker    Smokeless tobacco: Never Used   Substance Use Topics    Alcohol use: No      Current Outpatient Medications   Medication Sig Dispense Refill    busPIRone (BUSPAR) 5 MG tablet Take 1 tablet by mouth 2 times daily as needed (anxiety) 60 tablet 11    donepezil (ARICEPT) 5 MG tablet Take 1 tablet by mouth nightly 30 tablet 0    LORazepam (ATIVAN) 1 MG tablet TAKE 1/2 TABLET BY MOUTH IN THE MORNING AND 1 TABLET BY MOUTH AT BEDTIME 45 tablet 0    colestipol (COLESTID) 1 g tablet TAKE 1 TABLET TWICE A DAY 60 tablet 11    fexofenadine (ALLEGRA) 180 MG tablet Take 1 tablet by mouth daily 90 tablet 2    losartan-hydroCHLOROthiazide (HYZAAR) 100-12.5 MG per tablet TAKE 1 TABLET DAILY 90 tablet 3    esomeprazole (NEXIUM) 40 MG delayed release capsule Take 1 capsule by mouth daily 90 capsule 3    clopidogrel (PLAVIX) 75 MG tablet TAKE 1 TABLET DAILY 90 tablet 3    MYRBETRIQ 50 MG TB24 TAKE 1 TABLET DAILY 90 tablet 3    aspirin 81 MG EC tablet Take 81 mg by mouth daily      mirtazapine (REMERON) 30 MG tablet TAKE 1 TABLET NIGHTLY 90 tablet 3    escitalopram (LEXAPRO) 20 MG tablet TAKE 1 TABLET DAILY 90 tablet 3    levothyroxine (SYNTHROID) 25 MCG tablet 3 days a week take 1 tablet and 4 days a week take 2 tablets 390 tablet 1    acetaminophen (TYLENOL) 500 MG tablet Take 500 mg by mouth 2 times daily 2 TABLET IN THE AM & 2 TABLETS PM      atorvastatin (LIPITOR) 20 MG tablet Take 0.5 tablets by mouth daily 90 tablet 1    diclofenac sodium (VOLTAREN) 1 % GEL Apply 4 g topically 3 times daily as needed for Pain 5 Tube 1    Multiple Vitamins-Minerals (CENTRUM SILVER) TABS Take by mouth daily      ondansetron (ZOFRAN-ODT) 4 MG disintegrating tablet Take 4 mg by mouth every 8 hours as needed for Nausea or Vomiting      OXYGEN Inhale into the lungs 2L at [facility]       No current facility-administered medications for this visit.       Allergies   Allergen Reactions    Augmentin [Amoxicillin-Pot Clavulanate]     Biaxin [Clarithromycin]     Cefdinir     Effexor [Venlafaxine]     Erythromycin     Lortab [Hydrocodone-Acetaminophen]     Naprosyn [Naproxen]        Health Maintenance   Topic Date Due    DTaP/Tdap/Td vaccine (1 - Tdap) 05/17/2021 (Originally 12/18/1951)    Shingles Vaccine (1 of 2) 04/26/2022 (Originally 12/18/1982)    Lipid screen  10/16/2021    Annual Wellness Visit (AWV)  01/21/2022    TSH testing  04/26/2022    Potassium monitoring  04/26/2022    Creatinine monitoring  04/26/2022    Flu vaccine  Completed    Pneumococcal 65+ years Vaccine  Completed    COVID-19 Vaccine  Completed    Hepatitis A vaccine  Aged Out    Hepatitis B vaccine  Aged Out    Hib vaccine  Aged Out    Meningococcal (ACWY) vaccine  Aged Out       Lab Results   Component Value Date    LABA1C 5.8 04/26/2019     No results found for: PSA, PSADIA  TSH   Date Value Ref Range Status   04/26/2021 3.480 0.270 - 4.200 uIU/mL Final   ]  Lab Results   Component Value Date     04/26/2021    K 3.6 04/26/2021     04/26/2021    CO2 30 (H) 04/26/2021    BUN 23 04/26/2021    CREATININE 0.8 04/26/2021    GLUCOSE 117 (H) 04/26/2021    CALCIUM 9.6 04/26/2021    PROT 7.5 04/26/2021    LABALBU 4.2 04/26/2021    BILITOT 0.4 04/26/2021    ALKPHOS 70 04/26/2021    AST 38 (H) 04/26/2021    ALT 17 04/26/2021    LABGLOM >60 04/26/2021    GFRAA >59 04/26/2021     Lab Results   Component Value Date    CHOL 189 10/16/2020    CHOL 204 (H) 05/13/2020    CHOL 202 (H) 10/01/2019     Lab Results   Component Value Date    TRIG 183 (H) 01/15/2021    TRIG 248 (H) 10/16/2020    TRIG 265 (H) 05/13/2020     Lab Results   Component Value Date    HDL 50 (L) 10/16/2020    HDL 44 (L) 05/13/2020    HDL 48 (L) 10/01/2019     Lab Results   Component Value Date    LDLCALC 89 10/16/2020    LDLCALC 107 05/13/2020    LDLCALC 105 10/01/2019     Lab Results   Component Value Date     04/26/2021    K 3.6 04/26/2021     04/26/2021    CO2 30 04/26/2021    BUN 23 04/26/2021    CREATININE 0.8 04/26/2021    GLUCOSE 117 04/26/2021    CALCIUM 9.6 04/26/2021      Lab Results   Component Value Date    WBC 10.6 04/26/2021    HGB 13.0 04/26/2021    HCT 41.3 04/26/2021    MCV 94.1 04/26/2021     04/26/2021    LABLYMP 2.86 09/25/2014    LYMPHOPCT 34.3 04/26/2021    RBC 4.39 04/26/2021    MCH 29.6 04/26/2021    MCHC 31.5 (L) 04/26/2021    RDW 13.8 04/26/2021     Lab Results   Component Value Date    VITD25 45.8 04/26/2021     Labs reviewed from 4/26/2021    Subjective:      Review of Systems   Constitutional: Negative for fatigue, fever and unexpected weight change. HENT: Negative for ear discharge, ear pain, mouth sores, sore throat and trouble swallowing. Eyes: Negative for discharge, itching and visual disturbance. Respiratory: Negative for cough, choking, shortness of breath, wheezing and stridor. Cardiovascular: Negative for chest pain, palpitations and leg swelling. Gastrointestinal: Negative for abdominal distention, abdominal pain, blood in stool, constipation, diarrhea, nausea and vomiting. Endocrine: Negative for cold intolerance, polydipsia and polyuria. Genitourinary: Negative for difficulty urinating, dysuria, frequency and urgency. Musculoskeletal: Negative for arthralgias and gait problem. Skin: Negative for color change and rash. Allergic/Immunologic: Negative for food allergies and immunocompromised state. Neurological: Negative for dizziness, tremors, syncope, speech difficulty, weakness and headaches. Sleep apnea   Hematological: Negative for adenopathy. Does not bruise/bleed easily. Psychiatric/Behavioral: Negative for confusion and hallucinations. The patient is nervous/anxious. Objective:     Physical Exam  Constitutional:       General: She is not in acute distress. Appearance: She is well-developed. HENT:      Head: Normocephalic and atraumatic. Eyes:      General: No scleral icterus. Right eye: No discharge. Left eye: No discharge. Pupils: Pupils are equal, round, and reactive to light. Neck:      Musculoskeletal: Normal range of motion and neck supple. Thyroid: No thyromegaly. Vascular: No JVD. Cardiovascular:      Rate and Rhythm: Normal rate and regular rhythm. Heart sounds: Normal heart sounds. No murmur. Pulmonary:      Effort: Pulmonary effort is normal. No respiratory distress. Breath sounds: Normal breath sounds. No wheezing or rales. Abdominal:      General: Bowel sounds are normal. There is no distension. Palpations: Abdomen is soft. There is no mass. Tenderness: There is no abdominal tenderness. There is no guarding or rebound. Musculoskeletal: Normal range of motion. General: No tenderness. Skin:     General: Skin is warm and dry. Findings: No erythema or rash. Neurological:      Mental Status: She is alert and oriented to person, place, and time. Cranial Nerves: No cranial nerve deficit. Coordination: Coordination normal.      Deep Tendon Reflexes: Reflexes are normal and symmetric. Reflexes normal.   Psychiatric:         Mood and Affect: Mood is not depressed. Behavior: Behavior normal.         Thought Content: Thought content normal.         Judgment: Judgment normal.       /78   Pulse 95   Ht 5' 4.5\" (1.638 m)   Wt 187 lb (84.8 kg)   SpO2 94%   BMI 31.60 kg/m²           Assessment:      Problem List     Convulsions (HCC)     Resolved          Hypertension, essential - Primary    Memory loss    Nocturnal hypoxia    Other emphysema (HCC)    Relevant Medications    fexofenadine (ALLEGRA) 180 MG tablet          Plan:        Patient given educational materials - see patient instructions. Discussed use, benefit, and side effects of prescribed medications. Allpatient questions answered. Pt voiced understanding. Reviewed health maintenance. Instructed to continue current medications, diet and exercise. Patient agreed with treatment plan. Follow up as directed. MEDICATIONS:  Orders Placed This Encounter   Medications    busPIRone (BUSPAR) 5 MG tablet     Sig: Take 1 tablet by mouth 2 times daily as needed (anxiety)     Dispense:  60 tablet     Refill:  11    donepezil (ARICEPT) 5 MG tablet     Sig: Take 1 tablet by mouth nightly     Dispense:  30 tablet     Refill:  0         ORDERS:  No orders of the defined types were placed in this encounter. Follow-up:  Return in about 3 months (around 7/26/2021). PATIENT INSTRUCTIONS:  Patient Instructions   1  Hypertension;  The current medical regimen is effective;  continue present plan and medications. 2.  Sleep apnea  Doesn't use mask but sleeps on oxygen every night;  3.  Memory loss; We will start aricept 5 mg daily ; after 30 days if no side effects call and I will increase to 10 mg daily   4. Anxiety  Stable with Buspar     Electronically signed by SONIA Vu on 4/26/2021 at 4:21 PM        EMRDragon/transcription disclaimer:  Much of this encounter note is electronic transcription/translation of spoken language to printed texts. The electronic translation of spoken language may be erroneous, or at times,nonsensical words or phrases may be inadvertently transcribed.   Although I have reviewed the note for such errors, some may still exist.

## 2021-04-26 NOTE — PATIENT INSTRUCTIONS
1  Hypertension; The current medical regimen is effective;  continue present plan and medications. 2.  Sleep apnea  Doesn't use mask but sleeps on oxygen every night;  3.  Memory loss; We will start aricept 5 mg daily ; after 30 days if no side effects call and I will increase to 10 mg daily   4.   Anxiety  Stable with Buspar

## 2021-05-12 DIAGNOSIS — F51.01 PRIMARY INSOMNIA: ICD-10-CM

## 2021-05-12 NOTE — TELEPHONE ENCOUNTER
Speedy Friend called requesting a refill of the below medication which has been pended for you:     Requested Prescriptions      No prescriptions requested or ordered in this encounter       Last Appointment Date: 4/26/2021  Next Appointment Date: 7/28/2021    Allergies   Allergen Reactions    Augmentin [Amoxicillin-Pot Clavulanate]     Biaxin [Clarithromycin]     Cefdinir     Effexor [Venlafaxine]     Erythromycin     Lortab [Hydrocodone-Acetaminophen]     Naprosyn [Naproxen]

## 2021-05-13 RX ORDER — LORAZEPAM 1 MG/1
TABLET ORAL
Qty: 45 TABLET | Refills: 0 | Status: SHIPPED | OUTPATIENT
Start: 2021-05-13 | End: 2021-05-26

## 2021-05-26 ENCOUNTER — VIRTUAL VISIT (OUTPATIENT)
Dept: INTERNAL MEDICINE | Age: 86
End: 2021-05-26
Payer: MEDICARE

## 2021-05-26 DIAGNOSIS — F51.01 PRIMARY INSOMNIA: ICD-10-CM

## 2021-05-26 DIAGNOSIS — F41.8 ANXIETY ASSOCIATED WITH DEPRESSION: Primary | ICD-10-CM

## 2021-05-26 DIAGNOSIS — G47.09 OTHER INSOMNIA: ICD-10-CM

## 2021-05-26 PROCEDURE — G8417 CALC BMI ABV UP PARAM F/U: HCPCS | Performed by: NURSE PRACTITIONER

## 2021-05-26 PROCEDURE — G8427 DOCREV CUR MEDS BY ELIG CLIN: HCPCS | Performed by: NURSE PRACTITIONER

## 2021-05-26 PROCEDURE — 4040F PNEUMOC VAC/ADMIN/RCVD: CPT | Performed by: NURSE PRACTITIONER

## 2021-05-26 PROCEDURE — 1123F ACP DISCUSS/DSCN MKR DOCD: CPT | Performed by: NURSE PRACTITIONER

## 2021-05-26 PROCEDURE — 99213 OFFICE O/P EST LOW 20 MIN: CPT | Performed by: NURSE PRACTITIONER

## 2021-05-26 PROCEDURE — 1090F PRES/ABSN URINE INCON ASSESS: CPT | Performed by: NURSE PRACTITIONER

## 2021-05-26 PROCEDURE — 1036F TOBACCO NON-USER: CPT | Performed by: NURSE PRACTITIONER

## 2021-05-26 RX ORDER — LORAZEPAM 1 MG/1
1 TABLET ORAL 2 TIMES DAILY
Qty: 60 TABLET | Refills: 0 | Status: SHIPPED | OUTPATIENT
Start: 2021-05-26 | End: 2021-06-09 | Stop reason: SDUPTHER

## 2021-05-26 ASSESSMENT — ENCOUNTER SYMPTOMS
WHEEZING: 0
COUGH: 0
COLOR CHANGE: 0
SHORTNESS OF BREATH: 0
NAUSEA: 0
EYE DISCHARGE: 0
ABDOMINAL PAIN: 0
ABDOMINAL DISTENTION: 0
SORE THROAT: 0
DIARRHEA: 0
STRIDOR: 0
EYE ITCHING: 0
BLOOD IN STOOL: 0
CONSTIPATION: 0
CHOKING: 0
VOMITING: 0
TROUBLE SWALLOWING: 0

## 2021-05-26 NOTE — ASSESSMENT & PLAN NOTE
Continue with the as needed lorazepam during the daytime and BuSpar as needed during the day as well.

## 2021-05-26 NOTE — PROGRESS NOTES
[facility] PHYSICIAN SERVICES  [facility] INTER[facility] MED[facility]  [address] [address] [address]  [address] [address]  Dept: [phone]  Dept Fax: [phone]: [phone]    Mariana Cheung (:  1932) is a 80 y.o. female,Established patient, here for evaluation of the following chief complaint(s): Anxiety      Mariana Cheung is a 80 y.o. female who presents today for her medical conditions/complaints as noted below. Mariana Cheung is c/teresa Anxiety        HPI:     Chief Complaint   Patient presents with    Anxiety     HPI   1. Anxiety  Uses lorazepam 1 mg take 1/2 in the am and 1 at bedtime; She also has   #2 insomnia she is having problems sleeping at night she had been using an lorazepam at night to help her resting was taking 1-1/2 at bedtime may not still not be able to fall asleep.     Past Medical History:   Diagnosis Date    Abnormal mammogram     Anxiety     Anxiety associated with depression     Artery disease, cerebral     Ataxia     Benign diastolic hypertension     Carpal tunnel syndrome     idiopathic peripheral    Cellulitis of face     Chronic back pain     COPD (chronic obstructive pulmonary disease) (HCC)     Depression     Disc degeneration, lumbosacral     Diverticulosis     Dysphagia     Esophageal reflux     Fever blister     Herpes simplex     Herpes zoster     Hypertension, essential     Hypertensive heart disease without CHF     Hypoxia     Idiopathic peripheral neuropathy     2017    Insomnia     Major depressive disorder, recurrent episode, moderate (HCC)     Mixed hyperlipidemia     Osteoporosis     Ovarian failure     Pulmonary fibrosis (HCC)     Sciatica     Sleep apnea     Transient cerebral ischemic attack     Urinary incontinence     Weakness of both legs       Past Surgical History:   Procedure Laterality Date    COLONOSCOPY  2015    Aubree, repeat as needed    COLONOSCOPY N/A 10/21/2020    Dr REANNA Spicer-Diverticular disease, random colon bx negative    HYSTERECTOMY      LUNG SEGMENTECTOMY      lung segmental resection    UPPER GASTROINTESTINAL ENDOSCOPY N/A 10/21/2020    Dr Gene Landrum Gastritis, otherwise normal, NEG h pylori, NEG celiac       Vitals 4/26/2021 1/20/2021 10/21/2020 10/21/2020 10/21/2020 44/20/3570   SYSTOLIC 953 548 863 - - 829   DIASTOLIC 78 60 81 - - 76   Pulse 95 85 75 - - 80   Temp - - - - - -   Resp - 20 20 1 1 20   SpO2 94 95 96 - - 96   Weight 187 lb 184 lb - - - -   Height 5' 4.5\" 5' 4.5\" - - - -   Body mass index 31.6 kg/m2 31.09 kg/m2 - - - -   Pain Level - - 0 - - 0   Some recent data might be hidden       Family History   Problem Relation Age of Onset    Heart Failure Mother     Diabetes Mother     Heart Disease Mother     Diabetes Father     Heart Disease Father     Colon Polyps Neg Hx     Colon Cancer Neg Hx        Social History     Tobacco Use    Smoking status: Never Smoker    Smokeless tobacco: Never Used   Substance Use Topics    Alcohol use: No      Current Outpatient Medications   Medication Sig Dispense Refill    LORazepam (ATIVAN) 1 MG tablet Take 1 tablet by mouth 2 times daily for 31 days.  TAKE 1/2 TABLET BY MOUTH IN THE MORNING AND 1 TABLET BY MOUTH AT BEDTIME 60 tablet 0    busPIRone (BUSPAR) 5 MG tablet Take 1 tablet by mouth 2 times daily as needed (anxiety) 60 tablet 11    donepezil (ARICEPT) 5 MG tablet Take 1 tablet by mouth nightly 30 tablet 0    colestipol (COLESTID) 1 g tablet TAKE 1 TABLET TWICE A DAY 60 tablet 11    fexofenadine (ALLEGRA) 180 MG tablet Take 1 tablet by mouth daily 90 tablet 2    losartan-hydroCHLOROthiazide (HYZAAR) 100-12.5 MG per tablet TAKE 1 TABLET DAILY 90 tablet 3    esomeprazole (NEXIUM) 40 MG delayed release capsule Take 1 capsule by mouth daily 90 capsule 3    clopidogrel (PLAVIX) 75 MG tablet TAKE 1 TABLET DAILY 90 tablet 3    MYRBETRIQ 50 MG TB24 TAKE 1 TABLET DAILY 90 tablet 3    aspirin 81 MG EC tablet Take 81 mg by mouth daily      04/26/2021    BUN 23 04/26/2021    CREATININE 0.8 04/26/2021    GLUCOSE 117 (H) 04/26/2021    CALCIUM 9.6 04/26/2021    PROT 7.5 04/26/2021    LABALBU 4.2 04/26/2021    BILITOT 0.4 04/26/2021    ALKPHOS 70 04/26/2021    AST 38 (H) 04/26/2021    ALT 17 04/26/2021    LABGLOM >60 04/26/2021    GFRAA >59 04/26/2021     Lab Results   Component Value Date    CHOL 189 10/16/2020    CHOL 204 (H) 05/13/2020    CHOL 202 (H) 10/01/2019     Lab Results   Component Value Date    TRIG 183 (H) 01/15/2021    TRIG 248 (H) 10/16/2020    TRIG 265 (H) 05/13/2020     Lab Results   Component Value Date    HDL 50 (L) 10/16/2020    HDL 44 (L) 05/13/2020    HDL 48 (L) 10/01/2019     Lab Results   Component Value Date    LDLCALC 89 10/16/2020    LDLCALC 107 05/13/2020    LDLCALC 105 10/01/2019     Lab Results   Component Value Date     04/26/2021    K 3.6 04/26/2021     04/26/2021    CO2 30 04/26/2021    BUN 23 04/26/2021    CREATININE 0.8 04/26/2021    GLUCOSE 117 04/26/2021    CALCIUM 9.6 04/26/2021      Lab Results   Component Value Date    WBC 10.6 04/26/2021    HGB 13.0 04/26/2021    HCT 41.3 04/26/2021    MCV 94.1 04/26/2021     04/26/2021    LABLYMP 2.86 09/25/2014    LYMPHOPCT 34.3 04/26/2021    RBC 4.39 04/26/2021    MCH 29.6 04/26/2021    MCHC 31.5 (L) 04/26/2021    RDW 13.8 04/26/2021     Lab Results   Component Value Date    VITD25 45.8 04/26/2021       Subjective:      Review of Systems   Constitutional: Negative for fatigue, fever and unexpected weight change. HENT: Negative for ear discharge, ear pain, mouth sores, sore throat and trouble swallowing. Eyes: Negative for discharge, itching and visual disturbance. Respiratory: Negative for cough, choking, shortness of breath, wheezing and stridor. Cardiovascular: Negative for chest pain, palpitations and leg swelling. Gastrointestinal: Negative for abdominal distention, abdominal pain, blood in stool, constipation, diarrhea, nausea and vomiting. Endocrine: Negative for cold intolerance, polydipsia and polyuria. Genitourinary: Negative for difficulty urinating, dysuria, frequency and urgency. Musculoskeletal: Negative for arthralgias and gait problem. Skin: Negative for color change and rash. Allergic/Immunologic: Negative for food allergies and immunocompromised state. Neurological: Negative for dizziness, tremors, syncope, speech difficulty, weakness and headaches. Insomnia   Hematological: Negative for adenopathy. Does not bruise/bleed easily. Psychiatric/Behavioral: Negative for confusion and hallucinations. The patient is nervous/anxious. Objective:     Physical Exam   Vital signs were not taken at this visit as no equipment was available; No PE with doxy   There were no vitals taken for this visit. Assessment:      Problem List     Anxiety associated with depression - Primary     Continue with the as needed lorazepam during the daytime and BuSpar as needed during the day as well. Relevant Medications    escitalopram (LEXAPRO) 20 MG tablet    mirtazapine (REMERON) 30 MG tablet    busPIRone (BUSPAR) 5 MG tablet    LORazepam (ATIVAN) 1 MG tablet    Other insomnia     We will increase lorazepam so she can have 1 mg twice daily or 2 mg total a day                Plan:        Patient given educational materials - see patient instructions. Discussed use, benefit, and side effects of prescribed medications. Allpatient questions answered. Pt voiced understanding. Reviewed health maintenance. Instructed to continue current medications, diet and exercise. Patient agreed with treatment plan. Follow up as directed. MEDICATIONS:  Orders Placed This Encounter   Medications    LORazepam (ATIVAN) 1 MG tablet     Sig: Take 1 tablet by mouth 2 times daily for 31 days.  TAKE 1/2 TABLET BY MOUTH IN THE MORNING AND 1 TABLET BY MOUTH AT BEDTIME     Dispense:  60 tablet     Refill:  0         ORDERS:  No orders of the defined types were placed in this encounter. Follow-up:  No follow-ups on file. PATIENT INSTRUCTIONS:  There are no Patient Instructions on file for this visit. Electronically signed by SONIA Lomax on 5/26/2021 at 1:43 PM        EMRDragon/transcription disclaimer:  Much of this encounter note is electronic transcription/translation of spoken language to printed texts. The electronic translation of spoken language may be erroneous, or at times,nonsensical words or phrases may be inadvertently transcribed.   Although I have reviewed the note for such errors, some may still exist.

## 2021-06-02 ENCOUNTER — TELEPHONE (OUTPATIENT)
Dept: INTERNAL MEDICINE | Age: 86
End: 2021-06-02

## 2021-06-02 RX ORDER — DIAPER,BRIEF,INFANT-TODD,DISP
EACH MISCELLANEOUS
Qty: 1 TUBE | Refills: 1 | Status: SHIPPED | OUTPATIENT
Start: 2021-06-02 | End: 2021-06-09

## 2021-06-02 NOTE — TELEPHONE ENCOUNTER
Daughter called saying mother has yeast under breast and vaginal area. Asking for med to be called in to [facility].

## 2021-06-09 DIAGNOSIS — F51.01 PRIMARY INSOMNIA: ICD-10-CM

## 2021-06-09 RX ORDER — DONEPEZIL HYDROCHLORIDE 5 MG/1
5 TABLET, FILM COATED ORAL NIGHTLY
Qty: 30 TABLET | Refills: 1 | Status: SHIPPED | OUTPATIENT
Start: 2021-06-09 | End: 2021-09-13 | Stop reason: SDUPTHER

## 2021-06-09 RX ORDER — LORAZEPAM 1 MG/1
1 TABLET ORAL 2 TIMES DAILY
Qty: 60 TABLET | Refills: 0 | Status: SHIPPED | OUTPATIENT
Start: 2021-06-09 | End: 2021-09-13 | Stop reason: SDUPTHER

## 2021-07-12 ENCOUNTER — OFFICE VISIT (OUTPATIENT)
Dept: INTERNAL MEDICINE | Facility: CLINIC | Age: 86
End: 2021-07-12

## 2021-07-12 VITALS
HEIGHT: 65 IN | BODY MASS INDEX: 29.47 KG/M2 | RESPIRATION RATE: 18 BRPM | WEIGHT: 176.9 LBS | HEART RATE: 84 BPM | TEMPERATURE: 98.6 F | SYSTOLIC BLOOD PRESSURE: 128 MMHG | DIASTOLIC BLOOD PRESSURE: 75 MMHG | OXYGEN SATURATION: 94 %

## 2021-07-12 DIAGNOSIS — Z77.018 BERYLLIUM EXPOSURE: ICD-10-CM

## 2021-07-12 DIAGNOSIS — I10 ESSENTIAL HYPERTENSION: Primary | ICD-10-CM

## 2021-07-12 DIAGNOSIS — J96.11 CHRONIC RESPIRATORY FAILURE WITH HYPOXIA (HCC): ICD-10-CM

## 2021-07-12 DIAGNOSIS — F41.9 ANXIETY: ICD-10-CM

## 2021-07-12 DIAGNOSIS — F51.01 PRIMARY INSOMNIA: ICD-10-CM

## 2021-07-12 DIAGNOSIS — J84.111 IDIOPATHIC INTERSTITIAL PNEUMONIA (HCC): ICD-10-CM

## 2021-07-12 DIAGNOSIS — J44.9 CHRONIC OBSTRUCTIVE PULMONARY DISEASE, UNSPECIFIED COPD TYPE (HCC): ICD-10-CM

## 2021-07-12 DIAGNOSIS — E78.2 MIXED HYPERLIPIDEMIA: ICD-10-CM

## 2021-07-12 PROBLEM — E04.1 THYROID NODULE: Status: ACTIVE | Noted: 2021-07-12

## 2021-07-12 PROBLEM — E03.8 OTHER SPECIFIED HYPOTHYROIDISM: Status: ACTIVE | Noted: 2021-07-12

## 2021-07-12 PROCEDURE — 99204 OFFICE O/P NEW MOD 45 MIN: CPT | Performed by: INTERNAL MEDICINE

## 2021-07-12 RX ORDER — LORAZEPAM 1 MG/1
1.5 TABLET ORAL DAILY
COMMUNITY
Start: 2021-06-10 | End: 2021-07-12 | Stop reason: SDUPTHER

## 2021-07-12 RX ORDER — DONEPEZIL HYDROCHLORIDE 5 MG/1
1 TABLET, FILM COATED ORAL NIGHTLY
COMMUNITY
Start: 2021-06-10

## 2021-07-12 RX ORDER — BUSPIRONE HYDROCHLORIDE 5 MG/1
5 TABLET ORAL 2 TIMES DAILY
COMMUNITY
Start: 2021-06-18

## 2021-07-12 RX ORDER — ESCITALOPRAM OXALATE 20 MG/1
20 TABLET ORAL DAILY
COMMUNITY
Start: 2021-05-16

## 2021-07-12 RX ORDER — LEVOTHYROXINE SODIUM 25 MCG
1 TABLET ORAL DAILY
COMMUNITY
Start: 2021-04-28

## 2021-07-12 RX ORDER — MIRTAZAPINE 30 MG/1
1 TABLET, FILM COATED ORAL DAILY
COMMUNITY
Start: 2021-05-30

## 2021-07-12 RX ORDER — LORAZEPAM 1 MG/1
1.5 TABLET ORAL NIGHTLY
Qty: 45 TABLET | Refills: 2 | Status: SHIPPED | OUTPATIENT
Start: 2021-07-12

## 2021-07-12 NOTE — PROGRESS NOTES
Chief Complaint   Patient presents with   • Establish Care   • Home Health Recert         History:  La Muller is a 88 y.o. female who presents today for evaluation of the above problems.      She presents today to establish care.  She has essential hypertension, hyperlipidemia, anxiety, cognitive impairment, COPD, idiopathic interstitial pneumonia, chronic respiratory failure with hypoxia on 2 L nasal cannula chronically among other issues who is here to establish care.    She is due for department of labor recertification.  This will be completed today and scanned into the computer.  She worked for the plant for 15 years.  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  She has never smoked.    Her previous primary care provider is Angelina Valdovinos.    She takes Ativan 1.5 mg nightly to help her sleep and for anxiety.    She is also had memory issues and is on Aricept 5 mg nightly.    Currently, living with her daughter.  Previously has done water aerobics at [facility] [place] feels like she does not have the strength to do now.    She has had TIAs and a stroke in the past.  She takes Lipitor 20 mg and Plavix.    She takes Myrbetriq for urinary urgency.    She had an annual wellness visit earlier this year at [facility], estimated around in January Impression 1. Subcentimeter complex nodules associated left lobe of thyroid gland   completely stable and unchanged from January 10, 2000.   Signed by Dr Evelio Toribio on 7/27/2020 3:05 PM    HPI    ROS:  Review of Systems   Constitutional: Positive for activity change and fatigue.   Respiratory: Positive for shortness of breath.    Musculoskeletal: Positive for arthralgias and gait problem.   Neurological: Positive for weakness.         Current Outpatient Medications:   •  atorvastatin (LIPITOR) 20 MG tablet, Take 20 mg by mouth., Disp: , Rfl:   •  busPIRone (BUSPAR) 5 MG tablet, Take 5  mg by mouth 2 (Two) Times a Day., Disp: , Rfl:   •  clopidogrel (PLAVIX) 75 MG tablet, Take 75 mg by mouth Daily., Disp: , Rfl:   •  colestipol (COLESTID) 1 g tablet, Take 1 g by mouth 2 (Two) Times a Day., Disp: , Rfl:   •  donepezil (ARICEPT) 5 MG tablet, Take 1 tablet by mouth Every Night., Disp: , Rfl:   •  escitalopram (LEXAPRO) 20 MG tablet, Take 20 mg by mouth Daily., Disp: , Rfl:   •  esomeprazole (nexIUM) 40 MG capsule, Take 40 mg by mouth., Disp: , Rfl:   •  LORazepam (ATIVAN) 1 MG tablet, Take 1.5 tablets by mouth Every Night., Disp: 45 tablet, Rfl: 2  •  losartan-hydrochlorothiazide (HYZAAR) 100-12.5 MG per tablet, TAKE 1 TABLET DAILY, Disp: , Rfl:   •  Mirabegron ER (MYRBETRIQ) 50 MG tablet sustained-release 24 hour 24 hr tablet, Take 50 mg by mouth Daily., Disp: , Rfl:   •  mirtazapine (REMERON) 30 MG tablet, Take 1 tablet by mouth Daily., Disp: , Rfl:   •  Multiple Vitamins-Minerals (CENTRUM SILVER) tablet, Take  by mouth., Disp: , Rfl:   •  O2 (OXYGEN), Inhale., Disp: , Rfl:   •  Synthroid 25 MCG tablet, Take 1 tablet by mouth Daily., Disp: , Rfl:     Lab Results   Component Value Date    GLUCOSE 117 (H) 04/26/2021    BUN 23 04/26/2021    CREATININE 0.8 04/26/2021    EGFRIFNONA >60 04/26/2021    EGFRIFAFRI >59 04/26/2021    K 3.6 04/26/2021    CO2 30 (H) 04/26/2021    CALCIUM 9.6 04/26/2021    ALBUMIN 4.2 04/26/2021    AST 38 (H) 04/26/2021    ALT 17 04/26/2021       WBC   Date Value Ref Range Status   04/26/2021 10.6 4.8 - 10.8 K/uL Final     RBC   Date Value Ref Range Status   04/26/2021 4.39 4.20 - 5.40 M/uL Final     Hemoglobin   Date Value Ref Range Status   04/26/2021 13.0 12.0 - 16.0 g/dL Final     Hematocrit   Date Value Ref Range Status   04/26/2021 41.3 37.0 - 47.0 % Final     MCV   Date Value Ref Range Status   04/26/2021 94.1 81.0 - 99.0 fL Final     MCH   Date Value Ref Range Status   04/26/2021 29.6 27.0 - 31.0 pg Final     MCHC   Date Value Ref Range Status   04/26/2021 31.5 (L) 33.0 -  "37.0 g/dL Final     RDW   Date Value Ref Range Status   04/26/2021 13.8 11.5 - 14.5 % Final     MPV   Date Value Ref Range Status   04/26/2021 10.4 9.4 - 12.3 fL Final     Platelets   Date Value Ref Range Status   04/26/2021 327 130 - 400 K/uL Final     Neutrophil Rel %   Date Value Ref Range Status   04/26/2021 53.0 50.0 - 65.0 % Final     Lymphocyte Rel %   Date Value Ref Range Status   04/26/2021 34.3 20.0 - 40.0 % Final     Monocyte Rel %   Date Value Ref Range Status   04/26/2021 9.5 0.0 - 10.0 % Final     Eosinophil Rel %   Date Value Ref Range Status   04/26/2021 2.6 0.0 - 5.0 % Final     Basophil Rel %   Date Value Ref Range Status   04/26/2021 0.2 0.0 - 1.0 % Final     Neutrophils Absolute   Date Value Ref Range Status   04/26/2021 5.6 1.5 - 7.5 K/uL Final     Lymphocytes Absolute   Date Value Ref Range Status   04/26/2021 3.6 1.1 - 4.5 K/uL Final     Monocytes Absolute   Date Value Ref Range Status   04/26/2021 1.00 (H) 0.00 - 0.90 K/uL Final     Eosinophils Absolute   Date Value Ref Range Status   04/26/2021 0.30 0.00 - 0.60 K/uL Final     Basophils Absolute   Date Value Ref Range Status   04/26/2021 0.00 0.00 - 0.20 K/uL Final     Immature Grans, Absolute   Date Value Ref Range Status   04/26/2021 0.0 K/uL Final         OBJECTIVE:  Visit Vitals  /75 (BP Location: Left arm, Patient Position: Sitting, Cuff Size: Adult)   Pulse 84   Temp 98.6 °F (37 °C) (Oral)   Resp 18   Ht 165.1 cm (65\")   Wt 80.2 kg (176 lb 14.4 oz)   SpO2 94%   BMI 29.44 kg/m²      Physical Exam  Vitals reviewed.   Constitutional:       General: She is not in acute distress.     Appearance: Normal appearance. She is well-developed. She is not diaphoretic.      Interventions: Nasal cannula in place.   HENT:      Head: Normocephalic and atraumatic.   Eyes:      Pupils: Pupils are equal, round, and reactive to light.   Neck:      Thyroid: No thyromegaly.      Trachea: Phonation normal.   Cardiovascular:      Rate and Rhythm: Normal " rate and regular rhythm.      Heart sounds: No murmur heard.     Pulmonary:      Effort: Pulmonary effort is normal. No accessory muscle usage or respiratory distress.      Breath sounds: Examination of the right-middle field reveals decreased breath sounds. Examination of the right-lower field reveals decreased breath sounds. Decreased breath sounds present. No wheezing or rhonchi.   Musculoskeletal:      Comments: Very slow getting up from a seated to standing position.  Once standing including estimated 25 seconds for her to initiate her walking.   Skin:     General: Skin is warm and dry.      Coloration: Skin is not pale.      Findings: No erythema.   Neurological:      Mental Status: She is alert.      Gait: Gait abnormal.   Psychiatric:         Behavior: Behavior normal.         Thought Content: Thought content normal.         Judgment: Judgment normal.         Assessment/Plan    Diagnoses and all orders for this visit:    1. Essential hypertension (Primary)    2. Mixed hyperlipidemia    3. Chronic respiratory failure with hypoxia (CMS/HCC)    4. Beryllium exposure    5. Anxiety  -     LORazepam (ATIVAN) 1 MG tablet; Take 1.5 tablets by mouth Every Night.  Dispense: 45 tablet; Refill: 2    6. Primary insomnia  -     LORazepam (ATIVAN) 1 MG tablet; Take 1.5 tablets by mouth Every Night.  Dispense: 45 tablet; Refill: 2    7. Idiopathic interstitial pneumonia (CMS/HCC)    8. Chronic obstructive pulmonary disease, unspecified COPD type (CMS/HCC)      We will continue her current regimen for hypertension as her blood pressure is very well controlled.  For now, we will continue her current dose of Lipitor.  She is not due for labs at this time but we will likely get labs in 3 months when she comes back follow-up.    She has anxiety and insomnia.  She has been on Ativan 1.5 mg nightly and I am sending a refill today.    She has home health order to her by the department of labor for the COPD, idiopathic interstitial  pneumonia and chronic respiratory failure with hypoxia.  I have completed paperwork today and will be scanned into the computer and sent over to [facility].     She has received both COVID-19 vaccines.    Follow-up in 3 months or sooner if indicated    A total of 50 minutes was spent total on the day of the visit.    Return in about 3 months (around 10/12/2021) for Recheck.    Patient was given instructions and counseling regarding his/her condition or for health maintenance advice. Please see specific information pulled into the AVS if appropriate.     ROOSEVELT Ballesteros MD   14:41 CDT  7/12/2021

## 2021-07-20 RX ORDER — LEVOCETIRIZINE DIHYDROCHLORIDE 5 MG/1
TABLET, FILM COATED ORAL
Qty: 90 TABLET | Refills: 3 | Status: SHIPPED | OUTPATIENT
Start: 2021-07-20 | End: 2021-09-13 | Stop reason: SDUPTHER

## 2021-07-20 NOTE — TELEPHONE ENCOUNTER
Haritha Allen called requesting a refill of the below medication which has been pended for you:     Requested Prescriptions     Pending Prescriptions Disp Refills    levocetirizine (XYZAL) 5 MG tablet [Pharmacy Med Name: LEVOCETIRIZINE DIHYDROCHLORIDE TABS 5MG] 90 tablet 3     Sig: TAKE 1 TABLET NIGHTLY       Last Appointment Date: 5/26/2021  Next Appointment Date: 7/28/2021    Allergies   Allergen Reactions    Augmentin [Amoxicillin-Pot Clavulanate]     Biaxin [Clarithromycin]     Cefdinir     Effexor [Venlafaxine]     Erythromycin     Lortab [Hydrocodone-Acetaminophen]     Naprosyn [Naproxen]

## 2021-07-21 ENCOUNTER — TELEPHONE (OUTPATIENT)
Dept: INTERNAL MEDICINE | Age: 86
End: 2021-07-21

## 2021-07-21 RX ORDER — CIPROFLOXACIN 500 MG/1
500 TABLET, FILM COATED ORAL 2 TIMES DAILY
Qty: 14 TABLET | Refills: 0 | Status: SHIPPED | OUTPATIENT
Start: 2021-07-21 | End: 2021-07-28

## 2021-07-21 NOTE — TELEPHONE ENCOUNTER
Patient's daughter called this morning stating patient woke up sick. Says is mostly in her throat and ears. She (daughter is RN) listened to her and says she has slight crackling in upper lungs. Wants to know if she needs to be seen or if something could be called in for her. Please advise.

## 2021-07-23 NOTE — TELEPHONE ENCOUNTER
Jonny Vazquez called requesting a refill of the below medication which has been pended for you:     Requested Prescriptions     Pending Prescriptions Disp Refills    busPIRone (BUSPAR) 5 MG tablet 60 tablet 11     Sig: Take 1 tablet by mouth 2 times daily as needed (anxiety)       Last Appointment Date: 5/26/2021  Next Appointment Date: 7/28/2021    Allergies   Allergen Reactions    Augmentin [Amoxicillin-Pot Clavulanate]     Biaxin [Clarithromycin]     Cefdinir     Effexor [Venlafaxine]     Erythromycin     Lortab [Hydrocodone-Acetaminophen]     Naprosyn [Naproxen]

## 2021-07-26 RX ORDER — BUSPIRONE HYDROCHLORIDE 5 MG/1
5 TABLET ORAL 2 TIMES DAILY PRN
Qty: 60 TABLET | Refills: 11 | Status: SHIPPED | OUTPATIENT
Start: 2021-07-26 | End: 2021-09-13 | Stop reason: SDUPTHER

## 2021-07-27 RX ORDER — LEVOTHYROXINE SODIUM 0.03 MG/1
TABLET ORAL
Qty: 390 TABLET | Refills: 3 | Status: SHIPPED | OUTPATIENT
Start: 2021-07-27 | End: 2021-09-13 | Stop reason: SDUPTHER

## 2021-07-27 NOTE — TELEPHONE ENCOUNTER
Swapna Day called requesting a refill of the below medication which has been pended for you:     Requested Prescriptions     Pending Prescriptions Disp Refills    levothyroxine (SYNTHROID) 25 MCG tablet [Pharmacy Med Name: L-THYROXINE (SYNTHROID) TABS 25MCG] 390 tablet 3     Sig: TAKE 1 TABLET 3 DAYS A WEEK AND TAKE 2 TABLETS 4 DAYS A WEEK       Last Appointment Date: 5/26/2021  Next Appointment Date: 7/28/2021    Allergies   Allergen Reactions    Augmentin [Amoxicillin-Pot Clavulanate]     Biaxin [Clarithromycin]     Cefdinir     Effexor [Venlafaxine]     Erythromycin     Lortab [Hydrocodone-Acetaminophen]     Naprosyn [Naproxen]

## 2021-07-28 ENCOUNTER — OFFICE VISIT (OUTPATIENT)
Dept: INTERNAL MEDICINE | Age: 86
End: 2021-07-28
Payer: MEDICARE

## 2021-07-28 VITALS
DIASTOLIC BLOOD PRESSURE: 78 MMHG | HEIGHT: 65 IN | BODY MASS INDEX: 30.41 KG/M2 | RESPIRATION RATE: 16 BRPM | SYSTOLIC BLOOD PRESSURE: 132 MMHG | HEART RATE: 90 BPM | OXYGEN SATURATION: 95 % | WEIGHT: 182.5 LBS

## 2021-07-28 DIAGNOSIS — E03.8 OTHER SPECIFIED HYPOTHYROIDISM: ICD-10-CM

## 2021-07-28 DIAGNOSIS — E55.9 VITAMIN D DEFICIENCY: ICD-10-CM

## 2021-07-28 DIAGNOSIS — E78.2 MIXED HYPERLIPIDEMIA: ICD-10-CM

## 2021-07-28 DIAGNOSIS — J43.8 OTHER EMPHYSEMA (HCC): Primary | ICD-10-CM

## 2021-07-28 DIAGNOSIS — E78.2 MIXED HYPERLIPIDEMIA: Primary | ICD-10-CM

## 2021-07-28 DIAGNOSIS — I10 HYPERTENSION, ESSENTIAL: ICD-10-CM

## 2021-07-28 DIAGNOSIS — G47.34 NOCTURNAL HYPOXIA: ICD-10-CM

## 2021-07-28 DIAGNOSIS — F41.8 ANXIETY ASSOCIATED WITH DEPRESSION: ICD-10-CM

## 2021-07-28 LAB
ALBUMIN SERPL-MCNC: 4 G/DL (ref 3.5–5.2)
ALP BLD-CCNC: 64 U/L (ref 35–104)
ALT SERPL-CCNC: 20 U/L (ref 5–33)
ANION GAP SERPL CALCULATED.3IONS-SCNC: 11 MMOL/L (ref 7–19)
AST SERPL-CCNC: 43 U/L (ref 5–32)
BILIRUB SERPL-MCNC: 0.3 MG/DL (ref 0.2–1.2)
BUN BLDV-MCNC: 22 MG/DL (ref 8–23)
CALCIUM SERPL-MCNC: 9.9 MG/DL (ref 8.8–10.2)
CHLORIDE BLD-SCNC: 103 MMOL/L (ref 98–111)
CHOLESTEROL, TOTAL: 168 MG/DL (ref 160–199)
CO2: 30 MMOL/L (ref 22–29)
CREAT SERPL-MCNC: 0.9 MG/DL (ref 0.5–0.9)
GFR AFRICAN AMERICAN: >59
GFR NON-AFRICAN AMERICAN: 59
GLUCOSE BLD-MCNC: 112 MG/DL (ref 74–109)
HDLC SERPL-MCNC: 51 MG/DL (ref 65–121)
LDL CHOLESTEROL CALCULATED: 84 MG/DL
POTASSIUM SERPL-SCNC: 4 MMOL/L (ref 3.5–5)
SODIUM BLD-SCNC: 144 MMOL/L (ref 136–145)
TOTAL PROTEIN: 7.3 G/DL (ref 6.6–8.7)
TRIGL SERPL-MCNC: 165 MG/DL (ref 0–149)
TSH SERPL DL<=0.05 MIU/L-ACNC: 1.77 UIU/ML (ref 0.27–4.2)

## 2021-07-28 PROCEDURE — 3023F SPIROM DOC REV: CPT | Performed by: NURSE PRACTITIONER

## 2021-07-28 PROCEDURE — 99214 OFFICE O/P EST MOD 30 MIN: CPT | Performed by: NURSE PRACTITIONER

## 2021-07-28 PROCEDURE — G8926 SPIRO NO PERF OR DOC: HCPCS | Performed by: NURSE PRACTITIONER

## 2021-07-28 PROCEDURE — 1036F TOBACCO NON-USER: CPT | Performed by: NURSE PRACTITIONER

## 2021-07-28 PROCEDURE — G8427 DOCREV CUR MEDS BY ELIG CLIN: HCPCS | Performed by: NURSE PRACTITIONER

## 2021-07-28 PROCEDURE — 1123F ACP DISCUSS/DSCN MKR DOCD: CPT | Performed by: NURSE PRACTITIONER

## 2021-07-28 PROCEDURE — 1090F PRES/ABSN URINE INCON ASSESS: CPT | Performed by: NURSE PRACTITIONER

## 2021-07-28 PROCEDURE — 4040F PNEUMOC VAC/ADMIN/RCVD: CPT | Performed by: NURSE PRACTITIONER

## 2021-07-28 PROCEDURE — G8417 CALC BMI ABV UP PARAM F/U: HCPCS | Performed by: NURSE PRACTITIONER

## 2021-07-28 NOTE — PROGRESS NOTES
[facility] PHYSICIAN SERVICES  [facility] INTERNAL MEDICINE  [address] [facility] [address] [address]  Dept: [phone]  Dept Fax: [phone]: [phone]    Bebo Salmon (:  1932) is a 80 y.o. female,Established patient, here for evaluation of the following chief complaint(s): Follow-up      Bebo Salmon is a 80 y.o. female who presents today for her medical conditions/complaints as noted below. Bebo Salmon is c/teresa Follow-up        HPI:     Chief Complaint   Patient presents with    Follow-up     HPI   1. Anxiety she is on lorazepam as needed. There was recently confusion with her daily recertification and she was told she had to go see Dr. Toya Lynne she did see him and that he refilled her lorazepam and it shows up on her Sally Pérezn but an explanation today her daughter explained everything to us as she had been going to total a lot for you a starts and they had stopped doing those when she contacted the company that suggested that she see Dr. Félix Watkins. When she went to see him he told her he would not do her recertification so she changed all of her medical care to him. She wants to remain with [address] [facility] so I have cleared with Dr. Karol Glover and she will do the recertification  and see her on a yearly basis. 2. COPD with oxygen dependence she just finished cipro she is doing better no shortness of breath she wears oxygen just at night mostly in the daytime as needed  3. Hypertension blood pressure is good she is controlled on losartan 100/12.5 hydrochlorothiazide  4. Hypothyroidism she is on 25 mcg 3 days a week and 2 tablets 4 days a week  5. Depression she is on Lexapro 20 mg she does well with that dose no recent changes    Controlled Substance Monitoring:    Acute and Chronic Pain Monitoring:   RX Monitoring 2020   Attestation The Prescription Monitoring Report for this patient was reviewed today.    Periodic Controlled Substance Monitoring No signs of potential drug abuse or diversion identified. ;Possible medication side effects, risk of tolerance/dependence & alternative treatments discussed.        CONTROLLED SUBSTANCE  Drug lorazepam  Diagnosis anxiety  Last Jabier Reas 7/28/2021  Last UDS 7/28/2021  Pain contract last date 7/28/2021  Effective dose   yes     Changes this visit    none       Past Medical History:   Diagnosis Date    Abnormal mammogram     Anxiety     Anxiety associated with depression     Artery disease, cerebral     Ataxia     Benign diastolic hypertension     Carpal tunnel syndrome     idiopathic peripheral    Cellulitis of face     Chronic back pain     COPD (chronic obstructive pulmonary disease) (HCC)     Depression     Disc degeneration, lumbosacral     Diverticulosis     Dysphagia     Esophageal reflux     Fever blister     Herpes simplex     Herpes zoster     Hypertension, essential     Hypertensive heart disease without CHF     Hypoxia     Idiopathic peripheral neuropathy     2017    Insomnia     Major depressive disorder, recurrent episode, moderate (HCC)     Mixed hyperlipidemia     Osteoporosis     Ovarian failure     Pulmonary fibrosis (HCC)     Sciatica     Sleep apnea     Transient cerebral ischemic attack     Urinary incontinence     Weakness of both legs       Past Surgical History:   Procedure Laterality Date    COLONOSCOPY  06/12/2015    Aubree, repeat as needed    COLONOSCOPY N/A 10/21/2020    Dr REANNA Spicer-Diverticular disease, random colon bx negative    HYSTERECTOMY      LUNG SEGMENTECTOMY      lung segmental resection    UPPER GASTROINTESTINAL ENDOSCOPY N/A 10/21/2020    Dr Xiomara Spicer-mild Gastritis, otherwise normal, NEG h pylori, NEG celiac       Vitals 7/28/2021 4/26/2021 1/20/2021 10/21/2020 10/21/2020 85/68/2602   SYSTOLIC 844 614 082 148 - -   DIASTOLIC 78 78 60 81 - -   Pulse 90 95 85 75 - -   Temp - - - - - -   Resp 16 - 20 20 1 1   SpO2 95 94 95 96 - -   Weight 182 lb 8 oz 187 lb 184 lb - - - Height 5' 4.5\" 5' 4.5\" 5' 4.5\" - - -   Body mass index 30.84 kg/m2 31.6 kg/m2 31.09 kg/m2 - - -   Pain Level - - - 0 - -   Some recent data might be hidden       Family History   Problem Relation Age of Onset    Heart Failure Mother     Diabetes Mother     Heart Disease Mother     Diabetes Father     Heart Disease Father     Colon Polyps Neg Hx     Colon Cancer Neg Hx        Social History     Tobacco Use    Smoking status: Never Smoker    Smokeless tobacco: Never Used   Substance Use Topics    Alcohol use: No      Current Outpatient Medications   Medication Sig Dispense Refill    levothyroxine (SYNTHROID) 25 MCG tablet TAKE 1 TABLET 3 DAYS A WEEK AND TAKE 2 TABLETS 4 DAYS A WEEK 390 tablet 3    busPIRone (BUSPAR) 5 MG tablet Take 1 tablet by mouth 2 times daily as needed (anxiety) 60 tablet 11    ciprofloxacin (CIPRO) 500 MG tablet Take 1 tablet by mouth 2 times daily for 7 days 14 tablet 0    levocetirizine (XYZAL) 5 MG tablet TAKE 1 TABLET NIGHTLY 90 tablet 3    donepezil (ARICEPT) 5 MG tablet Take 1 tablet by mouth nightly 30 tablet 1    colestipol (COLESTID) 1 g tablet TAKE 1 TABLET TWICE A DAY 60 tablet 11    losartan-hydroCHLOROthiazide (HYZAAR) 100-12.5 MG per tablet TAKE 1 TABLET DAILY 90 tablet 3    esomeprazole (NEXIUM) 40 MG delayed release capsule Take 1 capsule by mouth daily 90 capsule 3    clopidogrel (PLAVIX) 75 MG tablet TAKE 1 TABLET DAILY 90 tablet 3    MYRBETRIQ 50 MG TB24 TAKE 1 TABLET DAILY 90 tablet 3    aspirin 81 MG EC tablet Take 81 mg by mouth daily      mirtazapine (REMERON) 30 MG tablet TAKE 1 TABLET NIGHTLY 90 tablet 3    escitalopram (LEXAPRO) 20 MG tablet TAKE 1 TABLET DAILY 90 tablet 3    acetaminophen (TYLENOL) 500 MG tablet Take 500 mg by mouth 2 times daily 2 TABLET IN THE AM & 2 TABLETS PM      Multiple Vitamins-Minerals (CENTRUM SILVER) TABS Take by mouth daily      ondansetron (ZOFRAN-ODT) 4 MG disintegrating tablet Take 4 mg by mouth every 8 hours as needed for Nausea or Vomiting      OXYGEN Inhale into the lungs 2L at qhs      LORazepam (ATIVAN) 1 MG tablet Take 1 tablet by mouth 2 times daily for 31 days. TAKE 1/2 TABLET BY MOUTH IN THE MORNING AND 1 TABLET BY MOUTH AT BEDTIME 60 tablet 0    atorvastatin (LIPITOR) 20 MG tablet Take 0.5 tablets by mouth daily 90 tablet 1    diclofenac sodium (VOLTAREN) 1 % GEL Apply 4 g topically 3 times daily as needed for Pain 5 Tube 1     No current facility-administered medications for this visit.      Allergies   Allergen Reactions    Augmentin [Amoxicillin-Pot Clavulanate]     Biaxin [Clarithromycin]     Cefdinir     Effexor [Venlafaxine]     Erythromycin     Lortab [Hydrocodone-Acetaminophen]     Naprosyn [Naproxen]        Health Maintenance   Topic Date Due    DTaP/Tdap/Td vaccine (1 - Tdap) Never done    Shingles Vaccine (1 of 2) 04/26/2022 (Originally 12/18/1982)    Flu vaccine (1) 09/01/2021    Annual Wellness Visit (AWV)  01/21/2022    TSH testing  07/28/2022    Potassium monitoring  07/28/2022    Creatinine monitoring  07/28/2022    Pneumococcal 65+ years Vaccine  Completed    COVID-19 Vaccine  Completed    Hepatitis A vaccine  Aged Out    Hepatitis B vaccine  Aged Out    Hib vaccine  Aged Out    Meningococcal (ACWY) vaccine  Aged Out       Lab Results   Component Value Date    LABA1C 5.8 04/26/2019     No results found for: PSA, PSADIA  TSH   Date Value Ref Range Status   07/28/2021 1.770 0.270 - 4.200 uIU/mL Final   ]  Lab Results   Component Value Date     07/28/2021    K 4.0 07/28/2021     07/28/2021    CO2 30 (H) 07/28/2021    BUN 22 07/28/2021    CREATININE 0.9 07/28/2021    GLUCOSE 112 (H) 07/28/2021    CALCIUM 9.9 07/28/2021    PROT 7.3 07/28/2021    LABALBU 4.0 07/28/2021    BILITOT 0.3 07/28/2021    ALKPHOS 64 07/28/2021    AST 43 (H) 07/28/2021    ALT 20 07/28/2021    LABGLOM 59 (A) 07/28/2021    GFRAA >59 07/28/2021     Lab Results   Component Value Date    CHOL 168 immunocompromised state. Neurological: Negative for dizziness, tremors, syncope, speech difficulty, weakness and headaches. Hematological: Negative for adenopathy. Does not bruise/bleed easily. Psychiatric/Behavioral: Negative for confusion and hallucinations. The patient is nervous/anxious. Objective:     Physical Exam  Constitutional:       General: She is not in acute distress. Appearance: She is well-developed. HENT:      Head: Normocephalic and atraumatic. Eyes:      General: No scleral icterus. Right eye: No discharge. Left eye: No discharge. Pupils: Pupils are equal, round, and reactive to light. Neck:      Thyroid: No thyromegaly. Vascular: No JVD. Cardiovascular:      Rate and Rhythm: Normal rate and regular rhythm. Heart sounds: Normal heart sounds. No murmur heard. Pulmonary:      Effort: Pulmonary effort is normal. No respiratory distress. Breath sounds: Normal breath sounds. No wheezing or rales. Abdominal:      General: Bowel sounds are normal. There is no distension. Palpations: Abdomen is soft. There is no mass. Tenderness: There is no abdominal tenderness. There is no guarding or rebound. Musculoskeletal:         General: No tenderness. Normal range of motion. Cervical back: Normal range of motion and neck supple. Skin:     General: Skin is warm and dry. Findings: No erythema or rash. Neurological:      Mental Status: She is alert and oriented to person, place, and time. Cranial Nerves: No cranial nerve deficit. Coordination: Coordination normal.      Deep Tendon Reflexes: Reflexes are normal and symmetric. Reflexes normal.   Psychiatric:         Mood and Affect: Mood is not depressed. Behavior: Behavior normal.         Thought Content:  Thought content normal.         Judgment: Judgment normal.       /78   Pulse 90   Resp 16   Ht 5' 4.5\" (1.638 m)   Wt 182 lb 8 oz (82.8 kg)   SpO2 95%   BMI 30.84 kg/m²           Assessment:      Problem List     None          Plan:        Patient given educational materials - see patient instructions. Discussed use, benefit, and side effects of prescribed medications. Allpatient questions answered. Pt voiced understanding. Reviewed health maintenance. Instructed to continue current medications, diet and exercise. Patient agreed with treatment plan. Follow up as directed. MEDICATIONS:  No orders of the defined types were placed in this encounter. ORDERS:  No orders of the defined types were placed in this encounter. Follow-up:  No follow-ups on file. PATIENT INSTRUCTIONS:  There are no Patient Instructions on file for this visit. Electronically signed by SONIA Culp on 7/28/2021 at 3:35 PM        EMRDragon/transcription disclaimer:  Much of this encounter note is electronic transcription/translation of spoken language to printed texts. The electronic translation of spoken language may be erroneous, or at times,nonsensical words or phrases may be inadvertently transcribed.   Although I have reviewed the note for such errors, some may still exist.

## 2021-07-29 ASSESSMENT — ENCOUNTER SYMPTOMS
CONSTIPATION: 0
EYE ITCHING: 0
COLOR CHANGE: 0
VOMITING: 0
COUGH: 0
DIARRHEA: 0
TROUBLE SWALLOWING: 0
ABDOMINAL DISTENTION: 0
WHEEZING: 0
SORE THROAT: 0
EYE DISCHARGE: 0
BLOOD IN STOOL: 0
STRIDOR: 0
CHOKING: 0
NAUSEA: 0
SHORTNESS OF BREATH: 0
ABDOMINAL PAIN: 0

## 2021-07-29 NOTE — PATIENT INSTRUCTIONS
1.  Anxiety we will continue with lorazepam as needed  2. COPD stable with just completed antibiotics   #3 nocturnal hypoxia continue with nighttime oxygen  4. Hypertension stable on current meds no changes are necessary  5. Hypothyroidism stable current dose no changes necessary  6.   Depression stable with Lexapro

## 2021-08-09 RX ORDER — ESCITALOPRAM OXALATE 20 MG/1
TABLET ORAL
Qty: 90 TABLET | Refills: 3 | Status: SHIPPED | OUTPATIENT
Start: 2021-08-09 | End: 2021-08-16 | Stop reason: SDUPTHER

## 2021-08-09 NOTE — TELEPHONE ENCOUNTER
Grayson Carlisle called requesting a refill of the below medication which has been pended for you:     Requested Prescriptions     Pending Prescriptions Disp Refills    escitalopram (LEXAPRO) 20 MG tablet [Pharmacy Med Name: ESCITALOPRAM TABS 20MG] 90 tablet 3     Sig: TAKE 1 TABLET DAILY       Last Appointment Date: 7/28/2021  Next Appointment Date: 10/27/2021    Allergies   Allergen Reactions    Augmentin [Amoxicillin-Pot Clavulanate]     Biaxin [Clarithromycin]     Cefdinir     Effexor [Venlafaxine]     Erythromycin     Lortab [Hydrocodone-Acetaminophen]     Naprosyn [Naproxen]

## 2021-08-16 NOTE — TELEPHONE ENCOUNTER
Bandar Sharpe called requesting a refill of the below medication which has been pended for you:     Requested Prescriptions     Pending Prescriptions Disp Refills    escitalopram (LEXAPRO) 20 MG tablet 90 tablet 1     Sig: TAKE 1 TABLET DAILY       Last Appointment Date: 7/28/2021  Next Appointment Date: 10/27/2021    Allergies   Allergen Reactions    Augmentin [Amoxicillin-Pot Clavulanate]     Biaxin [Clarithromycin]     Cefdinir     Effexor [Venlafaxine]     Erythromycin     Lortab [Hydrocodone-Acetaminophen]     Naprosyn [Naproxen]

## 2021-08-17 RX ORDER — ESCITALOPRAM OXALATE 20 MG/1
TABLET ORAL
Qty: 90 TABLET | Refills: 1 | Status: SHIPPED | OUTPATIENT
Start: 2021-08-17 | End: 2021-09-13 | Stop reason: SDUPTHER

## 2021-08-30 RX ORDER — MIRTAZAPINE 30 MG/1
TABLET, FILM COATED ORAL
Qty: 90 TABLET | Refills: 3 | Status: SHIPPED | OUTPATIENT
Start: 2021-08-30 | End: 2021-09-13 | Stop reason: SDUPTHER

## 2021-08-30 NOTE — TELEPHONE ENCOUNTER
Stas Soto called requesting a refill of the below medication which has been pended for you:     Requested Prescriptions     Pending Prescriptions Disp Refills    mirtazapine (REMERON) 30 MG tablet [Pharmacy Med Name: MIRTAZAPINE TABS 30MG] 90 tablet 3     Sig: TAKE 1 TABLET NIGHTLY       Last Appointment Date: 7/28/2021  Next Appointment Date: 10/27/2021    Allergies   Allergen Reactions    Augmentin [Amoxicillin-Pot Clavulanate]     Biaxin [Clarithromycin]     Cefdinir     Effexor [Venlafaxine]     Erythromycin     Lortab [Hydrocodone-Acetaminophen]     Naprosyn [Naproxen]

## 2021-09-13 ENCOUNTER — OFFICE VISIT (OUTPATIENT)
Dept: INTERNAL MEDICINE | Age: 86
End: 2021-09-13
Payer: MEDICARE

## 2021-09-13 VITALS
BODY MASS INDEX: 47.8 KG/M2 | HEIGHT: 64 IN | DIASTOLIC BLOOD PRESSURE: 88 MMHG | HEART RATE: 77 BPM | SYSTOLIC BLOOD PRESSURE: 124 MMHG | RESPIRATION RATE: 16 BRPM | WEIGHT: 280 LBS | OXYGEN SATURATION: 93 %

## 2021-09-13 DIAGNOSIS — I10 HYPERTENSION, ESSENTIAL: Primary | ICD-10-CM

## 2021-09-13 DIAGNOSIS — F51.01 PRIMARY INSOMNIA: ICD-10-CM

## 2021-09-13 DIAGNOSIS — G47.34 NOCTURNAL HYPOXIA: ICD-10-CM

## 2021-09-13 DIAGNOSIS — F41.8 ANXIETY ASSOCIATED WITH DEPRESSION: ICD-10-CM

## 2021-09-13 PROCEDURE — 4040F PNEUMOC VAC/ADMIN/RCVD: CPT | Performed by: NURSE PRACTITIONER

## 2021-09-13 PROCEDURE — 99213 OFFICE O/P EST LOW 20 MIN: CPT | Performed by: NURSE PRACTITIONER

## 2021-09-13 PROCEDURE — 1036F TOBACCO NON-USER: CPT | Performed by: NURSE PRACTITIONER

## 2021-09-13 PROCEDURE — G8417 CALC BMI ABV UP PARAM F/U: HCPCS | Performed by: NURSE PRACTITIONER

## 2021-09-13 PROCEDURE — 90694 VACC AIIV4 NO PRSRV 0.5ML IM: CPT | Performed by: NURSE PRACTITIONER

## 2021-09-13 PROCEDURE — 1090F PRES/ABSN URINE INCON ASSESS: CPT | Performed by: NURSE PRACTITIONER

## 2021-09-13 PROCEDURE — 1123F ACP DISCUSS/DSCN MKR DOCD: CPT | Performed by: NURSE PRACTITIONER

## 2021-09-13 PROCEDURE — G0008 ADMIN INFLUENZA VIRUS VAC: HCPCS | Performed by: NURSE PRACTITIONER

## 2021-09-13 PROCEDURE — G8427 DOCREV CUR MEDS BY ELIG CLIN: HCPCS | Performed by: NURSE PRACTITIONER

## 2021-09-13 RX ORDER — CLOPIDOGREL BISULFATE 75 MG/1
TABLET ORAL
Qty: 90 TABLET | Refills: 3 | Status: SHIPPED | OUTPATIENT
Start: 2021-09-13 | End: 2021-10-05 | Stop reason: SDUPTHER

## 2021-09-13 RX ORDER — LOSARTAN POTASSIUM AND HYDROCHLOROTHIAZIDE 12.5; 1 MG/1; MG/1
TABLET ORAL
Qty: 90 TABLET | Refills: 3 | Status: SHIPPED | OUTPATIENT
Start: 2021-09-13 | End: 2021-10-05 | Stop reason: SDUPTHER

## 2021-09-13 RX ORDER — DONEPEZIL HYDROCHLORIDE 5 MG/1
5 TABLET, FILM COATED ORAL NIGHTLY
Qty: 30 TABLET | Refills: 1 | Status: SHIPPED | OUTPATIENT
Start: 2021-09-13 | End: 2021-10-05 | Stop reason: SDUPTHER

## 2021-09-13 RX ORDER — ATORVASTATIN CALCIUM 20 MG/1
10 TABLET, FILM COATED ORAL DAILY
Qty: 90 TABLET | Refills: 1 | Status: SHIPPED | OUTPATIENT
Start: 2021-09-13 | End: 2021-10-05 | Stop reason: SDUPTHER

## 2021-09-13 RX ORDER — MIRTAZAPINE 30 MG/1
TABLET, FILM COATED ORAL
Qty: 90 TABLET | Refills: 3 | Status: SHIPPED | OUTPATIENT
Start: 2021-09-13 | End: 2021-10-05 | Stop reason: SDUPTHER

## 2021-09-13 RX ORDER — ESOMEPRAZOLE MAGNESIUM 40 MG/1
40 CAPSULE, DELAYED RELEASE ORAL DAILY
Qty: 90 CAPSULE | Refills: 3 | Status: SHIPPED | OUTPATIENT
Start: 2021-09-13 | End: 2021-10-05 | Stop reason: SDUPTHER

## 2021-09-13 RX ORDER — LORAZEPAM 1 MG/1
1 TABLET ORAL EVERY 8 HOURS PRN
Qty: 60 TABLET | Refills: 0 | Status: SHIPPED | OUTPATIENT
Start: 2021-09-13 | End: 2021-09-14 | Stop reason: SDUPTHER

## 2021-09-13 RX ORDER — LEVOCETIRIZINE DIHYDROCHLORIDE 5 MG/1
5 TABLET, FILM COATED ORAL NIGHTLY
Qty: 90 TABLET | Refills: 3 | Status: SHIPPED | OUTPATIENT
Start: 2021-09-13

## 2021-09-13 RX ORDER — LEVOTHYROXINE SODIUM 0.03 MG/1
TABLET ORAL
Qty: 390 TABLET | Refills: 3 | Status: SHIPPED | OUTPATIENT
Start: 2021-09-13 | End: 2021-10-05 | Stop reason: SDUPTHER

## 2021-09-13 RX ORDER — BUSPIRONE HYDROCHLORIDE 5 MG/1
5 TABLET ORAL 2 TIMES DAILY PRN
Qty: 60 TABLET | Refills: 11 | Status: SHIPPED | OUTPATIENT
Start: 2021-09-13 | End: 2021-10-05 | Stop reason: SDUPTHER

## 2021-09-13 RX ORDER — ESCITALOPRAM OXALATE 20 MG/1
TABLET ORAL
Qty: 90 TABLET | Refills: 1 | Status: SHIPPED | OUTPATIENT
Start: 2021-09-13 | End: 2021-10-05 | Stop reason: SDUPTHER

## 2021-09-13 RX ORDER — MONTELUKAST SODIUM 4 MG/1
TABLET, CHEWABLE ORAL
Qty: 60 TABLET | Refills: 11 | Status: SHIPPED | OUTPATIENT
Start: 2021-09-13 | End: 2021-10-05 | Stop reason: SDUPTHER

## 2021-09-13 SDOH — ECONOMIC STABILITY: FOOD INSECURITY: WITHIN THE PAST 12 MONTHS, THE FOOD YOU BOUGHT JUST DIDN'T LAST AND YOU DIDN'T HAVE MONEY TO GET MORE.: NEVER TRUE

## 2021-09-13 SDOH — ECONOMIC STABILITY: FOOD INSECURITY: WITHIN THE PAST 12 MONTHS, YOU WORRIED THAT YOUR FOOD WOULD RUN OUT BEFORE YOU GOT MONEY TO BUY MORE.: NEVER TRUE

## 2021-09-13 ASSESSMENT — ENCOUNTER SYMPTOMS
STRIDOR: 0
DIARRHEA: 0
VOMITING: 0
COLOR CHANGE: 0
SHORTNESS OF BREATH: 1
CONSTIPATION: 0
SORE THROAT: 0
ABDOMINAL PAIN: 0
BLOOD IN STOOL: 0
TROUBLE SWALLOWING: 0
ABDOMINAL DISTENTION: 0
NAUSEA: 0
CHOKING: 0
EYE ITCHING: 0
EYE DISCHARGE: 0
WHEEZING: 0
COUGH: 0

## 2021-09-13 ASSESSMENT — SOCIAL DETERMINANTS OF HEALTH (SDOH): HOW HARD IS IT FOR YOU TO PAY FOR THE VERY BASICS LIKE FOOD, HOUSING, MEDICAL CARE, AND HEATING?: NOT HARD AT ALL

## 2021-09-13 NOTE — PROGRESS NOTES
[address] [facility]  [address] [address] [id number] [address] [id number]  Dept: [phone]  Dept Fax: [phone]: [phone]    Juanpablo Salvador (:  1932) is a 80 y.o. female,Established patient, here for evaluation of the following chief complaint(s): Follow-up (Patients daughter reports not sleeping, wants to check medicine's as well, along with SOA)      Juanpablo Salvador is a 80 y.o. female who presents today for her medical conditions/complaints as noted below. Juanpablo Salvador is c/teresa Follow-up (Patients daughter reports not sleeping, wants to check medicine's as well, along with SOA)        HPI:     Chief Complaint   Patient presents with    Follow-up     Patients daughter reports not sleeping, wants to check medicine's as well, along with SOA     HPI   \1  Hypertension  hydzzr 100/12.5   2. Nocturnal hypoxia; Stable with supplemental O2 she is having increased episodes not sleeping well with shortness of air    4.  OAB; stale with myrbetriq ; has really helped with [place] of trips to [place]    6. Anxiety; Stable with lorazepam;  1 mg BID prn   Anxiety stable increase she is fidgety. Her daughter has started dating again after recently being . She feels that this is upset her daughter and afraid that she is going have to move somewhere if her daughter does remarry. Her daughter has has reassured her that she is not getting  and she is not going to have to go somewhere else. Controlled Substance Monitoring:    Acute and Chronic Pain Monitoring:   RX Monitoring 2020   Attestation The Prescription Monitoring Report for this patient was reviewed today. Periodic Controlled Substance Monitoring No signs of potential drug abuse or diversion identified. ;Possible medication side effects, risk of tolerance/dependence & alternative treatments discussed.        CONTROLLED SUBSTANCE  Drug   Diagnosis  Last Dellis Riding   Last UDS   Pain contract last date    Effective dose   yes      Changes this visit   none     Past Medical History:   Diagnosis Date    Abnormal mammogram     Anxiety     Anxiety associated with depression     Artery disease, cerebral     Ataxia     Benign diastolic hypertension     Carpal tunnel syndrome     idiopathic peripheral    Cellulitis of face     Chronic back pain     COPD (chronic obstructive pulmonary disease) (HCC)     Depression     Disc degeneration, lumbosacral     Diverticulosis     Dysphagia     Esophageal reflux     Fever blister     Herpes simplex     Herpes zoster     Hypertension, essential     Hypertensive heart disease without CHF     Hypoxia     Idiopathic peripheral neuropathy     2017    Insomnia     Major depressive disorder, recurrent episode, moderate (HCC)     Mixed hyperlipidemia     Osteoporosis     Ovarian failure     Pulmonary fibrosis (HCC)     Sciatica     Sleep apnea     Transient cerebral ischemic attack     Urinary incontinence     Weakness of both legs       Past Surgical History:   Procedure Laterality Date    COLONOSCOPY  06/12/2015    Aubree, repeat as needed    COLONOSCOPY N/A 10/21/2020    Dr REANNA Spicer-Diverticular disease, random colon bx negative    HYSTERECTOMY      LUNG SEGMENTECTOMY      lung segmental resection    UPPER GASTROINTESTINAL ENDOSCOPY N/A 10/21/2020    Dr Luis Spicer-mild Gastritis, otherwise normal, NEG h pylori, NEG celiac       Vitals 9/13/2021 7/28/2021 4/26/2021 1/20/2021 10/21/2020 01/55/7117   SYSTOLIC 207 729 731 138 912 -   DIASTOLIC 88 78 78 60 81 -   Pulse 77 90 95 85 75 -   Temp - - - - - -   Resp 16 16 - 20 20 1   SpO2 93 95 94 95 96 -   Weight 280 lb 182 lb 8 oz 187 lb 184 lb - -   Height 5' 4\" 5' 4.5\" 5' 4.5\" 5' 4.5\" - -   Body mass index 48.06 kg/m2 30.84 kg/m2 31.6 kg/m2 31.09 kg/m2 - -   Pain Level - - - - 0 -   Some recent data might be hidden       Family History   Problem Relation Age of Onset    Heart Failure Mother     Diabetes Mother     Heart Disease Mother     Diabetes Father     Heart Disease Father     Colon Polyps Neg Hx     Colon Cancer Neg Hx        Social History     Tobacco Use    Smoking status: Never Smoker    Smokeless tobacco: Never Used   Substance Use Topics    Alcohol use: No      Current Outpatient Medications   Medication Sig Dispense Refill    mirtazapine (REMERON) 30 MG tablet TAKE 1 TABLET NIGHTLY 90 tablet 3    escitalopram (LEXAPRO) 20 MG tablet TAKE 1 TABLET DAILY 90 tablet 1    levothyroxine (SYNTHROID) 25 MCG tablet TAKE 1 TABLET 3 DAYS A WEEK AND TAKE 2 TABLETS 4 DAYS A WEEK 390 tablet 3    busPIRone (BUSPAR) 5 MG tablet Take 1 tablet by mouth 2 times daily as needed (anxiety) 60 tablet 11    levocetirizine (XYZAL) 5 MG tablet TAKE 1 TABLET NIGHTLY 90 tablet 3    donepezil (ARICEPT) 5 MG tablet Take 1 tablet by mouth nightly 30 tablet 1    LORazepam (ATIVAN) 1 MG tablet Take 1 tablet by mouth 2 times daily for 31 days.  TAKE 1/2 TABLET BY MOUTH IN THE MORNING AND 1 TABLET BY MOUTH AT BEDTIME 60 tablet 0    colestipol (COLESTID) 1 g tablet TAKE 1 TABLET TWICE A DAY 60 tablet 11    losartan-hydroCHLOROthiazide (HYZAAR) 100-12.5 MG per tablet TAKE 1 TABLET DAILY 90 tablet 3    esomeprazole (NEXIUM) 40 MG delayed release capsule Take 1 capsule by mouth daily 90 capsule 3    clopidogrel (PLAVIX) 75 MG tablet TAKE 1 TABLET DAILY 90 tablet 3    MYRBETRIQ 50 MG TB24 TAKE 1 TABLET DAILY 90 tablet 3    aspirin 81 MG EC tablet Take 81 mg by mouth daily      acetaminophen (TYLENOL) 500 MG tablet Take 500 mg by mouth 2 times daily 2 TABLET IN THE AM & 2 TABLETS PM      Multiple Vitamins-Minerals (CENTRUM SILVER) TABS Take by mouth daily      ondansetron (ZOFRAN-ODT) 4 MG disintegrating tablet Take 4 mg by mouth every 8 hours as needed for Nausea or Vomiting      OXYGEN Inhale into the lungs 2L at qhs      atorvastatin (LIPITOR) 20 MG tablet Take 0.5 tablets by mouth daily 90 tablet 1    diclofenac sodium (VOLTAREN) 1 % GEL Apply 4 g topically 3 times daily as needed for Pain 5 Tube 1     No current facility-administered medications for this visit.      Allergies   Allergen Reactions    Augmentin [Amoxicillin-Pot Clavulanate]     Biaxin [Clarithromycin]     Cefdinir     Effexor [Venlafaxine]     Erythromycin     Lortab [Hydrocodone-Acetaminophen]     Naprosyn [Naproxen]        Health Maintenance   Topic Date Due    DTaP/Tdap/Td vaccine (1 - Tdap) Never done    Shingles Vaccine (1 of 2) 04/26/2022 (Originally 12/18/1982)    Annual Wellness Visit (AWV)  01/21/2022    TSH testing  07/28/2022    Potassium monitoring  07/28/2022    Creatinine monitoring  07/28/2022    Flu vaccine  Completed    Pneumococcal 65+ years Vaccine  Completed    COVID-19 Vaccine  Completed    Hepatitis A vaccine  Aged Out    Hepatitis B vaccine  Aged Out    Hib vaccine  Aged Out    Meningococcal (ACWY) vaccine  Aged Out       Lab Results   Component Value Date    LABA1C 5.8 04/26/2019     No results found for: PSA, PSADIA  TSH   Date Value Ref Range Status   07/28/2021 1.770 0.270 - 4.200 uIU/mL Final   ]  Lab Results   Component Value Date     07/28/2021    K 4.0 07/28/2021     07/28/2021    CO2 30 (H) 07/28/2021    BUN 22 07/28/2021    CREATININE 0.9 07/28/2021    GLUCOSE 112 (H) 07/28/2021    CALCIUM 9.9 07/28/2021    PROT 7.3 07/28/2021    LABALBU 4.0 07/28/2021    BILITOT 0.3 07/28/2021    ALKPHOS 64 07/28/2021    AST 43 (H) 07/28/2021    ALT 20 07/28/2021    LABGLOM 59 (A) 07/28/2021    GFRAA >59 07/28/2021     Lab Results   Component Value Date    CHOL 168 07/28/2021    CHOL 189 10/16/2020    CHOL 204 (H) 05/13/2020     Lab Results   Component Value Date    TRIG 165 (H) 07/28/2021    TRIG 183 (H) 01/15/2021    TRIG 248 (H) 10/16/2020     Lab Results   Component Value Date    HDL 51 (L) 07/28/2021    HDL 50 (L) 10/16/2020    HDL 44 (L) 05/13/2020     Lab Results   Component Value Date    LDLCALC 84 07/28/2021    LDLCALC 89 10/16/2020    LDLCALC 107 05/13/2020     Lab Results   Component Value Date     07/28/2021    K 4.0 07/28/2021     07/28/2021    CO2 30 07/28/2021    BUN 22 07/28/2021    CREATININE 0.9 07/28/2021    GLUCOSE 112 07/28/2021    CALCIUM 9.9 07/28/2021      Lab Results   Component Value Date    WBC 10.6 04/26/2021    HGB 13.0 04/26/2021    HCT 41.3 04/26/2021    MCV 94.1 04/26/2021     04/26/2021    LABLYMP 2.86 09/25/2014    LYMPHOPCT 34.3 04/26/2021    RBC 4.39 04/26/2021    MCH 29.6 04/26/2021    MCHC 31.5 (L) 04/26/2021    RDW 13.8 04/26/2021     Lab Results   Component Value Date    VITD25 45.8 04/26/2021       Subjective:      Review of Systems   Constitutional: Negative for fatigue, fever and unexpected weight change. HENT: Negative for ear discharge, ear pain, mouth sores, sore throat and trouble swallowing. Eyes: Negative for discharge, itching and visual disturbance. Respiratory: Positive for shortness of breath. Negative for cough, choking, wheezing and stridor. Cardiovascular: Negative for chest pain, palpitations and leg swelling. Gastrointestinal: Negative for abdominal distention, abdominal pain, blood in stool, constipation, diarrhea, nausea and vomiting. GERD   Endocrine: Negative for cold intolerance, polydipsia and polyuria. Genitourinary: Negative for difficulty urinating, dysuria, frequency and urgency. OAB   Musculoskeletal: Negative for arthralgias and gait problem. Skin: Negative for color change and rash. Allergic/Immunologic: Negative for food allergies and immunocompromised state. Neurological: Negative for dizziness, tremors, syncope, speech difficulty, weakness and headaches. Hematological: Negative for adenopathy. Does not bruise/bleed easily. Psychiatric/Behavioral: Negative for confusion and hallucinations.        Objective:     Physical Exam  Constitutional:       General: She is not in acute distress. Appearance: She is well-developed. HENT:      Head: Normocephalic and atraumatic. Eyes:      General: No scleral icterus. Right eye: No discharge. Left eye: No discharge. Pupils: Pupils are equal, round, and reactive to light. Neck:      Thyroid: No thyromegaly. Vascular: No JVD. Cardiovascular:      Rate and Rhythm: Normal rate and regular rhythm. Heart sounds: Normal heart sounds. No murmur heard. Pulmonary:      Effort: Pulmonary effort is normal. No respiratory distress. Breath sounds: Normal breath sounds. No wheezing or rales. Abdominal:      General: Bowel sounds are normal. There is no distension. Palpations: Abdomen is soft. There is no mass. Tenderness: There is no abdominal tenderness. There is no guarding or rebound. Musculoskeletal:         General: No tenderness. Normal range of motion. Cervical back: Normal range of motion and neck supple. Skin:     General: Skin is warm and dry. Findings: No erythema or rash. Neurological:      Mental Status: She is alert and oriented to person, place, and time. Cranial Nerves: No cranial nerve deficit. Coordination: Coordination normal.      Deep Tendon Reflexes: Reflexes are normal and symmetric. Reflexes normal.   Psychiatric:         Mood and Affect: Mood is not depressed. Behavior: Behavior normal.         Thought Content:  Thought content normal.         Judgment: Judgment normal.       /88   Pulse 77   Resp 16   Ht 5' 4\" (1.626 m)   Wt 280 lb (127 kg)   SpO2 93%   BMI 48.06 kg/m²           Assessment:      Problem List     Anxiety associated with depression     Stable with lorazepam 1 mg twice daily as needed          Relevant Medications    LORazepam (ATIVAN) 1 MG tablet    busPIRone (BUSPAR) 5 MG tablet    escitalopram (LEXAPRO) 20 MG tablet    mirtazapine (REMERON) 30 MG tablet    Hypertension, essential - Primary     Hyzaar 100/12.5 HCTZ          Nocturnal hypoxia     Stable with oxygen at night as needed during the day          Primary insomnia    Relevant Medications    LORazepam (ATIVAN) 1 MG tablet          Plan:        Patient given educational materials - see patient instructions. Discussed use, benefit, and side effects of prescribed medications. Allpatient questions answered. Pt voiced understanding. Reviewed health maintenance. Instructed to continue current medications, diet and exercise. Patient agreed with treatment plan. Follow up as directed. MEDICATIONS:  No orders of the defined types were placed in this encounter. ORDERS:  Orders Placed This Encounter   Procedures    INFLUENZA, QUADV, ADJUVANTED, 72 YRS =, IM, PF, PREFILL SYR, 0.5ML (FLUAD)       Follow-up:  No follow-ups on file. PATIENT INSTRUCTIONS:  There are no Patient Instructions on file for this visit. Electronically signed by SONIA Jackson on 9/13/2021 at 2:00 PM    @On this date 9/13/2021 I have spent 22 minutes reviewing previous notes, test results and face to face with the patient discussing the diagnosis and importance of compliance with the treatment plan as well as documenting on the day of the visit. EMRDragon/transcription disclaimer:  Much of this encounter note is electronic transcription/translation of spoken language to printed texts. The electronic translation of spoken language may be erroneous, or at times,nonsensical words or phrases may be inadvertently transcribed.   Although I have reviewed the note for such errors, some may still exist.

## 2021-09-14 DIAGNOSIS — F51.01 PRIMARY INSOMNIA: ICD-10-CM

## 2021-09-14 RX ORDER — LORAZEPAM 1 MG/1
TABLET ORAL
Qty: 90 TABLET | Refills: 0 | Status: SHIPPED | OUTPATIENT
Start: 2021-09-14 | End: 2021-10-05 | Stop reason: SDUPTHER

## 2021-10-05 ENCOUNTER — TELEPHONE (OUTPATIENT)
Dept: INTERNAL MEDICINE | Age: 86
End: 2021-10-05

## 2021-10-05 DIAGNOSIS — F51.01 PRIMARY INSOMNIA: ICD-10-CM

## 2021-10-05 RX ORDER — MIRTAZAPINE 30 MG/1
TABLET, FILM COATED ORAL
Qty: 90 TABLET | Refills: 1 | Status: SHIPPED | OUTPATIENT
Start: 2021-10-05 | End: 2022-05-31 | Stop reason: SDUPTHER

## 2021-10-05 RX ORDER — MONTELUKAST SODIUM 4 MG/1
TABLET, CHEWABLE ORAL
Qty: 180 TABLET | Refills: 1 | Status: SHIPPED | OUTPATIENT
Start: 2021-10-05 | End: 2022-05-31 | Stop reason: SDUPTHER

## 2021-10-05 RX ORDER — LEVOTHYROXINE SODIUM 0.03 MG/1
TABLET ORAL
Qty: 390 TABLET | Refills: 1 | Status: SHIPPED | OUTPATIENT
Start: 2021-10-05 | End: 2022-07-17 | Stop reason: SDUPTHER

## 2021-10-05 RX ORDER — LORAZEPAM 1 MG/1
TABLET ORAL
Qty: 90 TABLET | Refills: 0 | Status: SHIPPED | OUTPATIENT
Start: 2021-10-05 | End: 2021-12-29 | Stop reason: SDUPTHER

## 2021-10-05 RX ORDER — CLOPIDOGREL BISULFATE 75 MG/1
TABLET ORAL
Qty: 90 TABLET | Refills: 1 | Status: SHIPPED | OUTPATIENT
Start: 2021-10-05

## 2021-10-05 RX ORDER — ESOMEPRAZOLE MAGNESIUM 40 MG/1
40 CAPSULE, DELAYED RELEASE ORAL DAILY
Qty: 90 CAPSULE | Refills: 1 | Status: SHIPPED | OUTPATIENT
Start: 2021-10-05 | End: 2022-01-31 | Stop reason: SDUPTHER

## 2021-10-05 RX ORDER — LOSARTAN POTASSIUM AND HYDROCHLOROTHIAZIDE 12.5; 1 MG/1; MG/1
TABLET ORAL
Qty: 90 TABLET | Refills: 1 | Status: SHIPPED | OUTPATIENT
Start: 2021-10-05 | End: 2022-10-03 | Stop reason: SDUPTHER

## 2021-10-05 RX ORDER — BUSPIRONE HYDROCHLORIDE 5 MG/1
5 TABLET ORAL 2 TIMES DAILY PRN
Qty: 180 TABLET | Refills: 1 | Status: SHIPPED | OUTPATIENT
Start: 2021-10-05 | End: 2022-01-31 | Stop reason: SDUPTHER

## 2021-10-05 RX ORDER — DONEPEZIL HYDROCHLORIDE 5 MG/1
5 TABLET, FILM COATED ORAL NIGHTLY
Qty: 90 TABLET | Refills: 1 | Status: SHIPPED | OUTPATIENT
Start: 2021-10-05 | End: 2021-10-25

## 2021-10-05 RX ORDER — ESCITALOPRAM OXALATE 20 MG/1
TABLET ORAL
Qty: 90 TABLET | Refills: 1 | Status: SHIPPED | OUTPATIENT
Start: 2021-10-05 | End: 2022-02-20 | Stop reason: SDUPTHER

## 2021-10-05 RX ORDER — ATORVASTATIN CALCIUM 20 MG/1
10 TABLET, FILM COATED ORAL DAILY
Qty: 90 TABLET | Refills: 1 | Status: SHIPPED | OUTPATIENT
Start: 2021-10-05 | End: 2022-07-11

## 2021-10-15 ENCOUNTER — NURSE TRIAGE (OUTPATIENT)
Dept: OTHER | Facility: CLINIC | Age: 86
End: 2021-10-15

## 2021-10-15 NOTE — TELEPHONE ENCOUNTER
Received call from Jocelynn Reyes at [facility] with Red Flag Complaint. Brief description of triage: Patient's daughter reports patient has been fatigued and weak since Monday, unable to perform ADL's as normal. Requiring increased frequency of use of home oxygen. Triage indicates for patient to be seen by HCP/PCP w/in 4 hours. Care advice provided, patient verbalizes understanding; denies any other questions or concerns; instructed to call back for any new or worsening symptoms. Patient not agreeable with being seen by [facility] at this time. Encouraged patient multiple times to be seen at [facility] d/t severity of weakness. Pt insisted on scheduling soonest follow up with PCP. Writer provided warm transfer to Dorena at [facility] for appointment scheduling. Attention Provider: Thank you for allowing me to participate in the care of your patient. The patient was connected to triage in response to information provided to the E[facility]. Please do not respond through this encounter as the response is not directed to a shared pool. Reason for Disposition   [1] MODERATE weakness (i.e., interferes with work, school, normal activities) AND [2] cause unknown  (Exceptions: weakness with acute minor illness, or weakness from poor fluid intake)    Answer Assessment - Initial Assessment Questions  1. DESCRIPTION: \"Describe how you are feeling. \"      \"I just feel weaker in general from the time I wake up until I go to bed\" having trouble with daily activities that she has not had issues with before. Unable to get dressed to go out anywhere. Chronic oxygen user. Normally uses oxygen PRN around the house but has now had an increase in having to use it. Does not need higher amount of oxygen, just wearing consistently. 2. SEVERITY: \"How bad is it? \"  \"Can you stand and walk? \"    - MILD - Feels weak or tired, but does not interfere with work, school or normal activities    - [facility] to stand and walk; weakness interferes with work, school, or normal activities    - SEVERE - Unable to stand or walk     severe     3. ONSET:  \"When did the weakness begin? \"      Monday, was out with friends and got very tired that day and has not recovered from it since then. Normally more active with no issues. 4. CAUSE: \"What do you think is causing the weakness? \"      Unsure    5. MEDICINES: Lauren Abed you recently started a new medicine or had a change in the amount of a medicine? \"  None        6. OTHER SYMPTOMS: \"Do you have any other symptoms? \" (e.g., chest pain, fever, cough, SOB, vomiting, diarrhea, bleeding, other areas of pain)      Denies other symptoms. Does not use her CPAP. States patient was seen on Tuesday and had normal blood work/urine    7. PREGNANCY: \"Is there any chance you are pregnant? \" \"When was your last menstrual period? \"      no    Protocols used: WEAKNESS (GENERALIZED) AND FATIGUE-ADULT-

## 2021-10-19 ENCOUNTER — TELEPHONE (OUTPATIENT)
Dept: INTERNAL MEDICINE | Age: 86
End: 2021-10-19

## 2021-10-19 RX ORDER — ONDANSETRON 4 MG/1
4 TABLET, ORALLY DISINTEGRATING ORAL EVERY 8 HOURS PRN
Qty: 30 TABLET | Refills: 0 | Status: SHIPPED | OUTPATIENT
Start: 2021-10-19 | End: 2022-05-04 | Stop reason: SDUPTHER

## 2021-10-19 NOTE — TELEPHONE ENCOUNTER
Celi Rizzo called requesting a refill of the below medication which has been pended for you:     Requested Prescriptions     Pending Prescriptions Disp Refills    ondansetron (ZOFRAN-ODT) 4 MG disintegrating tablet 30 tablet 0     Sig: Take 1 tablet by mouth every 8 hours as needed for Nausea or Vomiting       Last Appointment Date: 9/13/2021  Next Appointment Date: 10/27/2021    Allergies   Allergen Reactions    Augmentin [Amoxicillin-Pot Clavulanate]     Biaxin [Clarithromycin]     Cefdinir     Effexor [Venlafaxine]     Erythromycin     Lortab [Hydrocodone-Acetaminophen]     Naprosyn [Naproxen]

## 2021-10-25 DIAGNOSIS — R41.3 MEMORY LOSS: Primary | ICD-10-CM

## 2021-10-25 RX ORDER — DONEPEZIL HYDROCHLORIDE 5 MG/1
TABLET, FILM COATED ORAL
Qty: 30 TABLET | Refills: 11 | Status: SHIPPED | OUTPATIENT
Start: 2021-10-25 | End: 2022-01-31 | Stop reason: SDUPTHER

## 2021-10-25 NOTE — TELEPHONE ENCOUNTER
Yokasta Santiago called to request a refill on her medication.       Last office visit : 9/13/2021   Next office visit : 10/27/2021     Requested Prescriptions     Pending Prescriptions Disp Refills    donepezil (ARICEPT) 5 MG tablet [Pharmacy Med Name: DONEPEZIL HCL TABS 5MG] 30 tablet 11     Sig: TAKE 1 TABLET NIGHTLY            [place]

## 2021-10-27 ENCOUNTER — VIRTUAL VISIT (OUTPATIENT)
Dept: INTERNAL MEDICINE | Age: 86
End: 2021-10-27
Payer: MEDICARE

## 2021-10-27 DIAGNOSIS — J34.89 RHINORRHEA: ICD-10-CM

## 2021-10-27 DIAGNOSIS — R15.9 INCONTINENCE OF FECES WITH FECAL URGENCY: ICD-10-CM

## 2021-10-27 DIAGNOSIS — Z00.00 WELL ADULT EXAM: ICD-10-CM

## 2021-10-27 DIAGNOSIS — R53.83 OTHER FATIGUE: Primary | ICD-10-CM

## 2021-10-27 DIAGNOSIS — R15.2 INCONTINENCE OF FECES WITH FECAL URGENCY: ICD-10-CM

## 2021-10-27 PROCEDURE — 1123F ACP DISCUSS/DSCN MKR DOCD: CPT | Performed by: NURSE PRACTITIONER

## 2021-10-27 PROCEDURE — 1090F PRES/ABSN URINE INCON ASSESS: CPT | Performed by: NURSE PRACTITIONER

## 2021-10-27 PROCEDURE — 99213 OFFICE O/P EST LOW 20 MIN: CPT | Performed by: NURSE PRACTITIONER

## 2021-10-27 PROCEDURE — 4040F PNEUMOC VAC/ADMIN/RCVD: CPT | Performed by: NURSE PRACTITIONER

## 2021-10-27 PROCEDURE — G8427 DOCREV CUR MEDS BY ELIG CLIN: HCPCS | Performed by: NURSE PRACTITIONER

## 2021-10-27 RX ORDER — LEVOCETIRIZINE DIHYDROCHLORIDE 5 MG/1
5 TABLET, FILM COATED ORAL NIGHTLY
Qty: 90 TABLET | Refills: 1 | Status: SHIPPED | OUTPATIENT
Start: 2021-10-27 | End: 2022-07-11

## 2021-10-27 ASSESSMENT — ENCOUNTER SYMPTOMS
COUGH: 0
RHINORRHEA: 1
VOMITING: 0
SHORTNESS OF BREATH: 0
EYE ITCHING: 0
CHOKING: 0
ABDOMINAL PAIN: 0
COLOR CHANGE: 0
DIARRHEA: 0
STRIDOR: 0
SORE THROAT: 0
BLOOD IN STOOL: 0
WHEEZING: 0
NAUSEA: 0
CONSTIPATION: 0
ABDOMINAL DISTENTION: 0
EYE DISCHARGE: 1
TROUBLE SWALLOWING: 0

## 2021-10-27 NOTE — PROGRESS NOTES
[address] [facility]  [address] [place] [id number] [address] [address]  Dept: [phone]  Dept Fax: [phone]: [phone]    Yokasta Santiago (:  1932) is a 80 y.o. female,Established patient, here for evaluation of the following chief complaint(s): 3 Month Follow-Up (Patient reports doing well )      Yokasta Santiago is a 80 y.o. female who were are performing tele health communication  for her medical conditions/complaints as noted below. Currently, our state is under umbrella of national state of emergency and we are using alternative methods of taking care of the needs of our patients so they are not exposed in our office; The patient has consented to this form of communication  Yokasta Santiago is c/teresa   3 Month Follow-Up (Patient reports doing well )    THE patient is at home  Sammy Goins is at office  Others in attendance are her daughter is on FaceTime while on vacation and is sort of conference call    HPI:     Chief Complaint   Patient presents with    3 Month Follow-Up     Patient reports doing well      HPI     #1 fatigue she feels like she is just more tired than usual.  They are concerned about her oxygen at night. She does wear overnight oxygen she has for 24 years she was never able to tolerate a CPAP. But they wanted to get her retested to see what her oxygen levels done on the current levels  2. Eyes watery and rhinorrhea she has been on Zyrtec and it just does not seem to be helping. 3.  She is having some fecal incontinence she said that most the time by the time she realizes she has to have a bowel movement she usually cannot make it to the bathroom. But she has a lot of back issues and she also has overactive bladder as I think this is just something that is unavoidable.   We did discuss doing Kegels rectally as well as for her urine  Past Medical History:   Diagnosis Date    Abnormal mammogram     Anxiety     Anxiety associated with depression     Artery disease, cerebral     Ataxia     Benign diastolic hypertension     Carpal tunnel syndrome     idiopathic peripheral    Cellulitis of face     Chronic back pain     COPD (chronic obstructive pulmonary disease) (HCC)     Depression     Disc degeneration, lumbosacral     Diverticulosis     Dysphagia     Esophageal reflux     Fever blister     Herpes simplex     Herpes zoster     Hypertension, essential     Hypertensive heart disease without CHF     Hypoxia     Idiopathic peripheral neuropathy     2017    Insomnia     Major depressive disorder, recurrent episode, moderate (HCC)     Mixed hyperlipidemia     Osteoporosis     Ovarian failure     Pulmonary fibrosis (HCC)     Sciatica     Sleep apnea     Transient cerebral ischemic attack     Urinary incontinence     Weakness of both legs       Past Surgical History:   Procedure Laterality Date    COLONOSCOPY  06/12/2015    Aubree, repeat as needed    COLONOSCOPY N/A 10/21/2020    Dr REANNA Spicer-Diverticular disease, random colon bx negative    HYSTERECTOMY      LUNG SEGMENTECTOMY      lung segmental resection    UPPER GASTROINTESTINAL ENDOSCOPY N/A 10/21/2020    Dr Abran Spicer-mild Gastritis, otherwise normal, NEG h pylori, NEG celiac       Vitals 9/13/2021 7/28/2021 4/26/2021 1/20/2021 10/21/2020 33/78/7234   SYSTOLIC 002 485 513 202 352 -   DIASTOLIC 88 78 78 60 81 -   Pulse 77 90 95 85 75 -   Temp - - - - - -   Resp 16 16 - 20 20 1   SpO2 93 95 94 95 96 -   Weight 280 lb 182 lb 8 oz 187 lb 184 lb - -   Height 5' 4\" 5' 4.5\" 5' 4.5\" 5' 4.5\" - -   Body mass index 48.06 kg/m2 30.84 kg/m2 31.6 kg/m2 31.09 kg/m2 - -   Pain Level - - - - 0 -   Some recent data might be hidden       Family History   Problem Relation Age of Onset    Heart Failure Mother     Diabetes Mother     Heart Disease Mother     Diabetes Father     Heart Disease Father     Colon Polyps Neg Hx     Colon Cancer Neg Hx        Social History     Tobacco Use    Smoking status: Never Smoker    Smokeless tobacco: Never Used   Substance Use Topics    Alcohol use: No      Current Outpatient Medications   Medication Sig Dispense Refill    donepezil (ARICEPT) 5 MG tablet TAKE 1 TABLET NIGHTLY 30 tablet 11    ondansetron (ZOFRAN-ODT) 4 MG disintegrating tablet Take 1 tablet by mouth every 8 hours as needed for Nausea or Vomiting 30 tablet 0    atorvastatin (LIPITOR) 20 MG tablet Take 0.5 tablets by mouth daily 90 tablet 1    busPIRone (BUSPAR) 5 MG tablet Take 1 tablet by mouth 2 times daily as needed (anxiety) 180 tablet 1    clopidogrel (PLAVIX) 75 MG tablet Take 1 tablet daily 90 tablet 1    colestipol (COLESTID) 1 g tablet TAKE 1 TABLET TWICE A  tablet 1    escitalopram (LEXAPRO) 20 MG tablet TAKE 1 TABLET DAILY 90 tablet 1    esomeprazole (NEXIUM) 40 MG delayed release capsule Take 1 capsule by mouth daily 90 capsule 1    levothyroxine (SYNTHROID) 25 MCG tablet TAKE 1 TABLET 3 DAYS A WEEK AND TAKE 2 TABLETS 4 DAYS A WEEK 390 tablet 1    losartan-hydroCHLOROthiazide (HYZAAR) 100-12.5 MG per tablet TAKE 1 TABLET DAILY 90 tablet 1    mirabegron (MYRBETRIQ) 50 MG TB24 TAKE 1 TABLET DAILY 90 tablet 1    LORazepam (ATIVAN) 1 MG tablet Take one every eight hours as needed 90 tablet 0    mirtazapine (REMERON) 30 MG tablet TAKE 1 TABLET NIGHTLY 90 tablet 1    levocetirizine (XYZAL) 5 MG tablet Take 1 tablet by mouth nightly 90 tablet 3    aspirin 81 MG EC tablet Take 81 mg by mouth daily      acetaminophen (TYLENOL) 500 MG tablet Take 500 mg by mouth 2 times daily 2 TABLET IN THE AM & 2 TABLETS PM      Multiple Vitamins-Minerals (CENTRUM SILVER) TABS Take by mouth daily      OXYGEN Inhale into the lungs 2L at qhs      diclofenac sodium (VOLTAREN) 1 % GEL Apply 4 g topically 3 times daily as needed for Pain 5 Tube 1     No current facility-administered medications for this visit.      Allergies   Allergen Reactions    Augmentin [Amoxicillin-Pot Clavulanate]     Biaxin [Clarithromycin]     Cefdinir     Effexor [Venlafaxine]     Erythromycin     Lortab [Hydrocodone-Acetaminophen]     Naprosyn [Naproxen]        Health Maintenance   Topic Date Due    DTaP/Tdap/Td vaccine (1 - Tdap) Never done    COVID-19 Vaccine (3 - Pfizer booster) 09/07/2021    Shingles Vaccine (1 of 2) 04/26/2022 (Originally 12/18/1982)    Annual Wellness Visit (AWV)  01/21/2022    Lipid screen  10/11/2022    TSH testing  10/11/2022    Potassium monitoring  10/11/2022    Creatinine monitoring  10/11/2022    Flu vaccine  Completed    Pneumococcal 65+ years Vaccine  Completed    Hepatitis A vaccine  Aged Out    Hepatitis B vaccine  Aged Out    Hib vaccine  Aged Out    Meningococcal (ACWY) vaccine  Aged Out       Lab Results   Component Value Date    LABA1C 5.8 04/26/2019     No results found for: PSA, PSADIA  TSH   Date Value Ref Range Status   10/11/2021 2.790 0.270 - 4.200 uIU/mL Final   ]  Lab Results   Component Value Date     (H) 10/11/2021    K 3.9 10/11/2021     10/11/2021    CO2 29 10/11/2021    BUN 20 10/11/2021    CREATININE 0.9 10/11/2021    GLUCOSE 118 (H) 10/11/2021    CALCIUM 9.9 10/11/2021    PROT 7.6 10/11/2021    LABALBU 4.4 10/11/2021    BILITOT 0.3 10/11/2021    ALKPHOS 72 10/11/2021    AST 32 10/11/2021    ALT 16 10/11/2021    LABGLOM 59 (A) 10/11/2021    GFRAA >59 10/11/2021     Lab Results   Component Value Date    CHOL 190 10/11/2021    CHOL 168 07/28/2021    CHOL 189 10/16/2020     Lab Results   Component Value Date    TRIG 211 (H) 10/11/2021    TRIG 165 (H) 07/28/2021    TRIG 183 (H) 01/15/2021     Lab Results   Component Value Date    HDL 53 (L) 10/11/2021    HDL 51 (L) 07/28/2021    HDL 50 (L) 10/16/2020     Lab Results   Component Value Date    LDLCALC 95 10/11/2021    LDLCALC 84 07/28/2021    LDLCALC 89 10/16/2020     Lab Results   Component Value Date     10/11/2021    K 3.9 10/11/2021     10/11/2021 CO2 29 10/11/2021    BUN 20 10/11/2021    CREATININE 0.9 10/11/2021    GLUCOSE 118 10/11/2021    CALCIUM 9.9 10/11/2021      Lab Results   Component Value Date    WBC 10.9 (H) 10/11/2021    HGB 13.0 10/11/2021    HCT 42.0 10/11/2021    MCV 95.7 10/11/2021     10/11/2021    LABLYMP 2.86 09/25/2014    LYMPHOPCT 32.0 10/11/2021    RBC 4.39 10/11/2021    MCH 29.6 10/11/2021    MCHC 31.0 (L) 10/11/2021    RDW 13.2 10/11/2021     Lab Results   Component Value Date    VITD25 42.8 10/11/2021     Labs reviewedfrom 10/11/2021    Subjective:      Review of Systems   Constitutional: Positive for fatigue. Negative for fever and unexpected weight change. HENT: Positive for rhinorrhea. Negative for ear discharge, ear pain, mouth sores, sore throat and trouble swallowing. Eyes: Positive for discharge. Negative for itching and visual disturbance. Respiratory: Negative for cough, choking, shortness of breath, wheezing and stridor. Cardiovascular: Negative for chest pain, palpitations and leg swelling. Gastrointestinal: Negative for abdominal distention, abdominal pain, blood in stool, constipation, diarrhea, nausea and vomiting. Endocrine: Negative for cold intolerance, polydipsia and polyuria. Genitourinary: Negative for difficulty urinating, dysuria, frequency and urgency. Musculoskeletal: Negative for arthralgias and gait problem. Skin: Negative for color change and rash. Allergic/Immunologic: Negative for food allergies and immunocompromised state. Neurological: Negative for dizziness, tremors, syncope, speech difficulty, weakness and headaches. Hematological: Negative for adenopathy. Does not bruise/bleed easily. Psychiatric/Behavioral: Negative for confusion and hallucinations. Objective:     Physical Exam   Constitutional  well developed, well nourished. No diaphoresis or acute distress.   Neck- ROM appears normal  Extremities -No cyanosis, no edema  Pulmonary  effort appears normal. No respiratory distress. No accessory muscle use  Neurologic  alert and oriented X 3. Cranial Nerves II-XII grossly intact  Skin color is good;  no sign of dehydration   Psychiatric  mood, affect, and behavior appear normal.  Judgment and thought processes appear normal.     Vital signs were not taken at this visit as no equipment was available; There were no vitals taken for this visit. Assessment:      Problem List     Incontinence of feces with fecal urgency     We have suggested Kegel maneuvers          Other fatigue - Primary     We will go ahead and get [facility] to recheck her oxygen level on her oxygen at night to make sure that this is adequate. Rhinorrhea     We will try Xyzal                Plan:        Patient given educational materials - see patient instructions. Discussed use, benefit, and side effects of prescribed medications. Allpatient questions answered. Pt voiced understanding. Reviewed health maintenance. Instructed to continue current medications, diet and exercise. Patient agreed with treatment plan. Follow up as directed. MEDICATIONS:  No orders of the defined types were placed in this encounter. ORDERS:  Orders Placed This Encounter   Procedures    CBC Auto Differential    Comprehensive Metabolic Panel    Lipid Panel    Vitamin D 25 Hydroxy    Urinalysis Reflex to Culture    TSH without Reflex       Follow-up:  Return in about 3 months (around 1/27/2022) for have labs done prior to appt. Following the remote visit today we will call you when we are available to reschedule your follow up appt      PATIENT INSTRUCTIONS:  Patient Instructions   1. Fatigue we will get her oxygen level rechecked with a pulse ox overnight to ensure that that is not the issue. 2.  Rhinorrhea and watery eyes most likely related allergies we will switch over to size all of send a prescription for that  3.   Fecal incontinence mostly just Kegel type exercises are all

## 2021-10-27 NOTE — ASSESSMENT & PLAN NOTE
We will go ahead and get [facility] to recheck her oxygen level on her oxygen at night to make sure that this is adequate.

## 2021-10-27 NOTE — PATIENT INSTRUCTIONS
1.  Fatigue we will get her oxygen level rechecked with a pulse ox overnight to ensure that that is not the issue. 2.  Rhinorrhea and watery eyes most likely related allergies we will switch over to size all of send a prescription for that  3.   Fecal incontinence mostly just Kegel type exercises are all negative able to do to fix that

## 2021-11-02 ENCOUNTER — TRANSCRIBE ORDERS (OUTPATIENT)
Dept: ADMINISTRATIVE | Facility: HOSPITAL | Age: 86
End: 2021-11-02

## 2021-11-02 DIAGNOSIS — R06.02 SHORTNESS OF BREATH: ICD-10-CM

## 2021-11-02 DIAGNOSIS — J84.10 POSTINFLAMMATORY PULMONARY FIBROSIS (HCC): ICD-10-CM

## 2021-11-02 DIAGNOSIS — J84.10 PULMONARY FIBROSIS (HCC): Primary | ICD-10-CM

## 2021-11-22 ENCOUNTER — APPOINTMENT (OUTPATIENT)
Dept: CARDIOLOGY | Facility: HOSPITAL | Age: 86
End: 2021-11-22

## 2021-11-23 ENCOUNTER — HOSPITAL ENCOUNTER (OUTPATIENT)
Dept: CARDIOLOGY | Facility: HOSPITAL | Age: 86
Discharge: HOME | End: 2021-11-23

## 2021-11-23 DIAGNOSIS — J84.10 POSTINFLAMMATORY PULMONARY FIBROSIS (HCC): ICD-10-CM

## 2021-11-23 DIAGNOSIS — R06.02 SHORTNESS OF BREATH: ICD-10-CM

## 2021-11-23 NOTE — NURSING NOTE
Patient arrived for stress testing.  Very wobbly gait and hesitation between steps and with stopping and starting of ambulation.  Daughter states she is unable to perform treadmill test.  [facility] notified and they will call the patient when test is rescheduled.

## 2021-12-13 ENCOUNTER — TRANSCRIBE ORDERS (OUTPATIENT)
Dept: ADMINISTRATIVE | Facility: HOSPITAL | Age: 86
End: 2021-12-13

## 2021-12-13 DIAGNOSIS — J84.10 POSTINFLAMMATORY PULMONARY FIBROSIS (HCC): Primary | ICD-10-CM

## 2021-12-23 ENCOUNTER — TELEPHONE (OUTPATIENT)
Dept: INTERNAL MEDICINE | Age: 86
End: 2021-12-23

## 2021-12-23 NOTE — TELEPHONE ENCOUNTER
S/w pt, c/o runny nose, blood on q-tip when cleaning out ears. Cannot make 10:45, no other openings today. Please advise.

## 2021-12-23 NOTE — TELEPHONE ENCOUNTER
Patient called in stating she has bilateral ear fullness and noticed red spots on qtip when she tries to clean. Thought that it maybe blood as it came out of both ears. No cold symptoms? Patient would like to speak with someone before having to come in the office?     Please call and advise

## 2021-12-29 ENCOUNTER — TELEPHONE (OUTPATIENT)
Dept: INTERNAL MEDICINE | Age: 86
End: 2021-12-29

## 2021-12-29 DIAGNOSIS — F51.01 PRIMARY INSOMNIA: ICD-10-CM

## 2021-12-29 RX ORDER — LORAZEPAM 1 MG/1
TABLET ORAL
Qty: 90 TABLET | Refills: 0 | Status: SHIPPED | OUTPATIENT
Start: 2021-12-29 | End: 2022-01-17

## 2022-01-17 DIAGNOSIS — F51.01 PRIMARY INSOMNIA: ICD-10-CM

## 2022-01-17 RX ORDER — LORAZEPAM 1 MG/1
TABLET ORAL
Qty: 90 TABLET | Refills: 0 | Status: SHIPPED | OUTPATIENT
Start: 2022-01-17 | End: 2022-01-31 | Stop reason: SDUPTHER

## 2022-01-17 NOTE — TELEPHONE ENCOUNTER
Cameron Sheppard called to request a refill on her medication. Last office visit : 10/27/2021   Next office visit : 2/3/2022     Last UDS: No results found for: Doralee Dapper, LABBENZ, BUPRENUR, COCAIMETSCRU, GABAPENTIN, MDMA, METAMPU, OPIATESCREENURINE, OXTCOSU, PHENCYCLIDINESCREENURINE, PROPOXYPHENE, THCSCREENUR, TRICYUR    Last Joaquin: 1/17/22  Medication Contract: 7/28/21   Last Fill: 12/30/2021    Requested Prescriptions     Pending Prescriptions Disp Refills    LORazepam (ATIVAN) 1 MG tablet [Pharmacy Med Name: LORAZEPAM 1MG TABLET] 90 tablet 0     Sig: TAKE 1 TABLET BY MOUTH EVERY 8 HOURS AS NEEDED         Please approve or refuse this medication.    Evin Schooling

## 2022-01-20 ENCOUNTER — TRANSCRIBE ORDERS (OUTPATIENT)
Dept: LAB | Facility: HOSPITAL | Age: 87
End: 2022-01-20

## 2022-01-20 DIAGNOSIS — Z01.812 PRE-PROCEDURE LAB EXAM: Primary | ICD-10-CM

## 2022-01-20 NOTE — TELEPHONE ENCOUNTER
----- Message from Michial Mcburney, MA sent at 1/20/2022  4:31 PM CST -----  Subject: Refill Request    QUESTIONS  Name of Medication? Other - Nystatin powder  Patient-reported dosage and instructions? as needed  How many days do you have left? 0  Preferred Pharmacy? [facility] phone number (if available)? [phone]  ---------------------------------------------------------------------------  --------------  CALL BACK INFO  What is the best way for the office to contact you? OK to leave message on   voicemail  Preferred Call Back Phone Number?  [phone]

## 2022-01-20 NOTE — TELEPHONE ENCOUNTER
Shellie Brooks called requesting a refill of the below medication which has been pended for you:     Requested Prescriptions     Pending Prescriptions Disp Refills    nystatin (MYCOSTATIN) 695655 UNIT/GM powder 1 each 0     Sig: Apply 3 times daily.        Last Appointment Date: 10/27/2021  Next Appointment Date: 2/3/2022    Allergies   Allergen Reactions    Augmentin [Amoxicillin-Pot Clavulanate]     Biaxin [Clarithromycin]     Cefdinir     Effexor [Venlafaxine]     Erythromycin     Lortab [Hydrocodone-Acetaminophen]     Naprosyn [Naproxen]

## 2022-01-22 ENCOUNTER — LAB (OUTPATIENT)
Dept: LAB | Facility: HOSPITAL | Age: 87
End: 2022-01-22

## 2022-01-22 LAB — SARS-COV-2 ORF1AB RESP QL NAA+PROBE: NOT DETECTED

## 2022-01-22 PROCEDURE — C9803 HOPD COVID-19 SPEC COLLECT: HCPCS | Performed by: INTERNAL MEDICINE

## 2022-01-22 PROCEDURE — U0004 COV-19 TEST NON-CDC HGH THRU: HCPCS | Performed by: INTERNAL MEDICINE

## 2022-01-24 ENCOUNTER — HOSPITAL ENCOUNTER (OUTPATIENT)
Dept: PULMONOLOGY | Facility: HOSPITAL | Age: 87
Discharge: HOME | End: 2022-01-24
Admitting: INTERNAL MEDICINE

## 2022-01-24 DIAGNOSIS — J84.10 POSTINFLAMMATORY PULMONARY FIBROSIS: ICD-10-CM

## 2022-01-24 PROCEDURE — 94010 BREATHING CAPACITY TEST: CPT | Performed by: INTERNAL MEDICINE

## 2022-01-24 PROCEDURE — 94726 PLETHYSMOGRAPHY LUNG VOLUMES: CPT | Performed by: INTERNAL MEDICINE

## 2022-01-24 PROCEDURE — 94729 DIFFUSING CAPACITY: CPT | Performed by: INTERNAL MEDICINE

## 2022-01-24 PROCEDURE — 94729 DIFFUSING CAPACITY: CPT

## 2022-01-24 PROCEDURE — 94010 BREATHING CAPACITY TEST: CPT

## 2022-01-24 PROCEDURE — 94726 PLETHYSMOGRAPHY LUNG VOLUMES: CPT

## 2022-01-24 RX ORDER — NYSTATIN 100000 [USP'U]/G
POWDER TOPICAL
Qty: 1 EACH | Refills: 0 | Status: SHIPPED | OUTPATIENT
Start: 2022-01-24

## 2022-01-31 ENCOUNTER — TELEPHONE (OUTPATIENT)
Dept: INTERNAL MEDICINE | Age: 87
End: 2022-01-31

## 2022-01-31 DIAGNOSIS — F51.01 PRIMARY INSOMNIA: ICD-10-CM

## 2022-01-31 DIAGNOSIS — R41.3 MEMORY LOSS: ICD-10-CM

## 2022-01-31 DIAGNOSIS — F41.8 ANXIETY ASSOCIATED WITH DEPRESSION: Primary | ICD-10-CM

## 2022-01-31 RX ORDER — DONEPEZIL HYDROCHLORIDE 5 MG/1
5 TABLET, FILM COATED ORAL NIGHTLY
Qty: 30 TABLET | Refills: 11 | Status: SHIPPED | OUTPATIENT
Start: 2022-01-31 | End: 2022-04-11

## 2022-01-31 RX ORDER — LORAZEPAM 1 MG/1
TABLET ORAL
Qty: 90 TABLET | Refills: 0 | Status: SHIPPED | OUTPATIENT
Start: 2022-01-31 | End: 2022-02-27 | Stop reason: SDUPTHER

## 2022-01-31 RX ORDER — ESOMEPRAZOLE MAGNESIUM 40 MG/1
40 CAPSULE, DELAYED RELEASE ORAL DAILY
Qty: 90 CAPSULE | Refills: 1 | Status: SHIPPED | OUTPATIENT
Start: 2022-01-31 | End: 2022-05-04 | Stop reason: SDUPTHER

## 2022-01-31 RX ORDER — BUSPIRONE HYDROCHLORIDE 5 MG/1
5 TABLET ORAL 2 TIMES DAILY PRN
Qty: 180 TABLET | Refills: 1 | Status: SHIPPED | OUTPATIENT
Start: 2022-01-31 | End: 2022-04-11

## 2022-01-31 NOTE — TELEPHONE ENCOUNTER
Patient didn't get refill from pharmacy on aricept, lorazepam, buspar, and omeprazole. Patient would like for me to call the pharmacy.

## 2022-01-31 NOTE — TELEPHONE ENCOUNTER
Requested Prescriptions     Pending Prescriptions Disp Refills    busPIRone (BUSPAR) 5 MG tablet 180 tablet 1     Sig: Take 1 tablet by mouth 2 times daily as needed (anxiety)    esomeprazole (NEXIUM) 40 MG delayed release capsule 90 capsule 1     Sig: Take 1 capsule by mouth daily    LORazepam (ATIVAN) 1 MG tablet 90 tablet 0     Sig: TAKE 1 TABLET BY MOUTH EVERY 8 HOURS AS NEEDED    donepezil (ARICEPT) 5 MG tablet 30 tablet 11     Sig: Take 1 tablet by mouth nightly     Christina Talbot 1/17/22  Drug Contract 7/28/21  S

## 2022-02-02 ENCOUNTER — OFFICE VISIT (OUTPATIENT)
Dept: INTERNAL MEDICINE | Age: 87
End: 2022-02-02
Payer: MEDICARE

## 2022-02-02 VITALS
OXYGEN SATURATION: 93 % | SYSTOLIC BLOOD PRESSURE: 120 MMHG | BODY MASS INDEX: 30.05 KG/M2 | HEIGHT: 64 IN | WEIGHT: 176 LBS | DIASTOLIC BLOOD PRESSURE: 70 MMHG | HEART RATE: 84 BPM

## 2022-02-02 DIAGNOSIS — J43.8 OTHER EMPHYSEMA (HCC): Primary | ICD-10-CM

## 2022-02-02 DIAGNOSIS — J84.111 IDIOPATHIC INTERSTITIAL PNEUMONIA (HCC): ICD-10-CM

## 2022-02-02 DIAGNOSIS — J96.11 CHRONIC RESPIRATORY FAILURE WITH HYPOXIA (HCC): ICD-10-CM

## 2022-02-02 PROCEDURE — 1036F TOBACCO NON-USER: CPT | Performed by: INTERNAL MEDICINE

## 2022-02-02 PROCEDURE — 4040F PNEUMOC VAC/ADMIN/RCVD: CPT | Performed by: INTERNAL MEDICINE

## 2022-02-02 PROCEDURE — 99213 OFFICE O/P EST LOW 20 MIN: CPT | Performed by: INTERNAL MEDICINE

## 2022-02-02 PROCEDURE — 1123F ACP DISCUSS/DSCN MKR DOCD: CPT | Performed by: INTERNAL MEDICINE

## 2022-02-02 PROCEDURE — 1090F PRES/ABSN URINE INCON ASSESS: CPT | Performed by: INTERNAL MEDICINE

## 2022-02-02 PROCEDURE — 3023F SPIROM DOC REV: CPT | Performed by: INTERNAL MEDICINE

## 2022-02-02 PROCEDURE — G8427 DOCREV CUR MEDS BY ELIG CLIN: HCPCS | Performed by: INTERNAL MEDICINE

## 2022-02-02 PROCEDURE — G8417 CALC BMI ABV UP PARAM F/U: HCPCS | Performed by: INTERNAL MEDICINE

## 2022-02-02 PROCEDURE — G8484 FLU IMMUNIZE NO ADMIN: HCPCS | Performed by: INTERNAL MEDICINE

## 2022-02-02 ASSESSMENT — PATIENT HEALTH QUESTIONNAIRE - PHQ9
5. POOR APPETITE OR OVEREATING: 1
SUM OF ALL RESPONSES TO PHQ9 QUESTIONS 1 & 2: 1
2. FEELING DOWN, DEPRESSED OR HOPELESS: 0
SUM OF ALL RESPONSES TO PHQ QUESTIONS 1-9: 3
8. MOVING OR SPEAKING SO SLOWLY THAT OTHER PEOPLE COULD HAVE NOTICED. OR THE OPPOSITE, BEING SO FIGETY OR RESTLESS THAT YOU HAVE BEEN MOVING AROUND A LOT MORE THAN USUAL: 0
SUM OF ALL RESPONSES TO PHQ QUESTIONS 1-9: 3
4. FEELING TIRED OR HAVING LITTLE ENERGY: 1
3. TROUBLE FALLING OR STAYING ASLEEP: 0
1. LITTLE INTEREST OR PLEASURE IN DOING THINGS: 1
10. IF YOU CHECKED OFF ANY PROBLEMS, HOW DIFFICULT HAVE THESE PROBLEMS MADE IT FOR YOU TO DO YOUR WORK, TAKE CARE OF THINGS AT HOME, OR GET ALONG WITH OTHER PEOPLE: 0
7. TROUBLE CONCENTRATING ON THINGS, SUCH AS READING THE NEWSPAPER OR WATCHING TELEVISION: 0
6. FEELING BAD ABOUT YOURSELF - OR THAT YOU ARE A FAILURE OR HAVE LET YOURSELF OR YOUR FAMILY DOWN: 0
SUM OF ALL RESPONSES TO PHQ QUESTIONS 1-9: 3
9. THOUGHTS THAT YOU WOULD BE BETTER OFF DEAD, OR OF HURTING YOURSELF: 0
SUM OF ALL RESPONSES TO PHQ QUESTIONS 1-9: 3

## 2022-02-02 NOTE — PROGRESS NOTES
Chief Complaint   Patient presents with    Follow-up     DOL recert. 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 Patient is very upset today she was supposed to get to moved to the [place] [address] this is following through due to something with her insurance.   She continues at her baseline she denies fevers chills cough or congestion again she is very upset about finding out about her assisted living not working out    Past Medical History:   Diagnosis Date    Abnormal mammogram     Anxiety     Anxiety associated with depression     Artery disease, cerebral     Ataxia     Benign diastolic hypertension     Carpal tunnel syndrome     idiopathic peripheral    Cellulitis of face     Chronic back pain     COPD (chronic obstructive pulmonary disease) (HCC)     Depression     Disc degeneration, lumbosacral     Diverticulosis     Dysphagia     Esophageal reflux     Fever blister     Herpes simplex     Herpes zoster     Hypertension, essential     Hypertensive heart disease without CHF     Hypoxia     Idiopathic peripheral neuropathy     2017    Insomnia     Major depressive disorder, recurrent episode, moderate (HCC)     Mixed hyperlipidemia     Osteoporosis     Ovarian failure     Pulmonary fibrosis (HCC)     Sciatica     Sleep apnea     Transient cerebral ischemic attack     Urinary incontinence     Weakness of both legs        Past Surgical History:   Procedure Laterality Date    COLONOSCOPY  06/12/2015    Aubree, repeat as needed    COLONOSCOPY N/A 10/21/2020    Dr REANNA Spicer-Diverticular disease, random colon bx negative    HYSTERECTOMY      LUNG SEGMENTECTOMY      lung segmental resection    UPPER GASTROINTESTINAL ENDOSCOPY N/A 10/21/2020    Dr Bárbara Spicer-mild Gastritis, otherwise normal, NEG h pylori, NEG celiac       Family History Problem Relation Age of Onset    Heart Failure Mother     Diabetes Mother     Heart Disease Mother     Diabetes Father     Heart Disease Father     Colon Polyps Neg Hx     Colon Cancer Neg Hx        Social History     Socioeconomic History    Marital status:      Spouse name: Not on file    Number of children: Not on file    Years of education: Not on file    Highest education level: Not on file   Occupational History    Not on file   Tobacco Use    Smoking status: Never Smoker    Smokeless tobacco: Never Used   Vaping Use    Vaping Use: Never used   Substance and Sexual Activity    Alcohol use: No    Drug use: No    Sexual activity: Not on file   Other Topics Concern    Not on file   Social History Narrative    Not on file     Social Determinants of Health     Financial Resource Strain: Low Risk     Difficulty of Paying Living Expenses: Not hard at all   Food Insecurity: No Food Insecurity    Worried About [address] in the Last Year: Never true    [address] [employer] in the Last Year: Never true   Transportation Needs:     Lack of Transportation (Medical): Not on file    Lack of Transportation (Non-Medical):  Not on file   Physical Activity:     Days of Exercise per Week: Not on file    Minutes of Exercise per Session: Not on file   Stress:     Feeling of Stress : Not on file   Social Connections:     Frequency of Communication with Friends and Family: Not on file    Frequency of Social Gatherings with Friends and Family: Not on file    Attends Islam Services: Not on file    Active Member of Clubs or Organizations: Not on file    Attends Club or Organization Meetings: Not on file    Marital Status: Not on file   Intimate Partner Violence:     Fear of Current or Ex-Partner: Not on file    Emotionally Abused: Not on file    Physically Abused: Not on file    Sexually Abused: Not on file   Housing Stability:     Unable to Pay for Housing in the Last Year: Not on file    Number of Places Lived in the Last Year: Not on file    Unstable Housing in the Last Year: Not on file       Allergies   Allergen Reactions    Augmentin [Amoxicillin-Pot Clavulanate]     Biaxin [Clarithromycin]     Cefdinir     Effexor [Venlafaxine]     Erythromycin     Lortab [Hydrocodone-Acetaminophen]     Naprosyn [Naproxen]        Current Outpatient Medications   Medication Sig Dispense Refill    busPIRone (BUSPAR) 5 MG tablet Take 1 tablet by mouth 2 times daily as needed (anxiety) 180 tablet 1    esomeprazole (NEXIUM) 40 MG delayed release capsule Take 1 capsule by mouth daily 90 capsule 1    LORazepam (ATIVAN) 1 MG tablet TAKE 1 TABLET BY MOUTH EVERY 8 HOURS AS NEEDED 90 tablet 0    donepezil (ARICEPT) 5 MG tablet Take 1 tablet by mouth nightly 30 tablet 11    nystatin (MYCOSTATIN) 425645 UNIT/GM powder Apply 3 times daily.  1 each 0    levocetirizine (XYZAL) 5 MG tablet Take 1 tablet by mouth nightly 90 tablet 1    ondansetron (ZOFRAN-ODT) 4 MG disintegrating tablet Take 1 tablet by mouth every 8 hours as needed for Nausea or Vomiting 30 tablet 0    atorvastatin (LIPITOR) 20 MG tablet Take 0.5 tablets by mouth daily 90 tablet 1    clopidogrel (PLAVIX) 75 MG tablet Take 1 tablet daily 90 tablet 1    colestipol (COLESTID) 1 g tablet TAKE 1 TABLET TWICE A  tablet 1    escitalopram (LEXAPRO) 20 MG tablet TAKE 1 TABLET DAILY 90 tablet 1    levothyroxine (SYNTHROID) 25 MCG tablet TAKE 1 TABLET 3 DAYS A WEEK AND TAKE 2 TABLETS 4 DAYS A WEEK 390 tablet 1    losartan-hydroCHLOROthiazide (HYZAAR) 100-12.5 MG per tablet TAKE 1 TABLET DAILY 90 tablet 1    mirabegron (MYRBETRIQ) 50 MG TB24 TAKE 1 TABLET DAILY 90 tablet 1    mirtazapine (REMERON) 30 MG tablet TAKE 1 TABLET NIGHTLY 90 tablet 1    levocetirizine (XYZAL) 5 MG tablet Take 1 tablet by mouth nightly 90 tablet 3    aspirin 81 MG EC tablet Take 81 mg by mouth daily      acetaminophen (TYLENOL) 500 MG tablet Take 500 mg by mouth 2 times daily 2 TABLET IN THE AM & 2 TABLETS PM      diclofenac sodium (VOLTAREN) 1 % GEL Apply 4 g topically 3 times daily as needed for Pain 5 Tube 1    Multiple Vitamins-Minerals (CENTRUM SILVER) TABS Take by mouth daily      OXYGEN Inhale into the lungs 2L at [facility] [place]       No current facility-administered medications for this visit. Review of Systems    /70   Pulse 84   Ht 5' 4\" (1.626 m)   Wt 176 lb (79.8 kg)   SpO2 93%   BMI 30.21 kg/m²   BP Readings from Last 7 Encounters:   02/02/22 120/70   09/13/21 124/88   07/28/21 132/78   04/26/21 118/78   01/20/21 118/60   10/21/20 (!) 188/87   10/21/20 (!) 179/81     Wt Readings from Last 7 Encounters:   02/02/22 176 lb (79.8 kg)   09/13/21 280 lb (127 kg)   07/28/21 182 lb 8 oz (82.8 kg)   04/26/21 187 lb (84.8 kg)   01/20/21 184 lb (83.5 kg)   10/21/20 183 lb (83 kg)   10/19/20 185 lb (83.9 kg)     BMI Readings from Last 7 Encounters:   02/02/22 30.21 kg/m²   09/13/21 48.06 kg/m²   07/28/21 30.84 kg/m²   04/26/21 31.60 kg/m²   01/20/21 31.10 kg/m²   10/21/20 30.93 kg/m²   10/19/20 30.79 kg/m²     Resp Readings from Last 7 Encounters:   09/13/21 16   07/28/21 16   01/20/21 20   10/21/20 (!) 1   10/21/20 20   10/30/19 18   10/01/19 18       Physical Exam  Constitutional:       General: She is not in acute distress. Eyes:      General: No scleral icterus. Cardiovascular:      Heart sounds: Normal heart sounds. Pulmonary:      Breath sounds: Normal breath sounds. Musculoskeletal:      Cervical back: Neck supple. Lymphadenopathy:      Cervical: No cervical adenopathy. Skin:     Findings: No rash. Results for orders placed or performed in visit on 10/11/21   Culture, Urine    Specimen: Urine, clean catch   Result Value Ref Range    Urine Culture, Routine       <50,000 CFU/ml mixed skin/urogenital harish.  No further workup   TSH without Reflex   Result Value Ref Range    TSH 2.790 0.270 - 4.200 uIU/mL   Urinalysis Reflex to Culture

## 2022-02-14 PROBLEM — J84.111 IDIOPATHIC INTERSTITIAL PNEUMONIA (HCC): Status: ACTIVE | Noted: 2022-02-14

## 2022-02-14 PROBLEM — J96.11 CHRONIC RESPIRATORY FAILURE WITH HYPOXIA (HCC): Status: ACTIVE | Noted: 2022-02-14

## 2022-02-21 RX ORDER — ESCITALOPRAM OXALATE 20 MG/1
TABLET ORAL
Qty: 90 TABLET | Refills: 1 | Status: SHIPPED | OUTPATIENT
Start: 2022-02-21 | End: 2022-05-04 | Stop reason: SDUPTHER

## 2022-02-21 NOTE — TELEPHONE ENCOUNTER
Shellie Brooks called requesting a refill of the below medication which has been pended for you:     Requested Prescriptions     Pending Prescriptions Disp Refills    escitalopram (LEXAPRO) 20 MG tablet 90 tablet 1     Sig: TAKE 1 TABLET DAILY       Last Appointment Date: 10/27/2021  Next Appointment Date: Visit date not found    Allergies   Allergen Reactions    Augmentin [Amoxicillin-Pot Clavulanate]     Biaxin [Clarithromycin]     Cefdinir     Effexor [Venlafaxine]     Erythromycin     Lortab [Hydrocodone-Acetaminophen]     Naprosyn [Naproxen]

## 2022-02-27 DIAGNOSIS — F51.01 PRIMARY INSOMNIA: ICD-10-CM

## 2022-02-28 RX ORDER — LORAZEPAM 1 MG/1
TABLET ORAL
Qty: 90 TABLET | Refills: 0 | Status: SHIPPED | OUTPATIENT
Start: 2022-02-28 | End: 2022-03-25 | Stop reason: SDUPTHER

## 2022-02-28 NOTE — TELEPHONE ENCOUNTER
Padmaja Gavin called to request a refill on her medication. Last office visit : 2/2/2022   Next office visit : Visit date not found     Last UDS: No results found for: Yamila Shellie, LABBENZ, BUPRENUR, COCAIMETSCRU, GABAPENTIN, MDMA, METAMPU, OPIATESCREENURINE, OXTCOSU, PHENCYCLIDINESCREENURINE, PROPOXYPHENE, THCSCREENUR, TRICYUR    Last Joaquin: 2/28/22  Medication Contract: 7/28/21   Last Fill: 1/27/22    Requested Prescriptions     Pending Prescriptions Disp Refills    LORazepam (ATIVAN) 1 MG tablet 90 tablet 0     Sig: TAKE 1 TABLET BY MOUTH EVERY 8 HOURS AS NEEDED         Please approve or refuse this medication.    Sebastián Knox

## 2022-03-07 ENCOUNTER — OFFICE VISIT (OUTPATIENT)
Dept: INTERNAL MEDICINE | Facility: CLINIC | Age: 87
End: 2022-03-07

## 2022-03-07 VITALS
HEIGHT: 65 IN | WEIGHT: 161 LBS | TEMPERATURE: 98.3 F | HEART RATE: 101 BPM | RESPIRATION RATE: 18 BRPM | SYSTOLIC BLOOD PRESSURE: 122 MMHG | DIASTOLIC BLOOD PRESSURE: 70 MMHG | OXYGEN SATURATION: 94 % | BODY MASS INDEX: 26.82 KG/M2

## 2022-03-07 DIAGNOSIS — Z77.018 BERYLLIUM EXPOSURE: ICD-10-CM

## 2022-03-07 DIAGNOSIS — J44.9 CHRONIC OBSTRUCTIVE PULMONARY DISEASE, UNSPECIFIED COPD TYPE: ICD-10-CM

## 2022-03-07 DIAGNOSIS — J84.111 IDIOPATHIC INTERSTITIAL PNEUMONIA: ICD-10-CM

## 2022-03-07 DIAGNOSIS — J96.11 CHRONIC RESPIRATORY FAILURE WITH HYPOXIA: Primary | ICD-10-CM

## 2022-03-07 PROCEDURE — 99213 OFFICE O/P EST LOW 20 MIN: CPT | Performed by: INTERNAL MEDICINE

## 2022-03-07 NOTE — PROGRESS NOTES
"Chief Complaint   Patient presents with   • Follow-up   • Other     DOL PAPERWORK         History:  La Muller is a 89 y.o. female who presents today for evaluation of the above problems.      She presents today for follow-up on [employer] paperwork and recertification.    She presents today with Ivette, her  who provides assistance with history    Today, she is feeling weak and shaky.  Her breathing is worse today as well.  She states this is related to being more active today than what she was typically used to.  Additionally, she did not bring her home oxygen with her today.  Her O2 level is good today in the office.    Her [employer] is [employer].  She has had difficulty in her living situation.  She is not happy living with her daughter and granddaughter and I do not feel she was getting very good care.  Therefore, she went to live with her sister and this created more strife within the family.  She is now back living with her daughter.  However, she does not feel this is a safe option for her.  Physically, she is worried about falling in the home as there are 2 very large dogs and she fears she may trip.  She is not comfortable her happy living there.     She has looked at moving to the [facility] and feels this is a very good option for her.  She states it will help her health \"90 to 95%\" she also states she will feel like she will be light out of her cage because she will have \"my own little place that is nice and clean\".  She can have her meals brought to her or go out to the dining room if she feels.  She also feels she will have great peace of mind and will not have to worry if she [place].    It should also be noted that she has seen a couple other primary care providers since last time I saw her in July.  She has made a decision to get her medical care here at our office.    HPI      ROS:  Review of Systems   Constitutional: Positive for activity change and fatigue. "   Respiratory: Positive for shortness of breath.    Musculoskeletal: Positive for gait problem.   Neurological: Positive for dizziness, tremors and weakness. Negative for light-headedness.         Current Outpatient Medications:   •  atorvastatin (LIPITOR) 20 MG tablet, Take 20 mg by mouth., Disp: , Rfl:   •  busPIRone (BUSPAR) 5 MG tablet, Take 5 mg by mouth 2 (Two) Times a Day., Disp: , Rfl:   •  clopidogrel (PLAVIX) 75 MG tablet, Take 75 mg by mouth Daily., Disp: , Rfl:   •  colestipol (COLESTID) 1 g tablet, Take 1 g by mouth 2 (Two) Times a Day., Disp: , Rfl:   •  donepezil (ARICEPT) 5 MG tablet, Take 1 tablet by mouth Every Night., Disp: , Rfl:   •  escitalopram (LEXAPRO) 20 MG tablet, Take 20 mg by mouth Daily., Disp: , Rfl:   •  esomeprazole (nexIUM) 40 MG capsule, Take 40 mg by mouth., Disp: , Rfl:   •  LORazepam (ATIVAN) 1 MG tablet, Take 1.5 tablets by mouth Every Night., Disp: 45 tablet, Rfl: 2  •  losartan-hydrochlorothiazide (HYZAAR) 100-12.5 MG per tablet, TAKE 1 TABLET DAILY, Disp: , Rfl:   •  Mirabegron ER (MYRBETRIQ) 50 MG tablet sustained-release 24 hour 24 hr tablet, Take 50 mg by mouth Daily., Disp: , Rfl:   •  mirtazapine (REMERON) 30 MG tablet, Take 1 tablet by mouth Daily., Disp: , Rfl:   •  Multiple Vitamins-Minerals (CENTRUM SILVER) tablet, Take  by mouth., Disp: , Rfl:   •  O2 (OXYGEN), Inhale., Disp: , Rfl:   •  Synthroid 25 MCG tablet, Take 1 tablet by mouth Daily., Disp: , Rfl:     Lab Results   Component Value Date    GLUCOSE 118 (H) 10/11/2021    BUN 20 10/11/2021    CREATININE 0.9 10/11/2021    EGFRIFNONA 59 (A) 10/11/2021    EGFRIFAFRI >59 10/11/2021    K 3.9 10/11/2021    CO2 29 10/11/2021    CALCIUM 9.9 10/11/2021    ALBUMIN 4.4 10/11/2021    AST 32 10/11/2021    ALT 16 10/11/2021       WBC   Date Value Ref Range Status   10/11/2021 10.9 (H) 4.8 - 10.8 K/uL Final     RBC   Date Value Ref Range Status   10/11/2021 4.39 4.20 - 5.40 M/uL Final     Hemoglobin   Date Value Ref Range  "Status   10/11/2021 13.0 12.0 - 16.0 g/dL Final     Hematocrit   Date Value Ref Range Status   10/11/2021 42.0 37.0 - 47.0 % Final     MCV   Date Value Ref Range Status   10/11/2021 95.7 81.0 - 99.0 fL Final     MCH   Date Value Ref Range Status   10/11/2021 29.6 27.0 - 31.0 pg Final     MCHC   Date Value Ref Range Status   10/11/2021 31.0 (L) 33.0 - 37.0 g/dL Final     RDW   Date Value Ref Range Status   10/11/2021 13.2 11.5 - 14.5 % Final     MPV   Date Value Ref Range Status   10/11/2021 10.1 9.4 - 12.3 fL Final     Platelets   Date Value Ref Range Status   10/11/2021 328 130 - 400 K/uL Final     Neutrophil Rel %   Date Value Ref Range Status   10/11/2021 55.7 50.0 - 65.0 % Final     Lymphocyte Rel %   Date Value Ref Range Status   10/11/2021 32.0 20.0 - 40.0 % Final     Monocyte Rel %   Date Value Ref Range Status   10/11/2021 9.3 0.0 - 10.0 % Final     Eosinophil Rel %   Date Value Ref Range Status   10/11/2021 2.4 0.0 - 5.0 % Final     Basophil Rel %   Date Value Ref Range Status   10/11/2021 0.3 0.0 - 1.0 % Final     Neutrophils Absolute   Date Value Ref Range Status   10/11/2021 6.1 1.5 - 7.5 K/uL Final     Lymphocytes Absolute   Date Value Ref Range Status   10/11/2021 3.5 1.1 - 4.5 K/uL Final     Monocytes Absolute   Date Value Ref Range Status   10/11/2021 1.00 (H) 0.00 - 0.90 K/uL Final     Eosinophils Absolute   Date Value Ref Range Status   10/11/2021 0.30 0.00 - 0.60 K/uL Final     Basophils Absolute   Date Value Ref Range Status   10/11/2021 0.00 0.00 - 0.20 K/uL Final     Immature Grans, Absolute   Date Value Ref Range Status   10/11/2021 0.0 K/uL Final         OBJECTIVE:  Visit Vitals  /70 (BP Location: Left arm, Patient Position: Sitting, Cuff Size: Adult)   Pulse 101   Temp 98.3 °F (36.8 °C) (Oral)   Resp 18   Ht 165.1 cm (65\")   Wt 73 kg (161 lb)   SpO2 94%   BMI 26.79 kg/m²      Physical Exam  Vitals and nursing note reviewed.   Constitutional:       General: She is not in acute " distress.     Appearance: She is well-developed. She is not diaphoretic.      Comments: Very pleasant   HENT:      Head: Normocephalic and atraumatic.   Eyes:      Pupils: Pupils are equal, round, and reactive to light.   Neck:      Thyroid: No thyromegaly.      Trachea: Phonation normal.   Cardiovascular:      Rate and Rhythm: Normal rate and regular rhythm.      Heart sounds: No murmur heard.  Pulmonary:      Effort: Pulmonary effort is normal. No respiratory distress.      Breath sounds: Normal breath sounds. Decreased air movement present.      Comments: Decreased breath sounds.  There is no wheezing or rales or rhonchi on my exam today.  Skin:     Coloration: Skin is not pale.      Findings: No erythema.   Neurological:      Mental Status: She is alert.   Psychiatric:         Behavior: Behavior normal.         Thought Content: Thought content normal.         Judgment: Judgment normal.         Assessment/Plan    Diagnoses and all orders for this visit:    1. Chronic respiratory failure with hypoxia (HCC) (Primary)    2. Beryllium exposure    3. Idiopathic interstitial pneumonia (HCC)    4. Chronic obstructive pulmonary disease, unspecified COPD type (HCC)      It is my medical opinion that La Muller would benefit from assisted living facility admission.  I believe both her physical and mental health would benefit from this type of living situation.    I have completed her precertification paperwork and we will send this over to [facility].    I have also written a letter for [facility] as well.    Follow-up in 3 months for Medicare wellness or sooner if needed.    27 minutes spent total on the day of the visit    Return in about 3 months (around 6/7/2022) for Medicare Wellness.      ROOSEVELT Ballesteros MD  14:34 CST  3/7/2022

## 2022-03-17 ENCOUNTER — TELEPHONE (OUTPATIENT)
Dept: INTERNAL MEDICINE | Facility: CLINIC | Age: 87
End: 2022-03-17

## 2022-03-17 ENCOUNTER — NURSE TRIAGE (OUTPATIENT)
Dept: CALL CENTER | Facility: HOSPITAL | Age: 87
End: 2022-03-17

## 2022-03-17 NOTE — TELEPHONE ENCOUNTER
Attempted to contact patient to schedule no answer. Left vm to let patient know to call the office back and schedule an appointment

## 2022-03-17 NOTE — TELEPHONE ENCOUNTER
Explained to call that her request is in the computer from 12:40 today.  Often times, the doctor calls in medications at the end of the day.  Please allow til 5:00 and check with pharmacy. If nothing has been sent and do not hear from office then check with them tomorrow. She has been self treating and prefers to not come to the office is can avoid it.  Afebrile.    Reason for Disposition  • Prescription request for new medicine (not a refill)    Additional Information  • Negative: [1] Intentional drug overdose AND [2] suicidal thoughts or ideas  • Negative: Drug overdose and triager unable to answer question  • Negative: Caller requesting information unrelated to medicine  • Negative: Caller requesting information about COVID-19 Vaccine  • Negative: Caller requesting information about Emergency Contraception  • Negative: Caller requesting information about Combined Birth Control Pills  • Negative: Caller requesting information about Progestin Birth Control Pills  • Negative: Caller requesting information about Post-Op pain or medicines  • Negative: Caller requesting a prescription antibiotic (such as Penicillin) for Strep throat and has a positive culture result  • Negative: Caller requesting a prescription anti-viral med (such as Tamiflu) and has influenza (flu)  symptoms  • Negative: Immunization reaction suspected  • Negative: Rash while taking a medicine or within 3 days of stopping it  • Negative: [1] Asthma and [2] having symptoms of asthma (cough, wheezing, etc.)  • Negative: [1] Symptom of illness (e.g., headache, abdominal pain, earache, vomiting) AND [2] more than mild  • Negative: Breastfeeding questions about mother's medicines and diet  • Negative: MORE THAN A DOUBLE DOSE of a prescription or over-the-counter (OTC) drug  • Negative: [1] DOUBLE DOSE (an extra dose or lesser amount) of prescription drug AND [2] any symptoms (e.g., dizziness, nausea, pain, sleepiness)  • Negative: [1] DOUBLE DOSE (an  "extra dose or lesser amount) of over-the-counter (OTC) drug AND [2] any symptoms (e.g., dizziness, nausea, pain, sleepiness)  • Negative: Took another person's prescription drug  • Negative: [1] DOUBLE DOSE (an extra dose or lesser amount) of prescription drug AND [2] NO symptoms (Exception: a double dose of antibiotics)  • Negative: Diabetes drug error or overdose (e.g., took wrong type of insulin or took extra dose)  • Negative: [1] Prescription refill request for ESSENTIAL medicine (i.e., likelihood of harm to patient if not taken) AND [2] triager unable to refill per department policy  • Negative: [1] Prescription not at pharmacy AND [2] was prescribed by PCP recently (Exception: triager has access to EMR and prescription is recorded there. Go to Home Care and confirm for pharmacy.)  • Negative: [1] Pharmacy calling with prescription question AND [2] triager unable to answer question  • Negative: [1] Caller has URGENT medicine question about med that PCP or specialist prescribed AND [2] triager unable to answer question  • Negative: Medicine patch causing local rash or itching  • Negative: [1] Caller has medicine question about med NOT prescribed by PCP AND [2] triager unable to answer question (e.g., compatibility with other med, storage)    Answer Assessment - Initial Assessment Questions  1. NAME of MEDICATION: \"What medicine are you calling about?\"      Antibiotic requested for sinus pressure and congestion  2. QUESTION: \"What is your question?\" (e.g., medication refill, side effect)      See above  3. PRESCRIBING HCP: \"Who prescribed it?\" Reason: if prescribed by specialist, call should be referred to that group.      Dr. Ballesteros  4. SYMPTOMS: \"Do you have any symptoms?\"      She states she has sinusitis  5. SEVERITY: If symptoms are present, ask \"Are they mild, moderate or severe?\"      moderate  6. PREGNANCY:  \"Is there any chance that you are pregnant?\" \"When was your last menstrual period?\"      " na    Protocols used: MEDICATION QUESTION CALL-ADULT-AH

## 2022-03-17 NOTE — TELEPHONE ENCOUNTER
Caller: La Muller    Relationship: Self    Best call back number: [phone]    What medication are you requesting:      Something to treat    What are your current symptoms:      Sinus pressure and pain. Tooth pain.     How long have you been experiencing symptoms:     3 days    Have you had these symptoms before:    [] Yes  [] No    Have you been treated for these symptoms before:   [] Yes  [] No    If a prescription is needed, what is your preferred pharmacy and phone number:        [facility] - [address] - [phone] [facility] [phone] FX     Additional notes:     Patient has been taking 6 tylenol per day to help with the pain. She states it dulls the pain, but it comes back.

## 2022-03-25 DIAGNOSIS — F51.01 PRIMARY INSOMNIA: ICD-10-CM

## 2022-03-25 NOTE — TELEPHONE ENCOUNTER
Requested Prescriptions     Pending Prescriptions Disp Refills    LORazepam (ATIVAN) 1 MG tablet 90 tablet 0     Sig: TAKE 1 TABLET BY MOUTH EVERY 8 HOURS AS NEEDED       Earlene Grant 2/28/22  Drug Contract 7/28/21

## 2022-03-28 RX ORDER — LORAZEPAM 1 MG/1
TABLET ORAL
Qty: 90 TABLET | Refills: 0 | Status: SHIPPED | OUTPATIENT
Start: 2022-03-28 | End: 2022-04-25 | Stop reason: SDUPTHER

## 2022-04-07 ENCOUNTER — TELEPHONE (OUTPATIENT)
Dept: INTERNAL MEDICINE | Age: 87
End: 2022-04-07

## 2022-04-07 NOTE — TELEPHONE ENCOUNTER
Pt's daughter states that she has noticed the pt having some memory issues. States that the pt is getting to where she can not remember things like where she has placed money, blaming people as if they are taking the money, states that she throws a fit it the daughter is not around. Daughter has noticed this more since she has been traveling to help with vaccines. States that she convinced another family member that they stole more money, went to stay with a different family member. Stayed there for 2 weeks, then she came back home and only stayed home for 2 days, then went back to aunt Scott Arroyo, then the [facility] tried to place the pt in an assisted living home, but the pt's daughter came back home and states that she does not want the pt in a nursing home. .. States that the pt is not remembering even being at the [facility]. Pt's daughter wants Roxy Tucker to see her to help evaluate the memory issues. They are thinking she has alzheimer's. They have the means to take care of her at home and have access to get help from the [facility] as well.

## 2022-04-11 ENCOUNTER — HOSPITAL ENCOUNTER (OUTPATIENT)
Dept: CT IMAGING | Age: 87
Discharge: HOME | End: 2022-04-11
Payer: MEDICARE

## 2022-04-11 ENCOUNTER — OFFICE VISIT (OUTPATIENT)
Dept: INTERNAL MEDICINE | Age: 87
End: 2022-04-11
Payer: MEDICARE

## 2022-04-11 VITALS — SYSTOLIC BLOOD PRESSURE: 120 MMHG | HEART RATE: 72 BPM | DIASTOLIC BLOOD PRESSURE: 70 MMHG | OXYGEN SATURATION: 95 %

## 2022-04-11 DIAGNOSIS — F41.8 ANXIETY ASSOCIATED WITH DEPRESSION: Primary | ICD-10-CM

## 2022-04-11 DIAGNOSIS — F41.8 ANXIETY ASSOCIATED WITH DEPRESSION: ICD-10-CM

## 2022-04-11 DIAGNOSIS — R41.3 MEMORY LOSS: ICD-10-CM

## 2022-04-11 DIAGNOSIS — Z00.00 WELL ADULT EXAM: ICD-10-CM

## 2022-04-11 LAB
ALBUMIN SERPL-MCNC: 4.4 G/DL (ref 3.5–5.2)
ALP BLD-CCNC: 69 U/L (ref 35–104)
ALT SERPL-CCNC: 14 U/L (ref 5–33)
ANION GAP SERPL CALCULATED.3IONS-SCNC: 13 MMOL/L (ref 7–19)
AST SERPL-CCNC: 33 U/L (ref 5–32)
BACTERIA: NEGATIVE /HPF
BASOPHILS ABSOLUTE: 0 K/UL (ref 0–0.2)
BASOPHILS RELATIVE PERCENT: 0.2 % (ref 0–1)
BILIRUB SERPL-MCNC: 0.3 MG/DL (ref 0.2–1.2)
BILIRUBIN URINE: NEGATIVE
BLOOD, URINE: NEGATIVE
BUN BLDV-MCNC: 21 MG/DL (ref 8–23)
CALCIUM SERPL-MCNC: 10 MG/DL (ref 8.8–10.2)
CHLORIDE BLD-SCNC: 104 MMOL/L (ref 98–111)
CHOLESTEROL, TOTAL: 199 MG/DL (ref 160–199)
CLARITY: CLEAR
CO2: 28 MMOL/L (ref 22–29)
COLOR: ABNORMAL
CREAT SERPL-MCNC: 0.8 MG/DL (ref 0.5–0.9)
CRYSTALS, UA: ABNORMAL /HPF
EOSINOPHILS ABSOLUTE: 0.2 K/UL (ref 0–0.6)
EOSINOPHILS RELATIVE PERCENT: 1.6 % (ref 0–5)
EPITHELIAL CELLS, UA: 1 /HPF (ref 0–5)
GFR AFRICAN AMERICAN: >59
GFR NON-AFRICAN AMERICAN: >60
GLUCOSE BLD-MCNC: 92 MG/DL (ref 74–109)
GLUCOSE URINE: NEGATIVE MG/DL
HCT VFR BLD CALC: 40.1 % (ref 37–47)
HDLC SERPL-MCNC: 52 MG/DL (ref 65–121)
HEMOGLOBIN: 12.6 G/DL (ref 12–16)
HYALINE CASTS: 0 /HPF (ref 0–8)
IMMATURE GRANULOCYTES #: 0 K/UL
KETONES, URINE: ABNORMAL MG/DL
LDL CHOLESTEROL CALCULATED: 105 MG/DL
LEUKOCYTE ESTERASE, URINE: ABNORMAL
LYMPHOCYTES ABSOLUTE: 3.5 K/UL (ref 1.1–4.5)
LYMPHOCYTES RELATIVE PERCENT: 32.9 % (ref 20–40)
MCH RBC QN AUTO: 30.1 PG (ref 27–31)
MCHC RBC AUTO-ENTMCNC: 31.4 G/DL (ref 33–37)
MCV RBC AUTO: 95.7 FL (ref 81–99)
MONOCYTES ABSOLUTE: 1.2 K/UL (ref 0–0.9)
MONOCYTES RELATIVE PERCENT: 11.4 % (ref 0–10)
NEUTROPHILS ABSOLUTE: 5.6 K/UL (ref 1.5–7.5)
NEUTROPHILS RELATIVE PERCENT: 53.6 % (ref 50–65)
NITRITE, URINE: NEGATIVE
PDW BLD-RTO: 13.5 % (ref 11.5–14.5)
PH UA: 5.5 (ref 5–8)
PLATELET # BLD: 315 K/UL (ref 130–400)
PMV BLD AUTO: 11 FL (ref 9.4–12.3)
POTASSIUM SERPL-SCNC: 4.7 MMOL/L (ref 3.5–5)
PROTEIN UA: NEGATIVE MG/DL
RBC # BLD: 4.19 M/UL (ref 4.2–5.4)
RBC UA: 1 /HPF (ref 0–4)
SODIUM BLD-SCNC: 145 MMOL/L (ref 136–145)
SPECIFIC GRAVITY UA: 1.03 (ref 1–1.03)
TOTAL PROTEIN: 6.7 G/DL (ref 6.6–8.7)
TRIGL SERPL-MCNC: 211 MG/DL (ref 0–149)
TSH SERPL DL<=0.05 MIU/L-ACNC: 1.33 UIU/ML (ref 0.27–4.2)
UROBILINOGEN, URINE: 1 E.U./DL
VITAMIN D 25-HYDROXY: 37.5 NG/ML
WBC # BLD: 10.5 K/UL (ref 4.8–10.8)
WBC UA: 1 /HPF (ref 0–5)

## 2022-04-11 PROCEDURE — 1090F PRES/ABSN URINE INCON ASSESS: CPT | Performed by: NURSE PRACTITIONER

## 2022-04-11 PROCEDURE — G8417 CALC BMI ABV UP PARAM F/U: HCPCS | Performed by: NURSE PRACTITIONER

## 2022-04-11 PROCEDURE — G8427 DOCREV CUR MEDS BY ELIG CLIN: HCPCS | Performed by: NURSE PRACTITIONER

## 2022-04-11 PROCEDURE — 70450 CT HEAD/BRAIN W/O DYE: CPT

## 2022-04-11 PROCEDURE — 1123F ACP DISCUSS/DSCN MKR DOCD: CPT | Performed by: NURSE PRACTITIONER

## 2022-04-11 PROCEDURE — 99214 OFFICE O/P EST MOD 30 MIN: CPT | Performed by: NURSE PRACTITIONER

## 2022-04-11 PROCEDURE — 4040F PNEUMOC VAC/ADMIN/RCVD: CPT | Performed by: NURSE PRACTITIONER

## 2022-04-11 PROCEDURE — 1036F TOBACCO NON-USER: CPT | Performed by: NURSE PRACTITIONER

## 2022-04-11 RX ORDER — BUSPIRONE HYDROCHLORIDE 5 MG/1
5 TABLET ORAL 3 TIMES DAILY PRN
Qty: 180 TABLET | Refills: 1 | Status: SHIPPED | OUTPATIENT
Start: 2022-04-11 | End: 2022-05-31 | Stop reason: SDUPTHER

## 2022-04-11 RX ORDER — DONEPEZIL HYDROCHLORIDE 10 MG/1
10 TABLET, FILM COATED ORAL NIGHTLY
Qty: 90 TABLET | Refills: 1 | Status: SHIPPED | OUTPATIENT
Start: 2022-04-11 | End: 2022-06-21 | Stop reason: SDUPTHER

## 2022-04-11 ASSESSMENT — ENCOUNTER SYMPTOMS
EYE DISCHARGE: 0
NAUSEA: 0
COLOR CHANGE: 0
SHORTNESS OF BREATH: 0
WHEEZING: 0
EYE ITCHING: 0
STRIDOR: 0
CHOKING: 0
ABDOMINAL PAIN: 0
ABDOMINAL DISTENTION: 0
TROUBLE SWALLOWING: 0
CONSTIPATION: 0
VOMITING: 0
DIARRHEA: 0
SORE THROAT: 0
COUGH: 0
BLOOD IN STOOL: 0

## 2022-04-11 NOTE — PATIENT INSTRUCTIONS
1.  Anxiety increase BuSpar to 5-3 times a day as needed  2. Depression we will continue the current dose of Lexapro  3. Memory impairment we will increase the Aricept to 10 mg daily.   We will also get labs on her today to ensure that there is nothing chemically that is causing this we will also get a CT

## 2022-04-11 NOTE — PROGRESS NOTES
[facility] PHYSICIAN SERVICES  [facility] INTERNAL MEDICINE  [address] [address]  [address] [address]  Dept: [phone]  Dept Fax: [phone]: Ana María Gutiérrez (:  1932) is a 80 y.o. female,Established patient  with green, here for evaluation of the following chief complaint(s): Other (memory loss per daughter)      Stacy Easton is a 80 y.o. female who presents today for her medical conditions/complaints as noted below. Stacy Easton is c/teresa Other (memory loss per daughter)        HPI:     Chief Complaint   Patient presents with    Other     memory loss per daughter     HPI   1. Memory loss  She is on aricept 5 mg   [facility] and [facility] and stayed a month with her sister; She was going to move to the [facility] [place]: but since that time she has decided to go back home   Her daughter says she was there over a month; She had intended to only spend the night; She felt she lost touch for a while  ; from her daughter, she had everything changed on he rmeds; her will, all her income; She reported her daughter as not taking care of her and they lost$20,000 a nonth from DO: as she was her primary care giver;;  She doesn't remember doing that;  ;  Her daughter says she reported her daughter as not taking care of her and there was a no compete clasu and the daughter was working out of town at the time    She is now concerned about leaving  Her and gong to work as contract nurse   Her daughter has [address] gotten everything back to the the way it was except the DOL  She fears taht income is lost;    She is going to try to get her a VA waiver;        Nurse at [employer], 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      She tells me that she knows she got \"off\"  She says she went to her sisters and thought she was just there a few days, no idea now many days; She went back to [facility], then left again;;  While the granddaughter was out for 2 hours, the sister came back and too her back to her house;   She says she doesn't remember any of this happening    Past Medical History:   Diagnosis Date    Abnormal mammogram     Anxiety     Anxiety associated with depression     Artery disease, cerebral     Ataxia     Benign diastolic hypertension     Carpal tunnel syndrome     idiopathic peripheral    Cellulitis of face     Chronic back pain     COPD (chronic obstructive pulmonary disease) (HCC)     Depression     Disc degeneration, lumbosacral     Diverticulosis     Dysphagia     Esophageal reflux     Fever blister     Herpes simplex     Herpes zoster     Hypertension, essential     Hypertensive heart disease without CHF     Hypoxia     Idiopathic peripheral neuropathy     2017    Insomnia     Major depressive disorder, recurrent episode, moderate (HCC)     Mixed hyperlipidemia     Osteoporosis     Ovarian failure     Pulmonary fibrosis (HCC)     Sciatica     Sleep apnea     Transient cerebral ischemic attack     Urinary incontinence     Weakness of both legs       Past Surgical History:   Procedure Laterality Date    COLONOSCOPY  06/12/2015    Aubree, repeat as needed    COLONOSCOPY N/A 10/21/2020    Dr REANNA Spicer-Diverticular disease, random colon bx negative    HYSTERECTOMY      LUNG SEGMENTECTOMY      lung segmental resection    UPPER GASTROINTESTINAL ENDOSCOPY N/A 10/21/2020    Dr Gilmar Spicer-mild Gastritis, otherwise normal, NEG h pylori, NEG celiac       Vitals 4/11/2022 2/2/2022 9/13/2021 7/28/2021 4/26/2021 3/94/0500   SYSTOLIC 645 222 990 198 412 024   DIASTOLIC 70 70 88 78 78 60   Pulse 72 84 77 90 95 85   Temp - - - - - -   Resp - - 16 16 - 20   SpO2 95 93 93 95 94 95   Weight - 176 lb 280 lb 182 lb 8 oz 187 lb 184 lb   Height - 5' 4\" 5' 4\" 5' 4.5\" 5' 4.5\" 5' 4.5\"   Body mass index - 30.21 kg/m2 48.06 kg/m2 30.84 kg/m2 31.6 kg/m2 31.09 kg/m2   Pain Level - - - - - -   Some recent data might be hidden       Family History   Problem Relation Age of Onset    Heart Failure Mother     Diabetes Mother     Heart Disease Mother     Diabetes Father     Heart Disease Father     Colon Polyps Neg Hx     Colon Cancer Neg Hx        Social History     Tobacco Use    Smoking status: Never Smoker    Smokeless tobacco: Never Used   Substance Use Topics    Alcohol use: No      Current Outpatient Medications   Medication Sig Dispense Refill    donepezil (ARICEPT) 10 MG tablet Take 1 tablet by mouth nightly 90 tablet 1    busPIRone (BUSPAR) 5 MG tablet Take 1 tablet by mouth 3 times daily as needed (anxiety) 180 tablet 1    LORazepam (ATIVAN) 1 MG tablet TAKE 1 TABLET BY MOUTH EVERY 8 HOURS AS NEEDED 90 tablet 0    escitalopram (LEXAPRO) 20 MG tablet TAKE 1 TABLET DAILY 90 tablet 1    esomeprazole (NEXIUM) 40 MG delayed release capsule Take 1 capsule by mouth daily 90 capsule 1    nystatin (MYCOSTATIN) 344230 UNIT/GM powder Apply 3 times daily.  1 each 0    levocetirizine (XYZAL) 5 MG tablet Take 1 tablet by mouth nightly 90 tablet 1    ondansetron (ZOFRAN-ODT) 4 MG disintegrating tablet Take 1 tablet by mouth every 8 hours as needed for Nausea or Vomiting 30 tablet 0    atorvastatin (LIPITOR) 20 MG tablet Take 0.5 tablets by mouth daily 90 tablet 1    clopidogrel (PLAVIX) 75 MG tablet Take 1 tablet daily 90 tablet 1    colestipol (COLESTID) 1 g tablet TAKE 1 TABLET TWICE A  tablet 1    levothyroxine (SYNTHROID) 25 MCG tablet TAKE 1 TABLET 3 DAYS A WEEK AND TAKE 2 TABLETS 4 DAYS A WEEK 390 tablet 1    losartan-hydroCHLOROthiazide (HYZAAR) 100-12.5 MG per tablet TAKE 1 TABLET DAILY 90 tablet 1    mirabegron (MYRBETRIQ) 50 MG TB24 TAKE 1 TABLET DAILY 90 tablet 1    mirtazapine (REMERON) 30 MG tablet TAKE 1 TABLET NIGHTLY 90 tablet 1  levocetirizine (XYZAL) 5 MG tablet Take 1 tablet by mouth nightly 90 tablet 3    aspirin 81 MG EC tablet Take 81 mg by mouth daily      acetaminophen (TYLENOL) 500 MG tablet Take 500 mg by mouth 2 times daily 2 TABLET IN THE AM & 2 TABLETS PM      diclofenac sodium (VOLTAREN) 1 % GEL Apply 4 g topically 3 times daily as needed for Pain 5 Tube 1    Multiple Vitamins-Minerals (CENTRUM SILVER) TABS Take by mouth daily      OXYGEN Inhale into the lungs 2L at [facility]       No current facility-administered medications for this visit.      Allergies   Allergen Reactions    Augmentin [Amoxicillin-Pot Clavulanate]     Biaxin [Clarithromycin]     Cefdinir     Effexor [Venlafaxine]     Erythromycin     Lortab [Hydrocodone-Acetaminophen]     Naprosyn [Naproxen]        Health Maintenance   Topic Date Due    DTaP/Tdap/Td vaccine (1 - Tdap) Never done    Shingles Vaccine (1 of 2) 04/26/2022 (Originally 12/18/1982)    TSH testing  10/11/2022    Potassium monitoring  10/11/2022    Creatinine monitoring  10/11/2022    Depression Monitoring  02/02/2023    Flu vaccine  Completed    Pneumococcal 65+ years Vaccine  Completed    COVID-19 Vaccine  Completed    Hepatitis A vaccine  Aged Out    Hepatitis B vaccine  Aged Out    Hib vaccine  Aged Out    Meningococcal (ACWY) vaccine  Aged Out       Lab Results   Component Value Date    LABA1C 5.8 04/26/2019     No results found for: PSA, PSADIA  TSH   Date Value Ref Range Status   10/11/2021 2.790 0.270 - 4.200 uIU/mL Final   ]  Lab Results   Component Value Date     (H) 10/11/2021    K 3.9 10/11/2021     10/11/2021    CO2 29 10/11/2021    BUN 20 10/11/2021    CREATININE 0.9 10/11/2021    GLUCOSE 118 (H) 10/11/2021    CALCIUM 9.9 10/11/2021    PROT 7.6 10/11/2021    LABALBU 4.4 10/11/2021    BILITOT 0.3 10/11/2021    ALKPHOS 72 10/11/2021    AST 32 10/11/2021    ALT 16 10/11/2021    LABGLOM 59 (A) 10/11/2021    GFRAA >59 10/11/2021     Lab Results   Component Value Date    CHOL 190 10/11/2021    CHOL 168 07/28/2021    CHOL 189 10/16/2020     Lab Results   Component Value Date    TRIG 211 (H) 10/11/2021    TRIG 165 (H) 07/28/2021    TRIG 183 (H) 01/15/2021     Lab Results   Component Value Date    HDL 53 (L) 10/11/2021    HDL 51 (L) 07/28/2021    HDL 50 (L) 10/16/2020     Lab Results   Component Value Date    LDLCALC 95 10/11/2021    LDLCALC 84 07/28/2021    LDLCALC 89 10/16/2020     Lab Results   Component Value Date     10/11/2021    K 3.9 10/11/2021     10/11/2021    CO2 29 10/11/2021    BUN 20 10/11/2021    CREATININE 0.9 10/11/2021    GLUCOSE 118 10/11/2021    CALCIUM 9.9 10/11/2021      Lab Results   Component Value Date    WBC 10.9 (H) 10/11/2021    HGB 13.0 10/11/2021    HCT 42.0 10/11/2021    MCV 95.7 10/11/2021     10/11/2021    LABLYMP 2.86 09/25/2014    LYMPHOPCT 32.0 10/11/2021    RBC 4.39 10/11/2021    MCH 29.6 10/11/2021    MCHC 31.0 (L) 10/11/2021    RDW 13.2 10/11/2021     Lab Results   Component Value Date    VITD25 42.8 10/11/2021     Labs reviewed from today   Diagnostics reviewed from today   Subjective:      Review of Systems   Constitutional: Negative for fatigue, fever and unexpected weight change. HENT: Negative for ear discharge, ear pain, mouth sores, sore throat and trouble swallowing. Eyes: Negative for discharge, itching and visual disturbance. Respiratory: Negative for cough, choking, shortness of breath, wheezing and stridor. Cardiovascular: Negative for chest pain, palpitations and leg swelling. Gastrointestinal: Negative for abdominal distention, abdominal pain, blood in stool, constipation, diarrhea, nausea and vomiting. Endocrine: Negative for cold intolerance, polydipsia and polyuria. Genitourinary: Negative for difficulty urinating, dysuria, frequency and urgency. Musculoskeletal: Negative for arthralgias and gait problem. Skin: Negative for color change and rash.    Allergic/Immunologic: Negative for food allergies and immunocompromised state. Neurological: Negative for dizziness, tremors, syncope, speech difficulty, weakness and headaches. Hematological: Negative for adenopathy. Does not bruise/bleed easily. Psychiatric/Behavioral: Negative for confusion and hallucinations. The patient is nervous/anxious. Memory concerns       Objective:     Physical Exam  Constitutional:       General: She is not in acute distress. Appearance: She is well-developed. HENT:      Head: Normocephalic and atraumatic. Eyes:      General: No scleral icterus. Right eye: No discharge. Left eye: No discharge. Pupils: Pupils are equal, round, and reactive to light. Neck:      Thyroid: No thyromegaly. Vascular: No JVD. Cardiovascular:      Rate and Rhythm: Normal rate and regular rhythm. Heart sounds: Normal heart sounds. No murmur heard. Pulmonary:      Effort: Pulmonary effort is normal. No respiratory distress. Breath sounds: Normal breath sounds. No wheezing or rales. Abdominal:      General: Bowel sounds are normal. There is no distension. Palpations: Abdomen is soft. There is no mass. Tenderness: There is no abdominal tenderness. There is no guarding or rebound. Musculoskeletal:         General: No tenderness. Normal range of motion. Cervical back: Normal range of motion and neck supple. Skin:     General: Skin is warm and dry. Findings: No erythema or rash. Neurological:      Mental Status: She is alert and oriented to person, place, and time. Cranial Nerves: No cranial nerve deficit. Coordination: Coordination normal.      Deep Tendon Reflexes: Reflexes are normal and symmetric. Reflexes normal.   Psychiatric:         Mood and Affect: Mood is not depressed. Behavior: Behavior normal.         Thought Content:  Thought content normal.         Judgment: Judgment normal.       /70   Pulse 72   SpO2 95% Assessment:      Problem List     Anxiety associated with depression - Primary    Relevant Medications    mirtazapine (REMERON) 30 MG tablet    escitalopram (LEXAPRO) 20 MG tablet    LORazepam (ATIVAN) 1 MG tablet    donepezil (ARICEPT) 10 MG tablet    busPIRone (BUSPAR) 5 MG tablet    Other Relevant Orders    CBC with Auto Differential    Comprehensive Metabolic Panel    Urinalysis with Reflex to Culture    Memory loss    Relevant Medications    donepezil (ARICEPT) 10 MG tablet    Other Relevant Orders    CT HEAD WO CONTRAST    CBC with Auto Differential    Comprehensive Metabolic Panel    Urinalysis with Reflex to Culture          Plan:        Patient given educational materials - see patient instructions. Discussed use, benefit, and side effects of prescribed medications. Allpatient questions answered. Pt voiced understanding. Reviewed health maintenance. Instructed to continue current medications, diet and exercise. Patient agreed with treatment plan. Follow up as directed. MEDICATIONS:  Orders Placed This Encounter   Medications    donepezil (ARICEPT) 10 MG tablet     Sig: Take 1 tablet by mouth nightly     Dispense:  90 tablet     Refill:  1    busPIRone (BUSPAR) 5 MG tablet     Sig: Take 1 tablet by mouth 3 times daily as needed (anxiety)     Dispense:  180 tablet     Refill:  1         ORDERS:  Orders Placed This Encounter   Procedures    CT HEAD WO CONTRAST    CBC with Auto Differential    Comprehensive Metabolic Panel    Urinalysis with Reflex to Culture       Follow-up:  Return in about 3 months (around 7/11/2022) for have labs done prior to appt. PATIENT INSTRUCTIONS:  Patient Instructions   1. Anxiety increase BuSpar to 5-3 times a day as needed  2. Depression we will continue the current dose of Lexapro  3. Memory impairment we will increase the Aricept to 10 mg daily.   We will also get labs on her today to ensure that there is nothing chemically that is causing this we will also get a CT    Electronically signed by SONIA Kelly on 4/11/2022 at 2:20 PM        EMRDragon/transcription disclaimer:  Much of this encounter note is electronic transcription/translation of spoken language to printed texts. The electronic translation of spoken language may be erroneous, or at times,nonsensical words or phrases may be inadvertently transcribed.   Although I have reviewed the note for such errors, some may still exist.

## 2022-04-11 NOTE — Clinical Note
[facility] Internal Medicine  [id number] [place] [address] [address] [address]  Phone: [phone]  Fax: [phone]    Elise Snellen, APRN        April 11, 2022     Patient: Nupur Allen   YOB: 1932   Date of Visit: 4/11/2022       To Whom It May Concern: It is my medical opinion that Carmencita Stahl {Work release (duty restriction):06785}. If you have any questions or concerns, please don't hesitate to call.     Sincerely,        Elise Snellen, SONIA

## 2022-04-25 DIAGNOSIS — F51.01 PRIMARY INSOMNIA: ICD-10-CM

## 2022-04-25 RX ORDER — LORAZEPAM 1 MG/1
TABLET ORAL
Qty: 90 TABLET | Refills: 0 | Status: SHIPPED | OUTPATIENT
Start: 2022-04-25 | End: 2022-04-26 | Stop reason: SDUPTHER

## 2022-04-25 RX ORDER — LORAZEPAM 1 MG/1
TABLET ORAL
Qty: 90 TABLET | Refills: 0 | Status: CANCELLED | OUTPATIENT
Start: 2022-04-25 | End: 2022-05-22

## 2022-04-25 NOTE — TELEPHONE ENCOUNTER
Mercedez Menchaca called to request a refill on her medication. Last office visit : 4/11/2022   Next office visit : 4/25/2022     Last UDS: No results found for: Phyliss Burger, LABBENZ, BUPRENUR, COCAIMETSCRU, GABAPENTIN, MDMA, METAMPU, OPIATESCREENURINE, OXTCOSU, PHENCYCLIDINESCREENURINE, PROPOXYPHENE, THCSCREENUR, TRICYUR    Last Joaquin:2/28/22  Medication Contract: 7/28/21    Requested Prescriptions     Pending Prescriptions Disp Refills    LORazepam (ATIVAN) 1 MG tablet 90 tablet 0     Sig: TAKE 1 TABLET BY MOUTH EVERY 8 HOURS AS NEEDED         Please approve or refuse this medication.    Donn Schilder, MA

## 2022-04-26 DIAGNOSIS — F51.01 PRIMARY INSOMNIA: ICD-10-CM

## 2022-04-26 NOTE — TELEPHONE ENCOUNTER
Long Wagner called to request a refill on her medication. Last office visit : 4/11/2022   Next office visit : 7/11/2022     Last UDS: No results found for: Keyonna Laming, LABBENZ, BUPRENUR, COCAIMETSCRU, GABAPENTIN, MDMA, METAMPU, OPIATESCREENURINE, OXTCOSU, PHENCYCLIDINESCREENURINE, PROPOXYPHENE, THCSCREENUR, TRICYUR    Last Joaquin: 2/28/22  Medication Contract:      Requested Prescriptions     Pending Prescriptions Disp Refills    LORazepam (ATIVAN) 1 MG tablet 90 tablet 0     Sig: TAKE 1 TABLET BY MOUTH EVERY 8 HOURS AS NEEDED         Please approve or refuse this medication.    Saintclair Kung, MA

## 2022-04-27 RX ORDER — LORAZEPAM 1 MG/1
TABLET ORAL
Qty: 90 TABLET | Refills: 0 | Status: SHIPPED | OUTPATIENT
Start: 2022-04-27 | End: 2022-05-23 | Stop reason: SDUPTHER

## 2022-05-03 ENCOUNTER — OUTSIDE FACILITY SERVICE (OUTPATIENT)
Dept: INTERNAL MEDICINE | Facility: CLINIC | Age: 87
End: 2022-05-03

## 2022-05-03 PROCEDURE — OUTSIDEPOS PR OUTSIDE POS PLACEHOLDER: Performed by: INTERNAL MEDICINE

## 2022-05-04 RX ORDER — ONDANSETRON 4 MG/1
4 TABLET, ORALLY DISINTEGRATING ORAL EVERY 8 HOURS PRN
Qty: 30 TABLET | Refills: 0 | Status: SHIPPED | OUTPATIENT
Start: 2022-05-04

## 2022-05-04 RX ORDER — ESCITALOPRAM OXALATE 20 MG/1
TABLET ORAL
Qty: 90 TABLET | Refills: 1 | Status: SHIPPED | OUTPATIENT
Start: 2022-05-04 | End: 2022-07-25 | Stop reason: SDUPTHER

## 2022-05-04 RX ORDER — ESOMEPRAZOLE MAGNESIUM 40 MG/1
40 CAPSULE, DELAYED RELEASE ORAL DAILY
Qty: 90 CAPSULE | Refills: 1 | Status: SHIPPED | OUTPATIENT
Start: 2022-05-04 | End: 2022-10-24 | Stop reason: SDUPTHER

## 2022-05-04 NOTE — TELEPHONE ENCOUNTER
Peggy Armstrong called requesting a refill of the below medication which has been pended for you:     Requested Prescriptions     Pending Prescriptions Disp Refills    escitalopram (LEXAPRO) 20 MG tablet 90 tablet 1     Sig: TAKE 1 TABLET DAILY       Last Appointment Date: 4/11/2022  Next Appointment Date: 5/4/2022    Allergies   Allergen Reactions    Augmentin [Amoxicillin-Pot Clavulanate]     Biaxin [Clarithromycin]     Cefdinir     Effexor [Venlafaxine]     Erythromycin     Lortab [Hydrocodone-Acetaminophen]     Naprosyn [Naproxen]

## 2022-05-04 NOTE — TELEPHONE ENCOUNTER
Irish Wheeler called requesting a refill of the below medication which has been pended for you:     Requested Prescriptions     Pending Prescriptions Disp Refills    ondansetron (ZOFRAN-ODT) 4 MG disintegrating tablet 30 tablet 0     Sig: Take 1 tablet by mouth every 8 hours as needed for Nausea or Vomiting    esomeprazole (NEXIUM) 40 MG delayed release capsule 90 capsule 1     Sig: Take 1 capsule by mouth daily       Last Appointment Date: 4/11/2022  Next Appointment Date: 7/11/2022    Allergies   Allergen Reactions    Augmentin [Amoxicillin-Pot Clavulanate]     Biaxin [Clarithromycin]     Cefdinir     Effexor [Venlafaxine]     Erythromycin     Lortab [Hydrocodone-Acetaminophen]     Naprosyn [Naproxen]

## 2022-05-16 ENCOUNTER — TELEPHONE (OUTPATIENT)
Dept: INTERNAL MEDICINE | Age: 87
End: 2022-05-16

## 2022-05-16 RX ORDER — CEFDINIR 300 MG/1
300 CAPSULE ORAL 2 TIMES DAILY
Qty: 14 CAPSULE | Refills: 0 | Status: SHIPPED | OUTPATIENT
Start: 2022-05-16 | End: 2022-05-23

## 2022-05-16 NOTE — TELEPHONE ENCOUNTER
Joanna Torres is calling in(on hippa) she states she had sinus pain and pressure since Thursday, low grade temp. She states she has sinus problems a lot. She also started complaining of urinary frequency, no pain with frequency. Could something be called in for her?  Offered appointment however she states it is a lot of work to get her out of the house and would rather not unless she has to    Pharmacy is [address]    Please call and advise

## 2022-05-18 ENCOUNTER — TELEPHONE (OUTPATIENT)
Dept: INTERNAL MEDICINE | Age: 87
End: 2022-05-18

## 2022-05-18 RX ORDER — FLUCONAZOLE 100 MG/1
200 TABLET ORAL DAILY
Qty: 4 TABLET | Refills: 0 | Status: SHIPPED | OUTPATIENT
Start: 2022-05-18 | End: 2022-05-20

## 2022-05-18 NOTE — TELEPHONE ENCOUNTER
Daughter pat is calling in (is on hippa) states that mom has a vaginal infection and wants to know if romina could call something in for her    Please call and advise    Pharmacy is [facility]

## 2022-05-23 DIAGNOSIS — F51.01 PRIMARY INSOMNIA: ICD-10-CM

## 2022-05-23 RX ORDER — LORAZEPAM 1 MG/1
TABLET ORAL
Qty: 90 TABLET | Refills: 0 | Status: SHIPPED | OUTPATIENT
Start: 2022-05-23 | End: 2022-06-21 | Stop reason: SDUPTHER

## 2022-05-23 NOTE — TELEPHONE ENCOUNTER
Frances Renee called requesting a refill of the below medication which has been pended for you:     Requested Prescriptions     Pending Prescriptions Disp Refills    LORazepam (ATIVAN) 1 MG tablet 90 tablet 0     Sig: TAKE 1 TABLET BY MOUTH EVERY 8 HOURS AS NEEDED       Last Appointment Date: 4/11/2022  Next Appointment Date: 7/11/2022    Allergies   Allergen Reactions    Augmentin [Amoxicillin-Pot Clavulanate]     Biaxin [Clarithromycin]     Cefdinir     Effexor [Venlafaxine]     Erythromycin     Lortab [Hydrocodone-Acetaminophen]     Naprosyn [Naproxen]

## 2022-05-31 DIAGNOSIS — F41.8 ANXIETY ASSOCIATED WITH DEPRESSION: ICD-10-CM

## 2022-05-31 RX ORDER — MONTELUKAST SODIUM 4 MG/1
TABLET, CHEWABLE ORAL
Qty: 180 TABLET | Refills: 1 | Status: SHIPPED | OUTPATIENT
Start: 2022-05-31 | End: 2022-06-06 | Stop reason: SDUPTHER

## 2022-05-31 RX ORDER — MIRTAZAPINE 30 MG/1
TABLET, FILM COATED ORAL
Qty: 90 TABLET | Refills: 1 | Status: SHIPPED | OUTPATIENT
Start: 2022-05-31 | End: 2022-06-06 | Stop reason: SDUPTHER

## 2022-05-31 RX ORDER — BUSPIRONE HYDROCHLORIDE 5 MG/1
5 TABLET ORAL 3 TIMES DAILY PRN
Qty: 180 TABLET | Refills: 1 | Status: SHIPPED | OUTPATIENT
Start: 2022-05-31 | End: 2022-06-06 | Stop reason: SDUPTHER

## 2022-05-31 NOTE — TELEPHONE ENCOUNTER
Alejo Lyles called requesting a refill of the below medication which has been pended for you:     Requested Prescriptions     Pending Prescriptions Disp Refills    colestipol (COLESTID) 1 g tablet 180 tablet 1     Sig: TAKE 1 TABLET TWICE A DAY    mirtazapine (REMERON) 30 MG tablet 90 tablet 1     Sig: TAKE 1 TABLET NIGHTLY    busPIRone (BUSPAR) 5 MG tablet 180 tablet 1     Sig: Take 1 tablet by mouth 3 times daily as needed (anxiety)       Last Appointment Date: 4/11/2022  Next Appointment Date: 7/11/2022    Allergies   Allergen Reactions    Augmentin [Amoxicillin-Pot Clavulanate]     Biaxin [Clarithromycin]     Cefdinir     Effexor [Venlafaxine]     Erythromycin     Lortab [Hydrocodone-Acetaminophen]     Naprosyn [Naproxen]

## 2022-06-06 DIAGNOSIS — F41.8 ANXIETY ASSOCIATED WITH DEPRESSION: ICD-10-CM

## 2022-06-07 RX ORDER — MONTELUKAST SODIUM 4 MG/1
TABLET, CHEWABLE ORAL
Qty: 180 TABLET | Refills: 1 | Status: SHIPPED | OUTPATIENT
Start: 2022-06-07 | End: 2022-10-24 | Stop reason: SDUPTHER

## 2022-06-07 RX ORDER — BUSPIRONE HYDROCHLORIDE 5 MG/1
5 TABLET ORAL 3 TIMES DAILY PRN
Qty: 180 TABLET | Refills: 1 | Status: SHIPPED | OUTPATIENT
Start: 2022-06-07 | End: 2022-07-25 | Stop reason: SDUPTHER

## 2022-06-07 RX ORDER — MIRTAZAPINE 30 MG/1
TABLET, FILM COATED ORAL
Qty: 90 TABLET | Refills: 1 | Status: SHIPPED | OUTPATIENT
Start: 2022-06-07 | End: 2022-07-25 | Stop reason: SDUPTHER

## 2022-06-09 DIAGNOSIS — F41.8 ANXIETY ASSOCIATED WITH DEPRESSION: ICD-10-CM

## 2022-06-09 RX ORDER — BUSPIRONE HYDROCHLORIDE 5 MG/1
5 TABLET ORAL 3 TIMES DAILY PRN
Qty: 180 TABLET | Refills: 1 | OUTPATIENT
Start: 2022-06-09

## 2022-06-09 RX ORDER — MIRTAZAPINE 30 MG/1
TABLET, FILM COATED ORAL
Qty: 90 TABLET | Refills: 1 | OUTPATIENT
Start: 2022-06-09

## 2022-06-09 RX ORDER — MONTELUKAST SODIUM 4 MG/1
TABLET, CHEWABLE ORAL
Qty: 180 TABLET | Refills: 1 | OUTPATIENT
Start: 2022-06-09

## 2022-06-09 NOTE — TELEPHONE ENCOUNTER
Mingo Going called to request a refill on her medication.  Duplicate request.     Last office visit : 4/11/2022   Next office visit : 7/11/2022     Requested Prescriptions     Pending Prescriptions Disp Refills    colestipol (COLESTID) 1 g tablet 180 tablet 1     Sig: TAKE 1 TABLET TWICE A DAY    mirtazapine (REMERON) 30 MG tablet 90 tablet 1     Sig: TAKE 1 TABLET NIGHTLY    busPIRone (BUSPAR) 5 MG tablet 180 tablet 1     Sig: Take 1 tablet by mouth 3 times daily as needed (anxiety)            Anabelle Shrestha

## 2022-06-21 DIAGNOSIS — F51.01 PRIMARY INSOMNIA: ICD-10-CM

## 2022-06-21 DIAGNOSIS — R41.3 MEMORY LOSS: ICD-10-CM

## 2022-06-21 RX ORDER — DONEPEZIL HYDROCHLORIDE 10 MG/1
10 TABLET, FILM COATED ORAL NIGHTLY
Qty: 90 TABLET | Refills: 1 | Status: SHIPPED | OUTPATIENT
Start: 2022-06-21 | End: 2022-10-31 | Stop reason: SDUPTHER

## 2022-06-21 RX ORDER — LORAZEPAM 1 MG/1
TABLET ORAL
Qty: 90 TABLET | Refills: 0 | Status: SHIPPED | OUTPATIENT
Start: 2022-06-21 | End: 2022-07-25 | Stop reason: SDUPTHER

## 2022-06-21 NOTE — TELEPHONE ENCOUNTER
Vishal Deny called to request a refill on her medication. Last office visit : 4/11/2022   Next office visit : 7/11/2022     Last UDS: No results found for: Diannia Susan, LABBENZ, BUPRENUR, COCAIMETSCRU, GABAPENTIN, MDMA, METAMPU, OPIATESCREENURINE, OXTCOSU, PHENCYCLIDINESCREENURINE, PROPOXYPHENE, THCSCREENUR, TRICYUR    Last Isaac Briggs: 6/21/22  Medication Contract: 7/28/21    Requested Prescriptions     Pending Prescriptions Disp Refills    donepezil (ARICEPT) 10 MG tablet 90 tablet 1     Sig: Take 1 tablet by mouth nightly    LORazepam (ATIVAN) 1 MG tablet 90 tablet 0     Sig: TAKE 1 TABLET BY MOUTH EVERY 8 HOURS AS NEEDED         Please approve or refuse this medication.    Wendie Cross MA

## 2022-07-04 SDOH — HEALTH STABILITY: PHYSICAL HEALTH: ON AVERAGE, HOW MANY MINUTES DO YOU ENGAGE IN EXERCISE AT THIS LEVEL?: 10 MIN

## 2022-07-04 SDOH — HEALTH STABILITY: PHYSICAL HEALTH: ON AVERAGE, HOW MANY DAYS PER WEEK DO YOU ENGAGE IN MODERATE TO STRENUOUS EXERCISE (LIKE A BRISK WALK)?: 0 DAYS

## 2022-07-04 ASSESSMENT — PATIENT HEALTH QUESTIONNAIRE - PHQ9
SUM OF ALL RESPONSES TO PHQ QUESTIONS 1-9: 2
2. FEELING DOWN, DEPRESSED OR HOPELESS: 1
SUM OF ALL RESPONSES TO PHQ QUESTIONS 1-9: 2
1. LITTLE INTEREST OR PLEASURE IN DOING THINGS: 1
SUM OF ALL RESPONSES TO PHQ9 QUESTIONS 1 & 2: 2
SUM OF ALL RESPONSES TO PHQ QUESTIONS 1-9: 2
SUM OF ALL RESPONSES TO PHQ QUESTIONS 1-9: 2

## 2022-07-04 ASSESSMENT — LIFESTYLE VARIABLES
HOW OFTEN DO YOU HAVE A DRINK CONTAINING ALCOHOL: 1
HOW OFTEN DO YOU HAVE A DRINK CONTAINING ALCOHOL: NEVER
HOW OFTEN DO YOU HAVE SIX OR MORE DRINKS ON ONE OCCASION: 1

## 2022-07-08 ENCOUNTER — TELEPHONE (OUTPATIENT)
Dept: INTERNAL MEDICINE | Age: 87
End: 2022-07-08

## 2022-07-08 DIAGNOSIS — Z13.0 SCREENING FOR DEFICIENCY ANEMIA: ICD-10-CM

## 2022-07-08 DIAGNOSIS — E03.8 OTHER SPECIFIED HYPOTHYROIDISM: ICD-10-CM

## 2022-07-08 DIAGNOSIS — E78.2 MIXED HYPERLIPIDEMIA: Primary | ICD-10-CM

## 2022-07-11 ENCOUNTER — OFFICE VISIT (OUTPATIENT)
Dept: INTERNAL MEDICINE | Age: 87
End: 2022-07-11
Payer: MEDICARE

## 2022-07-11 VITALS
BODY MASS INDEX: 28 KG/M2 | SYSTOLIC BLOOD PRESSURE: 108 MMHG | HEIGHT: 64 IN | HEART RATE: 56 BPM | WEIGHT: 164 LBS | OXYGEN SATURATION: 94 % | DIASTOLIC BLOOD PRESSURE: 56 MMHG

## 2022-07-11 DIAGNOSIS — E78.2 MIXED HYPERLIPIDEMIA: ICD-10-CM

## 2022-07-11 DIAGNOSIS — F33.41 RECURRENT MAJOR DEPRESSIVE DISORDER, IN PARTIAL REMISSION (HCC): ICD-10-CM

## 2022-07-11 DIAGNOSIS — Z13.0 SCREENING FOR DEFICIENCY ANEMIA: ICD-10-CM

## 2022-07-11 DIAGNOSIS — E03.8 OTHER SPECIFIED HYPOTHYROIDISM: ICD-10-CM

## 2022-07-11 DIAGNOSIS — J32.8 OTHER CHRONIC SINUSITIS: ICD-10-CM

## 2022-07-11 DIAGNOSIS — G47.34 NOCTURNAL HYPOXIA: ICD-10-CM

## 2022-07-11 DIAGNOSIS — Z00.00 MEDICARE ANNUAL WELLNESS VISIT, SUBSEQUENT: Primary | ICD-10-CM

## 2022-07-11 DIAGNOSIS — I10 HYPERTENSION, ESSENTIAL: ICD-10-CM

## 2022-07-11 DIAGNOSIS — R30.0 DYSURIA: ICD-10-CM

## 2022-07-11 DIAGNOSIS — R30.0 DYSURIA: Primary | ICD-10-CM

## 2022-07-11 PROBLEM — R56.9 CONVULSIONS (HCC): Status: RESOLVED | Noted: 2021-04-26 | Resolved: 2022-07-11

## 2022-07-11 LAB
ALBUMIN SERPL-MCNC: 4.1 G/DL (ref 3.5–5.2)
ALCOHOL URINE: NORMAL
ALP BLD-CCNC: 75 U/L (ref 35–104)
ALT SERPL-CCNC: 14 U/L (ref 5–33)
AMPHETAMINE SCREEN, URINE: NORMAL
ANION GAP SERPL CALCULATED.3IONS-SCNC: 15 MMOL/L (ref 7–19)
AST SERPL-CCNC: 32 U/L (ref 5–32)
BACTERIA: NEGATIVE /HPF
BARBITURATE SCREEN, URINE: NORMAL
BASOPHILS ABSOLUTE: 0 K/UL (ref 0–0.2)
BASOPHILS RELATIVE PERCENT: 0.2 % (ref 0–1)
BENZODIAZEPINE SCREEN, URINE: NORMAL
BILIRUB SERPL-MCNC: 0.3 MG/DL (ref 0.2–1.2)
BILIRUBIN URINE: NEGATIVE
BLOOD, URINE: NEGATIVE
BUN BLDV-MCNC: 19 MG/DL (ref 8–23)
BUPRENORPHINE URINE: NORMAL
CALCIUM SERPL-MCNC: 9.7 MG/DL (ref 8.8–10.2)
CHLORIDE BLD-SCNC: 105 MMOL/L (ref 98–111)
CHOLESTEROL, TOTAL: 252 MG/DL (ref 160–199)
CLARITY: CLEAR
CO2: 26 MMOL/L (ref 22–29)
COCAINE METABOLITE SCREEN URINE: NORMAL
COLOR: YELLOW
CREAT SERPL-MCNC: 0.8 MG/DL (ref 0.5–0.9)
CRYSTALS, UA: ABNORMAL /HPF
EOSINOPHILS ABSOLUTE: 0.2 K/UL (ref 0–0.6)
EOSINOPHILS RELATIVE PERCENT: 2.3 % (ref 0–5)
EPITHELIAL CELLS, UA: 1 /HPF (ref 0–5)
FENTANYL SCREEN, URINE: NORMAL
GABAPENTIN SCREEN, URINE: NORMAL
GFR AFRICAN AMERICAN: >59
GFR NON-AFRICAN AMERICAN: >60
GLUCOSE BLD-MCNC: 101 MG/DL (ref 74–109)
GLUCOSE URINE: NEGATIVE MG/DL
HCT VFR BLD CALC: 39 % (ref 37–47)
HDLC SERPL-MCNC: 47 MG/DL (ref 65–121)
HEMOGLOBIN: 12.4 G/DL (ref 12–16)
HYALINE CASTS: 17 /HPF (ref 0–8)
IMMATURE GRANULOCYTES #: 0 K/UL
KETONES, URINE: NEGATIVE MG/DL
LDL CHOLESTEROL CALCULATED: 146 MG/DL
LEUKOCYTE ESTERASE, URINE: ABNORMAL
LYMPHOCYTES ABSOLUTE: 2.9 K/UL (ref 1.1–4.5)
LYMPHOCYTES RELATIVE PERCENT: 31.8 % (ref 20–40)
MCH RBC QN AUTO: 30.5 PG (ref 27–31)
MCHC RBC AUTO-ENTMCNC: 31.8 G/DL (ref 33–37)
MCV RBC AUTO: 96.1 FL (ref 81–99)
MDMA URINE: NORMAL
METHADONE SCREEN, URINE: NORMAL
METHAMPHETAMINE, URINE: NORMAL
MONOCYTES ABSOLUTE: 0.9 K/UL (ref 0–0.9)
MONOCYTES RELATIVE PERCENT: 10 % (ref 0–10)
NEUTROPHILS ABSOLUTE: 5.1 K/UL (ref 1.5–7.5)
NEUTROPHILS RELATIVE PERCENT: 55.4 % (ref 50–65)
NITRITE, URINE: NEGATIVE
OPIATE SCREEN URINE: NORMAL
OXYCODONE SCREEN URINE: NORMAL
PDW BLD-RTO: 13.2 % (ref 11.5–14.5)
PH UA: 5.5 (ref 5–8)
PHENCYCLIDINE SCREEN URINE: NORMAL
PLATELET # BLD: 323 K/UL (ref 130–400)
PMV BLD AUTO: 10.6 FL (ref 9.4–12.3)
POTASSIUM SERPL-SCNC: 4.3 MMOL/L (ref 3.5–5)
PROPOXYPHENE SCREEN, URINE: NORMAL
PROTEIN UA: ABNORMAL MG/DL
RBC # BLD: 4.06 M/UL (ref 4.2–5.4)
RBC UA: 4 /HPF (ref 0–4)
SODIUM BLD-SCNC: 146 MMOL/L (ref 136–145)
SPECIFIC GRAVITY UA: 1.02 (ref 1–1.03)
SYNTHETIC CANNABINOIDS(K2) SCREEN, URINE: NORMAL
THC SCREEN, URINE: NORMAL
TOTAL PROTEIN: 6.9 G/DL (ref 6.6–8.7)
TRAMADOL SCREEN URINE: NORMAL
TRICYCLIC ANTIDEPRESSANTS, UR: NORMAL
TRIGL SERPL-MCNC: 293 MG/DL (ref 0–149)
TSH SERPL DL<=0.05 MIU/L-ACNC: 2 UIU/ML (ref 0.27–4.2)
UROBILINOGEN, URINE: 0.2 E.U./DL
WBC # BLD: 9.2 K/UL (ref 4.8–10.8)
WBC UA: 31 /HPF (ref 0–5)

## 2022-07-11 PROCEDURE — 1036F TOBACCO NON-USER: CPT | Performed by: NURSE PRACTITIONER

## 2022-07-11 PROCEDURE — G8417 CALC BMI ABV UP PARAM F/U: HCPCS | Performed by: NURSE PRACTITIONER

## 2022-07-11 PROCEDURE — 80305 DRUG TEST PRSMV DIR OPT OBS: CPT | Performed by: NURSE PRACTITIONER

## 2022-07-11 PROCEDURE — 1090F PRES/ABSN URINE INCON ASSESS: CPT | Performed by: NURSE PRACTITIONER

## 2022-07-11 PROCEDURE — 99214 OFFICE O/P EST MOD 30 MIN: CPT | Performed by: NURSE PRACTITIONER

## 2022-07-11 PROCEDURE — G0439 PPPS, SUBSEQ VISIT: HCPCS | Performed by: NURSE PRACTITIONER

## 2022-07-11 PROCEDURE — 1123F ACP DISCUSS/DSCN MKR DOCD: CPT | Performed by: NURSE PRACTITIONER

## 2022-07-11 PROCEDURE — G8427 DOCREV CUR MEDS BY ELIG CLIN: HCPCS | Performed by: NURSE PRACTITIONER

## 2022-07-11 RX ORDER — ATORVASTATIN CALCIUM 20 MG/1
10 TABLET, FILM COATED ORAL NIGHTLY
Qty: 90 TABLET | Refills: 1 | Status: SHIPPED | OUTPATIENT
Start: 2022-07-11 | End: 2022-07-25 | Stop reason: SDUPTHER

## 2022-07-11 RX ORDER — CIPROFLOXACIN 500 MG/1
500 TABLET, FILM COATED ORAL 2 TIMES DAILY
Qty: 14 TABLET | Refills: 0 | Status: SHIPPED | OUTPATIENT
Start: 2022-07-11 | End: 2022-07-18

## 2022-07-11 ASSESSMENT — ENCOUNTER SYMPTOMS
COUGH: 0
ABDOMINAL PAIN: 0
EYE DISCHARGE: 0
TROUBLE SWALLOWING: 0
COLOR CHANGE: 0
SHORTNESS OF BREATH: 0
SORE THROAT: 0
BLOOD IN STOOL: 0
ABDOMINAL DISTENTION: 0
WHEEZING: 0
CONSTIPATION: 0
STRIDOR: 0
CHOKING: 0
DIARRHEA: 0
NAUSEA: 0
EYE ITCHING: 0
VOMITING: 0

## 2022-07-11 NOTE — ASSESSMENT & PLAN NOTE
We will add losartan 100/12.5 twice daily we may need to cut this in half with her 20 pound weight loss she may not need as much.

## 2022-07-11 NOTE — ASSESSMENT & PLAN NOTE
Acute sinusitis  Cipro 500  twice  a day for 7 days  get 2 doses in today    Saline nasal spray 4 times a day both nares for 7 days  Steroid spray, rhinocort, nasocort, nasonex, flonase twice daily about 5 minutes after the saline   mucinex 600 mg twice daily for 7 days with plenty of water  Delsym cough syrup up to 3 times daily as needed for cough   Ricola cough drops, honey lemon in pink bag;  has echanesia to help build your immunity

## 2022-07-11 NOTE — ASSESSMENT & PLAN NOTE
We need to get humidification added to her oxygen for bedtime as it is drying her sinuses out terribly

## 2022-07-11 NOTE — PROGRESS NOTES
essential     Hypertensive heart disease without CHF     Hypoxia     Idiopathic peripheral neuropathy     2017    Insomnia     Major depressive disorder, recurrent episode, moderate (HCC)     Mixed hyperlipidemia     Osteoporosis     Ovarian failure     Pulmonary fibrosis (HCC)     Sciatica     Sleep apnea     Transient cerebral ischemic attack     Urinary incontinence     Weakness of both legs       Past Surgical History:   Procedure Laterality Date    COLONOSCOPY  06/12/2015    [facility], repeat as needed    COLONOSCOPY N/A 10/21/2020    Dr REANNA Spicer-Diverticular disease, random colon bx negative    HYSTERECTOMY (CERVIX STATUS UNKNOWN)      LUNG SEGMENTECTOMY      lung segmental resection    UPPER GASTROINTESTINAL ENDOSCOPY N/A 10/21/2020    Dr Lizz Stallings Gastritis, otherwise normal, NEG h pylori, NEG celiac       Vitals 7/11/2022 4/11/2022 2/2/2022 9/13/2021 7/28/2021 4/14/5276   SYSTOLIC 651 [id number] [phone]   DIASTOLIC 56 70 70 88 78 78   Pulse 56 72 84 77 90 95   Temp - - - - - -   Resp - - - 16 16 -   SpO2 94 95 93 93 95 94   Weight 164 lb - 176 lb 280 lb 182 lb 8 oz 187 lb   Height 5' 4\" - 5' 4\" 5' 4\" 5' 4.5\" 5' 4.5\"   Body mass index 28.15 kg/m2 - 30.21 kg/m2 48.06 kg/m2 30.84 kg/m2 31.6 kg/m2   Pain Level - - - - - -   Some recent data might be hidden       Family History   Problem Relation Age of Onset    Heart Failure Mother     Diabetes Mother     Heart Disease Mother     Diabetes Father     Heart Disease Father     Colon Polyps Neg Hx     Colon Cancer Neg Hx        Social History     Tobacco Use    Smoking status: Never Smoker    Smokeless tobacco: Never Used   Substance Use Topics    Alcohol use: No      Current Outpatient Medications   Medication Sig Dispense Refill    ciprofloxacin (CIPRO) 500 MG tablet Take 1 tablet by mouth 2 times daily for 7 days 14 tablet 0    atorvastatin (LIPITOR) 20 MG tablet Take 0.5 tablets by mouth at bedtime 90 tablet 1    donepezil (ARICEPT) 10 MG tablet Take 1 tablet by mouth nightly 90 tablet 1    LORazepam (ATIVAN) 1 MG tablet TAKE 1 TABLET BY MOUTH EVERY 8 HOURS AS NEEDED 90 tablet 0    colestipol (COLESTID) 1 g tablet TAKE 1 TABLET TWICE A  tablet 1    mirtazapine (REMERON) 30 MG tablet TAKE 1 TABLET NIGHTLY 90 tablet 1    busPIRone (BUSPAR) 5 MG tablet Take 1 tablet by mouth 3 times daily as needed (anxiety) 180 tablet 1    escitalopram (LEXAPRO) 20 MG tablet TAKE 1 TABLET DAILY 90 tablet 1    ondansetron (ZOFRAN-ODT) 4 MG disintegrating tablet Take 1 tablet by mouth every 8 hours as needed for Nausea or Vomiting 30 tablet 0    esomeprazole (NEXIUM) 40 MG delayed release capsule Take 1 capsule by mouth daily 90 capsule 1    nystatin (MYCOSTATIN) 459230 UNIT/GM powder Apply 3 times daily. 1 each 0    clopidogrel (PLAVIX) 75 MG tablet Take 1 tablet daily 90 tablet 1    levothyroxine (SYNTHROID) 25 MCG tablet TAKE 1 TABLET 3 DAYS A WEEK AND TAKE 2 TABLETS 4 DAYS A WEEK 390 tablet 1    losartan-hydroCHLOROthiazide (HYZAAR) 100-12.5 MG per tablet TAKE 1 TABLET DAILY 90 tablet 1    mirabegron (MYRBETRIQ) 50 MG TB24 TAKE 1 TABLET DAILY 90 tablet 1    levocetirizine (XYZAL) 5 MG tablet Take 1 tablet by mouth nightly 90 tablet 3    aspirin 81 MG EC tablet Take 81 mg by mouth daily      acetaminophen (TYLENOL) 500 MG tablet Take 500 mg by mouth 2 times daily 2 TABLET IN THE AM & 2 TABLETS PM      diclofenac sodium (VOLTAREN) 1 % GEL Apply 4 g topically 3 times daily as needed for Pain 5 Tube 1    Multiple Vitamins-Minerals (CENTRUM SILVER) TABS Take by mouth daily      OXYGEN Inhale into the lungs 2L at [facility]       No current facility-administered medications for this visit.      Allergies   Allergen Reactions    Augmentin [Amoxicillin-Pot Clavulanate]     Biaxin [Clarithromycin]     Cefdinir     Effexor [Venlafaxine]     Erythromycin     Lortab [Hydrocodone-Acetaminophen]     Naprosyn [Naproxen]        Health MCV 96.1 07/11/2022     07/11/2022    LABLYMP 2.86 09/25/2014    LYMPHOPCT 31.8 07/11/2022    RBC 4.06 (L) 07/11/2022    MCH 30.5 07/11/2022    MCHC 31.8 (L) 07/11/2022    RDW 13.2 07/11/2022     Lab Results   Component Value Date    VITD25 37.5 04/11/2022     Labs reviewed from 7/11/2022    Subjective:      Review of Systems   Constitutional: Negative for fatigue, fever and unexpected weight change. HENT: Positive for congestion. Negative for ear discharge, ear pain, mouth sores, sore throat and trouble swallowing. Eyes: Negative for discharge, itching and visual disturbance. Respiratory: Negative for cough, choking, shortness of breath, wheezing and stridor. Cardiovascular: Negative for chest pain, palpitations and leg swelling. Gastrointestinal: Negative for abdominal distention, abdominal pain, blood in stool, constipation, diarrhea, nausea and vomiting. Endocrine: Negative for cold intolerance, polydipsia and polyuria. Genitourinary: Negative for difficulty urinating, dysuria, frequency and urgency. Musculoskeletal: Negative for arthralgias and gait problem. Skin: Negative for color change and rash. Allergic/Immunologic: Negative for food allergies and immunocompromised state. Neurological: Negative for dizziness, tremors, syncope, speech difficulty, weakness and headaches. Hematological: Negative for adenopathy. Does not bruise/bleed easily. Psychiatric/Behavioral: Negative for confusion and hallucinations. The patient is nervous/anxious. Objective:     Physical Exam  Constitutional:       General: She is not in acute distress. Appearance: She is well-developed. HENT:      Head: Normocephalic and atraumatic. Eyes:      General: No scleral icterus. Right eye: No discharge. Left eye: No discharge. Pupils: Pupils are equal, round, and reactive to light. Neck:      Thyroid: No thyromegaly. Vascular: No JVD.    Cardiovascular:      Rate and Rhythm: Normal rate and regular rhythm. Heart sounds: Normal heart sounds. No murmur heard. Pulmonary:      Effort: Pulmonary effort is normal. No respiratory distress. Breath sounds: Normal breath sounds. No wheezing or rales. Abdominal:      General: Bowel sounds are normal. There is no distension. Palpations: Abdomen is soft. There is no mass. Tenderness: There is no abdominal tenderness. There is no guarding or rebound. Musculoskeletal:         General: No tenderness. Normal range of motion. Cervical back: Normal range of motion and neck supple. Skin:     General: Skin is warm and dry. Findings: No erythema or rash. Neurological:      Mental Status: She is alert and oriented to person, place, and time. Cranial Nerves: No cranial nerve deficit. Coordination: Coordination normal.      Deep Tendon Reflexes: Reflexes are normal and symmetric. Reflexes normal.   Psychiatric:         Mood and Affect: Mood is not depressed. Thought Content: Thought content normal.         Judgment: Judgment normal.      Comments: Seems overly anxious today       BP (!) 108/56   Pulse 56   Ht 5' 4\" (1.626 m)   Wt 164 lb (74.4 kg)   SpO2 94%   BMI 28.15 kg/m²           Assessment:      Problem List     Hypertension, essential     We will add losartan 100/12.5 twice daily we may need to cut this in half with her 20 pound weight loss she may not need as much.           Mixed hyperlipidemia     Cholesterol is up and William as well we will increase the Lipitor to the full 20 mg at bedtime          Relevant Medications    aspirin 81 MG EC tablet    losartan-hydroCHLOROthiazide (HYZAAR) 100-12.5 MG per tablet    colestipol (COLESTID) 1 g tablet    atorvastatin (LIPITOR) 20 MG tablet    Nocturnal hypoxia     We need to get humidification added to her oxygen for bedtime as it is drying her sinuses out terribly          Other chronic sinusitis      Acute sinusitis  Cipro 500  twice a day for 7 days  get 2 doses in today    Saline nasal spray 4 times a day both nares for 7 days  Steroid spray, rhinocort, nasocort, nasonex, flonase twice daily about 5 minutes after the saline   mucinex 600 mg twice daily for 7 days with plenty of water  Delsym cough syrup up to 3 times daily as needed for cough   Ricola cough drops, honey lemon in pink bag;  has echanesia to help build your immunity               Relevant Medications    levocetirizine (XYZAL) 5 MG tablet    ciprofloxacin (CIPRO) 500 MG tablet    Recurrent major depressive disorder, in partial remission (HCC)    Relevant Medications    escitalopram (LEXAPRO) 20 MG tablet    mirtazapine (REMERON) 30 MG tablet    busPIRone (BUSPAR) 5 MG tablet    donepezil (ARICEPT) 10 MG tablet    LORazepam (ATIVAN) 1 MG tablet    Other Relevant Orders    POCT Rapid Drug Screen (Completed)          Plan:        Patient given educational materials - see patient instructions. Discussed use, benefit, and side effects of prescribed medications. Allpatient questions answered. Pt voiced understanding. Reviewed health maintenance. Instructed to continue current medications, diet and exercise. Patient agreed with treatment plan. Follow up as directed. MEDICATIONS:  Orders Placed This Encounter   Medications    ciprofloxacin (CIPRO) 500 MG tablet     Sig: Take 1 tablet by mouth 2 times daily for 7 days     Dispense:  14 tablet     Refill:  0    atorvastatin (LIPITOR) 20 MG tablet     Sig: Take 0.5 tablets by mouth at bedtime     Dispense:  90 tablet     Refill:  1         ORDERS:  Orders Placed This Encounter   Procedures    POCT Rapid Drug Screen       Follow-up:  Return in about 3 months (around 10/11/2022) for have labs done prior to appt. PATIENT INSTRUCTIONS:  Patient Instructions   1.   Acute sinusitis    Acute sinusitis    Cefdinir 300 twice  a day for 7 days  get 2 doses in today    Saline nasal spray 4 times a day both nares for 7 days  Steroid spray, Plan:  We will add losartan 100/12.5 twice daily we may need to cut this in half with her 20 pound weight loss she may not need as much. Mixed hyperlipidemia  Assessment & Plan:  Cholesterol is up and William as well we will increase the Lipitor to the full 20 mg at bedtime   Nocturnal hypoxia  Assessment & Plan: We need to get humidification added to her oxygen for bedtime as it is drying her sinuses out terribly       Recommendations for Preventive Services Due: see orders and patient instructions/AVS.  Recommended screening schedule for the next 5-10 years is provided to the patient in written form: see Patient Instructions/AVS.     Return in about 3 months (around 10/11/2022) for have labs done prior to appt. Subjective   {  Patient's complete Health Risk Assessment and screening values have been reviewed and are found in Flowsheets. The following problems were reviewed today and where indicated follow up appointments were made and/or referrals ordered.     Positive Risk Factor Screenings with Interventions:    Fall Risk:  Do you feel unsteady or are you worried about falling? : (!) yes  2 or more falls in past year?: (!) yes  Fall with injury in past year?: (!) yes     Fall Risk Interventions:    · Patient declines any further evaluation/treatment for this issue              General Health and ACP:  General  In general, how would you say your health is?: Good  In the past 7 days, have you experienced any of the following: New or Increased Pain, New or Increased Fatigue, Loneliness, Social Isolation, Stress or Anger?: No  Do you get the social and emotional support that you need?: Yes  Do you have a Living Will?: Yes    Advance Directives     Power of  Living Will ACP-Advance Directive ACP-Power of     Not on File Filed on 10/21/20 Filed Not on File      General Health Risk Interventions:  · na    Health Habits/Nutrition:     Physical Activity: Inactive    Days of Exercise per Week: 0 days  Minutes of Exercise per Session: 10 min     Have you lost any weight without trying in the past 3 months?: No  Body mass index: (!) 28.15  Have you seen the dentist within the past year?: Yes    Health Habits/Nutrition Interventions:  · Inadequate physical activity:  patient is not ready to increase his/her physical activity level at this time    Hearing/Vision:  Do you or your family notice any trouble with your hearing that hasn't been managed with hearing aids?: No  Do you have difficulty driving, watching TV, or doing any of your daily activities because of your eyesight?: No  Have you had an eye exam within the past year?: (!) No  No exam data present    Hearing/Vision Interventions:  · Vision concerns:  patient encouraged to make appointment with his/her eye specialist     ADLs:  In the past 7 days, did you need help from others to perform any of the following everyday activities: Eating, dressing, grooming, bathing, toileting, or walking/balance?: No  In the past 7 days, did you need help from others to take care of any of the following: Laundry, housekeeping, banking/finances, shopping, telephone use, food preparation, transportation, or taking medications?: (!) Yes  Select all that apply: (!) Laundry,Housekeeping,Banking/Finances,Shopping,Food Preparation,Transportation,Taking Medications    ADL Interventions:  · Patient declines any further evaluation/treatment for this issue          Objective   Vitals:    07/11/22 1413   BP: (!) 108/56   Pulse: 56   SpO2: 94%   Weight: 164 lb (74.4 kg)   Height: 5' 4\" (1.626 m)      Body mass index is 28.15 kg/m². Allergies   Allergen Reactions    Augmentin [Amoxicillin-Pot Clavulanate]     Biaxin [Clarithromycin]     Cefdinir     Effexor [Venlafaxine]     Erythromycin     Lortab [Hydrocodone-Acetaminophen]     Naprosyn [Naproxen]      Prior to Visit Medications    Medication Sig Taking?  Authorizing Provider   ciprofloxacin (CIPRO) 500 MG tablet Take 1 tablet by mouth 2 times daily for 7 days Yes SONIA Tijerina   atorvastatin (LIPITOR) 20 MG tablet Take 0.5 tablets by mouth at bedtime Yes SONIA Tijerina   donepezil (ARICEPT) 10 MG tablet Take 1 tablet by mouth nightly  SONIA Tijerina   LORazepam (ATIVAN) 1 MG tablet TAKE 1 TABLET BY MOUTH EVERY 8 HOURS AS NEEDED  SONIA Tijerina   colestipol (COLESTID) 1 g tablet TAKE 1 TABLET TWICE A DAY  SONIA Tijerina   mirtazapine (REMERON) 30 MG tablet TAKE 1 TABLET NIGHTLY  SONIA Tijerina   busPIRone (BUSPAR) 5 MG tablet Take 1 tablet by mouth 3 times daily as needed (anxiety)  SONIA Tijerina   escitalopram (LEXAPRO) 20 MG tablet TAKE 1 TABLET DAILY  SONIA Tijerina   ondansetron (ZOFRAN-ODT) 4 MG disintegrating tablet Take 1 tablet by mouth every 8 hours as needed for Nausea or Vomiting  SONIA Tijerina   esomeprazole (NEXIUM) 40 MG delayed release capsule Take 1 capsule by mouth daily  SONIA Tijerina   nystatin (MYCOSTATIN) 137240 UNIT/GM powder Apply 3 times daily.   SONIA Tijerina   clopidogrel (PLAVIX) 75 MG tablet Take 1 tablet daily  SONIA Tijerina   levothyroxine (SYNTHROID) 25 MCG tablet TAKE 1 TABLET 3 DAYS A WEEK AND TAKE 2 TABLETS 4 DAYS A WEEK  SONIA Tijerina   losartan-hydroCHLOROthiazide (HYZAAR) 100-12.5 MG per tablet TAKE 1 TABLET DAILY  SONIA Tijerina   mirabegron (MYRBETRIQ) 50 MG TB24 TAKE 1 TABLET DAILY  SONIA Tijerina   levocetirizine (XYZAL) 5 MG tablet Take 1 tablet by mouth nightly  SONIA Tijerina   aspirin 81 MG EC tablet Take 81 mg by mouth daily  Historical Provider, MD   acetaminophen (TYLENOL) 500 MG tablet Take 500 mg by mouth 2 times daily 2 TABLET IN THE AM & 2 TABLETS PM  Historical Provider, MD   diclofenac sodium (VOLTAREN) 1 % GEL Apply 4 g topically 3 times daily as needed for Pain  SONIA Tijerina   Multiple Vitamins-Minerals (CENTRUM SILVER) TABS Take by mouth daily  Historical Provider, MD   OXYGEN Inhale into the lungs 2L at [facility]  Historical Provider, MD Watts (Including outside providers/suppliers regularly involved in providing care):   Patient Care Team:  Karyl Osler, APRN as PCP - General (Nurse Practitioner Acute Care)  Karyl Osler, APRN as PCP - REHABILITATION [facility] Empaneled Provider  SONIA Bell as Advanced Practice Nurse (Neurology)  Edilma Wise     Reviewed and updated this visit:  Tobacco  Allergies  Meds  Med Hx  Surg Hx  Soc Hx  Fam Hx

## 2022-07-11 NOTE — PATIENT INSTRUCTIONS
1.  Acute sinusitis    Acute sinusitis    Cefdinir 300 twice  a day for 7 days  get 2 doses in today    Saline nasal spray 4 times a day both nares for 7 days  Steroid spray, rhinocort, nasocort, nasonex, flonase twice daily about 5 minutes after the saline   mucinex 600 mg twice daily for 7 days with plenty of water  Delsym cough syrup up to 3 times daily as needed for cough   Ricola cough drops, honey lemon in pink bag;  has echanesia to help build your immunity   Will need humidifier on her oxygen    Do not take any more sinus pills;  I think she is drying her out;      2. Anxiety stable with lorazepam   3. Hyperlipidemia; Increase lipitor to 20 mg (the whole pill) at night   4. Hypertension  Watch b/p  And if still low and fatigue  We can take 1/2 of the losartan 100/12.5    5. Nocturnal hypoxia;  Uses oxygen at night; Will need humidified oxygen; Let us know where she gets her supplies we will need to send an new order to add it;      Personalized Preventive Plan for Judith Powell - 7/11/2022  Medicare offers a range of preventive health benefits. Some of the tests and screenings are paid in full while other may be subject to a deductible, co-insurance, and/or copay. Some of these benefits include a comprehensive review of your medical history including lifestyle, illnesses that may run in your family, and various assessments and screenings as appropriate. After reviewing your medical record and screening and assessments performed today your provider may have ordered immunizations, labs, imaging, and/or referrals for you. A list of these orders (if applicable) as well as your Preventive Care list are included within your After Visit Summary for your review. Other Preventive Recommendations:    · A preventive eye exam performed by an eye specialist is recommended every 1-2 years to screen for glaucoma; cataracts, macular degeneration, and other eye disorders.   · A preventive dental visit is

## 2022-07-13 LAB
ORGANISM: ABNORMAL
URINE CULTURE, ROUTINE: ABNORMAL
URINE CULTURE, ROUTINE: ABNORMAL

## 2022-07-13 RX ORDER — QUETIAPINE FUMARATE 25 MG/1
25 TABLET, FILM COATED ORAL 2 TIMES DAILY
Qty: 60 TABLET | Refills: 3 | Status: SHIPPED | OUTPATIENT
Start: 2022-07-13 | End: 2022-08-22 | Stop reason: SDUPTHER

## 2022-07-18 RX ORDER — LEVOTHYROXINE SODIUM 0.03 MG/1
TABLET ORAL
Qty: 390 TABLET | Refills: 1 | Status: SHIPPED | OUTPATIENT
Start: 2022-07-18 | End: 2022-10-31 | Stop reason: SDUPTHER

## 2022-07-25 DIAGNOSIS — F51.01 PRIMARY INSOMNIA: ICD-10-CM

## 2022-07-25 DIAGNOSIS — F41.8 ANXIETY ASSOCIATED WITH DEPRESSION: ICD-10-CM

## 2022-07-26 RX ORDER — ATORVASTATIN CALCIUM 20 MG/1
10 TABLET, FILM COATED ORAL NIGHTLY
Qty: 90 TABLET | Refills: 1 | Status: SHIPPED | OUTPATIENT
Start: 2022-07-26 | End: 2022-10-31 | Stop reason: SDUPTHER

## 2022-07-26 RX ORDER — LORAZEPAM 1 MG/1
TABLET ORAL
Qty: 90 TABLET | Refills: 0 | Status: SHIPPED | OUTPATIENT
Start: 2022-07-26 | End: 2022-08-22 | Stop reason: SDUPTHER

## 2022-07-26 RX ORDER — ESCITALOPRAM OXALATE 20 MG/1
TABLET ORAL
Qty: 90 TABLET | Refills: 1 | Status: SHIPPED | OUTPATIENT
Start: 2022-07-26 | End: 2022-10-24 | Stop reason: SDUPTHER

## 2022-07-26 RX ORDER — MIRTAZAPINE 30 MG/1
TABLET, FILM COATED ORAL
Qty: 90 TABLET | Refills: 1 | Status: SHIPPED | OUTPATIENT
Start: 2022-07-26 | End: 2022-10-31 | Stop reason: SDUPTHER

## 2022-07-26 RX ORDER — BUSPIRONE HYDROCHLORIDE 5 MG/1
5 TABLET ORAL 3 TIMES DAILY PRN
Qty: 180 TABLET | Refills: 1 | Status: SHIPPED | OUTPATIENT
Start: 2022-07-26 | End: 2022-08-22 | Stop reason: SDUPTHER

## 2022-08-22 DIAGNOSIS — F41.8 ANXIETY ASSOCIATED WITH DEPRESSION: ICD-10-CM

## 2022-08-22 DIAGNOSIS — F51.01 PRIMARY INSOMNIA: ICD-10-CM

## 2022-08-22 RX ORDER — BUSPIRONE HYDROCHLORIDE 5 MG/1
5 TABLET ORAL 3 TIMES DAILY PRN
Qty: 180 TABLET | Refills: 1 | Status: SHIPPED | OUTPATIENT
Start: 2022-08-22 | End: 2022-10-24 | Stop reason: SDUPTHER

## 2022-08-22 RX ORDER — LORAZEPAM 1 MG/1
TABLET ORAL
Qty: 90 TABLET | Refills: 0 | Status: SHIPPED | OUTPATIENT
Start: 2022-08-22 | End: 2022-09-26

## 2022-08-22 RX ORDER — QUETIAPINE FUMARATE 25 MG/1
25 TABLET, FILM COATED ORAL 2 TIMES DAILY
Qty: 60 TABLET | Refills: 3 | Status: SHIPPED | OUTPATIENT
Start: 2022-08-22 | End: 2022-09-01 | Stop reason: SDUPTHER

## 2022-09-01 RX ORDER — QUETIAPINE FUMARATE 25 MG/1
TABLET, FILM COATED ORAL
Qty: 120 TABLET | Refills: 3 | Status: SHIPPED | OUTPATIENT
Start: 2022-09-01 | End: 2022-10-10

## 2022-09-01 NOTE — TELEPHONE ENCOUNTER
No we dont need to get her on lorazepam 2 mg at bedtime;  I had rather increase the seroquel to 50 or we may even need 100 mg for sleep  ;

## 2022-09-01 NOTE — TELEPHONE ENCOUNTER
Then let's get her Seroquel and call it in to [facility] that in [facility]. Thank you.  I was concerned about the lorazepam.

## 2022-09-01 NOTE — TELEPHONE ENCOUNTER
Gabriel Campbell mom is back to not sleeping at night. I'm giving her a long with her other meds AM a lorazepam and buspar  about 3pm I give her a buspar, lorazepam and a Seroquel at bedtime buspar, lorazepam, and. Seroquel. For the last week she has been insisting she has to have another white. Pill because see. Can't sleep. White pill is Lorazepam. If this schedule is ok then she's going to run out to soon. If not what. Do I give her. On her side she did go two nights before I upped meds without a full 3 hours of sleep.  Thank Gianna Hsieh

## 2022-09-23 DIAGNOSIS — F51.01 PRIMARY INSOMNIA: ICD-10-CM

## 2022-09-24 DIAGNOSIS — F51.01 PRIMARY INSOMNIA: ICD-10-CM

## 2022-09-26 DIAGNOSIS — F51.01 PRIMARY INSOMNIA: ICD-10-CM

## 2022-09-26 RX ORDER — LORAZEPAM 1 MG/1
TABLET ORAL
Qty: 90 TABLET | Refills: 0 | OUTPATIENT
Start: 2022-09-26 | End: 2022-10-19

## 2022-09-26 RX ORDER — LORAZEPAM 1 MG/1
TABLET ORAL
Qty: 90 TABLET | Refills: 0 | Status: CANCELLED | OUTPATIENT
Start: 2022-09-26 | End: 2022-10-26

## 2022-09-26 RX ORDER — LORAZEPAM 1 MG/1
TABLET ORAL
Qty: 90 TABLET | Refills: 0 | Status: SHIPPED | OUTPATIENT
Start: 2022-09-26 | End: 2022-10-24 | Stop reason: SDUPTHER

## 2022-10-03 RX ORDER — LOSARTAN POTASSIUM AND HYDROCHLOROTHIAZIDE 12.5; 1 MG/1; MG/1
TABLET ORAL
Qty: 90 TABLET | Refills: 1 | Status: SHIPPED | OUTPATIENT
Start: 2022-10-03 | End: 2022-10-24 | Stop reason: SDUPTHER

## 2022-10-10 ENCOUNTER — OFFICE VISIT (OUTPATIENT)
Dept: INTERNAL MEDICINE | Age: 87
End: 2022-10-10
Payer: MEDICARE

## 2022-10-10 VITALS
TEMPERATURE: 97 F | SYSTOLIC BLOOD PRESSURE: 126 MMHG | HEART RATE: 78 BPM | OXYGEN SATURATION: 92 % | DIASTOLIC BLOOD PRESSURE: 70 MMHG | BODY MASS INDEX: 28.49 KG/M2 | WEIGHT: 166 LBS

## 2022-10-10 DIAGNOSIS — E78.2 MIXED HYPERLIPIDEMIA: ICD-10-CM

## 2022-10-10 DIAGNOSIS — I10 HYPERTENSION, ESSENTIAL: ICD-10-CM

## 2022-10-10 DIAGNOSIS — J32.8 OTHER CHRONIC SINUSITIS: Primary | ICD-10-CM

## 2022-10-10 PROCEDURE — 90694 VACC AIIV4 NO PRSRV 0.5ML IM: CPT | Performed by: NURSE PRACTITIONER

## 2022-10-10 PROCEDURE — 1123F ACP DISCUSS/DSCN MKR DOCD: CPT | Performed by: NURSE PRACTITIONER

## 2022-10-10 PROCEDURE — 99214 OFFICE O/P EST MOD 30 MIN: CPT | Performed by: NURSE PRACTITIONER

## 2022-10-10 PROCEDURE — 1036F TOBACCO NON-USER: CPT | Performed by: NURSE PRACTITIONER

## 2022-10-10 PROCEDURE — 1090F PRES/ABSN URINE INCON ASSESS: CPT | Performed by: NURSE PRACTITIONER

## 2022-10-10 PROCEDURE — G8484 FLU IMMUNIZE NO ADMIN: HCPCS | Performed by: NURSE PRACTITIONER

## 2022-10-10 PROCEDURE — G8427 DOCREV CUR MEDS BY ELIG CLIN: HCPCS | Performed by: NURSE PRACTITIONER

## 2022-10-10 PROCEDURE — G8417 CALC BMI ABV UP PARAM F/U: HCPCS | Performed by: NURSE PRACTITIONER

## 2022-10-10 PROCEDURE — G0008 ADMIN INFLUENZA VIRUS VAC: HCPCS | Performed by: NURSE PRACTITIONER

## 2022-10-10 RX ORDER — QUETIAPINE FUMARATE 100 MG/1
100 TABLET, FILM COATED ORAL NIGHTLY
Qty: 90 TABLET | Refills: 1 | Status: SHIPPED | OUTPATIENT
Start: 2022-10-10 | End: 2022-10-24

## 2022-10-10 RX ORDER — QUETIAPINE FUMARATE 50 MG/1
50 TABLET, FILM COATED ORAL NIGHTLY
Qty: 90 TABLET | Refills: 1 | Status: SHIPPED | OUTPATIENT
Start: 2022-10-10 | End: 2022-10-10

## 2022-10-10 RX ORDER — QUETIAPINE FUMARATE 25 MG/1
TABLET, FILM COATED ORAL
Qty: 180 TABLET | Refills: 1 | Status: SHIPPED | OUTPATIENT
Start: 2022-10-10 | End: 2022-10-10

## 2022-10-10 RX ORDER — QUETIAPINE FUMARATE 50 MG/1
50 TABLET, FILM COATED ORAL 2 TIMES DAILY
Qty: 90 TABLET | Refills: 1 | Status: SHIPPED | OUTPATIENT
Start: 2022-10-10 | End: 2022-10-10

## 2022-10-10 SDOH — ECONOMIC STABILITY: FOOD INSECURITY: WITHIN THE PAST 12 MONTHS, YOU WORRIED THAT YOUR FOOD WOULD RUN OUT BEFORE YOU GOT MONEY TO BUY MORE.: NEVER TRUE

## 2022-10-10 SDOH — ECONOMIC STABILITY: FOOD INSECURITY: WITHIN THE PAST 12 MONTHS, THE FOOD YOU BOUGHT JUST DIDN'T LAST AND YOU DIDN'T HAVE MONEY TO GET MORE.: NEVER TRUE

## 2022-10-10 ASSESSMENT — ENCOUNTER SYMPTOMS
EYE ITCHING: 0
WHEEZING: 0
ABDOMINAL PAIN: 0
BLOOD IN STOOL: 0
CONSTIPATION: 0
TROUBLE SWALLOWING: 0
COUGH: 0
DIARRHEA: 0
EYE DISCHARGE: 0
SHORTNESS OF BREATH: 0
SORE THROAT: 0
NAUSEA: 0
CHOKING: 0
COLOR CHANGE: 0
VOMITING: 0
STRIDOR: 0
ABDOMINAL DISTENTION: 0

## 2022-10-10 ASSESSMENT — SOCIAL DETERMINANTS OF HEALTH (SDOH): HOW HARD IS IT FOR YOU TO PAY FOR THE VERY BASICS LIKE FOOD, HOUSING, MEDICAL CARE, AND HEATING?: NOT HARD AT ALL

## 2022-10-10 NOTE — PATIENT INSTRUCTIONS
Anxiety stable with lorazepam  Insomia;  and behaviors;  seroque 25 twice daily and 100 mg at bedtime   Sinus drainagel refer to Dr Lethaniel Frankel;

## 2022-10-10 NOTE — PROGRESS NOTES
[facility] PHYSICIAN SERVICES  [facility] INTERNAL MEDICINE  [address]  [address] [address]  Dept: [phone]  Dept Fax: [phone]: [phone]    Sallyanne Habermann (:  1932) is a 80 y.o. female,Established patient  with green  , here for evaluation of the following chief complaint(s): Follow-up      Sallyanne Habermann is a 80 y.o. female who presents today for her medical conditions/complaints as noted below. Sallyanne Habermann is c/teresa Follow-up        HPI:     Chief Complaint   Patient presents with    Follow-up     HPI    Anxiety with behaviors she is on seroquel she is taking 25 bid and 50 mg at bedtime;   Sinus drainage;  constantly;  she is emphatic year round and she is constantly drainage;  she wants to see ENT   she takes xyzal daily    She has DOL monies that she gets and income from them for her daughter and granddaughter takes care of her and will receive the compensation    4.   Anxiety  she is taking lorazepam  3 times a day;  along with buspar;      Past Medical History:   Diagnosis Date    Abnormal mammogram     Anxiety     Anxiety associated with depression     Artery disease, cerebral     Ataxia     Benign diastolic hypertension     Carpal tunnel syndrome     idiopathic peripheral    Cellulitis of face     Chronic back pain     COPD (chronic obstructive pulmonary disease) (HCC)     Depression     Disc degeneration, lumbosacral     Diverticulosis     Dysphagia     Esophageal reflux     Fever blister     Herpes simplex     Herpes zoster     Hypertension, essential     Hypertensive heart disease without CHF     Hypoxia     Idiopathic peripheral neuropathy     2017    Insomnia     Major depressive disorder, recurrent episode, moderate (HCC)     Mixed hyperlipidemia     Osteoporosis     Ovarian failure     Pulmonary fibrosis ([facility] [address]     Sciatica     Sleep apnea     Transient cerebral ischemic attack     Urinary incontinence     Weakness of both legs       Past Surgical History:   Procedure Laterality Date    COLONOSCOPY  06/12/2015    Aubree, repeat as needed    COLONOSCOPY N/A 10/21/2020    Dr REANNA Spicer-Diverticular disease, random colon bx negative    HYSTERECTOMY (CERVIX STATUS UNKNOWN)      LUNG SEGMENTECTOMY      lung segmental resection    UPPER GASTROINTESTINAL ENDOSCOPY N/A 10/21/2020    Dr Elliott Duty Gastritis, otherwise normal, NEG h pylori, NEG celiac       Vitals 10/10/2022 7/11/2022 4/11/2022 2/2/2022 9/13/2021 9/83/6467   SYSTOLIC 536 623 [phone] 939   DIASTOLIC 70 56 70 70 88 78   Pulse 78 56 72 84 77 90   Temp 97 - - - - -   Resp - - - - 16 16   SpO2 92 94 95 93 93 95   Weight 166 lb 164 lb - 176 lb 280 lb 182 lb 8 oz   Height - 5' 4\" - 5' 4\" 5' 4\" 5' 4.5\"   Body mass index - 28.15 kg/m2 - 30.21 kg/m2 48.06 kg/m2 30.84 kg/m2   Pain Level - - - - - -   Some recent data might be hidden       Family History   Problem Relation Age of Onset    Heart Failure Mother     Diabetes Mother     Heart Disease Mother     Diabetes Father     Heart Disease Father     Colon Polyps Neg Hx     Colon Cancer Neg Hx        Social History     Tobacco Use    Smoking status: Never    Smokeless tobacco: Never   Substance Use Topics    Alcohol use: No      Current Outpatient Medications   Medication Sig Dispense Refill    QUEtiapine (SEROQUEL) 100 MG tablet Take 1 tablet by mouth at bedtime 90 tablet 1    losartan-hydroCHLOROthiazide (HYZAAR) 100-12.5 MG per tablet TAKE 1 TABLET DAILY 90 tablet 1    LORazepam (ATIVAN) 1 MG tablet TAKE (1) TABLET BY MOUTH EVERY EIGHT HOURS AS NEEDED.  90 tablet 0    busPIRone (BUSPAR) 5 MG tablet Take 1 tablet by mouth 3 times daily as needed (anxiety) 180 tablet 1    mirabegron (MYRBETRIQ) 50 MG TB24 TAKE 1 TABLET DAILY 90 tablet 1    escitalopram (LEXAPRO) 20 MG tablet TAKE 1 TABLET DAILY 90 tablet 1    mirtazapine (REMERON) 30 MG tablet TAKE 1 TABLET NIGHTLY 90 tablet 1    atorvastatin (LIPITOR) 20 MG tablet Take 0.5 tablets by mouth at bedtime 90 tablet 1    levothyroxine (SYNTHROID) 25 MCG tablet TAKE 1 TABLET 3 DAYS A WEEK AND TAKE 2 TABLETS 4 DAYS A WEEK 390 tablet 1    donepezil (ARICEPT) 10 MG tablet Take 1 tablet by mouth nightly 90 tablet 1    colestipol (COLESTID) 1 g tablet TAKE 1 TABLET TWICE A  tablet 1    ondansetron (ZOFRAN-ODT) 4 MG disintegrating tablet Take 1 tablet by mouth every 8 hours as needed for Nausea or Vomiting 30 tablet 0    esomeprazole (NEXIUM) 40 MG delayed release capsule Take 1 capsule by mouth daily 90 capsule 1    nystatin (MYCOSTATIN) 277877 UNIT/GM powder Apply 3 times daily. 1 each 0    clopidogrel (PLAVIX) 75 MG tablet Take 1 tablet daily 90 tablet 1    levocetirizine (XYZAL) 5 MG tablet Take 1 tablet by mouth nightly 90 tablet 3    aspirin 81 MG EC tablet Take 81 mg by mouth daily      acetaminophen (TYLENOL) 500 MG tablet Take 500 mg by mouth 2 times daily 2 TABLET IN THE AM & 2 TABLETS PM      Multiple Vitamins-Minerals (CENTRUM SILVER) TABS Take by mouth daily      OXYGEN Inhale into the lungs 2L at hs      diclofenac sodium (VOLTAREN) 1 % GEL Apply 4 g topically 3 times daily as needed for Pain 5 Tube 1     No current facility-administered medications for this visit.      Allergies   Allergen Reactions    Augmentin [Amoxicillin-Pot Clavulanate]     Biaxin [Clarithromycin]     Cefdinir     Effexor [Venlafaxine]     Erythromycin     Lortab [Hydrocodone-Acetaminophen]     Naprosyn [Naproxen]        Health Maintenance   Topic Date Due    Flu vaccine (1) 08/01/2022    Shingles vaccine (1 of 2) 10/11/2022 (Originally 12/18/1982)    DTaP/Tdap/Td vaccine (1 - Tdap) 10/31/2022 (Originally 12/18/1951)    Depression Monitoring  07/04/2023    Lipids  07/11/2023    Pneumococcal 65+ years Vaccine  Completed    COVID-19 Vaccine  Completed    Hepatitis A vaccine  Aged Out    Hib vaccine  Aged Out    Meningococcal (ACWY) vaccine  Aged Out       Lab Results   Component Value Date    LABA1C 5.8 04/26/2019     No results found for: PSA, PSADIA  TSH   Date Value Ref Range Status   07/11/2022 2.000 0.270 - 4.200 uIU/mL Final   ]  Lab Results   Component Value Date     (H) 07/11/2022    K 4.3 07/11/2022     07/11/2022    CO2 26 07/11/2022    BUN 19 07/11/2022    CREATININE 0.8 07/11/2022    GLUCOSE 101 07/11/2022    CALCIUM 9.7 07/11/2022    PROT 6.9 07/11/2022    LABALBU 4.1 07/11/2022    BILITOT 0.3 07/11/2022    ALKPHOS 75 07/11/2022    AST 32 07/11/2022    ALT 14 07/11/2022    LABGLOM >60 07/11/2022    GFRAA >59 07/11/2022     Lab Results   Component Value Date    CHOL 252 (H) 07/11/2022    CHOL 199 04/11/2022    CHOL 190 10/11/2021     Lab Results   Component Value Date    TRIG 293 (H) 07/11/2022    TRIG 211 (H) 04/11/2022    TRIG 211 (H) 10/11/2021     Lab Results   Component Value Date    HDL 47 (L) 07/11/2022    HDL 52 (L) 04/11/2022    HDL 53 (L) 10/11/2021     Lab Results   Component Value Date    LDLCALC 146 07/11/2022    LDLCALC 105 04/11/2022    LDLCALC 95 10/11/2021     Lab Results   Component Value Date/Time     07/11/2022 09:39 AM    K 4.3 07/11/2022 09:39 AM     07/11/2022 09:39 AM    CO2 26 07/11/2022 09:39 AM    BUN 19 07/11/2022 09:39 AM    CREATININE 0.8 07/11/2022 09:39 AM    GLUCOSE 101 07/11/2022 09:39 AM    CALCIUM 9.7 07/11/2022 09:39 AM      Lab Results   Component Value Date    WBC 9.2 07/11/2022    HGB 12.4 07/11/2022    HCT 39.0 07/11/2022    MCV 96.1 07/11/2022     07/11/2022    LABLYMP 2.86 09/25/2014    LYMPHOPCT 31.8 07/11/2022    RBC 4.06 (L) 07/11/2022    MCH 30.5 07/11/2022    MCHC 31.8 (L) 07/11/2022    RDW 13.2 07/11/2022     Lab Results   Component Value Date    VITD25 37.5 04/11/2022     Labs reviewed from 7/11/2022  Diagn  Subjective:      Review of Systems   Constitutional:  Negative for fatigue, fever and unexpected weight change. HENT:  Negative for ear discharge, ear pain, mouth sores, sore throat and trouble swallowing.     Eyes:  Negative for discharge, itching and visual disturbance. Respiratory:  Negative for cough, choking, shortness of breath, wheezing and stridor. Cardiovascular:  Negative for chest pain, palpitations and leg swelling. Gastrointestinal:  Negative for abdominal distention, abdominal pain, blood in stool, constipation, diarrhea, nausea and vomiting. Endocrine: Negative for cold intolerance, polydipsia and polyuria. Genitourinary:  Negative for difficulty urinating, dysuria, frequency and urgency. Musculoskeletal:  Negative for arthralgias and gait problem. Skin:  Negative for color change and rash. Allergic/Immunologic: Negative for food allergies and immunocompromised state. Neurological:  Negative for dizziness, tremors, syncope, speech difficulty, weakness and headaches. Hematological:  Negative for adenopathy. Does not bruise/bleed easily. Psychiatric/Behavioral:  Positive for confusion. Negative for hallucinations. The patient is nervous/anxious. Objective:     Physical Exam  /70   Pulse 78   Temp 97 °F (36.1 °C)   Wt 166 lb (75.3 kg)   SpO2 92%   BMI 28.49 kg/m²           Assessment:      Problem List       Other chronic sinusitis - Primary    Relevant Medications    levocetirizine (XYZAL) 5 MG tablet    Other Relevant Orders    Kamla Murphy MD, Otolaryngology, [place]    Hypertension, essential    Relevant Orders    CBC with Auto Differential    Comprehensive Metabolic Panel    Vitamin D 25 Hydroxy    Urinalysis with Reflex to Culture    TSH    Mixed hyperlipidemia    Relevant Medications    aspirin 81 MG EC tablet    colestipol (COLESTID) 1 g tablet    atorvastatin (LIPITOR) 20 MG tablet    losartan-hydroCHLOROthiazide (HYZAAR) 100-12.5 MG per tablet    Other Relevant Orders    Lipid Panel       Plan:        Patient given educational materials - see patient instructions. Discussed use, benefit, and side effects of prescribed medications. Allpatient questions answered.   Pt voiced understanding. Reviewed health maintenance. Instructed to continue current medications, diet and exercise. Patient agreed with treatment plan. Follow up as directed. MEDICATIONS:  Orders Placed This Encounter   Medications    DISCONTD: QUEtiapine (SEROQUEL) 25 MG tablet     Sig: Take 25 mg twice daily     Dispense:  180 tablet     Refill:  1    DISCONTD: QUEtiapine (SEROQUEL) 50 MG tablet     Sig: Take 1 tablet by mouth 2 times daily     Dispense:  90 tablet     Refill:  1    DISCONTD: QUEtiapine (SEROQUEL) 50 MG tablet     Sig: Take 1 tablet by mouth at bedtime     Dispense:  90 tablet     Refill:  1    QUEtiapine (SEROQUEL) 100 MG tablet     Sig: Take 1 tablet by mouth at bedtime     Dispense:  90 tablet     Refill:  1         ORDERS:  Orders Placed This Encounter   Procedures    Influenza, FLUAD, (age 72 y+), IM, Preservative Free, 0.5 mL    CBC with Auto Differential    Comprehensive Metabolic Panel    Lipid Panel    Vitamin D 25 Hydroxy    Urinalysis with Reflex to Culture    TSH    James Rocha MD, Otolaryngology, [place]         Follow-up:  Return in about 3 months (around 1/10/2023) for have labs done prior to appt. PATIENT INSTRUCTIONS:  Patient Instructions   Anxiety stable with lorazepam  Insomia;  and behaviors;  seroque 25 twice daily and 100 mg at bedtime   Sinus drainagel refer to Dr Leeanne Kay; Electronically signed by SONIA Fermin on 10/10/2022 at 3:21 PM    @    EMRDragon/transcription disclaimer:  Much of this encounter note is electronic transcription/translation of spoken language to printed texts. The electronic translation of spoken language may be erroneous, or at times,nonsensical words or phrases may be inadvertently transcribed.   Although I have reviewed the note for such errors, some may still exist.

## 2022-10-19 ENCOUNTER — OFFICE VISIT (OUTPATIENT)
Dept: INTERNAL MEDICINE | Age: 87
End: 2022-10-19
Payer: MEDICARE

## 2022-10-19 VITALS
SYSTOLIC BLOOD PRESSURE: 120 MMHG | DIASTOLIC BLOOD PRESSURE: 64 MMHG | OXYGEN SATURATION: 93 % | HEIGHT: 64 IN | WEIGHT: 162 LBS | HEART RATE: 86 BPM | BODY MASS INDEX: 27.66 KG/M2

## 2022-10-19 DIAGNOSIS — J84.111 IDIOPATHIC INTERSTITIAL PNEUMONIA (HCC): ICD-10-CM

## 2022-10-19 DIAGNOSIS — R41.3 MEMORY LOSS: ICD-10-CM

## 2022-10-19 DIAGNOSIS — F41.8 ANXIETY ASSOCIATED WITH DEPRESSION: ICD-10-CM

## 2022-10-19 DIAGNOSIS — J43.8 OTHER EMPHYSEMA (HCC): Primary | ICD-10-CM

## 2022-10-19 DIAGNOSIS — J96.11 CHRONIC RESPIRATORY FAILURE WITH HYPOXIA (HCC): ICD-10-CM

## 2022-10-19 PROCEDURE — G8427 DOCREV CUR MEDS BY ELIG CLIN: HCPCS | Performed by: INTERNAL MEDICINE

## 2022-10-19 PROCEDURE — G8484 FLU IMMUNIZE NO ADMIN: HCPCS | Performed by: INTERNAL MEDICINE

## 2022-10-19 PROCEDURE — 1090F PRES/ABSN URINE INCON ASSESS: CPT | Performed by: INTERNAL MEDICINE

## 2022-10-19 PROCEDURE — 99213 OFFICE O/P EST LOW 20 MIN: CPT | Performed by: INTERNAL MEDICINE

## 2022-10-19 PROCEDURE — 1036F TOBACCO NON-USER: CPT | Performed by: INTERNAL MEDICINE

## 2022-10-19 PROCEDURE — 1123F ACP DISCUSS/DSCN MKR DOCD: CPT | Performed by: INTERNAL MEDICINE

## 2022-10-19 PROCEDURE — G8417 CALC BMI ABV UP PARAM F/U: HCPCS | Performed by: INTERNAL MEDICINE

## 2022-10-19 PROCEDURE — 3023F SPIROM DOC REV: CPT | Performed by: INTERNAL MEDICINE

## 2022-10-19 NOTE — PROGRESS NOTES
Chief Complaint   Patient presents with    Follow-up     DOL recert. HPI: Pt needs DOL recertification. Daughter lives with her and is an RN. Pts granddaughter is a HHA. Pt needs skilled nursing to assess medical condition and review medications and adminitster medications and provide medical assessment and assistance 16 hours a day 7 days a week and patient needs 6 hours a day 7 day a week HHA care for assistance ADLS and including moibility/ dressing and undressing and toileting and bathing and meal prep. Pt has qualified for daily care based on medical conditions previously determined by DOL. Pt has trouble falling asleep at night. Gets nervous and upset because of this.      Past Medical History:   Diagnosis Date    Abnormal mammogram     Anxiety     Anxiety associated with depression     Artery disease, cerebral     Ataxia     Benign diastolic hypertension     Carpal tunnel syndrome     idiopathic peripheral    Cellulitis of face     Chronic back pain     COPD (chronic obstructive pulmonary disease) (HCC)     Depression     Disc degeneration, lumbosacral     Diverticulosis     Dysphagia     Esophageal reflux     Fever blister     Herpes simplex     Herpes zoster     Hypertension, essential     Hypertensive heart disease without CHF     Hypoxia     Idiopathic peripheral neuropathy     2017    Insomnia     Major depressive disorder, recurrent episode, moderate (HCC)     Mixed hyperlipidemia     Osteoporosis     Ovarian failure     Pulmonary fibrosis (HCC)     Sciatica     Sleep apnea     Transient cerebral ischemic attack     Urinary incontinence     Weakness of both legs        Past Surgical History:   Procedure Laterality Date    COLONOSCOPY  06/12/2015    Aubree, repeat as needed    COLONOSCOPY N/A 10/21/2020    Dr REANNA Spicer-Diverticular disease, random colon bx negative    HYSTERECTOMY (CERVIX STATUS UNKNOWN)      LUNG SEGMENTECTOMY      lung segmental resection    UPPER GASTROINTESTINAL ENDOSCOPY N/A 10/21/2020    Dr REANNA Spicer-mild Gastritis, otherwise normal, NEG h pylori, NEG celiac       Family History   Problem Relation Age of Onset    Heart Failure Mother     Diabetes Mother     Heart Disease Mother     Diabetes Father     Heart Disease Father     Colon Polyps Neg Hx     Colon Cancer Neg Hx        Social History     Socioeconomic History    Marital status:       Spouse name: Not on file    Number of children: Not on file    Years of education: Not on file    Highest education level: Not on file   Occupational History    Not on file   Tobacco Use    Smoking status: Never    Smokeless tobacco: Never   Vaping Use    Vaping Use: Never used   Substance and Sexual Activity    Alcohol use: No    Drug use: No    Sexual activity: Not on file   Other Topics Concern    Not on file   Social History Narrative    Not on file     Social Determinants of Health     Financial Resource Strain: Low Risk     Difficulty of Paying Living Expenses: Not hard at all   Food Insecurity: No Food Insecurity    Worried About Running Out of Food in the Last Year: Never true    Ran Out of Food in the Last Year: Never true   Transportation Needs: Not on file   Physical Activity: Inactive    Days of Exercise per Week: 0 days    Minutes of Exercise per Session: 10 min   Stress: Not on file   Social Connections: Not on file   Intimate Partner Violence: Not on file   Housing Stability: Not on file       Allergies   Allergen Reactions    Augmentin [Amoxicillin-Pot Clavulanate]     Biaxin [Clarithromycin]     Cefdinir     Effexor [Venlafaxine]     Erythromycin     Lortab [Hydrocodone-Acetaminophen]     Naprosyn [Naproxen]        Current Outpatient Medications   Medication Sig Dispense Refill    esomeprazole (NEXIUM) 40 MG delayed release capsule Take 1 capsule by mouth daily 90 capsule 1    colestipol (COLESTID) 1 g tablet TAKE 1 TABLET TWICE A  tablet 1    escitalopram (LEXAPRO) 20 MG tablet TAKE 1 TABLET DAILY 90 tablet 1 busPIRone (BUSPAR) 5 MG tablet Take 1 tablet by mouth 3 times daily as needed (anxiety) 180 tablet 1    LORazepam (ATIVAN) 1 MG tablet TAKE (1) TABLET BY MOUTH EVERY EIGHT HOURS AS NEEDED. 90 tablet 0    losartan-hydroCHLOROthiazide (HYZAAR) 100-12.5 MG per tablet TAKE 1 TABLET DAILY 90 tablet 1    QUEtiapine (SEROQUEL) 50 MG tablet Take 1 tablet by mouth 2 times daily 180 tablet 3    mirabegron (MYRBETRIQ) 50 MG TB24 TAKE 1 TABLET DAILY 90 tablet 1    mirtazapine (REMERON) 30 MG tablet TAKE 1 TABLET NIGHTLY 90 tablet 1    atorvastatin (LIPITOR) 20 MG tablet Take 0.5 tablets by mouth at bedtime 90 tablet 1    levothyroxine (SYNTHROID) 25 MCG tablet TAKE 1 TABLET 3 DAYS A WEEK AND TAKE 2 TABLETS 4 DAYS A WEEK 390 tablet 1    donepezil (ARICEPT) 10 MG tablet Take 1 tablet by mouth nightly 90 tablet 1    ondansetron (ZOFRAN-ODT) 4 MG disintegrating tablet Take 1 tablet by mouth every 8 hours as needed for Nausea or Vomiting 30 tablet 0    nystatin (MYCOSTATIN) 445345 UNIT/GM powder Apply 3 times daily. 1 each 0    clopidogrel (PLAVIX) 75 MG tablet Take 1 tablet daily 90 tablet 1    levocetirizine (XYZAL) 5 MG tablet Take 1 tablet by mouth nightly 90 tablet 3    aspirin 81 MG EC tablet Take 81 mg by mouth daily      acetaminophen (TYLENOL) 500 MG tablet Take 500 mg by mouth 2 times daily 2 TABLET IN THE AM & 2 TABLETS PM      diclofenac sodium (VOLTAREN) 1 % GEL Apply 4 g topically 3 times daily as needed for Pain 5 Tube 1    Multiple Vitamins-Minerals (CENTRUM SILVER) TABS Take by mouth daily      OXYGEN Inhale into the lungs 2L at [facility]       No current facility-administered medications for this visit.        Review of Systems    /64   Pulse 86   Ht 5' 4\" (1.626 m)   Wt 162 lb (73.5 kg)   SpO2 93%   BMI 27.81 kg/m²   BP Readings from Last 7 Encounters:   10/19/22 120/64   10/10/22 126/70   07/11/22 (!) 108/56   04/11/22 120/70   02/02/22 120/70   09/13/21 124/88   07/28/21 132/78     Wt Readings from Last 7 Encounters:   10/19/22 162 lb (73.5 kg)   10/10/22 166 lb (75.3 kg)   07/11/22 164 lb (74.4 kg)   02/02/22 176 lb (79.8 kg)   09/13/21 280 lb (127 kg)   07/28/21 182 lb 8 oz (82.8 kg)   04/26/21 187 lb (84.8 kg)     BMI Readings from Last 7 Encounters:   10/19/22 27.81 kg/m²   10/10/22 28.49 kg/m²   07/11/22 28.15 kg/m²   02/02/22 30.21 kg/m²   09/13/21 48.06 kg/m²   07/28/21 30.84 kg/m²   04/26/21 31.60 kg/m²     Resp Readings from Last 7 Encounters:   09/13/21 16   07/28/21 16   01/20/21 20   10/21/20 (!) 1   10/21/20 20   10/30/19 18   10/01/19 18       Physical Exam  Constitutional:       General: She is not in acute distress. Appearance: Normal appearance. She is well-developed. HENT:      Right Ear: External ear normal. Tympanic membrane is not injected. Left Ear: External ear normal. Tympanic membrane is not injected. Mouth/Throat:      Pharynx: No oropharyngeal exudate. Eyes:      General: No scleral icterus. Conjunctiva/sclera: Conjunctivae normal.   Neck:      Thyroid: No thyroid mass or thyromegaly. Vascular: No carotid bruit. Cardiovascular:      Rate and Rhythm: Normal rate and regular rhythm. Heart sounds: S1 normal and S2 normal. No murmur heard. No S3 or S4 sounds. Pulmonary:      Effort: Pulmonary effort is normal. No respiratory distress. Breath sounds: Normal breath sounds. No wheezing or rales. Musculoskeletal:      Cervical back: Neck supple. Comments: Chronic arthritis changes trace edema   Lymphadenopathy:      Cervical: No cervical adenopathy. Upper Body:      Right upper body: No supraclavicular adenopathy. Left upper body: No supraclavicular adenopathy. Skin:     Findings: No rash. Neurological:      Mental Status: She is alert and oriented to person, place, and time. Cranial Nerves: No cranial nerve deficit.    Psychiatric:      Comments: worried       Results for orders placed or performed in visit on 07/11/22   POCT Rapid Drug Screen   Result Value Ref Range    Alcohol, Urine neg     Amphetamine Screen, Urine neg     Barbiturate Screen, Urine neg     Benzodiazepine Screen, Urine pos     Buprenorphine Urine neg     Cocaine Metabolite Screen, Urine neg     FENTANYL SCREEN, URINE neg     Gabapentin Screen, Urine neg     MDMA, Urine neg     Methadone Screen, Urine neg     Methamphetamine, Urine neg     Opiate Scrn, Ur neg     Oxycodone Screen, Ur neg     PCP Screen, Urine neg     Propoxyphene Screen, Urine neg     Synthetic Cannabinoids (K2) Screen, Urine neg     THC Screen, Urine neg     Tramadol Scrn, Ur neg     Tricyclic Antidepressants, Urine neg        ASSESSMENT/ PLAN:  1. Other emphysema ([facility]  See above patient with needs for supervision and support described in more detail in HPI above otherwise continue current medical care    2. Idiopathic interstitial pneumonia ([facility]  As above we will fill out forms for DOL recertification for RN and home health aide support    3. Chronic respiratory failure with hypoxia (HCC)  Monitor oxygen level at home and cardiopulmonary status    4. Memory loss  Continue Aricept     5. Anxiety associated with depression  continue Lexapro BuSpar Ativan follow with Jessica Welch    Chart, medications, labs, vaccines reviewed. Keep up to date with routine care and follow up. Call with any problems or complaints. Keep up to date with routine screening recomendations and vaccines.   Reviewed record and chart  We saw the patient reviewed her record we filled out a medical form and we wrote a letter for her recertification

## 2022-10-24 DIAGNOSIS — F51.01 PRIMARY INSOMNIA: ICD-10-CM

## 2022-10-24 DIAGNOSIS — F41.8 ANXIETY ASSOCIATED WITH DEPRESSION: ICD-10-CM

## 2022-10-24 RX ORDER — ESOMEPRAZOLE MAGNESIUM 40 MG/1
40 CAPSULE, DELAYED RELEASE ORAL DAILY
Qty: 90 CAPSULE | Refills: 1 | Status: SHIPPED | OUTPATIENT
Start: 2022-10-24 | End: 2022-11-21 | Stop reason: SDUPTHER

## 2022-10-24 RX ORDER — ESCITALOPRAM OXALATE 20 MG/1
TABLET ORAL
Qty: 90 TABLET | Refills: 1 | Status: SHIPPED | OUTPATIENT
Start: 2022-10-24 | End: 2022-11-21 | Stop reason: SDUPTHER

## 2022-10-24 RX ORDER — LOSARTAN POTASSIUM AND HYDROCHLOROTHIAZIDE 12.5; 1 MG/1; MG/1
TABLET ORAL
Qty: 90 TABLET | Refills: 1 | Status: SHIPPED | OUTPATIENT
Start: 2022-10-24 | End: 2022-10-31 | Stop reason: SDUPTHER

## 2022-10-24 RX ORDER — QUETIAPINE FUMARATE 50 MG/1
50 TABLET, FILM COATED ORAL 2 TIMES DAILY
Qty: 180 TABLET | Refills: 3 | Status: SHIPPED | OUTPATIENT
Start: 2022-10-24 | End: 2022-11-21 | Stop reason: SDUPTHER

## 2022-10-24 RX ORDER — BUSPIRONE HYDROCHLORIDE 5 MG/1
5 TABLET ORAL 3 TIMES DAILY PRN
Qty: 180 TABLET | Refills: 1 | Status: SHIPPED | OUTPATIENT
Start: 2022-10-24 | End: 2022-11-21 | Stop reason: SDUPTHER

## 2022-10-24 RX ORDER — LORAZEPAM 1 MG/1
TABLET ORAL
Qty: 90 TABLET | Refills: 0 | Status: SHIPPED | OUTPATIENT
Start: 2022-10-24 | End: 2022-11-21 | Stop reason: SDUPTHER

## 2022-10-24 RX ORDER — MONTELUKAST SODIUM 4 MG/1
TABLET, CHEWABLE ORAL
Qty: 180 TABLET | Refills: 1 | Status: SHIPPED | OUTPATIENT
Start: 2022-10-24 | End: 2022-10-31 | Stop reason: SDUPTHER

## 2022-10-24 NOTE — TELEPHONE ENCOUNTER
Felipe Cazares called requesting a refill of the below medication which has been pended for you:     Requested Prescriptions     Pending Prescriptions Disp Refills    esomeprazole (NEXIUM) 40 MG delayed release capsule 90 capsule 1     Sig: Take 1 capsule by mouth daily    colestipol (COLESTID) 1 g tablet 180 tablet 1     Sig: TAKE 1 TABLET TWICE A DAY    escitalopram (LEXAPRO) 20 MG tablet 90 tablet 1     Sig: TAKE 1 TABLET DAILY    busPIRone (BUSPAR) 5 MG tablet 180 tablet 1     Sig: Take 1 tablet by mouth 3 times daily as needed (anxiety)    LORazepam (ATIVAN) 1 MG tablet 90 tablet 0     Sig: TAKE (1) TABLET BY MOUTH EVERY EIGHT HOURS AS NEEDED.     losartan-hydroCHLOROthiazide (HYZAAR) 100-12.5 MG per tablet 90 tablet 1     Sig: TAKE 1 TABLET DAILY       Last Appointment Date: 10/10/2022  Next Appointment Date: 1/10/2023    Allergies   Allergen Reactions    Augmentin [Amoxicillin-Pot Clavulanate]     Biaxin [Clarithromycin]     Cefdinir     Effexor [Venlafaxine]     Erythromycin     Lortab [Hydrocodone-Acetaminophen]     Naprosyn [Naproxen]

## 2022-10-31 DIAGNOSIS — R41.3 MEMORY LOSS: ICD-10-CM

## 2022-10-31 NOTE — TELEPHONE ENCOUNTER
Khanh Valderrama called requesting a refill of the below medication which has been pended for you:     Requested Prescriptions     Pending Prescriptions Disp Refills    donepezil (ARICEPT) 10 MG tablet 90 tablet 1     Sig: Take 1 tablet by mouth nightly    levothyroxine (SYNTHROID) 25 MCG tablet 390 tablet 1     Sig: TAKE 1 TABLET 3 DAYS A WEEK AND TAKE 2 TABLETS 4 DAYS A WEEK    mirtazapine (REMERON) 30 MG tablet 90 tablet 1     Sig: TAKE 1 TABLET NIGHTLY    atorvastatin (LIPITOR) 20 MG tablet 90 tablet 1     Sig: Take 0.5 tablets by mouth at bedtime    colestipol (COLESTID) 1 g tablet 180 tablet 1     Sig: TAKE 1 TABLET TWICE A DAY    losartan-hydroCHLOROthiazide (HYZAAR) 100-12.5 MG per tablet 90 tablet 1     Sig: TAKE 1 TABLET DAILY       Last Appointment Date: 10/10/2022  Next Appointment Date: 1/10/2023    Allergies   Allergen Reactions    Augmentin [Amoxicillin-Pot Clavulanate]     Biaxin [Clarithromycin]     Cefdinir     Effexor [Venlafaxine]     Erythromycin     Lortab [Hydrocodone-Acetaminophen]     Naprosyn [Naproxen]

## 2022-11-01 RX ORDER — ATORVASTATIN CALCIUM 20 MG/1
10 TABLET, FILM COATED ORAL NIGHTLY
Qty: 90 TABLET | Refills: 1 | Status: SHIPPED | OUTPATIENT
Start: 2022-11-01 | End: 2022-11-21 | Stop reason: SDUPTHER

## 2022-11-01 RX ORDER — LOSARTAN POTASSIUM AND HYDROCHLOROTHIAZIDE 12.5; 1 MG/1; MG/1
TABLET ORAL
Qty: 90 TABLET | Refills: 1 | Status: SHIPPED | OUTPATIENT
Start: 2022-11-01

## 2022-11-01 RX ORDER — MONTELUKAST SODIUM 4 MG/1
TABLET, CHEWABLE ORAL
Qty: 180 TABLET | Refills: 1 | Status: SHIPPED | OUTPATIENT
Start: 2022-11-01 | End: 2022-11-21 | Stop reason: SDUPTHER

## 2022-11-01 RX ORDER — MIRTAZAPINE 30 MG/1
TABLET, FILM COATED ORAL
Qty: 90 TABLET | Refills: 1 | Status: SHIPPED | OUTPATIENT
Start: 2022-11-01 | End: 2022-11-21 | Stop reason: SDUPTHER

## 2022-11-01 RX ORDER — LEVOTHYROXINE SODIUM 0.03 MG/1
TABLET ORAL
Qty: 390 TABLET | Refills: 1 | Status: SHIPPED | OUTPATIENT
Start: 2022-11-01 | End: 2022-11-21 | Stop reason: SDUPTHER

## 2022-11-01 RX ORDER — DONEPEZIL HYDROCHLORIDE 10 MG/1
10 TABLET, FILM COATED ORAL NIGHTLY
Qty: 90 TABLET | Refills: 1 | Status: SHIPPED | OUTPATIENT
Start: 2022-11-01 | End: 2022-11-21 | Stop reason: SDUPTHER

## 2022-11-02 PROBLEM — J84.10 PULMONARY FIBROSIS (HCC): Status: ACTIVE | Noted: 2022-11-02

## 2022-11-03 ENCOUNTER — OFFICE VISIT (OUTPATIENT)
Dept: ENT CLINIC | Age: 87
End: 2022-11-03
Payer: MEDICARE

## 2022-11-03 VITALS
WEIGHT: 160 LBS | BODY MASS INDEX: 27.31 KG/M2 | DIASTOLIC BLOOD PRESSURE: 68 MMHG | HEIGHT: 64 IN | SYSTOLIC BLOOD PRESSURE: 128 MMHG

## 2022-11-03 DIAGNOSIS — J34.89 NASAL SEPTAL PERFORATION: Primary | ICD-10-CM

## 2022-11-03 PROBLEM — J34.0 NASAL SEPTUM ULCERATION: Status: ACTIVE | Noted: 2022-11-03

## 2022-11-03 PROCEDURE — 1123F ACP DISCUSS/DSCN MKR DOCD: CPT | Performed by: PHYSICIAN ASSISTANT

## 2022-11-03 PROCEDURE — G8427 DOCREV CUR MEDS BY ELIG CLIN: HCPCS | Performed by: PHYSICIAN ASSISTANT

## 2022-11-03 PROCEDURE — G8417 CALC BMI ABV UP PARAM F/U: HCPCS | Performed by: PHYSICIAN ASSISTANT

## 2022-11-03 PROCEDURE — 1090F PRES/ABSN URINE INCON ASSESS: CPT | Performed by: PHYSICIAN ASSISTANT

## 2022-11-03 PROCEDURE — 99213 OFFICE O/P EST LOW 20 MIN: CPT | Performed by: PHYSICIAN ASSISTANT

## 2022-11-03 PROCEDURE — G8484 FLU IMMUNIZE NO ADMIN: HCPCS | Performed by: PHYSICIAN ASSISTANT

## 2022-11-03 PROCEDURE — 1036F TOBACCO NON-USER: CPT | Performed by: PHYSICIAN ASSISTANT

## 2022-11-03 ASSESSMENT — ENCOUNTER SYMPTOMS
EYE PAIN: 0
SORE THROAT: 0
SINUS PRESSURE: 0
RHINORRHEA: 0
SINUS PAIN: 0
EYE DISCHARGE: 0
FACIAL SWELLING: 0
VOICE CHANGE: 0
TROUBLE SWALLOWING: 0

## 2022-11-03 NOTE — ASSESSMENT & PLAN NOTE
Nasal septal perforation-50% mid septum  Plan: I advised the patient to stop using the steroidal sprays and only use it saline nasal gel 2-3 times per day for comfort measures. Overall I believe this is the culprit of her symptoms. I offered referring her to [facility] for surgery however she reports that due to her age she prefers only conservative management. Patient was advised to call if she has any questions or problems. At this point she is follow-up with me as needed.

## 2022-11-03 NOTE — PROGRESS NOTES
[facility] OTOLARYNGOLOGY/ENT  Mrs. Lilian Aleman is a pleasant 66-year-old  female that presents with complaints of chronic rhinorrhea and nasal pain. She reports this been going on for several years and seems to be worsening. She reports a constant drainage from the nose as well as the oropharynx. She also reports of sporadic bleeding that occurs from the nose. She reports that she has tried nasal steroids without any success. Allergies: Augmentin [amoxicillin-pot clavulanate], Biaxin [clarithromycin], Cefdinir, Effexor [venlafaxine], Erythromycin, Lortab [hydrocodone-acetaminophen], and Naprosyn [naproxen]      Current Outpatient Medications   Medication Sig Dispense Refill    levothyroxine (SYNTHROID) 25 MCG tablet TAKE 1 TABLET 3 DAYS A WEEK AND TAKE 2 TABLETS 4 DAYS A WEEK 390 tablet 1    mirtazapine (REMERON) 30 MG tablet TAKE 1 TABLET NIGHTLY 90 tablet 1    atorvastatin (LIPITOR) 20 MG tablet Take 0.5 tablets by mouth at bedtime 90 tablet 1    colestipol (COLESTID) 1 g tablet TAKE 1 TABLET TWICE A  tablet 1    losartan-hydroCHLOROthiazide (HYZAAR) 100-12.5 MG per tablet TAKE 1 TABLET DAILY 90 tablet 1    esomeprazole (NEXIUM) 40 MG delayed release capsule Take 1 capsule by mouth daily 90 capsule 1    escitalopram (LEXAPRO) 20 MG tablet TAKE 1 TABLET DAILY 90 tablet 1    busPIRone (BUSPAR) 5 MG tablet Take 1 tablet by mouth 3 times daily as needed (anxiety) 180 tablet 1    LORazepam (ATIVAN) 1 MG tablet TAKE (1) TABLET BY MOUTH EVERY EIGHT HOURS AS NEEDED.  90 tablet 0    QUEtiapine (SEROQUEL) 50 MG tablet Take 1 tablet by mouth 2 times daily 180 tablet 3    clopidogrel (PLAVIX) 75 MG tablet Take 1 tablet daily 90 tablet 1    acetaminophen (TYLENOL) 500 MG tablet Take 500 mg by mouth 2 times daily 2 TABLET IN THE AM & 2 TABLETS PM      Multiple Vitamins-Minerals (CENTRUM SILVER) TABS Take by mouth daily      OXYGEN Inhale into the lungs 2L at qhs      donepezil (ARICEPT) 10 MG tablet Take 1 tablet by mouth nightly (Patient not taking: Reported on 11/3/2022) 90 tablet 1    mirabegron (MYRBETRIQ) 50 MG TB24 TAKE 1 TABLET DAILY (Patient not taking: Reported on 11/3/2022) 90 tablet 1    ondansetron (ZOFRAN-ODT) 4 MG disintegrating tablet Take 1 tablet by mouth every 8 hours as needed for Nausea or Vomiting (Patient not taking: Reported on 11/3/2022) 30 tablet 0    nystatin (MYCOSTATIN) 285852 UNIT/GM powder Apply 3 times daily. (Patient not taking: Reported on 11/3/2022) 1 each 0    levocetirizine (XYZAL) 5 MG tablet Take 1 tablet by mouth nightly (Patient not taking: Reported on 11/3/2022) 90 tablet 3    aspirin 81 MG EC tablet Take 81 mg by mouth daily (Patient not taking: Reported on 11/3/2022)      diclofenac sodium (VOLTAREN) 1 % GEL Apply 4 g topically 3 times daily as needed for Pain 5 Tube 1     No current facility-administered medications for this visit.        Past Surgical History:   Procedure Laterality Date    COLONOSCOPY  06/12/2015    [facility], repeat as needed    COLONOSCOPY N/A 10/21/2020    Dr Cristopher Spicer-Diverticular disease, random colon bx negative    HYSTERECTOMY (CERVIX STATUS UNKNOWN)      LUNG SEGMENTECTOMY      lung segmental resection    UPPER GASTROINTESTINAL ENDOSCOPY N/A 10/21/2020    Dr Cristopher Spicer-mild Gastritis, otherwise normal, NEG h pylori, NEG celiac       Past Medical History:   Diagnosis Date    Abnormal mammogram     Anxiety     Anxiety associated with depression     Artery disease, cerebral     Ataxia     Benign diastolic hypertension     Carpal tunnel syndrome     idiopathic peripheral    Cellulitis of face     Chronic back pain     COPD (chronic obstructive pulmonary disease) ([facility])     Depression     Disc degeneration, lumbosacral     Diverticulosis     Dysphagia     Esophageal reflux     Fever blister     Herpes simplex     Herpes zoster     Hypertension, essential     Hypertensive heart disease without CHF     Hypoxia     Idiopathic peripheral neuropathy     2017 Insomnia     Major depressive disorder, recurrent episode, moderate (HCC)     Mixed hyperlipidemia     Osteoporosis     Ovarian failure     Oxygen dependent     Pulmonary fibrosis (HCC)     Sciatica     Sleep apnea     Transient cerebral ischemic attack     Urinary incontinence     Weakness of both legs        Family History   Problem Relation Age of Onset    Heart Failure Mother     Diabetes Mother     Heart Disease Mother     Diabetes Father     Heart Disease Father     Colon Polyps Neg Hx     Colon Cancer Neg Hx        Social History     Tobacco Use    Smoking status: Never    Smokeless tobacco: Never   Substance Use Topics    Alcohol use: No           REVIEW OF SYSTEMS:  all other systems reviewed and are negative  Review of Systems   Constitutional:  Negative for chills and fever. HENT:  Negative for congestion, dental problem, ear discharge, ear pain, facial swelling, hearing loss, nosebleeds, postnasal drip, rhinorrhea, sinus pressure, sinus pain, sneezing, sore throat, tinnitus, trouble swallowing and voice change. Eyes:  Negative for pain and discharge. Neurological:  Negative for dizziness and headaches. Comments:     PHYSICAL EXAM:    /68   Ht 5' 4\" (1.626 m)   Wt 160 lb (72.6 kg)   BMI 27.46 kg/m²   Body mass index is 27.46 kg/m². General Appearance: well developed  and well nourished  Head/ Face: normocephalic and atraumatic  Vocal Quality: good/ normal  Ears: Right Ear: External: external ears normal Otoscopy Ear Canal: canal clear Otoscopy TM: TM's normal and TM's mobile Left Ear: External: external ears normal Otoscopy Ear Canal: canal clear Otoscopy TM: TM's normal and TM's mobile  Hearing: grossly intact  Nose: + for a large mid-septal perforation with evidence of previous bleeding to the posterior aspect. Turbinates was normal with no masses or polyps noted.    Neck: supple and adenopathy none palpable  Thyroid: normal and nodules Yes -right side - stable compared to last exam    Assessment & Plan:    Problem List Items Addressed This Visit       Nasal septal perforation - Primary     Nasal septal perforation-50% mid septum  Plan: I advised the patient to stop using the steroidal sprays and only use it saline nasal gel 2-3 times per day for comfort measures. Overall I believe this is the culprit of her symptoms. I offered referring her to [facility] for surgery however she reports that due to her age she prefers only conservative management. Patient was advised to call if she has any questions or problems. At this point she is follow-up with me as needed. No orders of the defined types were placed in this encounter. No orders of the defined types were placed in this encounter. Electronically signed by Jake Campos PA-C on 11/3/22 at 4:41 PM CDT        Please note that this chart was generated using dragon dictation software. Although every effort was made to ensure the accuracy of this automated transcription, some errors in transcription may have occurred.

## 2022-11-21 DIAGNOSIS — F51.01 PRIMARY INSOMNIA: ICD-10-CM

## 2022-11-21 DIAGNOSIS — F41.8 ANXIETY ASSOCIATED WITH DEPRESSION: ICD-10-CM

## 2022-11-21 DIAGNOSIS — R41.3 MEMORY LOSS: ICD-10-CM

## 2022-11-21 RX ORDER — QUETIAPINE FUMARATE 50 MG/1
50 TABLET, FILM COATED ORAL 2 TIMES DAILY
Qty: 180 TABLET | Refills: 3 | OUTPATIENT
Start: 2022-11-21

## 2022-11-21 RX ORDER — MONTELUKAST SODIUM 4 MG/1
TABLET, CHEWABLE ORAL
Qty: 180 TABLET | Refills: 1 | Status: SHIPPED | OUTPATIENT
Start: 2022-11-21

## 2022-11-21 RX ORDER — LORAZEPAM 1 MG/1
TABLET ORAL
Qty: 90 TABLET | Refills: 0 | Status: SHIPPED | OUTPATIENT
Start: 2022-11-21 | End: 2022-12-21

## 2022-11-21 RX ORDER — QUETIAPINE FUMARATE 50 MG/1
50 TABLET, FILM COATED ORAL 2 TIMES DAILY
Qty: 180 TABLET | Refills: 3 | Status: SHIPPED | OUTPATIENT
Start: 2022-11-21

## 2022-11-21 RX ORDER — MIRTAZAPINE 30 MG/1
TABLET, FILM COATED ORAL
Qty: 90 TABLET | Refills: 1 | Status: SHIPPED | OUTPATIENT
Start: 2022-11-21

## 2022-11-21 RX ORDER — CLOPIDOGREL BISULFATE 75 MG/1
TABLET ORAL
Qty: 90 TABLET | Refills: 1 | Status: SHIPPED | OUTPATIENT
Start: 2022-11-21

## 2022-11-21 RX ORDER — ESOMEPRAZOLE MAGNESIUM 40 MG/1
40 CAPSULE, DELAYED RELEASE ORAL DAILY
Qty: 90 CAPSULE | Refills: 1 | Status: SHIPPED | OUTPATIENT
Start: 2022-11-21

## 2022-11-21 RX ORDER — LEVOTHYROXINE SODIUM 0.03 MG/1
TABLET ORAL
Qty: 390 TABLET | Refills: 1 | Status: SHIPPED | OUTPATIENT
Start: 2022-11-21

## 2022-11-21 RX ORDER — BUSPIRONE HYDROCHLORIDE 5 MG/1
5 TABLET ORAL 3 TIMES DAILY PRN
Qty: 180 TABLET | Refills: 1 | Status: SHIPPED | OUTPATIENT
Start: 2022-11-21

## 2022-11-21 RX ORDER — DONEPEZIL HYDROCHLORIDE 10 MG/1
10 TABLET, FILM COATED ORAL NIGHTLY
Qty: 90 TABLET | Refills: 1 | Status: SHIPPED | OUTPATIENT
Start: 2022-11-21

## 2022-11-21 RX ORDER — ATORVASTATIN CALCIUM 20 MG/1
10 TABLET, FILM COATED ORAL NIGHTLY
Qty: 90 TABLET | Refills: 1 | Status: SHIPPED | OUTPATIENT
Start: 2022-11-21 | End: 2023-02-19

## 2022-11-21 RX ORDER — ONDANSETRON 4 MG/1
4 TABLET, ORALLY DISINTEGRATING ORAL EVERY 8 HOURS PRN
Qty: 30 TABLET | Refills: 0 | Status: SHIPPED | OUTPATIENT
Start: 2022-11-21

## 2022-11-21 RX ORDER — BUSPIRONE HYDROCHLORIDE 5 MG/1
5 TABLET ORAL 3 TIMES DAILY PRN
Qty: 180 TABLET | Refills: 1 | OUTPATIENT
Start: 2022-11-21

## 2022-11-21 RX ORDER — ESCITALOPRAM OXALATE 20 MG/1
TABLET ORAL
Qty: 90 TABLET | Refills: 1 | Status: SHIPPED | OUTPATIENT
Start: 2022-11-21

## 2022-11-21 NOTE — TELEPHONE ENCOUNTER
Khanh Valderrama called requesting a refill of the below medication which has been pended for you:     Requested Prescriptions     Pending Prescriptions Disp Refills    clopidogrel (PLAVIX) 75 MG tablet 90 tablet 1     Sig: Take 1 tablet daily    ondansetron (ZOFRAN-ODT) 4 MG disintegrating tablet 30 tablet 0     Sig: Take 1 tablet by mouth every 8 hours as needed for Nausea or Vomiting    esomeprazole (NEXIUM) 40 MG delayed release capsule 90 capsule 1     Sig: Take 1 capsule by mouth daily    escitalopram (LEXAPRO) 20 MG tablet 90 tablet 1     Sig: TAKE 1 TABLET DAILY    busPIRone (BUSPAR) 5 MG tablet 180 tablet 1     Sig: Take 1 tablet by mouth 3 times daily as needed (anxiety)    LORazepam (ATIVAN) 1 MG tablet 90 tablet 0     Sig: TAKE (1) TABLET BY MOUTH EVERY EIGHT HOURS AS NEEDED.     QUEtiapine (SEROQUEL) 50 MG tablet 180 tablet 3     Sig: Take 1 tablet by mouth 2 times daily    donepezil (ARICEPT) 10 MG tablet 90 tablet 1     Sig: Take 1 tablet by mouth nightly    levothyroxine (SYNTHROID) 25 MCG tablet 390 tablet 1     Sig: TAKE 1 TABLET 3 DAYS A WEEK AND TAKE 2 TABLETS 4 DAYS A WEEK    mirtazapine (REMERON) 30 MG tablet 90 tablet 1     Sig: TAKE 1 TABLET NIGHTLY       Last Appointment Date: 10/10/2022  Next Appointment Date: 1/10/2023    Allergies   Allergen Reactions    Augmentin [Amoxicillin-Pot Clavulanate]     Biaxin [Clarithromycin]     Cefdinir     Effexor [Venlafaxine]     Erythromycin     Lortab [Hydrocodone-Acetaminophen]     Naprosyn [Naproxen]

## 2022-11-21 NOTE — TELEPHONE ENCOUNTER
Dayton Delgadillo called to request a refill on her medication.       Last office visit : 10/19/2022   Next office visit : 1/10/2023     Requested Prescriptions     Pending Prescriptions Disp Refills    atorvastatin (LIPITOR) 20 MG tablet 90 tablet 1     Sig: Take 0.5 tablets by mouth at bedtime    colestipol (COLESTID) 1 g tablet 180 tablet 1     Sig: TAKE 1 TABLET TWICE A DAY     Refused Prescriptions Disp Refills    busPIRone (BUSPAR) 5 MG tablet 180 tablet 1     Sig: Take 1 tablet by mouth 3 times daily as needed (anxiety)    QUEtiapine (SEROQUEL) 50 MG tablet 180 tablet 3     Sig: Take 1 tablet by mouth 2 times daily            Samir Peoples

## 2022-12-07 RX ORDER — LOSARTAN POTASSIUM AND HYDROCHLOROTHIAZIDE 12.5; 1 MG/1; MG/1
TABLET ORAL
Qty: 90 TABLET | Refills: 1 | Status: SHIPPED | OUTPATIENT
Start: 2022-12-07

## 2022-12-07 RX ORDER — MONTELUKAST SODIUM 4 MG/1
TABLET, CHEWABLE ORAL
Qty: 180 TABLET | Refills: 1 | Status: SHIPPED | OUTPATIENT
Start: 2022-12-07

## 2022-12-19 ENCOUNTER — TELEPHONE (OUTPATIENT)
Dept: INTERNAL MEDICINE | Age: 87
End: 2022-12-19

## 2022-12-19 NOTE — TELEPHONE ENCOUNTER
Pts daughter called says she has a cough and does not want to come in asking for med,sough is productive and she has a slight fever.

## 2022-12-20 ENCOUNTER — TELEPHONE (OUTPATIENT)
Dept: INTERNAL MEDICINE | Age: 87
End: 2022-12-20

## 2022-12-20 DIAGNOSIS — F51.01 PRIMARY INSOMNIA: ICD-10-CM

## 2022-12-20 DIAGNOSIS — J32.8 OTHER CHRONIC SINUSITIS: Primary | ICD-10-CM

## 2022-12-20 RX ORDER — AZITHROMYCIN 250 MG/1
250 TABLET, FILM COATED ORAL SEE ADMIN INSTRUCTIONS
Qty: 6 TABLET | Refills: 0 | Status: SHIPPED | OUTPATIENT
Start: 2022-12-20 | End: 2022-12-25

## 2022-12-20 RX ORDER — LORAZEPAM 1 MG/1
TABLET ORAL
Qty: 90 TABLET | Refills: 0 | Status: SHIPPED | OUTPATIENT
Start: 2022-12-20 | End: 2023-01-19

## 2022-12-20 RX ORDER — METHYLPREDNISOLONE 4 MG/1
TABLET ORAL
Qty: 1 KIT | Refills: 0 | Status: SHIPPED | OUTPATIENT
Start: 2022-12-20 | End: 2022-12-26

## 2022-12-20 RX ORDER — QUETIAPINE FUMARATE 50 MG/1
50 TABLET, FILM COATED ORAL 2 TIMES DAILY
Qty: 180 TABLET | Refills: 3 | Status: SHIPPED | OUTPATIENT
Start: 2022-12-20

## 2022-12-20 NOTE — TELEPHONE ENCOUNTER
alexandra Saeed Back called requesting a refill of the below medication which has been pended for you:     Requested Prescriptions     Pending Prescriptions Disp Refills    azithromycin (ZITHROMAX) 250 MG tablet 6 tablet 0     Sig: Take 1 tablet by mouth See Admin Instructions for 5 days 500mg on day 1 followed by 250mg on days 2 - 5    methylPREDNISolone (MEDROL DOSEPACK) 4 MG tablet 1 kit 0     Sig: Take by mouth.        Last Appointment Date: 10/10/2022  Next Appointment Date: 1/10/2023    Allergies   Allergen Reactions    Augmentin [Amoxicillin-Pot Clavulanate]     Biaxin [Clarithromycin]     Cefdinir     Effexor [Venlafaxine]     Erythromycin     Lortab [Hydrocodone-Acetaminophen]     Naprosyn [Naproxen]

## 2022-12-20 NOTE — TELEPHONE ENCOUNTER
Adriano Menchaca called to request a refill on her medication. Last office visit : 10/19/2022   Next office visit : 1/10/2023     Last UDS:   Amphetamine Screen, Urine   Date Value Ref Range Status   07/11/2022 neg  Final     Barbiturate Screen, Urine   Date Value Ref Range Status   07/11/2022 neg  Final     Benzodiazepine Screen, Urine   Date Value Ref Range Status   07/11/2022 pos  Final     Buprenorphine Urine   Date Value Ref Range Status   07/11/2022 neg  Final     Cocaine Metabolite Screen, Urine   Date Value Ref Range Status   07/11/2022 neg  Final     Gabapentin Screen, Urine   Date Value Ref Range Status   07/11/2022 neg  Final     MDMA, Urine   Date Value Ref Range Status   07/11/2022 neg  Final     Methamphetamine, Urine   Date Value Ref Range Status   07/11/2022 neg  Final     Opiate Scrn, Ur   Date Value Ref Range Status   07/11/2022 neg  Final     Oxycodone Screen, Ur   Date Value Ref Range Status   07/11/2022 neg  Final     PCP Screen, Urine   Date Value Ref Range Status   07/11/2022 neg  Final     Propoxyphene Screen, Urine   Date Value Ref Range Status   07/11/2022 neg  Final     THC Screen, Urine   Date Value Ref Range Status   07/11/2022 neg  Final     Tricyclic Antidepressants, Urine   Date Value Ref Range Status   07/11/2022 neg  Final       Last Evon William: 10/13/22  Medication Contract: 7/11/22     Requested Prescriptions     Pending Prescriptions Disp Refills    LORazepam (ATIVAN) 1 MG tablet 90 tablet 0     Sig: TAKE (1) TABLET BY MOUTH EVERY EIGHT HOURS AS NEEDED. QUEtiapine (SEROQUEL) 50 MG tablet 180 tablet 3     Sig: Take 1 tablet by mouth 2 times daily         Please approve or refuse this medication.    Norma Tillman MA

## 2022-12-28 DIAGNOSIS — F41.8 ANXIETY ASSOCIATED WITH DEPRESSION: ICD-10-CM

## 2022-12-28 RX ORDER — MONTELUKAST SODIUM 4 MG/1
TABLET, CHEWABLE ORAL
Qty: 180 TABLET | Refills: 1 | Status: SHIPPED | OUTPATIENT
Start: 2022-12-28

## 2022-12-28 NOTE — TELEPHONE ENCOUNTER
Chu Francisco called requesting a refill of the below medication which has been pended for you:     Requested Prescriptions     Pending Prescriptions Disp Refills    colestipol (COLESTID) 1 g tablet 180 tablet 1     Sig: TAKE 1 TABLET TWICE A DAY       Last Appointment Date: 10/10/2022  Next Appointment Date: 1/10/2023    Allergies   Allergen Reactions    Augmentin [Amoxicillin-Pot Clavulanate]     Biaxin [Clarithromycin]     Cefdinir     Effexor [Venlafaxine]     Erythromycin     Lortab [Hydrocodone-Acetaminophen]     Naprosyn [Naproxen]

## 2022-12-29 RX ORDER — LEVOTHYROXINE SODIUM 0.03 MG/1
TABLET ORAL
Qty: 390 TABLET | Refills: 1 | Status: SHIPPED | OUTPATIENT
Start: 2022-12-29

## 2022-12-29 RX ORDER — BUSPIRONE HYDROCHLORIDE 5 MG/1
5 TABLET ORAL 3 TIMES DAILY PRN
Qty: 180 TABLET | Refills: 1 | Status: SHIPPED | OUTPATIENT
Start: 2022-12-29

## 2022-12-29 RX ORDER — MIRTAZAPINE 30 MG/1
TABLET, FILM COATED ORAL
Qty: 90 TABLET | Refills: 1 | Status: SHIPPED | OUTPATIENT
Start: 2022-12-29

## 2022-12-29 NOTE — TELEPHONE ENCOUNTER
Chu Francisco called requesting a refill of the below medication which has been pended for you:     Requested Prescriptions     Pending Prescriptions Disp Refills    busPIRone (BUSPAR) 5 MG tablet 180 tablet 1     Sig: Take 1 tablet by mouth 3 times daily as needed (anxiety)    levothyroxine (SYNTHROID) 25 MCG tablet 390 tablet 1     Sig: TAKE 1 TABLET 3 DAYS A WEEK AND TAKE 2 TABLETS 4 DAYS A WEEK    mirtazapine (REMERON) 30 MG tablet 90 tablet 1     Sig: TAKE 1 TABLET NIGHTLY       Last Appointment Date: 10/10/2022  Next Appointment Date: 1/10/2023    Allergies   Allergen Reactions    Augmentin [Amoxicillin-Pot Clavulanate]     Biaxin [Clarithromycin]     Cefdinir     Effexor [Venlafaxine]     Erythromycin     Lortab [Hydrocodone-Acetaminophen]     Naprosyn [Naproxen]

## 2023-01-09 DIAGNOSIS — E78.2 MIXED HYPERLIPIDEMIA: ICD-10-CM

## 2023-01-09 DIAGNOSIS — I10 HYPERTENSION, ESSENTIAL: ICD-10-CM

## 2023-01-09 LAB
ALBUMIN SERPL-MCNC: 4.4 G/DL (ref 3.5–5.2)
ALP BLD-CCNC: 93 U/L (ref 35–104)
ALT SERPL-CCNC: 16 U/L (ref 5–33)
ANION GAP SERPL CALCULATED.3IONS-SCNC: 16 MMOL/L (ref 7–19)
AST SERPL-CCNC: 31 U/L (ref 5–32)
BACTERIA: NEGATIVE /HPF
BASOPHILS ABSOLUTE: 0 K/UL (ref 0–0.2)
BASOPHILS RELATIVE PERCENT: 0.2 % (ref 0–1)
BILIRUB SERPL-MCNC: 0.4 MG/DL (ref 0.2–1.2)
BILIRUBIN URINE: NEGATIVE
BLOOD, URINE: NEGATIVE
BUN BLDV-MCNC: 21 MG/DL (ref 8–23)
CALCIUM SERPL-MCNC: 10 MG/DL (ref 8.8–10.2)
CHLORIDE BLD-SCNC: 105 MMOL/L (ref 98–111)
CHOLESTEROL, TOTAL: 311 MG/DL (ref 160–199)
CLARITY: CLEAR
CO2: 25 MMOL/L (ref 22–29)
COLOR: YELLOW
CREAT SERPL-MCNC: 0.9 MG/DL (ref 0.5–0.9)
CRYSTALS, UA: ABNORMAL /HPF
EOSINOPHILS ABSOLUTE: 0.2 K/UL (ref 0–0.6)
EOSINOPHILS RELATIVE PERCENT: 2 % (ref 0–5)
EPITHELIAL CELLS, UA: 1 /HPF (ref 0–5)
GFR SERPL CREATININE-BSD FRML MDRD: >60 ML/MIN/{1.73_M2}
GLUCOSE BLD-MCNC: 104 MG/DL (ref 74–109)
GLUCOSE URINE: NEGATIVE MG/DL
HCT VFR BLD CALC: 39.8 % (ref 37–47)
HDLC SERPL-MCNC: 52 MG/DL (ref 65–121)
HEMOGLOBIN: 12.8 G/DL (ref 12–16)
HYALINE CASTS: 3 /HPF (ref 0–8)
IMMATURE GRANULOCYTES #: 0 K/UL
KETONES, URINE: NEGATIVE MG/DL
LDL CHOLESTEROL CALCULATED: 209 MG/DL
LEUKOCYTE ESTERASE, URINE: ABNORMAL
LYMPHOCYTES ABSOLUTE: 3.4 K/UL (ref 1.1–4.5)
LYMPHOCYTES RELATIVE PERCENT: 32.8 % (ref 20–40)
MCH RBC QN AUTO: 30 PG (ref 27–31)
MCHC RBC AUTO-ENTMCNC: 32.2 G/DL (ref 33–37)
MCV RBC AUTO: 93.4 FL (ref 81–99)
MONOCYTES ABSOLUTE: 0.9 K/UL (ref 0–0.9)
MONOCYTES RELATIVE PERCENT: 9 % (ref 0–10)
NEUTROPHILS ABSOLUTE: 5.8 K/UL (ref 1.5–7.5)
NEUTROPHILS RELATIVE PERCENT: 55.7 % (ref 50–65)
NITRITE, URINE: NEGATIVE
PDW BLD-RTO: 13.7 % (ref 11.5–14.5)
PH UA: 5.5 (ref 5–8)
PLATELET # BLD: 354 K/UL (ref 130–400)
PMV BLD AUTO: 10.7 FL (ref 9.4–12.3)
POTASSIUM SERPL-SCNC: 4.6 MMOL/L (ref 3.5–5)
PROTEIN UA: NEGATIVE MG/DL
RBC # BLD: 4.26 M/UL (ref 4.2–5.4)
RBC UA: 3 /HPF (ref 0–4)
SODIUM BLD-SCNC: 146 MMOL/L (ref 136–145)
SPECIFIC GRAVITY UA: 1.02 (ref 1–1.03)
TOTAL PROTEIN: 7 G/DL (ref 6.6–8.7)
TRIGL SERPL-MCNC: 249 MG/DL (ref 0–149)
TSH SERPL DL<=0.05 MIU/L-ACNC: 2.32 UIU/ML (ref 0.27–4.2)
UROBILINOGEN, URINE: 0.2 E.U./DL
VITAMIN D 25-HYDROXY: 34.4 NG/ML
WBC # BLD: 10.4 K/UL (ref 4.8–10.8)
WBC UA: 2 /HPF (ref 0–5)

## 2023-01-10 PROBLEM — J32.8 OTHER CHRONIC SINUSITIS: Status: RESOLVED | Noted: 2022-07-11 | Resolved: 2023-01-10

## 2023-01-10 PROBLEM — J84.111 IDIOPATHIC INTERSTITIAL PNEUMONIA (HCC): Status: RESOLVED | Noted: 2022-02-14 | Resolved: 2023-01-10

## 2023-01-10 PROBLEM — J34.89 NASAL SEPTAL PERFORATION: Status: RESOLVED | Noted: 2022-11-03 | Resolved: 2023-01-10

## 2023-01-10 ASSESSMENT — ENCOUNTER SYMPTOMS
WHEEZING: 0
CONSTIPATION: 0
ABDOMINAL DISTENTION: 0
ABDOMINAL PAIN: 0
VOMITING: 0
EYE DISCHARGE: 0
SHORTNESS OF BREATH: 0
EYE ITCHING: 0
COUGH: 0
COLOR CHANGE: 0
CHOKING: 0
SORE THROAT: 0
NAUSEA: 0
DIARRHEA: 0
STRIDOR: 0
TROUBLE SWALLOWING: 0
BLOOD IN STOOL: 0

## 2023-01-10 NOTE — PROGRESS NOTES
[address] [facility]  [address] [id number] [address] [id number]  Dept: [phone]  Dept Fax: [phone] 33: [phone]    Jaida Willson (:  1932) is a 80 y.o. female,Established patient  with green  , here for evaluation of the following chief complaint(s): Sinusitis and Follow-up (3 mnth f/u , feels like she has upper resp. Infections . Pt states she has been having lumps of blood coming out of mouth . )      Jaida Willson is a 80 y.o. female who presents today for her medical conditions/complaints as noted below. Jaida Willson is c/teresa Sinusitis and Follow-up (3 mnth f/u , feels like she has upper resp. Infections . Pt states she has been having lumps of blood coming out of mouth . )        HPI:     Chief Complaint   Patient presents with    Sinusitis    Follow-up     3 mnth f/u , feels like she has upper resp. Infections . Pt states she has been having lumps of blood coming out of mouth . 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 GERD stable on current dose of Nexium daily  7. Depression she is stable on current dose of Lexapro 20 daily  8. Hypothyroidism stable with Synthroid 25 mcg 3 days a week and 2 tablets 4 days a week\  9 hypertension stable on current dose of losartan HCTZ 100/12.5  10.   Overactive bladder stable current dose of 25 mg of Myrbetriq daily      CONTROLLED SUBSTANCE  Drug lorazepam;   Diagnosis  anxiety  Last [facility] 1/5/2023  Last UDS 7/11/22  Pain contract last date    Effective dose   yes      Changes this visit   none     I have explained to the patient the risks of chronic opioid therapy (including but not limited to, risk of accidental overdose/death, addiction, dependency/withdrawal, tolerance, hyperalgesia, constipation, dizziness/drowsiness, nausea/vomiting, falls, respiratory depression and constipation), as well as it's lack of evidence for safety and effectiveness in the treatment of chronic non-cancer pain. I have also explained to the patient the details of our opioid agreement, which they have signed. He was instructed to use as little of this medication as possible to allow adequate function. Chester Macedo was reviewed today and shows no aberrant behavior.        Past Medical History:   Diagnosis Date    Abnormal mammogram     Anxiety     Anxiety associated with depression     Artery disease, cerebral     Ataxia     Benign diastolic hypertension     Carpal tunnel syndrome     idiopathic peripheral    Cellulitis of face     Chronic back pain     COPD (chronic obstructive pulmonary disease) (HCC)     Depression     Disc degeneration, lumbosacral     Diverticulosis     Dysphagia     Esophageal reflux     Fever blister     Herpes simplex     Herpes zoster     Hypertension, essential     Hypertensive heart disease without CHF     Hypoxia     Idiopathic peripheral neuropathy     2017    Insomnia     Major depressive disorder, recurrent episode, moderate (HCC)     Mixed hyperlipidemia     Osteoporosis     Ovarian failure     Oxygen dependent     Pulmonary fibrosis (HCC)     Sciatica     Sleep apnea     Transient cerebral ischemic attack     Urinary incontinence     Weakness of both legs       Past Surgical History:   Procedure Laterality Date    COLONOSCOPY  06/12/2015    Aubree, repeat as needed    COLONOSCOPY N/A 10/21/2020    Dr REANNA Spicer-Diverticular disease, random colon bx negative    HYSTERECTOMY (CERVIX STATUS UNKNOWN) LUNG SEGMENTECTOMY      lung segmental resection    UPPER GASTROINTESTINAL ENDOSCOPY N/A 10/21/2020    Dr Tamika Romero Gastritis, otherwise normal, NEG h pylori, NEG celiac       Vitals 1/11/2023 11/3/2022 10/19/2022 10/10/2022 7/11/2022 6/69/8407   SYSTOLIC 780 684 079 [phone]   DIASTOLIC 78 68 64 70 56 70   Pulse 85 - 86 78 56 72   Temp 97.2 - - 97 - -   Resp - - - - - -   SpO2 96 - 93 92 94 95   Weight 157 lb 160 lb 162 lb 166 lb 164 lb -   Height - 5' 4\" 5' 4\" - 5' 4\" -   Body mass index - 27.46 kg/m2 27.8 kg/m2 - 28.15 kg/m2 -   Some recent data might be hidden       Family History   Problem Relation Age of Onset    Heart Failure Mother     Diabetes Mother     Heart Disease Mother     Diabetes Father     Heart Disease Father     Colon Polyps Neg Hx     Colon Cancer Neg Hx        Social History     Tobacco Use    Smoking status: Never    Smokeless tobacco: Never   Substance Use Topics    Alcohol use: No      Current Outpatient Medications   Medication Sig Dispense Refill    atorvastatin (LIPITOR) 40 MG tablet Take 1 tablet by mouth at bedtime 90 tablet 1    busPIRone (BUSPAR) 5 MG tablet Take 1 tablet by mouth 3 times daily as needed (anxiety) 180 tablet 1    levothyroxine (SYNTHROID) 25 MCG tablet TAKE 1 TABLET 3 DAYS A WEEK AND TAKE 2 TABLETS 4 DAYS A WEEK 390 tablet 1    mirtazapine (REMERON) 30 MG tablet TAKE 1 TABLET NIGHTLY 90 tablet 1    colestipol (COLESTID) 1 g tablet TAKE 1 TABLET TWICE A  tablet 1    LORazepam (ATIVAN) 1 MG tablet TAKE (1) TABLET BY MOUTH EVERY EIGHT HOURS AS NEEDED.  90 tablet 0    QUEtiapine (SEROQUEL) 50 MG tablet Take 1 tablet by mouth 2 times daily 180 tablet 3    losartan-hydroCHLOROthiazide (HYZAAR) 100-12.5 MG per tablet TAKE 1 TABLET DAILY 90 tablet 1    mirabegron (MYRBETRIQ) 25 MG TB24 Take 1 tablet by mouth daily 90 tablet 0    clopidogrel (PLAVIX) 75 MG tablet Take 1 tablet daily 90 tablet 1    ondansetron (ZOFRAN-ODT) 4 MG disintegrating tablet Take 1 tablet by mouth every 8 hours as needed for Nausea or Vomiting 30 tablet 0    esomeprazole (NEXIUM) 40 MG delayed release capsule Take 1 capsule by mouth daily 90 capsule 1    escitalopram (LEXAPRO) 20 MG tablet TAKE 1 TABLET DAILY 90 tablet 1    donepezil (ARICEPT) 10 MG tablet Take 1 tablet by mouth nightly 90 tablet 1    acetaminophen (TYLENOL) 500 MG tablet Take 500 mg by mouth 2 times daily 2 TABLET IN THE AM & 2 TABLETS PM      Multiple Vitamins-Minerals (CENTRUM SILVER) TABS Take by mouth daily      OXYGEN Inhale into the lungs 2L at hs      nystatin (MYCOSTATIN) 914437 UNIT/GM powder Apply 3 times daily. (Patient not taking: No sig reported) 1 each 0    levocetirizine (XYZAL) 5 MG tablet Take 1 tablet by mouth nightly (Patient not taking: No sig reported) 90 tablet 3    aspirin 81 MG EC tablet Take 81 mg by mouth daily (Patient not taking: Reported on 11/3/2022)      diclofenac sodium (VOLTAREN) 1 % GEL Apply 4 g topically 3 times daily as needed for Pain 5 Tube 1     No current facility-administered medications for this visit.      Allergies   Allergen Reactions    Augmentin [Amoxicillin-Pot Clavulanate]     Biaxin [Clarithromycin]     Cefdinir     Effexor [Venlafaxine]     Erythromycin     Lortab [Hydrocodone-Acetaminophen]     Naprosyn [Naproxen]        Health Maintenance   Topic Date Due    DTaP/Tdap/Td vaccine (1 - Tdap) Never done    Shingles vaccine (1 of 2) Never done    COVID-19 Vaccine (5 - Booster for Pfizer series) 08/20/2022    Depression Monitoring  07/04/2023    Lipids  01/09/2024    Flu vaccine  Completed    Pneumococcal 65+ years Vaccine  Completed    Hepatitis A vaccine  Aged Out    Hib vaccine  Aged Out    Meningococcal (ACWY) vaccine  Aged Out       Lab Results   Component Value Date    LABA1C 5.8 04/26/2019     No results found for: PSA, PSADIA  TSH   Date Value Ref Range Status   01/09/2023 2.320 0.270 - 4.200 uIU/mL Final   ]  Lab Results   Component Value Date     (H) 01/09/2023 K 4.6 01/09/2023     01/09/2023    CO2 25 01/09/2023    BUN 21 01/09/2023    CREATININE 0.9 01/09/2023    GLUCOSE 104 01/09/2023    CALCIUM 10.0 01/09/2023    PROT 7.0 01/09/2023    LABALBU 4.4 01/09/2023    BILITOT 0.4 01/09/2023    ALKPHOS 93 01/09/2023    AST 31 01/09/2023    ALT 16 01/09/2023    LABGLOM >60 01/09/2023    GFRAA >59 07/11/2022     Lab Results   Component Value Date    CHOL 311 (H) 01/09/2023    CHOL 252 (H) 07/11/2022    CHOL 199 04/11/2022     Lab Results   Component Value Date    TRIG 249 (H) 01/09/2023    TRIG 293 (H) 07/11/2022    TRIG 211 (H) 04/11/2022     Lab Results   Component Value Date    HDL 52 (L) 01/09/2023    HDL 47 (L) 07/11/2022    HDL 52 (L) 04/11/2022     Lab Results   Component Value Date    LDLCALC 209 01/09/2023    LDLCALC 146 07/11/2022    LDLCALC 105 04/11/2022     Lab Results   Component Value Date/Time     01/09/2023 11:47 AM    K 4.6 01/09/2023 11:47 AM     01/09/2023 11:47 AM    CO2 25 01/09/2023 11:47 AM    BUN 21 01/09/2023 11:47 AM    CREATININE 0.9 01/09/2023 11:47 AM    GLUCOSE 104 01/09/2023 11:47 AM    CALCIUM 10.0 01/09/2023 11:47 AM      Lab Results   Component Value Date    WBC 10.4 01/09/2023    HGB 12.8 01/09/2023    HCT 39.8 01/09/2023    MCV 93.4 01/09/2023     01/09/2023    LABLYMP 2.86 09/25/2014    LYMPHOPCT 32.8 01/09/2023    RBC 4.26 01/09/2023    MCH 30.0 01/09/2023    MCHC 32.2 (L) 01/09/2023    RDW 13.7 01/09/2023     Lab Results   Component Value Date    VITD25 34.4 01/09/2023     Labs reviewed from 7/11/2022  Diagn  Subjective:      Review of Systems   Constitutional:  Negative for fatigue, fever and unexpected weight change. HENT:  Positive for rhinorrhea. Negative for ear discharge, ear pain, mouth sores, sore throat and trouble swallowing. Eyes:  Negative for discharge, itching and visual disturbance. Respiratory:  Negative for cough, choking, shortness of breath, wheezing and stridor.     Cardiovascular: Negative for chest pain, palpitations and leg swelling. Gastrointestinal:  Negative for abdominal distention, abdominal pain, blood in stool, constipation, diarrhea, nausea and vomiting. GERD   Endocrine: Negative for cold intolerance, polydipsia and polyuria. Genitourinary:  Negative for difficulty urinating, dysuria, frequency and urgency. OAB OAB   Musculoskeletal:  Negative for arthralgias and gait problem. Skin:  Negative for color change and rash. Allergic/Immunologic: Negative for food allergies and immunocompromised state. Neurological:  Negative for dizziness, tremors, syncope, speech difficulty, weakness and headaches. Hematological:  Negative for adenopathy. Does not bruise/bleed easily. Psychiatric/Behavioral:  Positive for confusion. Negative for hallucinations. The patient is nervous/anxious. Objective:     Physical Exam  Constitutional:       General: She is not in acute distress. Appearance: She is well-developed. HENT:      Head: Normocephalic and atraumatic. Eyes:      General: No scleral icterus. Right eye: No discharge. Left eye: No discharge. Pupils: Pupils are equal, round, and reactive to light. Neck:      Thyroid: No thyromegaly. Vascular: No JVD. Cardiovascular:      Rate and Rhythm: Normal rate and regular rhythm. Heart sounds: Normal heart sounds. No murmur heard. Pulmonary:      Effort: Pulmonary effort is normal. No respiratory distress. Breath sounds: Normal breath sounds. No wheezing or rales. Abdominal:      General: Bowel sounds are normal. There is no distension. Palpations: Abdomen is soft. There is no mass. Tenderness: There is no abdominal tenderness. There is no guarding or rebound. Musculoskeletal:         General: No tenderness. Normal range of motion. Cervical back: Normal range of motion and neck supple. Skin:     General: Skin is warm and dry.       Findings: No erythema or rash.   Neurological:      Mental Status: She is alert and oriented to person, place, and time. Cranial Nerves: No cranial nerve deficit. Coordination: Coordination normal.      Deep Tendon Reflexes: Reflexes are normal and symmetric. Reflexes normal.   Psychiatric:         Mood and Affect: Mood is not depressed. Behavior: Behavior normal.         Thought Content:  Thought content normal.         Judgment: Judgment normal.     /78   Pulse 85   Temp 97.2 °F (36.2 °C)   Wt 157 lb (71.2 kg)   SpO2 96%   BMI 26.95 kg/m²           Assessment:      Problem List       Anxiety associated with depression - Primary    Relevant Medications    escitalopram (LEXAPRO) 20 MG tablet    donepezil (ARICEPT) 10 MG tablet    LORazepam (ATIVAN) 1 MG tablet    QUEtiapine (SEROQUEL) 50 MG tablet    busPIRone (BUSPAR) 5 MG tablet    mirtazapine (REMERON) 30 MG tablet    GERD (gastroesophageal reflux disease)     Stable with Nexium          Relevant Medications    ondansetron (ZOFRAN-ODT) 4 MG disintegrating tablet    esomeprazole (NEXIUM) 40 MG delayed release capsule    Hypertension, essential     She is stable on current dose of losartan HCTZ 100/12.5          Relevant Orders    CBC with Auto Differential    Comprehensive Metabolic Panel    Urinalysis with Reflex to Culture    TSH    Major depressive disorder, recurrent episode, moderate (HCC)     Stable with current dose of Lexapro          Relevant Medications    escitalopram (LEXAPRO) 20 MG tablet    donepezil (ARICEPT) 10 MG tablet    LORazepam (ATIVAN) 1 MG tablet    QUEtiapine (SEROQUEL) 50 MG tablet    busPIRone (BUSPAR) 5 MG tablet    mirtazapine (REMERON) 30 MG tablet    Memory loss     Stable with Aricept          Relevant Medications    donepezil (ARICEPT) 10 MG tablet    Mixed hyperlipidemia     She is on 20 mg of Lipitor cholesterol and triglycerides both done April increased to 40          Relevant Medications    aspirin 81 MG EC tablet losartan-hydroCHLOROthiazide (HYZAAR) 100-12.5 MG per tablet    colestipol (COLESTID) 1 g tablet    atorvastatin (LIPITOR) 40 MG tablet    Other Relevant Orders    Triglyceride    Other specified hypothyroidism     Stable with Synthroid 25 mcg 3 days a week and 54 days a week          Relevant Medications    levothyroxine (SYNTHROID) 25 MCG tablet    Overactive bladder     Stable with current dose of Myrbetriq 25 daily          Rhinorrhea     Referral to salt therapy           Plan:        Patient given educational materials - see patient instructions. Discussed use, benefit, and side effects of prescribed medications. Allpatient questions answered. Pt voiced understanding. Reviewed health maintenance. Instructed to continue current medications, diet and exercise. Patient agreed with treatment plan. Follow up as directed. MEDICATIONS:  Orders Placed This Encounter   Medications    atorvastatin (LIPITOR) 40 MG tablet     Sig: Take 1 tablet by mouth at bedtime     Dispense:  90 tablet     Refill:  1           ORDERS:  Orders Placed This Encounter   Procedures    CBC with Auto Differential    Comprehensive Metabolic Panel    Triglyceride    Vitamin D 25 Hydroxy    Urinalysis with Reflex to Culture    TSH           Follow-up:  Return in about 3 months (around 4/11/2023) for have labs done prior to appt. PATIENT INSTRUCTIONS:  Patient Instructions    Anxiety;  stable ith buspar and lorazepam  Isnus drainage;  refer for salt therapy  Hyperlipidemia;  increase to 40 mg;    Memory loss; stable with aricetp  GERD  stable with nexium   Depression;  stable with lexapro   Hypothyroidism; stablew ith synthroid  Hypertension The current medical regimen is effective;  continue present plan and medications.   OAB;  stable with myrbetriq  Electronically signed by SONIA Waters on 1/11/2023 at 3:31 PM    @    Gael/transcription disclaimer:  Much of this encounter note is electronic transcription/translation of spoken language to printed texts. The electronic translation of spoken language may be erroneous, or at times,nonsensical words or phrases may be inadvertently transcribed.   Although I have reviewed the note for such errors, some may still exist.

## 2023-01-11 ENCOUNTER — OFFICE VISIT (OUTPATIENT)
Dept: INTERNAL MEDICINE | Age: 88
End: 2023-01-11
Payer: MEDICARE

## 2023-01-11 VITALS
WEIGHT: 157 LBS | SYSTOLIC BLOOD PRESSURE: 128 MMHG | HEART RATE: 85 BPM | DIASTOLIC BLOOD PRESSURE: 78 MMHG | OXYGEN SATURATION: 96 % | BODY MASS INDEX: 26.95 KG/M2 | TEMPERATURE: 97.2 F

## 2023-01-11 DIAGNOSIS — F33.1 MAJOR DEPRESSIVE DISORDER, RECURRENT EPISODE, MODERATE (HCC): ICD-10-CM

## 2023-01-11 DIAGNOSIS — E78.2 MIXED HYPERLIPIDEMIA: ICD-10-CM

## 2023-01-11 DIAGNOSIS — K21.9 GASTROESOPHAGEAL REFLUX DISEASE WITHOUT ESOPHAGITIS: ICD-10-CM

## 2023-01-11 DIAGNOSIS — J34.89 RHINORRHEA: ICD-10-CM

## 2023-01-11 DIAGNOSIS — F41.8 ANXIETY ASSOCIATED WITH DEPRESSION: Primary | ICD-10-CM

## 2023-01-11 DIAGNOSIS — R41.3 MEMORY LOSS: ICD-10-CM

## 2023-01-11 DIAGNOSIS — E03.8 OTHER SPECIFIED HYPOTHYROIDISM: ICD-10-CM

## 2023-01-11 DIAGNOSIS — N32.81 OVERACTIVE BLADDER: ICD-10-CM

## 2023-01-11 DIAGNOSIS — E55.9 VITAMIN D DEFICIENCY: ICD-10-CM

## 2023-01-11 DIAGNOSIS — I10 HYPERTENSION, ESSENTIAL: ICD-10-CM

## 2023-01-11 PROCEDURE — G8417 CALC BMI ABV UP PARAM F/U: HCPCS | Performed by: NURSE PRACTITIONER

## 2023-01-11 PROCEDURE — 1123F ACP DISCUSS/DSCN MKR DOCD: CPT | Performed by: NURSE PRACTITIONER

## 2023-01-11 PROCEDURE — G8484 FLU IMMUNIZE NO ADMIN: HCPCS | Performed by: NURSE PRACTITIONER

## 2023-01-11 PROCEDURE — G8427 DOCREV CUR MEDS BY ELIG CLIN: HCPCS | Performed by: NURSE PRACTITIONER

## 2023-01-11 PROCEDURE — 1036F TOBACCO NON-USER: CPT | Performed by: NURSE PRACTITIONER

## 2023-01-11 PROCEDURE — 1090F PRES/ABSN URINE INCON ASSESS: CPT | Performed by: NURSE PRACTITIONER

## 2023-01-11 PROCEDURE — 99214 OFFICE O/P EST MOD 30 MIN: CPT | Performed by: NURSE PRACTITIONER

## 2023-01-11 RX ORDER — CLOPIDOGREL BISULFATE 75 MG/1
TABLET ORAL
Qty: 90 TABLET | Refills: 1 | Status: SHIPPED | OUTPATIENT
Start: 2023-01-11

## 2023-01-11 RX ORDER — LOSARTAN POTASSIUM AND HYDROCHLOROTHIAZIDE 12.5; 1 MG/1; MG/1
TABLET ORAL
Qty: 90 TABLET | Refills: 1 | Status: SHIPPED | OUTPATIENT
Start: 2023-01-11

## 2023-01-11 RX ORDER — ATORVASTATIN CALCIUM 40 MG/1
40 TABLET, FILM COATED ORAL NIGHTLY
Qty: 90 TABLET | Refills: 1 | Status: SHIPPED | OUTPATIENT
Start: 2023-01-11 | End: 2023-04-11

## 2023-01-11 ASSESSMENT — ENCOUNTER SYMPTOMS: RHINORRHEA: 1

## 2023-01-11 NOTE — PATIENT INSTRUCTIONS
Anxiety;  stable ith buspar and lorazepam  Isnus drainage;  refer for salt therapy  Hyperlipidemia;  increase to 40 mg;    Memory loss; stable with aricetp  GERD  stable with nexium   Depression;  stable with lexapro   Hypothyroidism; stablew ith synthroid  Hypertension The current medical regimen is effective;  continue present plan and medications.   OAB;  stable with myrbetriq

## 2023-01-11 NOTE — TELEPHONE ENCOUNTER
Shiva July called to request a refill on her medication.       Last office visit : 10/19/2022   Next office visit : 1/11/2023     Requested Prescriptions     Pending Prescriptions Disp Refills    clopidogrel (PLAVIX) 75 MG tablet 90 tablet 1     Sig: Take 1 tablet daily    losartan-hydroCHLOROthiazide (HYZAAR) 100-12.5 MG per tablet 90 tablet 1     Sig: TAKE 1 TABLET DAILY            Sammy Rodríguez [place]

## 2023-01-17 DIAGNOSIS — R41.3 MEMORY LOSS: ICD-10-CM

## 2023-01-17 DIAGNOSIS — F51.01 PRIMARY INSOMNIA: ICD-10-CM

## 2023-01-17 DIAGNOSIS — F41.8 ANXIETY ASSOCIATED WITH DEPRESSION: ICD-10-CM

## 2023-01-18 NOTE — TELEPHONE ENCOUNTER
Diamond Zarate called requesting a refill of the below medication which has been pended for you:     Requested Prescriptions     Pending Prescriptions Disp Refills    ondansetron (ZOFRAN-ODT) 4 MG disintegrating tablet 30 tablet 0     Sig: Take 1 tablet by mouth every 8 hours as needed for Nausea or Vomiting    esomeprazole (NEXIUM) 40 MG delayed release capsule 90 capsule 1     Sig: Take 1 capsule by mouth daily    escitalopram (LEXAPRO) 20 MG tablet 90 tablet 1     Sig: TAKE 1 TABLET DAILY    donepezil (ARICEPT) 10 MG tablet 90 tablet 1     Sig: Take 1 tablet by mouth nightly    LORazepam (ATIVAN) 1 MG tablet 90 tablet 0     Sig: TAKE (1) TABLET BY MOUTH EVERY EIGHT HOURS AS NEEDED.     QUEtiapine (SEROQUEL) 50 MG tablet 180 tablet 3     Sig: Take 1 tablet by mouth 2 times daily    colestipol (COLESTID) 1 g tablet 180 tablet 1     Sig: TAKE 1 TABLET TWICE A DAY    busPIRone (BUSPAR) 5 MG tablet 180 tablet 1     Sig: Take 1 tablet by mouth 3 times daily as needed (anxiety)    clopidogrel (PLAVIX) 75 MG tablet 90 tablet 1     Sig: Take 1 tablet daily    losartan-hydroCHLOROthiazide (HYZAAR) 100-12.5 MG per tablet 90 tablet 1     Sig: TAKE 1 TABLET DAILY    atorvastatin (LIPITOR) 40 MG tablet 90 tablet 1     Sig: Take 1 tablet by mouth at bedtime       Last Appointment Date: 1/11/2023  Next Appointment Date: 4/11/2023    Allergies   Allergen Reactions    Augmentin [Amoxicillin-Pot Clavulanate]     Biaxin [Clarithromycin]     Cefdinir     Effexor [Venlafaxine]     Erythromycin     Lortab [Hydrocodone-Acetaminophen]     Naprosyn [Naproxen]

## 2023-01-19 RX ORDER — LORAZEPAM 1 MG/1
TABLET ORAL
Qty: 90 TABLET | Refills: 0 | OUTPATIENT
Start: 2023-01-19 | End: 2023-02-16

## 2023-01-19 RX ORDER — ESOMEPRAZOLE MAGNESIUM 40 MG/1
40 CAPSULE, DELAYED RELEASE ORAL DAILY
Qty: 90 CAPSULE | Refills: 1 | OUTPATIENT
Start: 2023-01-19

## 2023-01-19 RX ORDER — MONTELUKAST SODIUM 4 MG/1
TABLET, CHEWABLE ORAL
Qty: 180 TABLET | Refills: 1 | OUTPATIENT
Start: 2023-01-19

## 2023-01-19 RX ORDER — ATORVASTATIN CALCIUM 40 MG/1
40 TABLET, FILM COATED ORAL NIGHTLY
Qty: 90 TABLET | Refills: 1 | OUTPATIENT
Start: 2023-01-19 | End: 2023-04-19

## 2023-01-19 RX ORDER — DONEPEZIL HYDROCHLORIDE 10 MG/1
10 TABLET, FILM COATED ORAL NIGHTLY
Qty: 90 TABLET | Refills: 1 | OUTPATIENT
Start: 2023-01-19

## 2023-01-19 RX ORDER — ONDANSETRON 4 MG/1
4 TABLET, ORALLY DISINTEGRATING ORAL EVERY 8 HOURS PRN
Qty: 30 TABLET | Refills: 0 | OUTPATIENT
Start: 2023-01-19

## 2023-01-19 RX ORDER — LOSARTAN POTASSIUM AND HYDROCHLOROTHIAZIDE 12.5; 1 MG/1; MG/1
TABLET ORAL
Qty: 90 TABLET | Refills: 1 | OUTPATIENT
Start: 2023-01-19

## 2023-01-19 RX ORDER — CLOPIDOGREL BISULFATE 75 MG/1
TABLET ORAL
Qty: 90 TABLET | Refills: 1 | OUTPATIENT
Start: 2023-01-19

## 2023-01-19 RX ORDER — QUETIAPINE FUMARATE 50 MG/1
50 TABLET, FILM COATED ORAL 2 TIMES DAILY
Qty: 180 TABLET | Refills: 3 | OUTPATIENT
Start: 2023-01-19

## 2023-01-19 RX ORDER — ESCITALOPRAM OXALATE 20 MG/1
TABLET ORAL
Qty: 90 TABLET | Refills: 1 | OUTPATIENT
Start: 2023-01-19

## 2023-01-19 RX ORDER — BUSPIRONE HYDROCHLORIDE 5 MG/1
5 TABLET ORAL 3 TIMES DAILY PRN
Qty: 180 TABLET | Refills: 1 | OUTPATIENT
Start: 2023-01-19

## 2023-01-24 DIAGNOSIS — F51.01 PRIMARY INSOMNIA: ICD-10-CM

## 2023-01-24 RX ORDER — LORAZEPAM 1 MG/1
TABLET ORAL
Qty: 90 TABLET | Refills: 0 | Status: SHIPPED | OUTPATIENT
Start: 2023-01-24 | End: 2023-02-24

## 2023-01-24 NOTE — TELEPHONE ENCOUNTER
Eileen Fay called requesting a refill of the below medication which has been pended for you:     Requested Prescriptions     Pending Prescriptions Disp Refills    LORazepam (ATIVAN) 1 MG tablet [Pharmacy Med Name: LORAZEPAM 1MG TABLET] 90 tablet 0     Sig: TAKE 1 TABLET EVERY 8 HOURS AS NEEDED FOR ANXIETY.        Last Appointment Date: 1/11/2023  Next Appointment Date: 4/11/2023    Allergies   Allergen Reactions    Augmentin [Amoxicillin-Pot Clavulanate]     Biaxin [Clarithromycin]     Cefdinir     Effexor [Venlafaxine]     Erythromycin     Lortab [Hydrocodone-Acetaminophen]     Naprosyn [Naproxen]

## 2023-01-31 DIAGNOSIS — F41.8 ANXIETY ASSOCIATED WITH DEPRESSION: ICD-10-CM

## 2023-01-31 DIAGNOSIS — R41.3 MEMORY LOSS: ICD-10-CM

## 2023-01-31 RX ORDER — DONEPEZIL HYDROCHLORIDE 10 MG/1
10 TABLET, FILM COATED ORAL NIGHTLY
Qty: 90 TABLET | Refills: 1 | OUTPATIENT
Start: 2023-01-31

## 2023-01-31 RX ORDER — BUSPIRONE HYDROCHLORIDE 5 MG/1
5 TABLET ORAL 3 TIMES DAILY PRN
Qty: 180 TABLET | Refills: 1 | OUTPATIENT
Start: 2023-01-31

## 2023-01-31 RX ORDER — MONTELUKAST SODIUM 4 MG/1
TABLET, CHEWABLE ORAL
Qty: 180 TABLET | Refills: 1 | OUTPATIENT
Start: 2023-01-31

## 2023-01-31 RX ORDER — ESCITALOPRAM OXALATE 20 MG/1
TABLET ORAL
Qty: 90 TABLET | Refills: 1 | OUTPATIENT
Start: 2023-01-31

## 2023-01-31 RX ORDER — MIRTAZAPINE 30 MG/1
TABLET, FILM COATED ORAL
Qty: 90 TABLET | Refills: 1 | Status: SHIPPED | OUTPATIENT
Start: 2023-01-31

## 2023-02-21 DIAGNOSIS — F51.01 PRIMARY INSOMNIA: ICD-10-CM

## 2023-02-21 RX ORDER — LORAZEPAM 1 MG/1
TABLET ORAL
Qty: 90 TABLET | Refills: 0 | Status: SHIPPED | OUTPATIENT
Start: 2023-02-21 | End: 2023-03-21

## 2023-02-21 NOTE — TELEPHONE ENCOUNTER
Antonia Valles called to request a refill on her medication. Last office visit : 1/11/2023   Next office visit : 4/11/2023     Last UDS:   Amphetamine Screen, Urine   Date Value Ref Range Status   07/11/2022 neg  Final     Barbiturate Screen, Urine   Date Value Ref Range Status   07/11/2022 neg  Final     Benzodiazepine Screen, Urine   Date Value Ref Range Status   07/11/2022 pos  Final     Buprenorphine Urine   Date Value Ref Range Status   07/11/2022 neg  Final     Cocaine Metabolite Screen, Urine   Date Value Ref Range Status   07/11/2022 neg  Final     Gabapentin Screen, Urine   Date Value Ref Range Status   07/11/2022 neg  Final     MDMA, Urine   Date Value Ref Range Status   07/11/2022 neg  Final     Methamphetamine, Urine   Date Value Ref Range Status   07/11/2022 neg  Final     Opiate Scrn, Ur   Date Value Ref Range Status   07/11/2022 neg  Final     Oxycodone Screen, Ur   Date Value Ref Range Status   07/11/2022 neg  Final     PCP Screen, Urine   Date Value Ref Range Status   07/11/2022 neg  Final     Propoxyphene Screen, Urine   Date Value Ref Range Status   07/11/2022 neg  Final     THC Screen, Urine   Date Value Ref Range Status   07/11/2022 neg  Final     Tricyclic Antidepressants, Urine   Date Value Ref Range Status   07/11/2022 neg  Final       Last Jeevan [place]: 01/05/23  Medication Contract: 07/11/2022   Last Fill: 01/31/2023    Requested Prescriptions     Pending Prescriptions Disp Refills    LORazepam (ATIVAN) 1 MG tablet [Pharmacy Med Name: LORAZEPAM 1MG TABLET] 90 tablet 0     Sig: TAKE 1 TABLET EVERY 8 HOURS AS NEEDED FOR ANXIETY. Please approve or refuse this medication.    Laura Gooden MA

## 2023-03-17 DIAGNOSIS — F51.01 PRIMARY INSOMNIA: ICD-10-CM

## 2023-03-17 NOTE — TELEPHONE ENCOUNTER
Darci Torrez called to request a refill on her medication. Last office visit : 1/11/2023   Next office visit : 4/11/2023     Last UDS:   Amphetamine Screen, Urine   Date Value Ref Range Status   07/11/2022 neg  Final     Barbiturate Screen, Urine   Date Value Ref Range Status   07/11/2022 neg  Final     Benzodiazepine Screen, Urine   Date Value Ref Range Status   07/11/2022 pos  Final     Buprenorphine Urine   Date Value Ref Range Status   07/11/2022 neg  Final     Cocaine Metabolite Screen, Urine   Date Value Ref Range Status   07/11/2022 neg  Final     Gabapentin Screen, Urine   Date Value Ref Range Status   07/11/2022 neg  Final     MDMA, Urine   Date Value Ref Range Status   07/11/2022 neg  Final     Methamphetamine, Urine   Date Value Ref Range Status   07/11/2022 neg  Final     Opiate Scrn, Ur   Date Value Ref Range Status   07/11/2022 neg  Final     Oxycodone Screen, Ur   Date Value Ref Range Status   07/11/2022 neg  Final     PCP Screen, Urine   Date Value Ref Range Status   07/11/2022 neg  Final     Propoxyphene Screen, Urine   Date Value Ref Range Status   07/11/2022 neg  Final     THC Screen, Urine   Date Value Ref Range Status   07/11/2022 neg  Final     Tricyclic Antidepressants, Urine   Date Value Ref Range Status   07/11/2022 neg  Final       Last Alexandra Link: 01/05/23  Medication Contract: 07/11/23     Last Fill: 02/21/23    Requested Prescriptions     Pending Prescriptions Disp Refills    LORazepam (ATIVAN) 1 MG tablet [Pharmacy Med Name: LORAZEPAM 1MG TABLET] 90 tablet 0     Sig: TAKE 1 TABLET EVERY 8 HOURS AS NEEDED FOR ANXIETY. Please approve or refuse this medication.    Daniella Campbell MA

## 2023-03-20 DIAGNOSIS — F51.01 PRIMARY INSOMNIA: ICD-10-CM

## 2023-03-20 RX ORDER — LORAZEPAM 1 MG/1
TABLET ORAL
Qty: 90 TABLET | Refills: 0 | Status: SHIPPED | OUTPATIENT
Start: 2023-03-20 | End: 2023-06-20

## 2023-03-28 ENCOUNTER — TELEPHONE (OUTPATIENT)
Dept: INTERNAL MEDICINE | Age: 88
End: 2023-03-28

## 2023-03-28 NOTE — TELEPHONE ENCOUNTER
Her daughter Delaney Collins called today to say that her mom is somewhat out of control demanding and degrading. They would like to increase her medication I am assuming that she needs Seroquel but we need to call her to confirm that;    Call her and find out if she is talking about the Seroquel. I think we just recently filled those so she should have plenty at home she has 50 mg to take twice a day she can increase it to 100 mg  or 3 times a day as needed. Then she just needs to let us know what dose she is finally giving so we can adjust it in the computer that we should need to send in a prescription.

## 2023-03-30 ENCOUNTER — TELEPHONE (OUTPATIENT)
Dept: INTERNAL MEDICINE | Age: 88
End: 2023-03-30

## 2023-03-30 RX ORDER — QUETIAPINE FUMARATE 50 MG/1
TABLET, FILM COATED ORAL
Qty: 180 TABLET | Refills: 3 | Status: SHIPPED | OUTPATIENT
Start: 2023-03-30

## 2023-03-30 NOTE — TELEPHONE ENCOUNTER
Dylan Chamberlain called requesting a refill of the below medication which has been pended for you:     Requested Prescriptions     Pending Prescriptions Disp Refills    QUEtiapine (SEROQUEL) 50 MG tablet 180 tablet 3     Si IN THE MORNING,ONE AT NOON AND 3 AT BEDTIME       Last Appointment Date: 2023  Next Appointment Date: 2023    Allergies   Allergen Reactions    Augmentin [Amoxicillin-Pot Clavulanate]     Biaxin [Clarithromycin]     Cefdinir     Effexor [Venlafaxine]     Erythromycin     Lortab [Hydrocodone-Acetaminophen]     Naprosyn [Naproxen]

## 2023-03-30 NOTE — TELEPHONE ENCOUNTER
Just a update on what seems to be working with mom right now sequil 100mg morning 50 mg afternoon 150 bedtime. She has settled down and no drowsiness at all. Whatever you think is best.    Thank You.   Radha Perez

## 2023-04-05 RX ORDER — MIRTAZAPINE 30 MG/1
TABLET, FILM COATED ORAL
Qty: 90 TABLET | Refills: 1 | Status: SHIPPED | OUTPATIENT
Start: 2023-04-05

## 2023-04-05 NOTE — TELEPHONE ENCOUNTER
Oralia Rodriguez called requesting a refill of the below medication which has been pended for you:     Requested Prescriptions     Pending Prescriptions Disp Refills    mirtazapine (REMERON) 30 MG tablet 90 tablet 1     Sig: TAKE 1 TABLET NIGHTLY       Last Appointment Date: 1/11/2023  Next Appointment Date: 4/11/2023    Allergies   Allergen Reactions    Augmentin [Amoxicillin-Pot Clavulanate]     Biaxin [Clarithromycin]     Cefdinir     Effexor [Venlafaxine]     Erythromycin     Lortab [Hydrocodone-Acetaminophen]     Naprosyn [Naproxen]

## 2023-04-10 DIAGNOSIS — E78.2 MIXED HYPERLIPIDEMIA: ICD-10-CM

## 2023-04-10 DIAGNOSIS — E55.9 VITAMIN D DEFICIENCY: ICD-10-CM

## 2023-04-10 DIAGNOSIS — I10 HYPERTENSION, ESSENTIAL: ICD-10-CM

## 2023-04-10 LAB
25(OH)D3 SERPL-MCNC: 28.6 NG/ML
ALBUMIN SERPL-MCNC: 3.9 G/DL (ref 3.5–5.2)
ALP SERPL-CCNC: 82 U/L (ref 35–104)
ALT SERPL-CCNC: 10 U/L (ref 5–33)
ANION GAP SERPL CALCULATED.3IONS-SCNC: 11 MMOL/L (ref 7–19)
AST SERPL-CCNC: 28 U/L (ref 5–32)
BASOPHILS # BLD: 0 K/UL (ref 0–0.2)
BASOPHILS NFR BLD: 0.3 % (ref 0–1)
BILIRUB SERPL-MCNC: 0.3 MG/DL (ref 0.2–1.2)
BILIRUB UR QL STRIP: NEGATIVE
BUN SERPL-MCNC: 20 MG/DL (ref 8–23)
CALCIUM SERPL-MCNC: 9.3 MG/DL (ref 8.8–10.2)
CHLORIDE SERPL-SCNC: 104 MMOL/L (ref 98–111)
CLARITY UR: CLEAR
CO2 SERPL-SCNC: 29 MMOL/L (ref 22–29)
COLOR UR: YELLOW
CREAT SERPL-MCNC: 0.8 MG/DL (ref 0.5–0.9)
EOSINOPHIL # BLD: 0.2 K/UL (ref 0–0.6)
EOSINOPHIL NFR BLD: 2.2 % (ref 0–5)
ERYTHROCYTE [DISTWIDTH] IN BLOOD BY AUTOMATED COUNT: 13.5 % (ref 11.5–14.5)
GLUCOSE SERPL-MCNC: 103 MG/DL (ref 74–109)
GLUCOSE UR STRIP.AUTO-MCNC: NEGATIVE MG/DL
HCT VFR BLD AUTO: 41.2 % (ref 37–47)
HGB BLD-MCNC: 12.5 G/DL (ref 12–16)
HGB UR STRIP.AUTO-MCNC: NEGATIVE MG/L
IMM GRANULOCYTES # BLD: 0 K/UL
KETONES UR STRIP.AUTO-MCNC: NEGATIVE MG/DL
LEUKOCYTE ESTERASE UR QL STRIP.AUTO: NEGATIVE
LYMPHOCYTES # BLD: 2.7 K/UL (ref 1.1–4.5)
LYMPHOCYTES NFR BLD: 34.6 % (ref 20–40)
MCH RBC QN AUTO: 28.9 PG (ref 27–31)
MCHC RBC AUTO-ENTMCNC: 30.3 G/DL (ref 33–37)
MCV RBC AUTO: 95.4 FL (ref 81–99)
MONOCYTES # BLD: 0.8 K/UL (ref 0–0.9)
MONOCYTES NFR BLD: 10.1 % (ref 0–10)
NEUTROPHILS # BLD: 4.2 K/UL (ref 1.5–7.5)
NEUTS SEG NFR BLD: 52.5 % (ref 50–65)
NITRITE UR QL STRIP.AUTO: NEGATIVE
PH UR STRIP.AUTO: 6 [PH] (ref 5–8)
PLATELET # BLD AUTO: 305 K/UL (ref 130–400)
PMV BLD AUTO: 10 FL (ref 9.4–12.3)
POTASSIUM SERPL-SCNC: 4 MMOL/L (ref 3.5–5)
PROT SERPL-MCNC: 6.7 G/DL (ref 6.6–8.7)
PROT UR STRIP.AUTO-MCNC: ABNORMAL MG/DL
RBC # BLD AUTO: 4.32 M/UL (ref 4.2–5.4)
SODIUM SERPL-SCNC: 144 MMOL/L (ref 136–145)
SP GR UR STRIP.AUTO: 1.03 (ref 1–1.03)
TRIGL SERPL-MCNC: 210 MG/DL (ref 0–149)
TSH SERPL DL<=0.005 MIU/L-ACNC: 2.56 UIU/ML (ref 0.27–4.2)
UROBILINOGEN UR STRIP.AUTO-MCNC: 0.2 E.U./DL
WBC # BLD AUTO: 7.9 K/UL (ref 4.8–10.8)

## 2023-04-11 PROBLEM — G30.1 MODERATE LATE ONSET ALZHEIMER'S DEMENTIA (HCC): Status: ACTIVE | Noted: 2019-10-01

## 2023-04-11 PROBLEM — F02.B0 MODERATE LATE ONSET ALZHEIMER'S DEMENTIA (HCC): Status: ACTIVE | Noted: 2019-10-01

## 2023-04-11 PROBLEM — R19.8 RECTAL PRESSURE: Status: RESOLVED | Noted: 2020-09-22 | Resolved: 2023-04-11

## 2023-04-11 PROBLEM — R19.5 DARK STOOLS: Status: RESOLVED | Noted: 2020-09-22 | Resolved: 2023-04-11

## 2023-04-11 PROBLEM — H11.30 SUBCONJUNCTIVAL HEMORRHAGE: Status: RESOLVED | Noted: 2020-01-24 | Resolved: 2023-04-11

## 2023-04-11 PROBLEM — E55.9 VITAMIN D DEFICIENCY: Status: ACTIVE | Noted: 2023-04-11

## 2023-04-11 PROBLEM — R53.83 OTHER FATIGUE: Status: RESOLVED | Noted: 2021-10-27 | Resolved: 2023-04-11

## 2023-04-17 DIAGNOSIS — F51.01 PRIMARY INSOMNIA: ICD-10-CM

## 2023-04-17 RX ORDER — LORAZEPAM 1 MG/1
TABLET ORAL
Qty: 90 TABLET | Refills: 0 | Status: SHIPPED | OUTPATIENT
Start: 2023-04-17 | End: 2023-07-18

## 2023-04-17 NOTE — TELEPHONE ENCOUNTER
Austin Aarti called to request a refill on her medication. Last office visit : 4/11/2023   Next office visit : 7/18/2023     Last UDS:   Amphetamine Screen, Urine   Date Value Ref Range Status   07/11/2022 neg  Final     Barbiturate Screen, Urine   Date Value Ref Range Status   07/11/2022 neg  Final     Benzodiazepine Screen, Urine   Date Value Ref Range Status   07/11/2022 pos  Final     Buprenorphine Urine   Date Value Ref Range Status   07/11/2022 neg  Final     Cocaine Metabolite Screen, Urine   Date Value Ref Range Status   07/11/2022 neg  Final     Gabapentin Screen, Urine   Date Value Ref Range Status   07/11/2022 neg  Final     MDMA, Urine   Date Value Ref Range Status   07/11/2022 neg  Final     Methamphetamine, Urine   Date Value Ref Range Status   07/11/2022 neg  Final     Opiate Scrn, Ur   Date Value Ref Range Status   07/11/2022 neg  Final     Oxycodone Screen, Ur   Date Value Ref Range Status   07/11/2022 neg  Final     PCP Screen, Urine   Date Value Ref Range Status   07/11/2022 neg  Final     Propoxyphene Screen, Urine   Date Value Ref Range Status   07/11/2022 neg  Final     THC Screen, Urine   Date Value Ref Range Status   07/11/2022 neg  Final     Tricyclic Antidepressants, Urine   Date Value Ref Range Status   07/11/2022 neg  Final       Last [facility]: 04/11/23  Medication Contract: 07/11/22   Last Fill: 03/17/23    Requested Prescriptions     Pending Prescriptions Disp Refills    LORazepam (ATIVAN) 1 MG tablet 90 tablet 0     Sig: TAKE 1 TABLET EVERY 8 HOURS AS NEEDED FOR ANXIETY. Please approve or refuse this medication.    Carla Vieira MA

## 2023-05-04 RX ORDER — LEVOCETIRIZINE DIHYDROCHLORIDE 5 MG/1
5 TABLET, FILM COATED ORAL NIGHTLY
Qty: 90 TABLET | Refills: 1 | Status: SHIPPED | OUTPATIENT
Start: 2023-05-04

## 2023-05-04 NOTE — TELEPHONE ENCOUNTER
Joon Grossman called requesting a refill of the below medication which has been pended for you:     Requested Prescriptions     Pending Prescriptions Disp Refills    levocetirizine (XYZAL) 5 MG tablet 90 tablet 1     Sig: Take 1 tablet by mouth nightly       Last Appointment Date: 4/11/2023  Next Appointment Date: 7/18/2023    Allergies   Allergen Reactions    Augmentin [Amoxicillin-Pot Clavulanate]     Biaxin [Clarithromycin]     Cefdinir     Effexor [Venlafaxine]     Erythromycin     Lortab [Hydrocodone-Acetaminophen]     Naprosyn [Naproxen]

## 2023-05-23 ENCOUNTER — OFFICE VISIT (OUTPATIENT)
Dept: INTERNAL MEDICINE | Age: 88
End: 2023-05-23
Payer: MEDICARE

## 2023-05-23 ENCOUNTER — TELEPHONE (OUTPATIENT)
Dept: INTERNAL MEDICINE | Age: 88
End: 2023-05-23

## 2023-05-23 VITALS
WEIGHT: 156 LBS | DIASTOLIC BLOOD PRESSURE: 70 MMHG | HEART RATE: 86 BPM | HEIGHT: 64 IN | SYSTOLIC BLOOD PRESSURE: 114 MMHG | BODY MASS INDEX: 26.63 KG/M2 | OXYGEN SATURATION: 94 %

## 2023-05-23 DIAGNOSIS — G60.9 IDIOPATHIC PERIPHERAL NEUROPATHY: ICD-10-CM

## 2023-05-23 DIAGNOSIS — J43.8 OTHER EMPHYSEMA (HCC): Primary | ICD-10-CM

## 2023-05-23 DIAGNOSIS — J84.10 PULMONARY FIBROSIS (HCC): ICD-10-CM

## 2023-05-23 DIAGNOSIS — J96.11 CHRONIC RESPIRATORY FAILURE WITH HYPOXIA (HCC): ICD-10-CM

## 2023-05-23 PROCEDURE — G8417 CALC BMI ABV UP PARAM F/U: HCPCS | Performed by: INTERNAL MEDICINE

## 2023-05-23 PROCEDURE — 1123F ACP DISCUSS/DSCN MKR DOCD: CPT | Performed by: INTERNAL MEDICINE

## 2023-05-23 PROCEDURE — G8427 DOCREV CUR MEDS BY ELIG CLIN: HCPCS | Performed by: INTERNAL MEDICINE

## 2023-05-23 PROCEDURE — 99213 OFFICE O/P EST LOW 20 MIN: CPT | Performed by: INTERNAL MEDICINE

## 2023-05-23 PROCEDURE — 3023F SPIROM DOC REV: CPT | Performed by: INTERNAL MEDICINE

## 2023-05-23 PROCEDURE — 1090F PRES/ABSN URINE INCON ASSESS: CPT | Performed by: INTERNAL MEDICINE

## 2023-05-23 PROCEDURE — 1036F TOBACCO NON-USER: CPT | Performed by: INTERNAL MEDICINE

## 2023-05-23 NOTE — TELEPHONE ENCOUNTER
----- Message from [place] sent at 5/23/2023 10:38 AM CDT -----  Subject: Appointment Request    Reason for Call: Established Patient Appointment needed: Routine (Patient   Request) No Script    QUESTIONS    Reason for appointment request? Available appointments did not meet   patient need     Additional Information for Provider? Pt daughter Adeel Weinstein is calling to   see if pt can get in with Don Flaherty for a DOL recert. Pt is needing   this asap. Please call pt daughter back.    ---------------------------------------------------------------------------  --------------  [facility]  [phone]; OK to leave message on voicemail  ---------------------------------------------------------------------------  --------------  SCRIPT ANSWERS  COVID Screen: Kita Fuentes

## 2023-05-23 NOTE — PROGRESS NOTES
Chief Complaint   Patient presents with    Follow-up     [facility]       HPI: Pt is here today for a DOL certification for COPD pulmonary fibrosis hx of idiopathic interstitial pnemonia and chronic respiratory failure with hypoxia. Weakness and shortness of breath. Feet well some. Foot pain is controlled. Left foot and ankle were swollen the other night. In general she is stable. She is 80years old she qualifies for daily care based on her medical conditions and as previously determined by the department of labor.     Past Medical History:   Diagnosis Date    Abnormal mammogram     Anxiety     Anxiety associated with depression     Artery disease, cerebral     Ataxia     Benign diastolic hypertension     Carpal tunnel syndrome     idiopathic peripheral    Cellulitis of face     Chronic back pain     COPD (chronic obstructive pulmonary disease) (HCC)     Depression     Disc degeneration, lumbosacral     Diverticulosis     Dysphagia     Esophageal reflux     Fever blister     Herpes simplex     Herpes zoster     Hypertension, essential     Hypertensive heart disease without CHF     Hypoxia     Idiopathic peripheral neuropathy     2017    Insomnia     Major depressive disorder, recurrent episode, moderate (HCC)     Mixed hyperlipidemia     Osteoporosis     Ovarian failure     Oxygen dependent     Pulmonary fibrosis (HCC)     Sciatica     Sleep apnea     Transient cerebral ischemic attack     Urinary incontinence     Weakness of both legs        Past Surgical History:   Procedure Laterality Date    COLONOSCOPY  06/12/2015    Aubree, repeat as needed    COLONOSCOPY N/A 10/21/2020    Dr REANNA Spicer-Diverticular disease, random colon bx negative    HYSTERECTOMY (CERVIX STATUS UNKNOWN)      LUNG SEGMENTECTOMY      lung segmental resection    UPPER GASTROINTESTINAL ENDOSCOPY N/A 10/21/2020    Dr Sabrina Spicer-mild Gastritis, otherwise normal, NEG h pylori, NEG celiac       Family History   Problem Relation Age of Onset    Heart

## 2023-05-24 ENCOUNTER — PATIENT MESSAGE (OUTPATIENT)
Dept: INTERNAL MEDICINE | Age: 88
End: 2023-05-24

## 2023-05-24 DIAGNOSIS — F51.01 PRIMARY INSOMNIA: ICD-10-CM

## 2023-05-24 RX ORDER — LORAZEPAM 1 MG/1
TABLET ORAL
Qty: 90 TABLET | Refills: 0 | Status: SHIPPED | OUTPATIENT
Start: 2023-05-24 | End: 2023-08-24

## 2023-05-24 NOTE — TELEPHONE ENCOUNTER
From: Felisha Medellin  To: Lucretia Han  Sent: 5/24/2023 8:39 AM CDT  Subject: Meds    This message is being sent by Beatriz Hyatt on behalf of Felisha Medellin.     Agatha could you call in mothers Lorazepam 1mg refill to [facility] in [facility]

## 2023-05-25 DIAGNOSIS — F41.8 ANXIETY ASSOCIATED WITH DEPRESSION: ICD-10-CM

## 2023-05-26 RX ORDER — BUSPIRONE HYDROCHLORIDE 5 MG/1
5 TABLET ORAL 3 TIMES DAILY PRN
Qty: 180 TABLET | Refills: 1 | Status: SHIPPED | OUTPATIENT
Start: 2023-05-26

## 2023-06-20 DIAGNOSIS — F51.01 PRIMARY INSOMNIA: ICD-10-CM

## 2023-06-20 RX ORDER — LORAZEPAM 1 MG/1
TABLET ORAL
Qty: 90 TABLET | Refills: 0 | Status: SHIPPED | OUTPATIENT
Start: 2023-06-20 | End: 2023-09-20

## 2023-06-20 NOTE — TELEPHONE ENCOUNTER
Kiara Correa called to request a refill on her medication. Last office visit : 5/23/2023   Next office visit : 7/18/2023     Last UDS:   Amphetamine Screen, Urine   Date Value Ref Range Status   07/11/2022 neg  Final     Barbiturate Screen, Urine   Date Value Ref Range Status   07/11/2022 neg  Final     Benzodiazepine Screen, Urine   Date Value Ref Range Status   07/11/2022 pos  Final     Buprenorphine Urine   Date Value Ref Range Status   07/11/2022 neg  Final     Cocaine Metabolite Screen, Urine   Date Value Ref Range Status   07/11/2022 neg  Final     Gabapentin Screen, Urine   Date Value Ref Range Status   07/11/2022 neg  Final     MDMA, Urine   Date Value Ref Range Status   07/11/2022 neg  Final     Methamphetamine, Urine   Date Value Ref Range Status   07/11/2022 neg  Final     Opiate Scrn, Ur   Date Value Ref Range Status   07/11/2022 neg  Final     Oxycodone Screen, Ur   Date Value Ref Range Status   07/11/2022 neg  Final     PCP Screen, Urine   Date Value Ref Range Status   07/11/2022 neg  Final     Propoxyphene Screen, Urine   Date Value Ref Range Status   07/11/2022 neg  Final     THC Screen, Urine   Date Value Ref Range Status   07/11/2022 neg  Final     Tricyclic Antidepressants, Urine   Date Value Ref Range Status   07/11/2022 neg  Final       Last Scarlett Cough: 4/11/23  Medication Contract:  7/11/22    Requested Prescriptions     Pending Prescriptions Disp Refills    LORazepam (ATIVAN) 1 MG tablet 90 tablet 0     Sig: TAKE 1 TABLET EVERY 8 HOURS AS NEEDED FOR ANXIETY. Please approve or refuse this medication.    Latoya Calderon MA

## 2023-07-15 DIAGNOSIS — F51.01 PRIMARY INSOMNIA: ICD-10-CM

## 2023-07-17 RX ORDER — LORAZEPAM 1 MG/1
TABLET ORAL
Qty: 90 TABLET | Refills: 0 | Status: SHIPPED | OUTPATIENT
Start: 2023-07-17 | End: 2023-08-17

## 2023-07-17 RX ORDER — LEVOTHYROXINE SODIUM 0.03 MG/1
TABLET ORAL
Qty: 44 TABLET | Refills: 5 | Status: SHIPPED | OUTPATIENT
Start: 2023-07-17

## 2023-07-24 DIAGNOSIS — I10 HYPERTENSION, ESSENTIAL: ICD-10-CM

## 2023-07-24 DIAGNOSIS — E78.2 MIXED HYPERLIPIDEMIA: ICD-10-CM

## 2023-07-24 DIAGNOSIS — E55.9 VITAMIN D DEFICIENCY: ICD-10-CM

## 2023-07-24 LAB
25(OH)D3 SERPL-MCNC: 41.8 NG/ML
ALBUMIN SERPL-MCNC: 4.3 G/DL (ref 3.5–5.2)
ALP SERPL-CCNC: 86 U/L (ref 35–104)
ALT SERPL-CCNC: 12 U/L (ref 5–33)
ANION GAP SERPL CALCULATED.3IONS-SCNC: 15 MMOL/L (ref 7–19)
AST SERPL-CCNC: 29 U/L (ref 5–32)
BACTERIA URNS QL MICRO: NEGATIVE /HPF
BASOPHILS # BLD: 0 K/UL (ref 0–0.2)
BASOPHILS NFR BLD: 0.3 % (ref 0–1)
BILIRUB SERPL-MCNC: 0.3 MG/DL (ref 0.2–1.2)
BILIRUB UR QL STRIP: NEGATIVE
BUN SERPL-MCNC: 19 MG/DL (ref 8–23)
CALCIUM SERPL-MCNC: 9.8 MG/DL (ref 8.8–10.2)
CHLORIDE SERPL-SCNC: 102 MMOL/L (ref 98–111)
CHOLEST SERPL-MCNC: 336 MG/DL (ref 160–199)
CLARITY UR: CLEAR
CO2 SERPL-SCNC: 29 MMOL/L (ref 22–29)
COLOR UR: YELLOW
CREAT SERPL-MCNC: 0.9 MG/DL (ref 0.5–0.9)
CRYSTALS URNS MICRO: ABNORMAL /HPF
EOSINOPHIL # BLD: 0.2 K/UL (ref 0–0.6)
EOSINOPHIL NFR BLD: 2.4 % (ref 0–5)
EPI CELLS #/AREA URNS AUTO: 1 /HPF (ref 0–5)
ERYTHROCYTE [DISTWIDTH] IN BLOOD BY AUTOMATED COUNT: 14 % (ref 11.5–14.5)
GLUCOSE SERPL-MCNC: 96 MG/DL (ref 74–109)
GLUCOSE UR STRIP.AUTO-MCNC: NEGATIVE MG/DL
HCT VFR BLD AUTO: 41.5 % (ref 37–47)
HDLC SERPL-MCNC: 54 MG/DL (ref 65–121)
HGB BLD-MCNC: 13.3 G/DL (ref 12–16)
HGB UR STRIP.AUTO-MCNC: NEGATIVE MG/L
HYALINE CASTS #/AREA URNS AUTO: 2 /HPF (ref 0–8)
IMM GRANULOCYTES # BLD: 0 K/UL
KETONES UR STRIP.AUTO-MCNC: NEGATIVE MG/DL
LDLC SERPL CALC-MCNC: 236 MG/DL
LEUKOCYTE ESTERASE UR QL STRIP.AUTO: ABNORMAL
LYMPHOCYTES # BLD: 2.6 K/UL (ref 1.1–4.5)
LYMPHOCYTES NFR BLD: 36.9 % (ref 20–40)
MCH RBC QN AUTO: 29.8 PG (ref 27–31)
MCHC RBC AUTO-ENTMCNC: 32 G/DL (ref 33–37)
MCV RBC AUTO: 93 FL (ref 81–99)
MONOCYTES # BLD: 0.7 K/UL (ref 0–0.9)
MONOCYTES NFR BLD: 10.4 % (ref 0–10)
NEUTROPHILS # BLD: 3.5 K/UL (ref 1.5–7.5)
NEUTS SEG NFR BLD: 49.9 % (ref 50–65)
NITRITE UR QL STRIP.AUTO: NEGATIVE
PH UR STRIP.AUTO: 5.5 [PH] (ref 5–8)
PLATELET # BLD AUTO: 294 K/UL (ref 130–400)
PMV BLD AUTO: 10.6 FL (ref 9.4–12.3)
POTASSIUM SERPL-SCNC: 4.8 MMOL/L (ref 3.5–5)
PROT SERPL-MCNC: 7.4 G/DL (ref 6.6–8.7)
PROT UR STRIP.AUTO-MCNC: NEGATIVE MG/DL
RBC # BLD AUTO: 4.46 M/UL (ref 4.2–5.4)
RBC #/AREA URNS AUTO: 2 /HPF (ref 0–4)
SODIUM SERPL-SCNC: 146 MMOL/L (ref 136–145)
SP GR UR STRIP.AUTO: 1.02 (ref 1–1.03)
TRIGL SERPL-MCNC: 232 MG/DL (ref 0–149)
TSH SERPL DL<=0.005 MIU/L-ACNC: 3.04 UIU/ML (ref 0.27–4.2)
UROBILINOGEN UR STRIP.AUTO-MCNC: 0.2 E.U./DL
WBC # BLD AUTO: 7 K/UL (ref 4.8–10.8)
WBC #/AREA URNS AUTO: 4 /HPF (ref 0–5)

## 2023-07-25 ENCOUNTER — OFFICE VISIT (OUTPATIENT)
Dept: INTERNAL MEDICINE | Age: 88
End: 2023-07-25
Payer: MEDICARE

## 2023-07-25 VITALS
HEIGHT: 64 IN | SYSTOLIC BLOOD PRESSURE: 128 MMHG | HEART RATE: 54 BPM | WEIGHT: 160 LBS | OXYGEN SATURATION: 91 % | DIASTOLIC BLOOD PRESSURE: 78 MMHG | BODY MASS INDEX: 27.31 KG/M2

## 2023-07-25 DIAGNOSIS — J96.11 CHRONIC RESPIRATORY FAILURE WITH HYPOXIA (HCC): ICD-10-CM

## 2023-07-25 DIAGNOSIS — F51.01 PRIMARY INSOMNIA: ICD-10-CM

## 2023-07-25 DIAGNOSIS — E78.2 MIXED HYPERLIPIDEMIA: ICD-10-CM

## 2023-07-25 DIAGNOSIS — I10 HYPERTENSION, ESSENTIAL: Primary | ICD-10-CM

## 2023-07-25 DIAGNOSIS — D64.9 ANEMIA, UNSPECIFIED TYPE: ICD-10-CM

## 2023-07-25 DIAGNOSIS — E55.9 VITAMIN D DEFICIENCY: ICD-10-CM

## 2023-07-25 DIAGNOSIS — Z00.00 MEDICARE ANNUAL WELLNESS VISIT, SUBSEQUENT: ICD-10-CM

## 2023-07-25 DIAGNOSIS — N32.81 OVERACTIVE BLADDER: ICD-10-CM

## 2023-07-25 DIAGNOSIS — K21.9 GASTROESOPHAGEAL REFLUX DISEASE WITHOUT ESOPHAGITIS: ICD-10-CM

## 2023-07-25 DIAGNOSIS — F41.8 ANXIETY ASSOCIATED WITH DEPRESSION: ICD-10-CM

## 2023-07-25 DIAGNOSIS — E03.8 OTHER SPECIFIED HYPOTHYROIDISM: ICD-10-CM

## 2023-07-25 DIAGNOSIS — K58.0 IRRITABLE BOWEL SYNDROME WITH DIARRHEA: ICD-10-CM

## 2023-07-25 DIAGNOSIS — R41.3 MEMORY LOSS: ICD-10-CM

## 2023-07-25 DIAGNOSIS — G47.34 NOCTURNAL HYPOXIA: ICD-10-CM

## 2023-07-25 PROBLEM — F01.B4 MODERATE VASCULAR DEMENTIA WITH ANXIETY (HCC): Status: ACTIVE | Noted: 2023-07-25

## 2023-07-25 PROCEDURE — 99214 OFFICE O/P EST MOD 30 MIN: CPT | Performed by: NURSE PRACTITIONER

## 2023-07-25 PROCEDURE — 1090F PRES/ABSN URINE INCON ASSESS: CPT | Performed by: NURSE PRACTITIONER

## 2023-07-25 PROCEDURE — G8417 CALC BMI ABV UP PARAM F/U: HCPCS | Performed by: NURSE PRACTITIONER

## 2023-07-25 PROCEDURE — 1036F TOBACCO NON-USER: CPT | Performed by: NURSE PRACTITIONER

## 2023-07-25 PROCEDURE — G0439 PPPS, SUBSEQ VISIT: HCPCS | Performed by: NURSE PRACTITIONER

## 2023-07-25 PROCEDURE — 1123F ACP DISCUSS/DSCN MKR DOCD: CPT | Performed by: NURSE PRACTITIONER

## 2023-07-25 PROCEDURE — G8427 DOCREV CUR MEDS BY ELIG CLIN: HCPCS | Performed by: NURSE PRACTITIONER

## 2023-07-25 RX ORDER — EZETIMIBE 10 MG/1
10 TABLET ORAL DAILY
Qty: 90 TABLET | Refills: 1 | Status: SHIPPED | OUTPATIENT
Start: 2023-07-25

## 2023-07-25 RX ORDER — CLOPIDOGREL BISULFATE 75 MG/1
TABLET ORAL
Qty: 90 TABLET | Refills: 1 | Status: SHIPPED | OUTPATIENT
Start: 2023-07-25

## 2023-07-25 RX ORDER — DONEPEZIL HYDROCHLORIDE 10 MG/1
10 TABLET, FILM COATED ORAL NIGHTLY
Qty: 90 TABLET | Refills: 1 | Status: SHIPPED | OUTPATIENT
Start: 2023-07-25

## 2023-07-25 RX ORDER — ESZOPICLONE 1 MG/1
1 TABLET, FILM COATED ORAL NIGHTLY
Qty: 30 TABLET | Refills: 0 | Status: SHIPPED | OUTPATIENT
Start: 2023-07-25 | End: 2023-08-24

## 2023-07-25 RX ORDER — MIRTAZAPINE 30 MG/1
TABLET, FILM COATED ORAL
Qty: 90 TABLET | Refills: 1 | Status: CANCELLED | OUTPATIENT
Start: 2023-07-25

## 2023-07-25 RX ORDER — ATORVASTATIN CALCIUM 80 MG/1
80 TABLET, FILM COATED ORAL NIGHTLY
Qty: 90 TABLET | Refills: 1 | Status: SHIPPED | OUTPATIENT
Start: 2023-07-25 | End: 2024-01-21

## 2023-07-25 RX ORDER — LOSARTAN POTASSIUM AND HYDROCHLOROTHIAZIDE 12.5; 1 MG/1; MG/1
TABLET ORAL
Qty: 90 TABLET | Refills: 1 | Status: SHIPPED | OUTPATIENT
Start: 2023-07-25

## 2023-07-25 ASSESSMENT — ENCOUNTER SYMPTOMS
WHEEZING: 0
NAUSEA: 0
CHOKING: 0
VOMITING: 0
TROUBLE SWALLOWING: 0
COLOR CHANGE: 0
EYE ITCHING: 0
STRIDOR: 0
CONSTIPATION: 0
EYE DISCHARGE: 0
SORE THROAT: 0
DIARRHEA: 1
BLOOD IN STOOL: 0
ABDOMINAL DISTENTION: 0
SHORTNESS OF BREATH: 1
ABDOMINAL PAIN: 0
COUGH: 0

## 2023-07-25 ASSESSMENT — LIFESTYLE VARIABLES
HOW OFTEN DO YOU HAVE A DRINK CONTAINING ALCOHOL: NEVER
HOW MANY STANDARD DRINKS CONTAINING ALCOHOL DO YOU HAVE ON A TYPICAL DAY: PATIENT DOES NOT DRINK

## 2023-07-25 ASSESSMENT — PATIENT HEALTH QUESTIONNAIRE - PHQ9
3. TROUBLE FALLING OR STAYING ASLEEP: 0
10. IF YOU CHECKED OFF ANY PROBLEMS, HOW DIFFICULT HAVE THESE PROBLEMS MADE IT FOR YOU TO DO YOUR WORK, TAKE CARE OF THINGS AT HOME, OR GET ALONG WITH OTHER PEOPLE: 0
7. TROUBLE CONCENTRATING ON THINGS, SUCH AS READING THE NEWSPAPER OR WATCHING TELEVISION: 0
6. FEELING BAD ABOUT YOURSELF - OR THAT YOU ARE A FAILURE OR HAVE LET YOURSELF OR YOUR FAMILY DOWN: 0
SUM OF ALL RESPONSES TO PHQ QUESTIONS 1-9: 0
SUM OF ALL RESPONSES TO PHQ QUESTIONS 1-9: 0
4. FEELING TIRED OR HAVING LITTLE ENERGY: 0
SUM OF ALL RESPONSES TO PHQ QUESTIONS 1-9: 0
2. FEELING DOWN, DEPRESSED OR HOPELESS: 0
SUM OF ALL RESPONSES TO PHQ9 QUESTIONS 1 & 2: 0
1. LITTLE INTEREST OR PLEASURE IN DOING THINGS: 0
5. POOR APPETITE OR OVEREATING: 0
9. THOUGHTS THAT YOU WOULD BE BETTER OFF DEAD, OR OF HURTING YOURSELF: 0
8. MOVING OR SPEAKING SO SLOWLY THAT OTHER PEOPLE COULD HAVE NOTICED. OR THE OPPOSITE, BEING SO FIGETY OR RESTLESS THAT YOU HAVE BEEN MOVING AROUND A LOT MORE THAN USUAL: 0
SUM OF ALL RESPONSES TO PHQ QUESTIONS 1-9: 0

## 2023-07-25 NOTE — PATIENT INSTRUCTIONS
need. Knowing what you can and can't do for yourself is an important first step to getting help. And when you have the help you need, you can stay as independent as possible. Your doctor will want to know if you are able to do tasks such as: Take a bath or shower without help. Go to the bathroom by yourself. Dress and undress without help. Shave, comb your hair, and brush teeth on your own. Get in and out of bed or a chair without help. Feed yourself without help. If you are having trouble doing basic self-care tasks, talk with your doctor. You may want to bring a caregiver or family member who can help the doctor understand your needs and abilities. How will a doctor assess your ADLs? Asking about ADLs is part of a routine health checkup your doctor will likely do as you age. Your health check might be done in a doctor's office, in your home, or at a hospital. The goal is to find out if you are having any problems that could make your health problems worse or that make it unsafe for you to be on your own. To measure your ADLs, your doctor will ask how hard it is for you to do routine tasks. He or she may also want to know if you have changed the way you do a task because of a health problem. He or she may watch how you:  Walk back and forth. Keep your balance while you stand or walk. Move from sitting to standing or from a bed to a chair. Button or unbutton a shirt or sweater. Remove and put on your shoes. It's normal to feel a little worried or anxious if you find you can't do all the things you used to be able to do. Talking with your doctor about ADLs isn't a test that you either pass or fail. It's just a way to get more information about your health and safety. Follow-up care is a key part of your treatment and safety. Be sure to make and go to all appointments, and call your doctor if you are having problems.  It's also a good idea to know your test results and keep a list of the medicines you Statement Selected

## 2023-08-14 DIAGNOSIS — F51.01 PRIMARY INSOMNIA: ICD-10-CM

## 2023-08-14 RX ORDER — LORAZEPAM 1 MG/1
TABLET ORAL
Qty: 90 TABLET | Refills: 0 | Status: SHIPPED | OUTPATIENT
Start: 2023-08-14 | End: 2023-09-14

## 2023-08-14 RX ORDER — ESZOPICLONE 1 MG/1
1 TABLET, FILM COATED ORAL NIGHTLY
Qty: 30 TABLET | Refills: 0 | Status: SHIPPED | OUTPATIENT
Start: 2023-08-14 | End: 2023-08-16 | Stop reason: SDUPTHER

## 2023-08-14 NOTE — TELEPHONE ENCOUNTER
Nicole Ferro called requesting a refill of the below medication which has been pended for you:     Requested Prescriptions     Pending Prescriptions Disp Refills    LORazepam (ATIVAN) 1 MG tablet 90 tablet 0     Sig: TAKE 1 TABLET EVERY 8 HOURS AS NEEDED FOR ANXIETY. eszopiclone (LUNESTA) 1 MG TABS 30 tablet 0     Sig: Take 1 tablet by mouth nightly for 30 days.  Max Daily Amount: 1 mg       Last Appointment Date: 7/25/2023  Next Appointment Date: 10/25/2023    Allergies   Allergen Reactions    Biaxin [Clarithromycin]     Cefdinir     Effexor [Venlafaxine]     Erythromycin     Naprosyn [Naproxen]

## 2023-08-16 DIAGNOSIS — F51.01 PRIMARY INSOMNIA: ICD-10-CM

## 2023-08-16 RX ORDER — ESZOPICLONE 1 MG/1
1 TABLET, FILM COATED ORAL NIGHTLY
Qty: 30 TABLET | Refills: 0 | Status: SHIPPED | OUTPATIENT
Start: 2023-08-16 | End: 2023-09-15

## 2023-09-13 ENCOUNTER — TELEPHONE (OUTPATIENT)
Dept: INTERNAL MEDICINE | Age: 88
End: 2023-09-13

## 2023-09-13 DIAGNOSIS — F51.01 PRIMARY INSOMNIA: ICD-10-CM

## 2023-09-13 RX ORDER — LORAZEPAM 1 MG/1
TABLET ORAL
Qty: 90 TABLET | Refills: 0 | Status: SHIPPED | OUTPATIENT
Start: 2023-09-13 | End: 2023-10-13

## 2023-09-13 NOTE — TELEPHONE ENCOUNTER
Her caretakeer, Royal Scott, says that the Letter of Necessity is not in depth enough for DOL office and they need another letter. She would like to pick it up on Monday if possible.

## 2023-09-13 NOTE — TELEPHONE ENCOUNTER
Shiva De La Cruz called requesting a refill of the below medication which has been pended for you:     Requested Prescriptions     Pending Prescriptions Disp Refills    LORazepam (ATIVAN) 1 MG tablet [Pharmacy Med Name: LORAZEPAM 1MG TABLET] 90 tablet 0     Sig: TAKE 1 TABLET EVERY 8 HOURS AS NEEDED FOR ANXIETY.        Last Appointment Date: 7/25/2023  Next Appointment Date: 10/25/2023    Allergies   Allergen Reactions    Biaxin [Clarithromycin]     Cefdinir     Effexor [Venlafaxine]     Erythromycin     Naprosyn [Naproxen]

## 2023-09-13 NOTE — TELEPHONE ENCOUNTER
This is really Haylie's pt and I see her for recert--- I used same letter that worked last year -- can they let us know more what we need to do--- I don't really manage her care so I need more info for the letter-- can they supply that as to why not enough -

## 2023-09-14 DIAGNOSIS — F51.01 PRIMARY INSOMNIA: ICD-10-CM

## 2023-09-14 RX ORDER — ESZOPICLONE 2 MG/1
2 TABLET, FILM COATED ORAL NIGHTLY
Qty: 30 TABLET | Refills: 0 | Status: SHIPPED | OUTPATIENT
Start: 2023-09-14 | End: 2023-10-14

## 2023-09-14 NOTE — TELEPHONE ENCOUNTER
I got the number to call for the certification person, Laneyjorge Gee, phone [phone], fax [phone].   Case ID # [id number],  Certification # [id number]

## 2023-09-18 DIAGNOSIS — E78.2 MIXED HYPERLIPIDEMIA: ICD-10-CM

## 2023-09-18 DIAGNOSIS — R41.3 MEMORY LOSS: ICD-10-CM

## 2023-09-18 RX ORDER — EZETIMIBE 10 MG/1
10 TABLET ORAL DAILY
Qty: 90 TABLET | Refills: 1 | Status: SHIPPED | OUTPATIENT
Start: 2023-09-18

## 2023-09-18 RX ORDER — ESOMEPRAZOLE MAGNESIUM 40 MG/1
40 CAPSULE, DELAYED RELEASE ORAL DAILY
Qty: 90 CAPSULE | Refills: 1 | Status: SHIPPED | OUTPATIENT
Start: 2023-09-18

## 2023-09-18 RX ORDER — DONEPEZIL HYDROCHLORIDE 10 MG/1
10 TABLET, FILM COATED ORAL NIGHTLY
Qty: 90 TABLET | Refills: 1 | Status: SHIPPED | OUTPATIENT
Start: 2023-09-18

## 2023-09-18 RX ORDER — QUETIAPINE FUMARATE 50 MG/1
50 TABLET, FILM COATED ORAL 2 TIMES DAILY
Qty: 180 TABLET | Refills: 1 | Status: SHIPPED | OUTPATIENT
Start: 2023-09-18

## 2023-09-18 RX ORDER — ATORVASTATIN CALCIUM 80 MG/1
80 TABLET, FILM COATED ORAL NIGHTLY
Qty: 90 TABLET | Refills: 1 | Status: SHIPPED | OUTPATIENT
Start: 2023-09-18 | End: 2024-03-16

## 2023-09-18 RX ORDER — ONDANSETRON 4 MG/1
4 TABLET, ORALLY DISINTEGRATING ORAL EVERY 8 HOURS PRN
Qty: 30 TABLET | Refills: 0 | Status: SHIPPED | OUTPATIENT
Start: 2023-09-18

## 2023-09-18 NOTE — TELEPHONE ENCOUNTER
Antonella Figueroa called requesting a refill of the below medication which has been pended for you:     Requested Prescriptions     Pending Prescriptions Disp Refills    ondansetron (ZOFRAN-ODT) 4 MG disintegrating tablet 30 tablet 0     Sig: Take 1 tablet by mouth every 8 hours as needed for Nausea or Vomiting    mirabegron (MYRBETRIQ) 25 MG TB24 90 tablet 0     Sig: Take 1 tablet by mouth daily    esomeprazole (NEXIUM) 40 MG delayed release capsule 90 capsule 1     Sig: Take 1 capsule by mouth daily    QUEtiapine (SEROQUEL) 50 MG tablet 180 tablet 1     Sig: Take 1 tablet by mouth 2 times daily 2 IN THE MORNING,ONE AT NOON AND 3 AT BEDTIME    donepezil (ARICEPT) 10 MG tablet 90 tablet 1     Sig: Take 1 tablet by mouth nightly    atorvastatin (LIPITOR) 80 MG tablet 90 tablet 1     Sig: Take 1 tablet by mouth at bedtime    ezetimibe (ZETIA) 10 MG tablet 90 tablet 1     Sig: Take 1 tablet by mouth daily       Last Appointment Date: 7/25/2023  Next Appointment Date: 10/25/2023    Allergies   Allergen Reactions    Biaxin [Clarithromycin]     Cefdinir     Effexor [Venlafaxine]     Erythromycin     Naprosyn [Naproxen]

## 2023-09-22 ENCOUNTER — TELEPHONE (OUTPATIENT)
Dept: INTERNAL MEDICINE | Age: 88
End: 2023-09-22

## 2023-09-22 NOTE — TELEPHONE ENCOUNTER
Pt's daughter called and wanted it notated that the letter written on 9/21 worked and perhaps we could bookmark it to send for next time so we don't have to keep going back and forth.

## 2023-09-23 DIAGNOSIS — E55.9 VITAMIN D DEFICIENCY: ICD-10-CM

## 2023-09-25 RX ORDER — ESCITALOPRAM OXALATE 20 MG/1
TABLET ORAL
Qty: 30 TABLET | Refills: 5 | Status: SHIPPED | OUTPATIENT
Start: 2023-09-25

## 2023-09-25 RX ORDER — ERGOCALCIFEROL 1.25 MG/1
1 CAPSULE ORAL WEEKLY
Qty: 4 CAPSULE | Refills: 5 | Status: SHIPPED | OUTPATIENT
Start: 2023-09-25

## 2023-09-25 NOTE — TELEPHONE ENCOUNTER
True Prakash called requesting a refill of the below medication which has been pended for you:     Requested Prescriptions     Pending Prescriptions Disp Refills    escitalopram (LEXAPRO) 20 MG tablet [Pharmacy Med Name: ESCITALOPRAM OXALATE 20MG TABLET] 30 tablet 5     Sig: TAKE 1 TABLET BY MOUTH ONCE DAILY    Vitamin D, Ergocalciferol, 17835 units CAPS [Pharmacy Med Name: VITAMIN D [id number] CAPSULE] 4 capsule 5     Sig: TAKE 1 CAPSULE BY MOUTH ONCE A WEEK       Last Appointment Date: 7/25/2023  Next Appointment Date: 10/25/2023    Allergies   Allergen Reactions    Biaxin [Clarithromycin]     Cefdinir     Effexor [Venlafaxine]     Erythromycin     Naprosyn [Naproxen]

## 2023-09-25 NOTE — TELEPHONE ENCOUNTER
Elsi Case called requesting a refill of the below medication which has been pended for you:     Requested Prescriptions     Pending Prescriptions Disp Refills    escitalopram (LEXAPRO) 20 MG tablet [Pharmacy Med Name: ESCITALOPRAM OXALATE 20MG TABLET] 30 tablet 5     Sig: TAKE 1 TABLET BY MOUTH ONCE DAILY    Vitamin D, Ergocalciferol, 33110 units CAPS [Pharmacy Med Name: VITAMIN D [id number] CAPSULE] 4 capsule 5     Sig: TAKE 1 CAPSULE BY MOUTH ONCE A WEEK       Last Appointment Date: 7/25/2023  Next Appointment Date: 10/25/2023    Allergies   Allergen Reactions    Biaxin [Clarithromycin]     Cefdinir     Effexor [Venlafaxine]     Erythromycin     Naprosyn [Naproxen]

## 2023-10-09 DIAGNOSIS — F51.01 PRIMARY INSOMNIA: ICD-10-CM

## 2023-10-09 RX ORDER — LORAZEPAM 1 MG/1
TABLET ORAL
Qty: 90 TABLET | Refills: 0 | Status: SHIPPED | OUTPATIENT
Start: 2023-10-09 | End: 2023-11-08

## 2023-10-09 RX ORDER — ESZOPICLONE 2 MG/1
2 TABLET, FILM COATED ORAL NIGHTLY
Qty: 30 TABLET | Refills: 0 | Status: SHIPPED | OUTPATIENT
Start: 2023-10-09 | End: 2023-11-08

## 2023-10-09 NOTE — TELEPHONE ENCOUNTER
Tyra Ratliff called to request a refill on her medication. Last office visit : 7/25/2023   Next office visit : 10/25/2023     Last UDS:   Amphetamine Screen, Urine   Date Value Ref Range Status   07/11/2022 neg  Final     Barbiturate Screen, Urine   Date Value Ref Range Status   07/11/2022 neg  Final     Benzodiazepine Screen, Urine   Date Value Ref Range Status   07/11/2022 pos  Final     Buprenorphine Urine   Date Value Ref Range Status   07/11/2022 neg  Final     Cocaine Metabolite Screen, Urine   Date Value Ref Range Status   07/11/2022 neg  Final     Gabapentin Screen, Urine   Date Value Ref Range Status   07/11/2022 neg  Final     MDMA, Urine   Date Value Ref Range Status   07/11/2022 neg  Final     Methamphetamine, Urine   Date Value Ref Range Status   07/11/2022 neg  Final     Opiate Scrn, Ur   Date Value Ref Range Status   07/11/2022 neg  Final     Oxycodone Screen, Ur   Date Value Ref Range Status   07/11/2022 neg  Final     PCP Screen, Urine   Date Value Ref Range Status   07/11/2022 neg  Final     Propoxyphene Screen, Urine   Date Value Ref Range Status   07/11/2022 neg  Final     THC Screen, Urine   Date Value Ref Range Status   07/11/2022 neg  Final     Tricyclic Antidepressants, Urine   Date Value Ref Range Status   07/11/2022 neg  Final       Last Beauty Hammed: 07/25/23  Medication Contract: 07/25/23   Last Fill: 09/13/23    Requested Prescriptions     Pending Prescriptions Disp Refills    LORazepam (ATIVAN) 1 MG tablet 90 tablet 0     Sig: TAKE 1 TABLET EVERY 8 HOURS AS NEEDED FOR ANXIETY. eszopiclone (LUNESTA) 2 MG TABS 30 tablet 0     Sig: Take 1 tablet by mouth nightly for 30 days. Max Daily Amount: 2 mg         Please approve or refuse this medication.    Ximena Mccauley MA

## 2023-11-03 DIAGNOSIS — E55.9 VITAMIN D DEFICIENCY: ICD-10-CM

## 2023-11-03 DIAGNOSIS — I10 HYPERTENSION, ESSENTIAL: ICD-10-CM

## 2023-11-03 DIAGNOSIS — E78.2 MIXED HYPERLIPIDEMIA: ICD-10-CM

## 2023-11-03 DIAGNOSIS — D64.9 ANEMIA, UNSPECIFIED TYPE: ICD-10-CM

## 2023-11-03 LAB
25(OH)D3 SERPL-MCNC: 54.1 NG/ML
ALBUMIN SERPL-MCNC: 4.3 G/DL (ref 3.5–5.2)
ALP SERPL-CCNC: 74 U/L (ref 35–104)
ALT SERPL-CCNC: 18 U/L (ref 5–33)
ANION GAP SERPL CALCULATED.3IONS-SCNC: 13 MMOL/L (ref 7–19)
AST SERPL-CCNC: 47 U/L (ref 5–32)
BASOPHILS # BLD: 0 K/UL (ref 0–0.2)
BASOPHILS NFR BLD: 0.1 % (ref 0–1)
BILIRUB SERPL-MCNC: 0.5 MG/DL (ref 0.2–1.2)
BUN SERPL-MCNC: 18 MG/DL (ref 8–23)
CALCIUM SERPL-MCNC: 9.7 MG/DL (ref 8.8–10.2)
CHLORIDE SERPL-SCNC: 100 MMOL/L (ref 98–111)
CHOLEST SERPL-MCNC: 154 MG/DL (ref 160–199)
CO2 SERPL-SCNC: 30 MMOL/L (ref 22–29)
CREAT SERPL-MCNC: 0.9 MG/DL (ref 0.5–0.9)
EOSINOPHIL # BLD: 0.2 K/UL (ref 0–0.6)
EOSINOPHIL NFR BLD: 1.8 % (ref 0–5)
ERYTHROCYTE [DISTWIDTH] IN BLOOD BY AUTOMATED COUNT: 14.1 % (ref 11.5–14.5)
GLUCOSE SERPL-MCNC: 91 MG/DL (ref 74–109)
HCT VFR BLD AUTO: 38.9 % (ref 37–47)
HDLC SERPL-MCNC: 70 MG/DL (ref 65–121)
HGB BLD-MCNC: 12.1 G/DL (ref 12–16)
IMM GRANULOCYTES # BLD: 0 K/UL
IRON SERPL-MCNC: 74 UG/DL (ref 37–145)
LDLC SERPL CALC-MCNC: 64 MG/DL
LYMPHOCYTES # BLD: 2.7 K/UL (ref 1.1–4.5)
LYMPHOCYTES NFR BLD: 30.4 % (ref 20–40)
MCH RBC QN AUTO: 29.7 PG (ref 27–31)
MCHC RBC AUTO-ENTMCNC: 31.1 G/DL (ref 33–37)
MCV RBC AUTO: 95.3 FL (ref 81–99)
MONOCYTES # BLD: 0.8 K/UL (ref 0–0.9)
MONOCYTES NFR BLD: 9.1 % (ref 0–10)
NEUTROPHILS # BLD: 5.1 K/UL (ref 1.5–7.5)
NEUTS SEG NFR BLD: 58.3 % (ref 50–65)
PLATELET # BLD AUTO: 313 K/UL (ref 130–400)
PMV BLD AUTO: 11.1 FL (ref 9.4–12.3)
POTASSIUM SERPL-SCNC: 4.7 MMOL/L (ref 3.5–5)
PROT SERPL-MCNC: 7.2 G/DL (ref 6.6–8.7)
RBC # BLD AUTO: 4.08 M/UL (ref 4.2–5.4)
SODIUM SERPL-SCNC: 143 MMOL/L (ref 136–145)
TRIGL SERPL-MCNC: 102 MG/DL (ref 0–149)
TSH SERPL DL<=0.005 MIU/L-ACNC: 2.3 UIU/ML (ref 0.27–4.2)
WBC # BLD AUTO: 8.8 K/UL (ref 4.8–10.8)

## 2023-11-06 ENCOUNTER — OFFICE VISIT (OUTPATIENT)
Dept: INTERNAL MEDICINE | Age: 88
End: 2023-11-06
Payer: MEDICARE

## 2023-11-06 VITALS
SYSTOLIC BLOOD PRESSURE: 118 MMHG | HEIGHT: 64 IN | BODY MASS INDEX: 27.14 KG/M2 | OXYGEN SATURATION: 94 % | DIASTOLIC BLOOD PRESSURE: 68 MMHG | HEART RATE: 90 BPM | WEIGHT: 159 LBS

## 2023-11-06 DIAGNOSIS — F02.80 ALZHEIMER DISEASE (HCC): ICD-10-CM

## 2023-11-06 DIAGNOSIS — F51.01 PRIMARY INSOMNIA: ICD-10-CM

## 2023-11-06 DIAGNOSIS — J84.10 PULMONARY FIBROSIS (HCC): ICD-10-CM

## 2023-11-06 DIAGNOSIS — K21.9 GASTROESOPHAGEAL REFLUX DISEASE WITHOUT ESOPHAGITIS: ICD-10-CM

## 2023-11-06 DIAGNOSIS — J96.11 CHRONIC RESPIRATORY FAILURE WITH HYPOXIA (HCC): ICD-10-CM

## 2023-11-06 DIAGNOSIS — E78.2 MIXED HYPERLIPIDEMIA: ICD-10-CM

## 2023-11-06 DIAGNOSIS — E55.9 VITAMIN D DEFICIENCY: Primary | ICD-10-CM

## 2023-11-06 DIAGNOSIS — E03.8 OTHER SPECIFIED HYPOTHYROIDISM: ICD-10-CM

## 2023-11-06 DIAGNOSIS — R41.3 MEMORY LOSS: ICD-10-CM

## 2023-11-06 DIAGNOSIS — I10 HYPERTENSION, ESSENTIAL: ICD-10-CM

## 2023-11-06 DIAGNOSIS — N32.81 OVERACTIVE BLADDER: ICD-10-CM

## 2023-11-06 DIAGNOSIS — G30.9 ALZHEIMER DISEASE (HCC): ICD-10-CM

## 2023-11-06 PROCEDURE — G8427 DOCREV CUR MEDS BY ELIG CLIN: HCPCS | Performed by: NURSE PRACTITIONER

## 2023-11-06 PROCEDURE — 1036F TOBACCO NON-USER: CPT | Performed by: NURSE PRACTITIONER

## 2023-11-06 PROCEDURE — 1090F PRES/ABSN URINE INCON ASSESS: CPT | Performed by: NURSE PRACTITIONER

## 2023-11-06 PROCEDURE — 1123F ACP DISCUSS/DSCN MKR DOCD: CPT | Performed by: NURSE PRACTITIONER

## 2023-11-06 PROCEDURE — 90694 VACC AIIV4 NO PRSRV 0.5ML IM: CPT | Performed by: NURSE PRACTITIONER

## 2023-11-06 PROCEDURE — 99214 OFFICE O/P EST MOD 30 MIN: CPT | Performed by: NURSE PRACTITIONER

## 2023-11-06 PROCEDURE — G0008 ADMIN INFLUENZA VIRUS VAC: HCPCS | Performed by: NURSE PRACTITIONER

## 2023-11-06 PROCEDURE — G8417 CALC BMI ABV UP PARAM F/U: HCPCS | Performed by: NURSE PRACTITIONER

## 2023-11-06 PROCEDURE — G8484 FLU IMMUNIZE NO ADMIN: HCPCS | Performed by: NURSE PRACTITIONER

## 2023-11-06 RX ORDER — DONEPEZIL HYDROCHLORIDE 10 MG/1
10 TABLET, FILM COATED ORAL NIGHTLY
Qty: 90 TABLET | Refills: 1 | Status: SHIPPED | OUTPATIENT
Start: 2023-11-06

## 2023-11-06 RX ORDER — LORAZEPAM 1 MG/1
TABLET ORAL
Qty: 90 TABLET | Refills: 0 | Status: SHIPPED | OUTPATIENT
Start: 2023-11-06 | End: 2023-12-06

## 2023-11-06 RX ORDER — LEVOCETIRIZINE DIHYDROCHLORIDE 5 MG/1
5 TABLET, FILM COATED ORAL NIGHTLY
Qty: 90 TABLET | Refills: 1 | Status: SHIPPED | OUTPATIENT
Start: 2023-11-06

## 2023-11-06 RX ORDER — ESZOPICLONE 2 MG/1
2 TABLET, FILM COATED ORAL NIGHTLY
Qty: 30 TABLET | Refills: 0 | Status: SHIPPED | OUTPATIENT
Start: 2023-11-06 | End: 2023-12-06

## 2023-11-06 ASSESSMENT — ENCOUNTER SYMPTOMS
EYE ITCHING: 0
COUGH: 0
CHOKING: 0
STRIDOR: 0
VOMITING: 0
SORE THROAT: 0
WHEEZING: 0
ABDOMINAL DISTENTION: 0
CONSTIPATION: 0
COLOR CHANGE: 0
EYE DISCHARGE: 0
BLOOD IN STOOL: 0
ABDOMINAL PAIN: 0
NAUSEA: 0
TROUBLE SWALLOWING: 0
SHORTNESS OF BREATH: 0
DIARRHEA: 0

## 2023-11-06 NOTE — ASSESSMENT & PLAN NOTE
She is stable on the increased dose of Lipitor with cholesterol down to 154 triglycerides down to 101

## 2023-11-06 NOTE — PATIENT INSTRUCTIONS
Hyperlipidemia; The current medical regimen is effective;  continue present plan and medications. Hypertension The current medical regimen is effective;  continue present plan and medications. Chronic respiratory failure  recert today   Pulmonary fibrosis;  stable with oxygen  Hypothyroidism; The current medical regimen is effective;  continue present plan and medications.   Anxiety;  stable with lorazepam  Neuropathy   stable no meds  Depression; stable with lexapro   Insomnia stable with lunesta  Dementia with behavior  stable with seroquel

## 2023-11-06 NOTE — PROGRESS NOTES
4.200 uIU/mL Final   ]  Lab Results   Component Value Date     11/03/2023    K 4.7 11/03/2023     11/03/2023    CO2 30 (H) 11/03/2023    BUN 18 11/03/2023    CREATININE 0.9 11/03/2023    GLUCOSE 91 11/03/2023    CALCIUM 9.7 11/03/2023    PROT 7.2 11/03/2023    LABALBU 4.3 11/03/2023    BILITOT 0.5 11/03/2023    ALKPHOS 74 11/03/2023    AST 47 (H) 11/03/2023    ALT 18 11/03/2023    LABGLOM >60 11/03/2023    GFRAA >59 07/11/2022     Lab Results   Component Value Date    CHOL 154 (L) 11/03/2023    CHOL 336 (H) 07/24/2023    CHOL 311 (H) 01/09/2023     Lab Results   Component Value Date    TRIG 102 11/03/2023    TRIG 232 (H) 07/24/2023    TRIG 210 (H) 04/10/2023     Lab Results   Component Value Date    HDL 70 11/03/2023    HDL 54 (L) 07/24/2023    HDL 52 (L) 01/09/2023     Lab Results   Component Value Date    LDLCALC 64 11/03/2023    LDLCALC 236 07/24/2023    LDLCALC 209 01/09/2023     Lab Results   Component Value Date/Time     11/03/2023 12:22 PM    K 4.7 11/03/2023 12:22 PM     11/03/2023 12:22 PM    CO2 30 11/03/2023 12:22 PM    BUN 18 11/03/2023 12:22 PM    CREATININE 0.9 11/03/2023 12:22 PM    GLUCOSE 91 11/03/2023 12:22 PM    CALCIUM 9.7 11/03/2023 12:22 PM      Lab Results   Component Value Date    WBC 8.8 11/03/2023    HGB 12.1 11/03/2023    HCT 38.9 11/03/2023    MCV 95.3 11/03/2023     11/03/2023    LABLYMP 2.86 09/25/2014    LYMPHOPCT 30.4 11/03/2023    RBC 4.08 (L) 11/03/2023    MCH 29.7 11/03/2023    MCHC 31.1 (L) 11/03/2023    RDW 14.1 11/03/2023     Lab Results   Component Value Date    VITD25 54.1 11/03/2023     Labs reviewed from 11/3/2023  @assessment requiring independent historian daughter; @   Subjective:      Review of Systems   Constitutional:  Negative for fatigue, fever and unexpected weight change. HENT:  Negative for ear discharge, ear pain, mouth sores, sore throat and trouble swallowing. Eyes:  Negative for discharge, itching and visual disturbance.

## 2023-11-21 RX ORDER — QUETIAPINE FUMARATE 100 MG/1
TABLET, FILM COATED ORAL
Qty: 60 TABLET | Refills: 1 | Status: SHIPPED | OUTPATIENT
Start: 2023-11-21

## 2023-11-21 NOTE — TELEPHONE ENCOUNTER
Samuel Alonso called requesting a refill of the below medication which has been pended for you:     Requested Prescriptions     Pending Prescriptions Disp Refills    QUEtiapine (SEROQUEL) 100 MG tablet [Pharmacy Med Name: QUETIAPINE FUMARATE 100MG TABLET] 60 tablet 1     Sig: TAKE 1 TABLET IN THE MORNING AND 1 TABLET AT BEDTIME       Last Appointment Date: 11/6/2023  Next Appointment Date: Visit date not found    Allergies   Allergen Reactions    Biaxin [Clarithromycin]     Cefdinir     Effexor [Venlafaxine]     Erythromycin     Naprosyn [Naproxen]

## 2023-11-22 DIAGNOSIS — I10 HYPERTENSION, ESSENTIAL: ICD-10-CM

## 2023-11-22 RX ORDER — LOSARTAN POTASSIUM AND HYDROCHLOROTHIAZIDE 12.5; 1 MG/1; MG/1
TABLET ORAL
Qty: 90 TABLET | Refills: 1 | OUTPATIENT
Start: 2023-11-22

## 2023-11-22 NOTE — TELEPHONE ENCOUNTER
Last OV 11/6/2023  Next OV Visit date not found      Requested Prescriptions     Pending Prescriptions Disp Refills    losartan-hydroCHLOROthiazide (HYZAAR) 100-12.5 MG per tablet 90 tablet 1     Sig: TAKE 1 TABLET DAILY

## 2023-11-28 RX ORDER — QUETIAPINE FUMARATE 50 MG/1
50 TABLET, FILM COATED ORAL 2 TIMES DAILY
Qty: 180 TABLET | Refills: 1 | Status: SHIPPED | OUTPATIENT
Start: 2023-11-28

## 2023-11-28 NOTE — TELEPHONE ENCOUNTER
Jasson Lara called requesting a refill of the below medication which has been pended for you:     Requested Prescriptions     Pending Prescriptions Disp Refills    QUEtiapine (SEROQUEL) 50 MG tablet 180 tablet 1     Sig: Take 1 tablet by mouth 2 times daily 2 IN THE MORNING,ONE AT NOON AND 3 AT BEDTIME       Last Appointment Date: 11/6/2023  Next Appointment Date: Visit date not found    Allergies   Allergen Reactions    Biaxin [Clarithromycin]     Cefdinir     Effexor [Venlafaxine]     Erythromycin     Naprosyn [Naproxen]

## 2023-12-06 ENCOUNTER — TELEPHONE (OUTPATIENT)
Dept: INTERNAL MEDICINE | Age: 88
End: 2023-12-06

## 2023-12-06 NOTE — TELEPHONE ENCOUNTER
Patient daughter called stating that patient has been seeing things and having hallucinations. Patient daughter states she thinks it is from the [facility] because that is the only medication with that side effect.

## 2023-12-11 DIAGNOSIS — F51.01 PRIMARY INSOMNIA: ICD-10-CM

## 2023-12-11 RX ORDER — LORAZEPAM 1 MG/1
TABLET ORAL
Qty: 90 TABLET | Refills: 0 | Status: SHIPPED | OUTPATIENT
Start: 2023-12-11 | End: 2024-01-10

## 2023-12-11 NOTE — TELEPHONE ENCOUNTER
Rodger Pion called to request a refill on her medication. Last office visit : 11/6/2023   Next office visit : 1/9/2024     Last UDS:   Amphetamine Screen, Urine   Date Value Ref Range Status   07/11/2022 neg  Final     Barbiturate Screen, Urine   Date Value Ref Range Status   07/11/2022 neg  Final     Benzodiazepine Screen, Urine   Date Value Ref Range Status   07/11/2022 pos  Final     Buprenorphine Urine   Date Value Ref Range Status   07/11/2022 neg  Final     Cocaine Metabolite Screen, Urine   Date Value Ref Range Status   07/11/2022 neg  Final     Gabapentin Screen, Urine   Date Value Ref Range Status   07/11/2022 neg  Final     MDMA, Urine   Date Value Ref Range Status   07/11/2022 neg  Final     Methamphetamine, Urine   Date Value Ref Range Status   07/11/2022 neg  Final     Opiate Scrn, Ur   Date Value Ref Range Status   07/11/2022 neg  Final     Oxycodone Screen, Ur   Date Value Ref Range Status   07/11/2022 neg  Final     PCP Screen, Urine   Date Value Ref Range Status   07/11/2022 neg  Final     Propoxyphene Screen, Urine   Date Value Ref Range Status   07/11/2022 neg  Final     THC Screen, Urine   Date Value Ref Range Status   07/11/2022 neg  Final     Tricyclic Antidepressants, Urine   Date Value Ref Range Status   07/11/2022 neg  Final       Last Shellia Spore: 7/25/23  Medication Contract: 7/25/23   Last Fill: 11/6/2023    Requested Prescriptions     Pending Prescriptions Disp Refills    LORazepam (ATIVAN) 1 MG tablet 90 tablet 0     Sig: TAKE 1 TABLET EVERY 8 HOURS AS NEEDED FOR ANXIETY. Please approve or refuse this medication.    Aydin Vergara

## 2023-12-28 ENCOUNTER — HOSPITAL ENCOUNTER (EMERGENCY)
Age: 88
Discharge: HOME | End: 2023-12-28
Attending: EMERGENCY MEDICINE
Payer: MEDICARE

## 2023-12-28 ENCOUNTER — HOSPITAL ENCOUNTER (OUTPATIENT)
Dept: CT IMAGING | Age: 88
Discharge: HOME | End: 2023-12-28
Payer: MEDICARE

## 2023-12-28 ENCOUNTER — OFFICE VISIT (OUTPATIENT)
Dept: INTERNAL MEDICINE | Age: 88
End: 2023-12-28
Payer: MEDICARE

## 2023-12-28 VITALS
RESPIRATION RATE: 18 BRPM | BODY MASS INDEX: 27.46 KG/M2 | HEART RATE: 68 BPM | SYSTOLIC BLOOD PRESSURE: 201 MMHG | DIASTOLIC BLOOD PRESSURE: 69 MMHG | WEIGHT: 160 LBS | TEMPERATURE: 98.2 F | OXYGEN SATURATION: 93 %

## 2023-12-28 VITALS
HEART RATE: 76 BPM | SYSTOLIC BLOOD PRESSURE: 120 MMHG | HEIGHT: 64 IN | WEIGHT: 159 LBS | OXYGEN SATURATION: 94 % | BODY MASS INDEX: 27.14 KG/M2 | DIASTOLIC BLOOD PRESSURE: 62 MMHG
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
## 2023-12-28 DIAGNOSIS — S02.2XXA CLOSED FRACTURE OF NASAL BONE, INITIAL ENCOUNTER: ICD-10-CM

## 2023-12-28 DIAGNOSIS — F41.8 ANXIETY ASSOCIATED WITH DEPRESSION: ICD-10-CM

## 2023-12-28 DIAGNOSIS — S09.93XA FACIAL INJURY, INITIAL ENCOUNTER: ICD-10-CM
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
## 2023-12-28 DIAGNOSIS — S09.90XA INJURY OF HEAD, INITIAL ENCOUNTER: ICD-10-CM
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
## 2023-12-28 DIAGNOSIS — S00.12XA PERIORBITAL ECCHYMOSIS OF LEFT EYE, INITIAL ENCOUNTER: ICD-10-CM

## 2023-12-28 DIAGNOSIS — Z86.73 HISTORY OF CEREBROVASCULAR ACCIDENT (CVA) IN ADULTHOOD: Primary | ICD-10-CM

## 2023-12-28 DIAGNOSIS — R44.3 HALLUCINATIONS: ICD-10-CM

## 2023-12-28 LAB
GLUCOSE BLD-MCNC: 93 MG/DL (ref 70–99)
PERFORMED ON: NORMAL

## 2023-12-28 PROCEDURE — G8484 FLU IMMUNIZE NO ADMIN: HCPCS | Performed by: NURSE PRACTITIONER

## 2023-12-28 PROCEDURE — G8427 DOCREV CUR MEDS BY ELIG CLIN: HCPCS | Performed by: NURSE PRACTITIONER

## 2023-12-28 PROCEDURE — 70486 CT MAXILLOFACIAL W/O DYE: CPT

## 2023-12-28 PROCEDURE — 1123F ACP DISCUSS/DSCN MKR DOCD: CPT | Performed by: NURSE PRACTITIONER

## 2023-12-28 PROCEDURE — 99283 EMERGENCY DEPT VISIT LOW MDM: CPT

## 2023-12-28 PROCEDURE — 99214 OFFICE O/P EST MOD 30 MIN: CPT | Performed by: NURSE PRACTITIONER

## 2023-12-28 PROCEDURE — 70450 CT HEAD/BRAIN W/O DYE: CPT

## 2023-12-28 PROCEDURE — 99284 EMERGENCY DEPT VISIT MOD MDM: CPT

## 2023-12-28 PROCEDURE — 1090F PRES/ABSN URINE INCON ASSESS: CPT | Performed by: NURSE PRACTITIONER

## 2023-12-28 PROCEDURE — 82962 GLUCOSE BLOOD TEST: CPT

## 2023-12-28 PROCEDURE — G8417 CALC BMI ABV UP PARAM F/U: HCPCS | Performed by: NURSE PRACTITIONER

## 2023-12-28 PROCEDURE — 1036F TOBACCO NON-USER: CPT | Performed by: NURSE PRACTITIONER

## 2023-12-28 NOTE — PATIENT INSTRUCTIONS
Hallucinating;  increase seroquel to 50 3 times daily and if doesn't help we can increase   Fall with facial injury;  ct head and facial bones;

## 2024-01-08 DIAGNOSIS — I10 HYPERTENSION, ESSENTIAL: ICD-10-CM

## 2024-01-08 RX ORDER — CLOPIDOGREL BISULFATE 75 MG/1
TABLET ORAL
Qty: 30 TABLET | Refills: 5 | Status: SHIPPED | OUTPATIENT
Start: 2024-01-08

## 2024-01-08 RX ORDER — LEVOTHYROXINE SODIUM 0.03 MG/1
TABLET ORAL
Qty: 44 TABLET | Refills: 5 | Status: SHIPPED | OUTPATIENT
Start: 2024-01-08

## 2024-01-08 NOTE — TELEPHONE ENCOUNTER
Barbie called requesting a refill of the below medication which has been pended for you:     Requested Prescriptions     Pending Prescriptions Disp Refills    clopidogrel (PLAVIX) 75 MG tablet [Pharmacy Med Name: CLOPIDOGREL 75MG TABLET] 30 tablet 5     Sig: TAKE 1 TABLET BY MOUTH ONCE DAILY    levothyroxine (SYNTHROID) 25 MCG tablet [Pharmacy Med Name: LEVOTHYROXINE SODIUM 25MCG TABLET] 44 tablet 5     Sig: TAKE 1 TABLET 3 DAYS A WEEK AND TAKE 2 TABLETS 4 DAYS A WEEK       Last Appointment Date: 12/28/2023  Next Appointment Date: 1/9/2024    Allergies   Allergen Reactions    Biaxin [Clarithromycin]     Cefdinir     Effexor [Venlafaxine]     Erythromycin     Naprosyn [Naproxen]

## 2024-01-09 ENCOUNTER — OFFICE VISIT (OUTPATIENT)
Dept: INTERNAL MEDICINE | Age: 89
End: 2024-01-09
Payer: MEDICARE

## 2024-01-09 VITALS
WEIGHT: 160 LBS | HEIGHT: 64 IN | OXYGEN SATURATION: 91 % | BODY MASS INDEX: 27.31 KG/M2 | SYSTOLIC BLOOD PRESSURE: 120 MMHG | HEART RATE: 76 BPM | DIASTOLIC BLOOD PRESSURE: 80 MMHG

## 2024-01-09 DIAGNOSIS — J84.111 IDIOPATHIC INTERSTITIAL PNEUMONIA (HCC): ICD-10-CM

## 2024-01-09 DIAGNOSIS — J84.10 PULMONARY FIBROSIS (HCC): ICD-10-CM

## 2024-01-09 DIAGNOSIS — J43.8 OTHER EMPHYSEMA (HCC): Primary | ICD-10-CM

## 2024-01-09 PROCEDURE — 1090F PRES/ABSN URINE INCON ASSESS: CPT | Performed by: INTERNAL MEDICINE

## 2024-01-09 PROCEDURE — G8427 DOCREV CUR MEDS BY ELIG CLIN: HCPCS | Performed by: INTERNAL MEDICINE

## 2024-01-09 PROCEDURE — G8417 CALC BMI ABV UP PARAM F/U: HCPCS | Performed by: INTERNAL MEDICINE

## 2024-01-09 PROCEDURE — G8484 FLU IMMUNIZE NO ADMIN: HCPCS | Performed by: INTERNAL MEDICINE

## 2024-01-09 PROCEDURE — 99213 OFFICE O/P EST LOW 20 MIN: CPT | Performed by: INTERNAL MEDICINE

## 2024-01-09 PROCEDURE — 1036F TOBACCO NON-USER: CPT | Performed by: INTERNAL MEDICINE

## 2024-01-09 PROCEDURE — 3023F SPIROM DOC REV: CPT | Performed by: INTERNAL MEDICINE

## 2024-01-09 PROCEDURE — 1123F ACP DISCUSS/DSCN MKR DOCD: CPT | Performed by: INTERNAL MEDICINE

## 2024-01-09 ASSESSMENT — PATIENT HEALTH QUESTIONNAIRE - PHQ9
1. LITTLE INTEREST OR PLEASURE IN DOING THINGS: 1
SUM OF ALL RESPONSES TO PHQ QUESTIONS 1-9: 6
7. TROUBLE CONCENTRATING ON THINGS, SUCH AS READING THE NEWSPAPER OR WATCHING TELEVISION: 0
SUM OF ALL RESPONSES TO PHQ QUESTIONS 1-9: 6
SUM OF ALL RESPONSES TO PHQ9 QUESTIONS 1 & 2: 2
3. TROUBLE FALLING OR STAYING ASLEEP: 1
10. IF YOU CHECKED OFF ANY PROBLEMS, HOW DIFFICULT HAVE THESE PROBLEMS MADE IT FOR YOU TO DO YOUR WORK, TAKE CARE OF THINGS AT HOME, OR GET ALONG WITH OTHER PEOPLE: 1
9. THOUGHTS THAT YOU WOULD BE BETTER OFF DEAD, OR OF HURTING YOURSELF: 0
6. FEELING BAD ABOUT YOURSELF - OR THAT YOU ARE A FAILURE OR HAVE LET YOURSELF OR YOUR FAMILY DOWN: 0
SUM OF ALL RESPONSES TO PHQ QUESTIONS 1-9: 6
4. FEELING TIRED OR HAVING LITTLE ENERGY: 2
8. MOVING OR SPEAKING SO SLOWLY THAT OTHER PEOPLE COULD HAVE NOTICED. OR THE OPPOSITE, BEING SO FIGETY OR RESTLESS THAT YOU HAVE BEEN MOVING AROUND A LOT MORE THAN USUAL: 0
2. FEELING DOWN, DEPRESSED OR HOPELESS: 1
5. POOR APPETITE OR OVEREATING: 1
SUM OF ALL RESPONSES TO PHQ QUESTIONS 1-9: 6

## 2024-01-09 NOTE — PROGRESS NOTES
124/70     Wt Readings from Last 7 Encounters:   01/09/24 72.6 kg (160 lb)   12/28/23 72.6 kg (160 lb)   12/28/23 72.1 kg (159 lb)   11/06/23 72.1 kg (159 lb)   07/25/23 72.6 kg (160 lb)   05/23/23 70.8 kg (156 lb)   04/11/23 66.2 kg (146 lb)     BMI Readings from Last 7 Encounters:   01/09/24 27.46 kg/m²   12/28/23 27.46 kg/m²   12/28/23 27.29 kg/m²   11/06/23 27.29 kg/m²   07/25/23 27.46 kg/m²   05/23/23 26.78 kg/m²   04/11/23 25.06 kg/m²     Resp Readings from Last 7 Encounters:   12/28/23 18   09/13/21 16   07/28/21 16   01/20/21 20   10/21/20 (!) 1   10/21/20 20   10/30/19 18       Physical Exam  Constitutional:       General: She is not in acute distress.  Eyes:      General: No scleral icterus.  Cardiovascular:      Heart sounds: Normal heart sounds.   Pulmonary:      Effort: Prolonged expiration present.      Breath sounds: Decreased air movement present.   Musculoskeletal:      Cervical back: Neck supple.   Lymphadenopathy:      Cervical: No cervical adenopathy.   Skin:     Findings: No rash.   Psychiatric:         Mood and Affect: Mood normal.         Results for orders placed or performed during the hospital encounter of 12/28/23   POCT Glucose   Result Value Ref Range    POC Glucose 93 70 - 99 mg/dl    Performed on [employer]        ASSESSMENT/ PLAN:  1. Other emphysema (HCC)  Patient requires ongoing 24-hour care from her family who had medical training.  Please see the attached letter and documents.  She has significant decline in health because of COPD has to use home oxygen and prescribed an elation therapy she requires nursing assessments and monitoring help with ADLs bathing grooming she requires oxygen to function at her current level nursing and HHA services of great benefit.  She is at risk of falling because of her COPD at risk of respiratory decompensation and multiple other medical complications.  Patient needs 24-hour care 16 hours/day of nursing care and 8 hours/day of HHA care.    2.

## 2024-01-10 DIAGNOSIS — F51.01 PRIMARY INSOMNIA: ICD-10-CM

## 2024-01-10 RX ORDER — LORAZEPAM 1 MG/1
TABLET ORAL
Qty: 90 TABLET | Refills: 0 | Status: SHIPPED | OUTPATIENT
Start: 2024-01-10 | End: 2024-02-09

## 2024-01-10 NOTE — TELEPHONE ENCOUNTER
Barbie Romero called to request a refill on her medication.      Last office visit : 1/9/2024   Next office visit : 4/9/2024     Last UDS:   Amphetamine Screen, Urine   Date Value Ref Range Status   07/11/2022 neg  Final     Barbiturate Screen, Urine   Date Value Ref Range Status   07/11/2022 neg  Final     Benzodiazepine Screen, Urine   Date Value Ref Range Status   07/11/2022 pos  Final     Buprenorphine Urine   Date Value Ref Range Status   07/11/2022 neg  Final     Cocaine Metabolite Screen, Urine   Date Value Ref Range Status   07/11/2022 neg  Final     Gabapentin Screen, Urine   Date Value Ref Range Status   07/11/2022 neg  Final     MDMA, Urine   Date Value Ref Range Status   07/11/2022 neg  Final     Methamphetamine, Urine   Date Value Ref Range Status   07/11/2022 neg  Final     Opiate Scrn, Ur   Date Value Ref Range Status   07/11/2022 neg  Final     Oxycodone Screen, Ur   Date Value Ref Range Status   07/11/2022 neg  Final     PCP Screen, Urine   Date Value Ref Range Status   07/11/2022 neg  Final     Propoxyphene Screen, Urine   Date Value Ref Range Status   07/11/2022 neg  Final     THC Screen, Urine   Date Value Ref Range Status   07/11/2022 neg  Final     Tricyclic Antidepressants, Urine   Date Value Ref Range Status   07/11/2022 neg  Final       Last Joaquin: 11/6/23  Medication Contract: 7/25/23   Last Fill: 12/11/23    Requested Prescriptions     Pending Prescriptions Disp Refills    LORazepam (ATIVAN) 1 MG tablet 90 tablet 0     Sig: TAKE 1 TABLET EVERY 8 HOURS AS NEEDED FOR ANXIETY.         Please approve or refuse this medication.   Aydin Vergara MA
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
## 2024-02-08 DIAGNOSIS — F51.01 PRIMARY INSOMNIA: ICD-10-CM

## 2024-02-08 RX ORDER — LORAZEPAM 1 MG/1
TABLET ORAL
Qty: 90 TABLET | Refills: 0 | Status: SHIPPED | OUTPATIENT
Start: 2024-02-08 | End: 2024-03-09

## 2024-02-08 NOTE — TELEPHONE ENCOUNTER
Barbie Romero called to request a refill on her medication.      Last office visit : 1/9/2024   Next office visit : 4/9/2024     Last UDS:   Amphetamine Screen, Urine   Date Value Ref Range Status   07/11/2022 neg  Final     Barbiturate Screen, Urine   Date Value Ref Range Status   07/11/2022 neg  Final     Benzodiazepine Screen, Urine   Date Value Ref Range Status   07/11/2022 pos  Final     Buprenorphine Urine   Date Value Ref Range Status   07/11/2022 neg  Final     Cocaine Metabolite Screen, Urine   Date Value Ref Range Status   07/11/2022 neg  Final     Gabapentin Screen, Urine   Date Value Ref Range Status   07/11/2022 neg  Final     MDMA, Urine   Date Value Ref Range Status   07/11/2022 neg  Final     Methamphetamine, Urine   Date Value Ref Range Status   07/11/2022 neg  Final     Opiate Scrn, Ur   Date Value Ref Range Status   07/11/2022 neg  Final     Oxycodone Screen, Ur   Date Value Ref Range Status   07/11/2022 neg  Final     PCP Screen, Urine   Date Value Ref Range Status   07/11/2022 neg  Final     Propoxyphene Screen, Urine   Date Value Ref Range Status   07/11/2022 neg  Final     THC Screen, Urine   Date Value Ref Range Status   07/11/2022 neg  Final     Tricyclic Antidepressants, Urine   Date Value Ref Range Status   07/11/2022 neg  Final       Last Joaquin: 11/16/23  Medication Contract: 07/25/23   Last Fill: 01/10/23    Requested Prescriptions     Pending Prescriptions Disp Refills    LORazepam (ATIVAN) 1 MG tablet 90 tablet 0     Sig: TAKE 1 TABLET EVERY 8 HOURS AS NEEDED FOR ANXIETY.         Please approve or refuse this medication.   Nicolasa Maynard MA

## 2024-02-24 DIAGNOSIS — I10 HYPERTENSION, ESSENTIAL: ICD-10-CM

## 2024-02-26 RX ORDER — LOSARTAN POTASSIUM AND HYDROCHLOROTHIAZIDE 12.5; 1 MG/1; MG/1
TABLET ORAL
Qty: 30 TABLET | Refills: 5 | Status: SHIPPED | OUTPATIENT
Start: 2024-02-26

## 2024-02-26 NOTE — TELEPHONE ENCOUNTER
Barbie called requesting a refill of the below medication which has been pended for you:     Requested Prescriptions     Pending Prescriptions Disp Refills    losartan-hydroCHLOROthiazide (HYZAAR) 100-12.5 MG per tablet [Pharmacy Med Name: LOSARTAN POTASSIUM/HYDROCHLOROTHIAZIDE 12.5MG-100MG TABLET] 30 tablet 5     Sig: TAKE 1 TABLET BY MOUTH ONCE DAILY       Last Appointment Date: 12/28/2023  Next Appointment Date: 4/9/2024    Allergies   Allergen Reactions    Biaxin [Clarithromycin]     Cefdinir     Effexor [Venlafaxine]     Erythromycin     Naprosyn [Naproxen]

## 2024-03-04 ENCOUNTER — TELEPHONE (OUTPATIENT)
Dept: INTERNAL MEDICINE | Age: 89
End: 2024-03-04

## 2024-03-04 DIAGNOSIS — F51.01 PRIMARY INSOMNIA: ICD-10-CM

## 2024-03-04 RX ORDER — LORAZEPAM 1 MG/1
TABLET ORAL
Qty: 90 TABLET | Refills: 0 | Status: SHIPPED | OUTPATIENT
Start: 2024-03-04 | End: 2024-04-03

## 2024-03-04 NOTE — TELEPHONE ENCOUNTER
Barbie Romero called to request a refill on her medication.      Last office visit : 1/9/2024   Next office visit : 4/9/2024     Last UDS:   Amphetamine Screen, Urine   Date Value Ref Range Status   07/11/2022 neg  Final     Barbiturate Screen, Urine   Date Value Ref Range Status   07/11/2022 neg  Final     Benzodiazepine Screen, Urine   Date Value Ref Range Status   07/11/2022 pos  Final     Buprenorphine Urine   Date Value Ref Range Status   07/11/2022 neg  Final     Cocaine Metabolite Screen, Urine   Date Value Ref Range Status   07/11/2022 neg  Final     Gabapentin Screen, Urine   Date Value Ref Range Status   07/11/2022 neg  Final     MDMA, Urine   Date Value Ref Range Status   07/11/2022 neg  Final     Methamphetamine, Urine   Date Value Ref Range Status   07/11/2022 neg  Final     Opiate Scrn, Ur   Date Value Ref Range Status   07/11/2022 neg  Final     Oxycodone Screen, Ur   Date Value Ref Range Status   07/11/2022 neg  Final     PCP Screen, Urine   Date Value Ref Range Status   07/11/2022 neg  Final     Propoxyphene Screen, Urine   Date Value Ref Range Status   07/11/2022 neg  Final     THC Screen, Urine   Date Value Ref Range Status   07/11/2022 neg  Final     Tricyclic Antidepressants, Urine   Date Value Ref Range Status   07/11/2022 neg  Final       Last Joaquin: 11/06/23  Medication Contract: 07/25/23   Last Fill: 02/08/24    Requested Prescriptions     Pending Prescriptions Disp Refills    LORazepam (ATIVAN) 1 MG tablet 90 tablet 0     Sig: TAKE 1 TABLET EVERY 8 HOURS AS NEEDED FOR ANXIETY.         Please approve or refuse this medication.   Nicolasa Maynard MA

## 2024-03-20 DIAGNOSIS — F41.8 ANXIETY ASSOCIATED WITH DEPRESSION: ICD-10-CM

## 2024-03-20 DIAGNOSIS — E55.9 VITAMIN D DEFICIENCY: ICD-10-CM

## 2024-03-20 DIAGNOSIS — E78.2 MIXED HYPERLIPIDEMIA: ICD-10-CM

## 2024-03-20 RX ORDER — ERGOCALCIFEROL 1.25 MG/1
1 CAPSULE ORAL WEEKLY
Qty: 4 CAPSULE | Refills: 5 | Status: SHIPPED | OUTPATIENT
Start: 2024-03-20

## 2024-03-20 RX ORDER — EZETIMIBE 10 MG/1
10 TABLET ORAL DAILY
Qty: 30 TABLET | Refills: 5 | Status: SHIPPED | OUTPATIENT
Start: 2024-03-20

## 2024-03-20 RX ORDER — QUETIAPINE FUMARATE 100 MG/1
TABLET, FILM COATED ORAL
Qty: 90 TABLET | Refills: 2 | Status: SHIPPED | OUTPATIENT
Start: 2024-03-20

## 2024-03-20 RX ORDER — ATORVASTATIN CALCIUM 80 MG/1
80 TABLET, FILM COATED ORAL NIGHTLY
Qty: 30 TABLET | Refills: 5 | Status: SHIPPED | OUTPATIENT
Start: 2024-03-20 | End: 2024-09-16

## 2024-03-20 RX ORDER — ONDANSETRON 4 MG/1
TABLET, ORALLY DISINTEGRATING ORAL
Qty: 9 TABLET | Refills: 2 | Status: SHIPPED | OUTPATIENT
Start: 2024-03-20

## 2024-03-20 RX ORDER — MONTELUKAST SODIUM 4 MG/1
TABLET, CHEWABLE ORAL
Qty: 60 TABLET | Refills: 2 | Status: SHIPPED | OUTPATIENT
Start: 2024-03-20

## 2024-03-20 RX ORDER — BUSPIRONE HYDROCHLORIDE 5 MG/1
5 TABLET ORAL 3 TIMES DAILY PRN
Qty: 90 TABLET | Refills: 2 | Status: SHIPPED | OUTPATIENT
Start: 2024-03-20

## 2024-03-20 RX ORDER — ESCITALOPRAM OXALATE 20 MG/1
TABLET ORAL
Qty: 30 TABLET | Refills: 5 | Status: SHIPPED | OUTPATIENT
Start: 2024-03-20

## 2024-03-20 NOTE — TELEPHONE ENCOUNTER
Barbie called requesting a refill of the below medication which has been pended for you:     Requested Prescriptions     Pending Prescriptions Disp Refills    ondansetron (ZOFRAN-ODT) 4 MG disintegrating tablet [Pharmacy Med Name: ONDANSETRON ODT 4MG TABLET DISINTEGRATING] 9 tablet 2     Sig: DISSOLVE 1 TABLET IN MOUTH EVERY 8 HOURS AS NEEDED FOR NAUSEA       Last Appointment Date: 12/28/2023  Next Appointment Date: 4/9/2024    Allergies   Allergen Reactions    Biaxin [Clarithromycin]     Cefdinir     Effexor [Venlafaxine]     Erythromycin     Naprosyn [Naproxen]

## 2024-03-20 NOTE — TELEPHONE ENCOUNTER
Barbie called requesting a refill of the below medication which has been pended for you:     Requested Prescriptions     Pending Prescriptions Disp Refills    QUEtiapine (SEROQUEL) 100 MG tablet [Pharmacy Med Name: QUETIAPINE FUMARATE 100MG TABLET] 90 tablet 2     Sig: TAKE 1 TABLET IN THE MORNING, 1/2 TABLET AT NOON, AND 1.5 TABLETS AT BEDTIME    escitalopram (LEXAPRO) 20 MG tablet [Pharmacy Med Name: ESCITALOPRAM OXALATE 20MG TABLET] 30 tablet 5     Sig: TAKE 1 TABLET BY MOUTH ONCE DAILY    colestipol (COLESTID) 1 g tablet [Pharmacy Med Name: COLESTIPOL HCL 1GM TABLET] 60 tablet 2     Sig: TAKE 1 TABLET BY MOUTH TWICE DAILY    busPIRone (BUSPAR) 5 MG tablet [Pharmacy Med Name: BUSPIRONE HYDROCHLORIDE 5MG TABLET] 90 tablet 2     Sig: TAKE 1 TABLET BY MOUTH THREE TIMES DAILY AS NEEDED FOR ANXIETY    Vitamin D, Ergocalciferol, 63286 units CAPS [Pharmacy Med Name: VITAMIN D 70604YGMQ CAPSULE] 4 capsule 5     Sig: TAKE 1 CAPSULE BY MOUTH ONCE A WEEK    ezetimibe (ZETIA) 10 MG tablet [Pharmacy Med Name: EZETIMIBE 10MG TABLET] 30 tablet 5     Sig: TAKE 1 TABLET BY MOUTH ONCE DAILY    atorvastatin (LIPITOR) 80 MG tablet [Pharmacy Med Name: ATORVASTATIN CALCIUM 80MG TABLET] 30 tablet 5     Sig: TAKE 1 TABLET BY MOUTH AT BEDTIME       Last Appointment Date: 12/28/2023  Next Appointment Date: 4/9/2024    Allergies   Allergen Reactions    Biaxin [Clarithromycin]     Cefdinir     Effexor [Venlafaxine]     Erythromycin     Naprosyn [Naproxen]

## 2024-04-01 DIAGNOSIS — F51.01 PRIMARY INSOMNIA: ICD-10-CM

## 2024-04-01 RX ORDER — LORAZEPAM 1 MG/1
TABLET ORAL
Qty: 90 TABLET | Refills: 0 | Status: SHIPPED | OUTPATIENT
Start: 2024-04-01 | End: 2024-05-01

## 2024-04-01 NOTE — TELEPHONE ENCOUNTER
Barbie Romero called to request a refill on her medication.      Last office visit : 1/9/2024   Next office visit : 4/9/2024     Last UDS:   Amphetamine Screen, Urine   Date Value Ref Range Status   07/11/2022 neg  Final     Barbiturate Screen, Urine   Date Value Ref Range Status   07/11/2022 neg  Final     Benzodiazepine Screen, Urine   Date Value Ref Range Status   07/11/2022 pos  Final     Buprenorphine Urine   Date Value Ref Range Status   07/11/2022 neg  Final     Cocaine Metabolite Screen, Urine   Date Value Ref Range Status   07/11/2022 neg  Final     Gabapentin Screen, Urine   Date Value Ref Range Status   07/11/2022 neg  Final     MDMA, Urine   Date Value Ref Range Status   07/11/2022 neg  Final     Methamphetamine, Urine   Date Value Ref Range Status   07/11/2022 neg  Final     Opiate Scrn, Ur   Date Value Ref Range Status   07/11/2022 neg  Final     Oxycodone Screen, Ur   Date Value Ref Range Status   07/11/2022 neg  Final     PCP Screen, Urine   Date Value Ref Range Status   07/11/2022 neg  Final     Propoxyphene Screen, Urine   Date Value Ref Range Status   07/11/2022 neg  Final     THC Screen, Urine   Date Value Ref Range Status   07/11/2022 neg  Final     Tricyclic Antidepressants, Urine   Date Value Ref Range Status   07/11/2022 neg  Final       Last Joaquin: 11/06/23  Medication Contract: 07/25/23   Last Fill: 03/04/24    Requested Prescriptions     Pending Prescriptions Disp Refills    LORazepam (ATIVAN) 1 MG tablet 90 tablet 0     Sig: TAKE 1 TABLET EVERY 8 HOURS AS NEEDED FOR ANXIETY.         Please approve or refuse this medication.   Nicolasa Maynard MA

## 2024-04-18 DIAGNOSIS — E03.8 OTHER SPECIFIED HYPOTHYROIDISM: ICD-10-CM

## 2024-04-18 DIAGNOSIS — E78.2 MIXED HYPERLIPIDEMIA: ICD-10-CM

## 2024-04-18 DIAGNOSIS — E55.9 VITAMIN D DEFICIENCY: ICD-10-CM

## 2024-04-18 DIAGNOSIS — I10 HYPERTENSION, ESSENTIAL: ICD-10-CM

## 2024-04-18 LAB
25(OH)D3 SERPL-MCNC: 61.8 NG/ML
ALBUMIN SERPL-MCNC: 3.9 G/DL (ref 3.5–5.2)
ALP SERPL-CCNC: 66 U/L (ref 35–104)
ALT SERPL-CCNC: 19 U/L (ref 5–33)
ANION GAP SERPL CALCULATED.3IONS-SCNC: 15 MMOL/L (ref 7–19)
AST SERPL-CCNC: 43 U/L (ref 5–32)
BASOPHILS # BLD: 0 K/UL (ref 0–0.2)
BASOPHILS NFR BLD: 0.1 % (ref 0–1)
BILIRUB SERPL-MCNC: 0.4 MG/DL (ref 0.2–1.2)
BUN SERPL-MCNC: 24 MG/DL (ref 8–23)
CALCIUM SERPL-MCNC: 9.5 MG/DL (ref 8.2–9.6)
CHLORIDE SERPL-SCNC: 105 MMOL/L (ref 98–111)
CHOLEST SERPL-MCNC: 147 MG/DL (ref 160–199)
CO2 SERPL-SCNC: 25 MMOL/L (ref 22–29)
CREAT SERPL-MCNC: 1 MG/DL (ref 0.5–0.9)
EOSINOPHIL # BLD: 0.2 K/UL (ref 0–0.6)
EOSINOPHIL NFR BLD: 2 % (ref 0–5)
ERYTHROCYTE [DISTWIDTH] IN BLOOD BY AUTOMATED COUNT: 14.2 % (ref 11.5–14.5)
GLUCOSE SERPL-MCNC: 101 MG/DL (ref 74–109)
HCT VFR BLD AUTO: 37.5 % (ref 37–47)
HDLC SERPL-MCNC: 61 MG/DL (ref 65–121)
HGB BLD-MCNC: 11.6 G/DL (ref 12–16)
IMM GRANULOCYTES # BLD: 0 K/UL
LDLC SERPL CALC-MCNC: 66 MG/DL
LYMPHOCYTES # BLD: 3.6 K/UL (ref 1.1–4.5)
LYMPHOCYTES NFR BLD: 43.1 % (ref 20–40)
MCH RBC QN AUTO: 30.6 PG (ref 27–31)
MCHC RBC AUTO-ENTMCNC: 30.9 G/DL (ref 33–37)
MCV RBC AUTO: 98.9 FL (ref 81–99)
MONOCYTES # BLD: 0.8 K/UL (ref 0–0.9)
MONOCYTES NFR BLD: 10.1 % (ref 0–10)
NEUTROPHILS # BLD: 3.7 K/UL (ref 1.5–7.5)
NEUTS SEG NFR BLD: 44.6 % (ref 50–65)
PLATELET # BLD AUTO: 246 K/UL (ref 130–400)
PMV BLD AUTO: 11.4 FL (ref 9.4–12.3)
POTASSIUM SERPL-SCNC: 4.7 MMOL/L (ref 3.5–5)
PROT SERPL-MCNC: 6.7 G/DL (ref 6.6–8.7)
RBC # BLD AUTO: 3.79 M/UL (ref 4.2–5.4)
SODIUM SERPL-SCNC: 145 MMOL/L (ref 136–145)
TRIGL SERPL-MCNC: 102 MG/DL (ref 0–149)
TSH SERPL DL<=0.005 MIU/L-ACNC: 2.51 UIU/ML (ref 0.27–4.2)
WBC # BLD AUTO: 8.4 K/UL (ref 4.8–10.8)

## 2024-04-19 SDOH — ECONOMIC STABILITY: FOOD INSECURITY: WITHIN THE PAST 12 MONTHS, THE FOOD YOU BOUGHT JUST DIDN'T LAST AND YOU DIDN'T HAVE MONEY TO GET MORE.: NEVER TRUE

## 2024-04-19 SDOH — ECONOMIC STABILITY: FOOD INSECURITY: WITHIN THE PAST 12 MONTHS, YOU WORRIED THAT YOUR FOOD WOULD RUN OUT BEFORE YOU GOT MONEY TO BUY MORE.: NEVER TRUE

## 2024-04-19 SDOH — ECONOMIC STABILITY: HOUSING INSECURITY
IN THE LAST 12 MONTHS, WAS THERE A TIME WHEN YOU DID NOT HAVE A STEADY PLACE TO SLEEP OR SLEPT IN A SHELTER (INCLUDING NOW)?: NO

## 2024-04-19 SDOH — ECONOMIC STABILITY: INCOME INSECURITY: HOW HARD IS IT FOR YOU TO PAY FOR THE VERY BASICS LIKE FOOD, HOUSING, MEDICAL CARE, AND HEATING?: NOT HARD AT ALL

## 2024-04-19 SDOH — ECONOMIC STABILITY: TRANSPORTATION INSECURITY
IN THE PAST 12 MONTHS, HAS LACK OF TRANSPORTATION KEPT YOU FROM MEETINGS, WORK, OR FROM GETTING THINGS NEEDED FOR DAILY LIVING?: NO

## 2024-04-22 ENCOUNTER — OFFICE VISIT (OUTPATIENT)
Dept: INTERNAL MEDICINE | Age: 89
End: 2024-04-22

## 2024-04-22 VITALS
DIASTOLIC BLOOD PRESSURE: 74 MMHG | BODY MASS INDEX: 27.12 KG/M2 | WEIGHT: 158 LBS | TEMPERATURE: 97 F | SYSTOLIC BLOOD PRESSURE: 122 MMHG | OXYGEN SATURATION: 99 % | HEART RATE: 75 BPM

## 2024-04-22 DIAGNOSIS — E78.2 MIXED HYPERLIPIDEMIA: ICD-10-CM

## 2024-04-22 DIAGNOSIS — R41.3 MEMORY LOSS: ICD-10-CM

## 2024-04-22 DIAGNOSIS — I10 HYPERTENSION, ESSENTIAL: ICD-10-CM

## 2024-04-22 DIAGNOSIS — G30.1 MODERATE LATE ONSET ALZHEIMER'S DEMENTIA WITH OTHER BEHAVIORAL DISTURBANCE (HCC): Primary | Chronic | ICD-10-CM

## 2024-04-22 DIAGNOSIS — J96.11 CHRONIC RESPIRATORY FAILURE WITH HYPOXIA (HCC): ICD-10-CM

## 2024-04-22 DIAGNOSIS — N32.81 OVERACTIVE BLADDER: ICD-10-CM

## 2024-04-22 DIAGNOSIS — N17.9 AKI (ACUTE KIDNEY INJURY) (HCC): ICD-10-CM

## 2024-04-22 DIAGNOSIS — K21.9 GASTROESOPHAGEAL REFLUX DISEASE WITHOUT ESOPHAGITIS: ICD-10-CM

## 2024-04-22 DIAGNOSIS — E03.8 OTHER SPECIFIED HYPOTHYROIDISM: ICD-10-CM

## 2024-04-22 DIAGNOSIS — F41.8 ANXIETY ASSOCIATED WITH DEPRESSION: ICD-10-CM

## 2024-04-22 DIAGNOSIS — E55.9 VITAMIN D DEFICIENCY: ICD-10-CM

## 2024-04-22 DIAGNOSIS — F51.01 PRIMARY INSOMNIA: ICD-10-CM

## 2024-04-22 DIAGNOSIS — F33.1 MAJOR DEPRESSIVE DISORDER, RECURRENT EPISODE, MODERATE (HCC): ICD-10-CM

## 2024-04-22 DIAGNOSIS — F02.B18 MODERATE LATE ONSET ALZHEIMER'S DEMENTIA WITH OTHER BEHAVIORAL DISTURBANCE (HCC): Primary | Chronic | ICD-10-CM

## 2024-04-22 DIAGNOSIS — F02.B2 MODERATE LATE ONSET ALZHEIMER'S DEMENTIA WITH PSYCHOTIC DISTURBANCE (HCC): ICD-10-CM

## 2024-04-22 DIAGNOSIS — G30.1 MODERATE LATE ONSET ALZHEIMER'S DEMENTIA WITH PSYCHOTIC DISTURBANCE (HCC): ICD-10-CM

## 2024-04-22 PROBLEM — G30.9 ALZHEIMER DISEASE (HCC): Status: RESOLVED | Noted: 2023-11-06 | Resolved: 2024-04-22

## 2024-04-22 PROBLEM — F02.80 ALZHEIMER DISEASE (HCC): Status: RESOLVED | Noted: 2023-11-06 | Resolved: 2024-04-22

## 2024-04-22 PROBLEM — F02.B0 MODERATE LATE ONSET ALZHEIMER'S DEMENTIA (HCC): Status: RESOLVED | Noted: 2019-10-01 | Resolved: 2024-04-22

## 2024-04-22 RX ORDER — ATORVASTATIN CALCIUM 80 MG/1
80 TABLET, FILM COATED ORAL NIGHTLY
Qty: 30 TABLET | Refills: 5 | Status: SHIPPED | OUTPATIENT
Start: 2024-04-22 | End: 2024-10-19

## 2024-04-22 RX ORDER — DONEPEZIL HYDROCHLORIDE 10 MG/1
10 TABLET, FILM COATED ORAL NIGHTLY
Qty: 90 TABLET | Refills: 1 | Status: SHIPPED | OUTPATIENT
Start: 2024-04-22

## 2024-04-22 RX ORDER — ESCITALOPRAM OXALATE 20 MG/1
20 TABLET ORAL DAILY
Qty: 30 TABLET | Refills: 5 | Status: SHIPPED | OUTPATIENT
Start: 2024-04-22

## 2024-04-22 RX ORDER — ERGOCALCIFEROL 1.25 MG/1
1 CAPSULE ORAL WEEKLY
Qty: 4 CAPSULE | Refills: 5 | Status: SHIPPED | OUTPATIENT
Start: 2024-04-22

## 2024-04-22 RX ORDER — EZETIMIBE 10 MG/1
10 TABLET ORAL DAILY
Qty: 30 TABLET | Refills: 5 | Status: SHIPPED | OUTPATIENT
Start: 2024-04-22

## 2024-04-22 RX ORDER — LEVOTHYROXINE SODIUM 0.03 MG/1
TABLET ORAL
Qty: 44 TABLET | Refills: 5 | Status: SHIPPED | OUTPATIENT
Start: 2024-04-22

## 2024-04-22 RX ORDER — CHLORAL HYDRATE 500 MG
1000 CAPSULE ORAL 2 TIMES DAILY
Qty: 180 CAPSULE | Refills: 1 | Status: SHIPPED | OUTPATIENT
Start: 2024-04-22

## 2024-04-22 RX ORDER — LORAZEPAM 1 MG/1
TABLET ORAL
Qty: 90 TABLET | Refills: 0 | Status: SHIPPED | OUTPATIENT
Start: 2024-04-22 | End: 2024-05-22

## 2024-04-22 RX ORDER — ESZOPICLONE 2 MG/1
2 TABLET, FILM COATED ORAL NIGHTLY
Qty: 30 TABLET | Refills: 0 | Status: SHIPPED | OUTPATIENT
Start: 2024-04-22 | End: 2024-05-22

## 2024-04-22 RX ORDER — NYSTATIN 100000 [USP'U]/G
POWDER TOPICAL
Qty: 1 EACH | Refills: 0 | Status: SHIPPED | OUTPATIENT
Start: 2024-04-22

## 2024-04-22 RX ORDER — ONDANSETRON 4 MG/1
TABLET, ORALLY DISINTEGRATING ORAL
Qty: 9 TABLET | Refills: 2 | Status: SHIPPED | OUTPATIENT
Start: 2024-04-22

## 2024-04-22 RX ORDER — MONTELUKAST SODIUM 4 MG/1
1 TABLET, CHEWABLE ORAL 2 TIMES DAILY
Qty: 60 TABLET | Refills: 2 | Status: SHIPPED | OUTPATIENT
Start: 2024-04-22

## 2024-04-22 RX ORDER — CLOPIDOGREL BISULFATE 75 MG/1
TABLET ORAL
Qty: 30 TABLET | Refills: 5 | Status: SHIPPED | OUTPATIENT
Start: 2024-04-22

## 2024-04-22 RX ORDER — ESOMEPRAZOLE MAGNESIUM 40 MG/1
40 CAPSULE, DELAYED RELEASE ORAL DAILY
Qty: 90 CAPSULE | Refills: 1 | Status: SHIPPED | OUTPATIENT
Start: 2024-04-22

## 2024-04-22 RX ORDER — LEVOCETIRIZINE DIHYDROCHLORIDE 5 MG/1
5 TABLET, FILM COATED ORAL NIGHTLY
Qty: 90 TABLET | Refills: 1 | Status: SHIPPED | OUTPATIENT
Start: 2024-04-22

## 2024-04-22 RX ORDER — BUSPIRONE HYDROCHLORIDE 5 MG/1
5 TABLET ORAL 3 TIMES DAILY PRN
Qty: 90 TABLET | Refills: 2 | Status: SHIPPED | OUTPATIENT
Start: 2024-04-22

## 2024-04-22 RX ORDER — LOSARTAN POTASSIUM AND HYDROCHLOROTHIAZIDE 12.5; 1 MG/1; MG/1
TABLET ORAL
Qty: 30 TABLET | Refills: 5 | Status: SHIPPED | OUTPATIENT
Start: 2024-04-22

## 2024-04-22 RX ORDER — QUETIAPINE FUMARATE 100 MG/1
TABLET, FILM COATED ORAL
Qty: 60 TABLET | Refills: 1 | Status: SHIPPED | OUTPATIENT
Start: 2024-04-22

## 2024-04-22 SDOH — ECONOMIC STABILITY: FOOD INSECURITY: WITHIN THE PAST 12 MONTHS, YOU WORRIED THAT YOUR FOOD WOULD RUN OUT BEFORE YOU GOT MONEY TO BUY MORE.: NEVER TRUE

## 2024-04-22 SDOH — ECONOMIC STABILITY: INCOME INSECURITY: HOW HARD IS IT FOR YOU TO PAY FOR THE VERY BASICS LIKE FOOD, HOUSING, MEDICAL CARE, AND HEATING?: NOT HARD AT ALL

## 2024-04-22 SDOH — ECONOMIC STABILITY: FOOD INSECURITY: WITHIN THE PAST 12 MONTHS, THE FOOD YOU BOUGHT JUST DIDN'T LAST AND YOU DIDN'T HAVE MONEY TO GET MORE.: NEVER TRUE

## 2024-04-22 ASSESSMENT — ENCOUNTER SYMPTOMS
COUGH: 0
STRIDOR: 0
BLOOD IN STOOL: 0
ABDOMINAL DISTENTION: 0
WHEEZING: 0
COLOR CHANGE: 0
CONSTIPATION: 0
SHORTNESS OF BREATH: 0
NAUSEA: 0
TROUBLE SWALLOWING: 0
EYE DISCHARGE: 0
ABDOMINAL PAIN: 0
SORE THROAT: 0
VOMITING: 0
EYE ITCHING: 0
CHOKING: 0
DIARRHEA: 0

## 2024-04-22 ASSESSMENT — PATIENT HEALTH QUESTIONNAIRE - PHQ9
7. TROUBLE CONCENTRATING ON THINGS, SUCH AS READING THE NEWSPAPER OR WATCHING TELEVISION: NOT AT ALL
2. FEELING DOWN, DEPRESSED OR HOPELESS: NOT AT ALL
SUM OF ALL RESPONSES TO PHQ QUESTIONS 1-9: 0
10. IF YOU CHECKED OFF ANY PROBLEMS, HOW DIFFICULT HAVE THESE PROBLEMS MADE IT FOR YOU TO DO YOUR WORK, TAKE CARE OF THINGS AT HOME, OR GET ALONG WITH OTHER PEOPLE: NOT DIFFICULT AT ALL
SUM OF ALL RESPONSES TO PHQ QUESTIONS 1-9: 0
6. FEELING BAD ABOUT YOURSELF - OR THAT YOU ARE A FAILURE OR HAVE LET YOURSELF OR YOUR FAMILY DOWN: NOT AT ALL
SUM OF ALL RESPONSES TO PHQ9 QUESTIONS 1 & 2: 0
3. TROUBLE FALLING OR STAYING ASLEEP: NOT AT ALL
SUM OF ALL RESPONSES TO PHQ QUESTIONS 1-9: 0
5. POOR APPETITE OR OVEREATING: NOT AT ALL
8. MOVING OR SPEAKING SO SLOWLY THAT OTHER PEOPLE COULD HAVE NOTICED. OR THE OPPOSITE, BEING SO FIGETY OR RESTLESS THAT YOU HAVE BEEN MOVING AROUND A LOT MORE THAN USUAL: NOT AT ALL
4. FEELING TIRED OR HAVING LITTLE ENERGY: NOT AT ALL
1. LITTLE INTEREST OR PLEASURE IN DOING THINGS: NOT AT ALL
SUM OF ALL RESPONSES TO PHQ QUESTIONS 1-9: 0
9. THOUGHTS THAT YOU WOULD BE BETTER OFF DEAD, OR OF HURTING YOURSELF: NOT AT ALL

## 2024-04-22 NOTE — PATIENT INSTRUCTIONS
1.  Vitamin D deficiency stable on 50,000 weekly  2 hyperlipidemia continue current meds levels are fine  3.  Hypertension blood pressure is good today on current meds no changes  4.  Insomnia stable with Lunesta  5.  Memory loss stable with Aricept  6.  Anxiety depression stable with BuSpar and Lexapro as well as lorazepam  7.  GERD stable with Nexium  8.  Hypothyroidism level is good on current dose of Synthroid  9.  Overactive bladder stable with Myrbetriq  10.  Mild sundowners with dementia Seroquel is being very helpful current dose continue  11.  Vitamin D deficiency level is good continue 50,000 weekly  12.  Acute kidney injury be sure you are drinking plenty fluids chronic respiratory failure stable  with nighttime oxygen  PATIENT INSTRUCTIONS:      IT IS VERY IMPORTANT THAT YOU GET YOUR LABS AT LEAST 1 DAY BEFORE YOUR APPT.  WE MAY HAVE TO RESCHEDULE YOU IF YOU WE DONT HAVE THE RESULTS;      WE CAN DISCUSS THE LABS ON THE DAY OF YOUR OFFICE VISIT AND MAKE CHANGES TOGETHER WHILE YOU ARE HERE.  THEN YOU CAN LEAVE WITH A COPY OF THOSE CHANGES.     CALLING YOU BACK WITH ROUTINE RESULTS IS VERY TIME CONSUMING;  I HAVE TO REVIEW THE LABS, THEN YOUR PAST LABS AND OV, THEN MAKE A NOTE TO THE NURSES TO CALL YOU, OR I CALL MYSELF WITH EXTREME ABNORMALITIES WHICH CAN BE TIME CONSUMING AS WELL;  WHEN I HAVE TO TAKE TIME OUT OF ANOTHER DAY FOR YOUR RESULTS THAT IS TIME AWAY FROM OTHER PATIENTS AND DISCUSSING RESULTS OVER THE PHONE IS JUST NOT A GOOD IDEA;  WHILE HERE WE CAN WRITE DOWN THE CHANGES BEFORE YOU LEAVE THE OFFICE;      Life simple 7  1) Manage blood pressure - high blood pressure is a major risk factor for heart disease and stroke. Keeping blood pressure in health range reduces strain on your heart, arteries and kidneys.  Blood pressure goal is less than 130/80.   2) Control cholesterol - contributes to plaque, which can clog arteries and lead to heart disease and stroke. When you control your cholesterol you

## 2024-04-22 NOTE — PROGRESS NOTES
[facility]  [facility] INTERNAL MEDICINE  [address] [place] [address] DRIVE  [address]  [place] [address]  Dept: [phone]  Dept Fax: [phone]  Loc: [phone]    Barbie Romero (:  1932) is a 89 y.o. female,Established patient  with gree, here for evaluation of the following chief complaint(s): 3 Month Follow-Up      Barbie Romero is a 89 y.o. female who presents today for her medical conditions/complaints as noted below.  Barbie Romero is c/teresa 3 Month Follow-Up        HPI:     Chief Complaint   Patient presents with    3 Month Follow-Up     @pt is alone     HPI      Vit d def she is on 50,000 weekly her level is normal today  Hyperlipidemia she is on a Trivastan 80 at bedtime and Zetia 10 mg daily and fish oil thousand twice daily; she is stable his cholesterol is 147 triglycerides are 102  Hypertension she is on losartan/HCTZ 100/12.5 daily  Insomnia she is stable with Lunesta 2 mg at bedtime  Memory loss she is stable with Aricept 10 mg at bedtime  Anxiety with depression she is on BuSpar 5 mg 3 times daily as needed she is also on Lexapro 20 daily she also takes lorazepam 1 mg 3 times daily as needed  7.  GERD is stable with Nexium 40  8.  Hypothyroidism she is stable with 25 mcg of Synthroid daily and her TSH is normal at 2.5  9.  Overactive bladder she is on Myrbetriq 25 mg daily  10.  Mild sundowners with dementia Seroquel helps in the morning half at noon and -/ at bedtime  11.  Vitamin D deficiency she is on 50,000 weekly she is stable at 61.8  12.  Acute kidney injury this is a new finding for her her GFR is always been over 60 today is 53 BUN and creatinine are elevated as well so we will just need to encourage fluid liquids  13.  Chronic respiratory failure;  she has supplemental oxygen for night; she no longer has to wear it at night;      CONTROLLED SUBSTANCE  Drug Lunesta, lorazepam  Diagnosis insomnia, anxiety  Last Joaquin 2024  Last UDS 2023  Pain contract

## 2024-04-30 DIAGNOSIS — F51.01 PRIMARY INSOMNIA: ICD-10-CM

## 2024-04-30 NOTE — TELEPHONE ENCOUNTER
Barbie Romero called to request a refill on her medication.      Last office visit : 4/22/2024   Next office visit : Visit date not found     Last UDS:   Amphetamine Screen, Urine   Date Value Ref Range Status   04/22/2024 neg  Final     Barbiturate Screen, Urine   Date Value Ref Range Status   04/22/2024 neg  Final     Benzodiazepine Screen, Urine   Date Value Ref Range Status   04/22/2024 pos  Final     Buprenorphine Urine   Date Value Ref Range Status   04/22/2024 neg  Final     Cocaine Metabolite Screen, Urine   Date Value Ref Range Status   04/22/2024 neg  Final     Gabapentin Screen, Urine   Date Value Ref Range Status   04/22/2024 neg  Final     MDMA, Urine   Date Value Ref Range Status   04/22/2024 neg  Final     Methamphetamine, Urine   Date Value Ref Range Status   04/22/2024 neg  Final     Opiate Scrn, Ur   Date Value Ref Range Status   04/22/2024 neg  Final     Oxycodone Screen, Ur   Date Value Ref Range Status   04/22/2024 neg  Final     PCP Screen, Urine   Date Value Ref Range Status   04/22/2024 neg  Final     Propoxyphene Screen, Urine   Date Value Ref Range Status   04/22/2024 neg  Final     THC Screen, Urine   Date Value Ref Range Status   04/22/2024 neg  Final     Tricyclic Antidepressants, Urine   Date Value Ref Range Status   04/22/2024 pos  Final       Last Joaquin: 04/22/24  Medication Contract:  04/22/24   Last Fill: 04/22/24    Requested Prescriptions     Pending Prescriptions Disp Refills    LORazepam (ATIVAN) 1 MG tablet 90 tablet 0     Sig: TAKE 1 TABLET EVERY 8 HOURS AS NEEDED FOR ANXIETY.         Please approve or refuse this medication.   Nicolasa Maynard MA

## 2024-05-01 RX ORDER — LORAZEPAM 1 MG/1
TABLET ORAL
Qty: 90 TABLET | Refills: 0 | Status: SHIPPED | OUTPATIENT
Start: 2024-05-01 | End: 2024-05-30

## 2024-05-28 DIAGNOSIS — F51.01 PRIMARY INSOMNIA: ICD-10-CM

## 2024-05-28 RX ORDER — LORAZEPAM 1 MG/1
TABLET ORAL
Qty: 90 TABLET | Refills: 0 | Status: SHIPPED | OUTPATIENT
Start: 2024-05-28 | End: 2024-06-26

## 2024-05-28 NOTE — TELEPHONE ENCOUNTER
Barbie Romero called to request a refill on her medication.      Last office visit : 4/22/2024   Next office visit : Visit date not found     Last UDS:   Benzodiazepine Screen, Urine   Date Value Ref Range Status   04/22/2024 pos  Final     Buprenorphine Urine   Date Value Ref Range Status   04/22/2024 neg  Final     Cocaine Metabolite Screen, Urine   Date Value Ref Range Status   04/22/2024 neg  Final     Gabapentin Screen, Urine   Date Value Ref Range Status   04/22/2024 neg  Final     MDMA, Urine   Date Value Ref Range Status   04/22/2024 neg  Final     Oxycodone Screen, Ur   Date Value Ref Range Status   04/22/2024 neg  Final     Propoxyphene Screen, Urine   Date Value Ref Range Status   04/22/2024 neg  Final     THC Screen, Urine   Date Value Ref Range Status   04/22/2024 neg  Final     Tricyclic Antidepressants, Urine   Date Value Ref Range Status   04/22/2024 pos  Final       Last Joaquin: 11/06/23  Medication Contract: 04/22/24   Last Fill: 04/22/24    Requested Prescriptions     Pending Prescriptions Disp Refills    LORazepam (ATIVAN) 1 MG tablet 90 tablet 0     Sig: TAKE 1 TABLET EVERY 8 HOURS AS NEEDED FOR ANXIETY.         Please approve or refuse this medication.   Nicolasa Maynard MA

## 2024-05-30 DIAGNOSIS — R30.0 DYSURIA: ICD-10-CM

## 2024-05-30 DIAGNOSIS — R30.0 DYSURIA: Primary | ICD-10-CM

## 2024-05-30 LAB
BACTERIA URNS QL MICRO: NEGATIVE /HPF
BILIRUB UR QL STRIP: NEGATIVE
CLARITY UR: CLEAR
COLOR UR: YELLOW
CRYSTALS URNS MICRO: NORMAL /HPF
EPI CELLS #/AREA URNS AUTO: 0 /HPF (ref 0–5)
GLUCOSE UR STRIP.AUTO-MCNC: NEGATIVE MG/DL
HGB UR STRIP.AUTO-MCNC: NEGATIVE MG/L
HYALINE CASTS #/AREA URNS AUTO: 0 /HPF (ref 0–8)
KETONES UR STRIP.AUTO-MCNC: NEGATIVE MG/DL
LEUKOCYTE ESTERASE UR QL STRIP.AUTO: ABNORMAL
NITRITE UR QL STRIP.AUTO: NEGATIVE
PH UR STRIP.AUTO: 5.5 [PH] (ref 5–8)
PROT UR STRIP.AUTO-MCNC: NEGATIVE MG/DL
RBC #/AREA URNS AUTO: 1 /HPF (ref 0–4)
SP GR UR STRIP.AUTO: 1.01 (ref 1–1.03)
UROBILINOGEN UR STRIP.AUTO-MCNC: 0.2 E.U./DL
WBC #/AREA URNS AUTO: 5 /HPF (ref 0–5)

## 2024-05-30 RX ORDER — MIRABEGRON 25 MG/1
25 TABLET, FILM COATED, EXTENDED RELEASE ORAL DAILY
Qty: 30 TABLET | Refills: 2 | Status: SHIPPED | OUTPATIENT
Start: 2024-05-30

## 2024-05-30 NOTE — TELEPHONE ENCOUNTER
Barbie called requesting a refill of the below medication which has been pended for you:     Requested Prescriptions     Pending Prescriptions Disp Refills    mirabegron (MYRBETRIQ) 25 MG TB24 30 tablet 2     Sig: Take 1 tablet by mouth daily       Last Appointment Date: Visit date not found  Next Appointment Date: 7/10/2024    Allergies   Allergen Reactions    Biaxin [Clarithromycin]     Cefdinir     Effexor [Venlafaxine]     Erythromycin     Naprosyn [Naproxen]

## 2024-06-17 RX ORDER — QUETIAPINE FUMARATE 100 MG/1
TABLET, FILM COATED ORAL
Qty: 90 TABLET | Refills: 2 | Status: SHIPPED | OUTPATIENT
Start: 2024-06-17

## 2024-06-17 NOTE — TELEPHONE ENCOUNTER
Barbie called requesting a refill of the below medication which has been pended for you:     Requested Prescriptions     Pending Prescriptions Disp Refills    QUEtiapine (SEROQUEL) 100 MG tablet [Pharmacy Med Name: QUETIAPINE FUMARATE 100MG TABLET] 90 tablet 2     Sig: TAKE 1 TABLET IN THE MORNING, 1/2 TABLET AT NOON, AND 1.5 TABLETS AT BEDTIME       Last Appointment Date: 4/22/2024  Next Appointment Date: 7/10/2024    Allergies   Allergen Reactions    Biaxin [Clarithromycin]     Cefdinir     Effexor [Venlafaxine]     Erythromycin     Naprosyn [Naproxen]

## 2024-07-07 SDOH — HEALTH STABILITY: PHYSICAL HEALTH: ON AVERAGE, HOW MANY DAYS PER WEEK DO YOU ENGAGE IN MODERATE TO STRENUOUS EXERCISE (LIKE A BRISK WALK)?: 0 DAYS

## 2024-07-10 ENCOUNTER — OFFICE VISIT (OUTPATIENT)
Dept: INTERNAL MEDICINE | Age: 89
End: 2024-07-10
Payer: MEDICARE

## 2024-07-10 VITALS
SYSTOLIC BLOOD PRESSURE: 98 MMHG | OXYGEN SATURATION: 90 % | WEIGHT: 160 LBS | BODY MASS INDEX: 27.46 KG/M2 | HEART RATE: 85 BPM | DIASTOLIC BLOOD PRESSURE: 64 MMHG

## 2024-07-10 DIAGNOSIS — F33.1 MAJOR DEPRESSIVE DISORDER, RECURRENT EPISODE, MODERATE (HCC): ICD-10-CM

## 2024-07-10 DIAGNOSIS — G30.1 MODERATE LATE ONSET ALZHEIMER'S DEMENTIA WITH PSYCHOTIC DISTURBANCE (HCC): ICD-10-CM

## 2024-07-10 DIAGNOSIS — J43.8 OTHER EMPHYSEMA (HCC): Primary | ICD-10-CM

## 2024-07-10 DIAGNOSIS — F02.B2 MODERATE LATE ONSET ALZHEIMER'S DEMENTIA WITH PSYCHOTIC DISTURBANCE (HCC): ICD-10-CM

## 2024-07-10 DIAGNOSIS — J84.10 PULMONARY FIBROSIS (HCC): ICD-10-CM

## 2024-07-10 DIAGNOSIS — F51.01 PRIMARY INSOMNIA: ICD-10-CM

## 2024-07-10 DIAGNOSIS — I73.9 PERIPHERAL VASCULAR DISEASE (HCC): ICD-10-CM

## 2024-07-10 PROCEDURE — 3023F SPIROM DOC REV: CPT | Performed by: INTERNAL MEDICINE

## 2024-07-10 PROCEDURE — 1123F ACP DISCUSS/DSCN MKR DOCD: CPT | Performed by: INTERNAL MEDICINE

## 2024-07-10 PROCEDURE — G8427 DOCREV CUR MEDS BY ELIG CLIN: HCPCS | Performed by: INTERNAL MEDICINE

## 2024-07-10 PROCEDURE — 1090F PRES/ABSN URINE INCON ASSESS: CPT | Performed by: INTERNAL MEDICINE

## 2024-07-10 PROCEDURE — 1036F TOBACCO NON-USER: CPT | Performed by: INTERNAL MEDICINE

## 2024-07-10 PROCEDURE — 99214 OFFICE O/P EST MOD 30 MIN: CPT | Performed by: INTERNAL MEDICINE

## 2024-07-10 PROCEDURE — G8417 CALC BMI ABV UP PARAM F/U: HCPCS | Performed by: INTERNAL MEDICINE

## 2024-07-10 NOTE — PROGRESS NOTES
patient needs less medicine they said they have been referred to a psychiatrist by one of their family members I am not sure if it is a psychiatrist or psychologist and/or if they will be adjusting the medication either way we need to start working towards having her on less medication especially with her age agitation fall risk and other medical issues.

## 2024-07-11 PROBLEM — G62.9 NEUROPATHY: Status: ACTIVE | Noted: 2018-09-26

## 2024-07-11 PROBLEM — I73.9 PERIPHERAL VASCULAR DISEASE (HCC): Status: ACTIVE | Noted: 2019-06-26

## 2024-07-21 DIAGNOSIS — F51.01 PRIMARY INSOMNIA: ICD-10-CM

## 2024-07-22 NOTE — TELEPHONE ENCOUNTER
Barbie Romero called to request a refill on her medication.      Last office visit : 7/10/2024   Next office visit : 10/23/2024     Last UDS:   Benzodiazepine Screen, Urine   Date Value Ref Range Status   04/22/2024 pos  Final     Buprenorphine Urine   Date Value Ref Range Status   04/22/2024 neg  Final     Cocaine Metabolite Screen, Urine   Date Value Ref Range Status   04/22/2024 neg  Final     Gabapentin Screen, Urine   Date Value Ref Range Status   04/22/2024 neg  Final     MDMA, Urine   Date Value Ref Range Status   04/22/2024 neg  Final     Oxycodone Screen, Ur   Date Value Ref Range Status   04/22/2024 neg  Final     Propoxyphene Screen, Urine   Date Value Ref Range Status   04/22/2024 neg  Final     THC Screen, Urine   Date Value Ref Range Status   04/22/2024 neg  Final     Tricyclic Antidepressants, Urine   Date Value Ref Range Status   04/22/2024 pos  Final       Last Joaquin: 07/11/24  Medication Contract:  04/22/24   Last Fill: 05/28/24    Requested Prescriptions     Pending Prescriptions Disp Refills    LORazepam (ATIVAN) 1 MG tablet 90 tablet 0     Sig: TAKE 1 TABLET EVERY 8 HOURS AS NEEDED FOR ANXIETY.         Please approve or refuse this medication.   Nicolasa Maynard MA

## 2024-07-24 RX ORDER — LORAZEPAM 1 MG/1
TABLET ORAL
Qty: 90 TABLET | Refills: 0 | Status: SHIPPED | OUTPATIENT
Start: 2024-07-24 | End: 2024-09-01

## 2024-08-05 ENCOUNTER — TELEPHONE (OUTPATIENT)
Dept: INTERNAL MEDICINE | Age: 89
End: 2024-08-05

## 2024-08-05 NOTE — TELEPHONE ENCOUNTER
She is needing the letter of medical necessity again for DOL.  Looks like there is one in there from last September.  Wants us to call when this is ready to

## 2024-08-06 RX ORDER — MIRABEGRON 25 MG/1
25 TABLET, FILM COATED, EXTENDED RELEASE ORAL DAILY
Qty: 90 TABLET | Refills: 3 | Status: SHIPPED | OUTPATIENT
Start: 2024-08-06

## 2024-08-12 NOTE — TELEPHONE ENCOUNTER
Barbie Romero called to request a refill on her medication.      Last office visit : 7/10/2024   Next office visit : 10/23/2024     Requested Prescriptions     Pending Prescriptions Disp Refills    levocetirizine (XYZAL) 5 MG tablet 90 tablet 1     Sig: Take 1 tablet by mouth nightly            Tiny Mendoza LPN

## 2024-08-15 RX ORDER — LEVOCETIRIZINE DIHYDROCHLORIDE 5 MG/1
5 TABLET, FILM COATED ORAL NIGHTLY
Qty: 90 TABLET | Refills: 1 | OUTPATIENT
Start: 2024-08-15

## 2024-08-22 ENCOUNTER — OFFICE VISIT (OUTPATIENT)
Dept: INTERNAL MEDICINE | Age: 89
End: 2024-08-22

## 2024-08-22 VITALS
SYSTOLIC BLOOD PRESSURE: 118 MMHG | HEART RATE: 63 BPM | DIASTOLIC BLOOD PRESSURE: 68 MMHG | HEIGHT: 64 IN | WEIGHT: 163 LBS | BODY MASS INDEX: 27.83 KG/M2 | OXYGEN SATURATION: 91 %

## 2024-08-22 DIAGNOSIS — R41.0 CONFUSION: ICD-10-CM

## 2024-08-22 DIAGNOSIS — F41.8 ANXIETY ASSOCIATED WITH DEPRESSION: ICD-10-CM

## 2024-08-22 DIAGNOSIS — R06.02 SHORTNESS OF BREATH: ICD-10-CM

## 2024-08-22 DIAGNOSIS — Z02.83 ENCOUNTER FOR DRUG SCREENING: ICD-10-CM

## 2024-08-22 DIAGNOSIS — R06.02 SHORTNESS OF BREATH: Primary | ICD-10-CM

## 2024-08-22 DIAGNOSIS — I10 HYPERTENSION, ESSENTIAL: ICD-10-CM

## 2024-08-22 DIAGNOSIS — J96.11 CHRONIC RESPIRATORY FAILURE WITH HYPOXIA (HCC): ICD-10-CM

## 2024-08-22 DIAGNOSIS — F33.1 MAJOR DEPRESSIVE DISORDER, RECURRENT EPISODE, MODERATE (HCC): ICD-10-CM

## 2024-08-22 DIAGNOSIS — R53.1 WEAKNESS: ICD-10-CM

## 2024-08-22 LAB
ALBUMIN SERPL-MCNC: 4.2 G/DL (ref 3.5–5.2)
ALP SERPL-CCNC: 64 U/L (ref 35–104)
ALT SERPL-CCNC: 21 U/L (ref 5–33)
ANION GAP SERPL CALCULATED.3IONS-SCNC: 10 MMOL/L (ref 7–19)
AST SERPL-CCNC: 46 U/L (ref 5–32)
BILIRUB SERPL-MCNC: 0.5 MG/DL (ref 0.2–1.2)
BNP BLD-MCNC: 72 PG/ML (ref 0–449)
BUN SERPL-MCNC: 27 MG/DL (ref 8–23)
CALCIUM SERPL-MCNC: 9.5 MG/DL (ref 8.2–9.6)
CHLORIDE SERPL-SCNC: 103 MMOL/L (ref 98–111)
CO2 SERPL-SCNC: 30 MMOL/L (ref 22–29)
CREAT SERPL-MCNC: 0.9 MG/DL (ref 0.5–0.9)
ERYTHROCYTE [DISTWIDTH] IN BLOOD BY AUTOMATED COUNT: 13.2 % (ref 11.5–14.5)
GLUCOSE SERPL-MCNC: 101 MG/DL (ref 74–109)
HCT VFR BLD AUTO: 39.1 % (ref 37–47)
HGB BLD-MCNC: 12.5 G/DL (ref 12–16)
MCH RBC QN AUTO: 30.9 PG (ref 27–31)
MCHC RBC AUTO-ENTMCNC: 32 G/DL (ref 33–37)
MCV RBC AUTO: 96.5 FL (ref 81–99)
PLATELET # BLD AUTO: 311 K/UL (ref 130–400)
PMV BLD AUTO: 10.5 FL (ref 9.4–12.3)
POTASSIUM SERPL-SCNC: 4 MMOL/L (ref 3.5–5)
PROT SERPL-MCNC: 7.1 G/DL (ref 6.6–8.7)
RBC # BLD AUTO: 4.05 M/UL (ref 4.2–5.4)
SODIUM SERPL-SCNC: 143 MMOL/L (ref 136–145)
TSH SERPL DL<=0.005 MIU/L-ACNC: 2.9 UIU/ML (ref 0.27–4.2)
WBC # BLD AUTO: 11.1 K/UL (ref 4.8–10.8)

## 2024-08-22 NOTE — PROGRESS NOTES
Chief Complaint   Patient presents with    Other     SOB and confusion        HPI: Patient is here today for problem visit with increased shortness of breath and confusion she is a relatively new patient to me have seen her for DOL recertification however for some time.  She previously saw Agatha Henry for primary care.  Patient is on a lot of benzodiazepines and other medications 89 years old.  I think medications likely contribute to some of this.  She is in severe grief at the time has lost her sister and granddaughter both within a short period of time severe grief sadness this has exacerbated confusion anxiety.  In general patient is also much more short of breath no fever or cough.    Past Medical History:   Diagnosis Date    Abnormal mammogram     Anxiety     Anxiety associated with depression     Artery disease, cerebral     Ataxia     Benign diastolic hypertension     Carpal tunnel syndrome     idiopathic peripheral    Cellulitis of face     Chronic back pain     COPD (chronic obstructive pulmonary disease) (HCC)     Depression     Disc degeneration, lumbosacral     Diverticulosis     Dysphagia     Esophageal reflux     Fever blister     Herpes simplex     Herpes zoster     Hypertension, essential     Hypertensive heart disease without CHF     Hypoxia     Idiopathic peripheral neuropathy     2017    Insomnia     Major depressive disorder, recurrent episode, moderate (HCC)     Mixed hyperlipidemia     Osteoporosis     Ovarian failure     Oxygen dependent     Pulmonary fibrosis (HCC)     Sciatica     Sleep apnea     Transient cerebral ischemic attack     Urinary incontinence     Weakness of both legs        Past Surgical History:   Procedure Laterality Date    COLONOSCOPY  06/12/2015    [facility], repeat as needed    COLONOSCOPY N/A 10/21/2020    Dr REANNA Spicer-Diverticular disease, random colon bx negative    HYSTERECTOMY (CERVIX STATUS UNKNOWN)      LUNG SEGMENTECTOMY      lung segmental resection    UPPER

## 2024-08-23 RX ORDER — ESCITALOPRAM OXALATE 20 MG/1
20 TABLET ORAL DAILY
Qty: 30 TABLET | Refills: 5 | Status: SHIPPED | OUTPATIENT
Start: 2024-08-23

## 2024-08-23 NOTE — TELEPHONE ENCOUNTER
Last OV 8/22/2024  Next OV 10/23/2024      Requested Prescriptions     Pending Prescriptions Disp Refills    escitalopram (LEXAPRO) 20 MG tablet 30 tablet 5     Sig: Take 1 tablet by mouth daily

## 2024-09-09 NOTE — TELEPHONE ENCOUNTER
Mohs Case Number:  c/o yeast infection, [facility] Date Of Previous Biopsy (Optional): 08/06/2024 Previous Accession (Optional): [id number] Biopsy Photograph Reviewed: Yes Consent Type: Consent 1 (Standard) Eye Shield Used: No Initial Size Of Lesion: 0.4 X Size Of Lesion In Cm (Optional): 0.3 Number Of Stages: 4 Primary Defect Length In Cm (Final Defect Size - Required For Flaps/Grafts): 1.6 Primary Defect Width In Cm (Final Defect Size - Required For Flaps/Grafts): 0.8 Primary Defect Depth In Cm (Optional But Required For Some Insurers): 0 Repair Type: Complex Repair Which Instrument Did You Use For Dermabrasion?: Wire Brush Which Eyelid Repair Cpt Are You Using?: 33155 Oculoplastic Surgeon Procedure Text (A): After obtaining clear surgical margins the patient was sent to oculoplastics for surgical repair.  The patient understands they will receive post-surgical care and follow-up from the referring physician's office. Otolaryngologist Procedure Text (A): After obtaining clear surgical margins the patient was sent to otolaryngology for surgical repair.  The patient understands they will receive post-surgical care and follow-up from the referring physician's office. Plastic Surgeon Procedure Text (A): After obtaining clear surgical margins the patient was sent to plastics for surgical repair.  The patient understands they will receive post-surgical care and follow-up from the referring physician's office. Mid-Level Procedure Text (A): After obtaining clear surgical margins the patient was sent to a mid-level provider for surgical repair.  The patient understands they will receive post-surgical care and follow-up from the mid-level provider. Provider Procedure Text (A): After obtaining clear surgical margins the defect was repaired by another provider. Asc Procedure Text (A): After obtaining clear surgical margins the patient was sent to an ASC for surgical repair.  The patient understands they will receive post-surgical care and follow-up from the ASC physician. Suturegard Retention Suture: 2-0 Nylon Retention Suture Bite Size: 3 mm Length To Time In Minutes Device Was In Place: 10 Number Of Hemigard Strips Per Side: 1 Undermining Type: Entire Wound Debridement Text: The wound edges were debrided prior to proceeding with the closure to facilitate wound healing. Complex Requirements: Involvement Of Free Margin?: Vermilion Border Helical Rim Text: The closure involved the helical rim WHICH IS A FREE MARGIN OF THE EAR AND THEREFORE INDICATION FOR COMPLEX REPAIR. Vermilion Border Text: The closure involved the vermilion border, WHICH IS A FREE MARGIN OF THE LIP AND THEREFORE INDICATION FOR COMPLEX REPAIR. Nostril Rim Text: The closure involved the nostril rim, WHICH IS A FREE MARGIN AND THEREFORE INDICATION FOR COMPLEX REPAIR. Retention Suture Text: Retention sutures were placed to support the closure, prevent dehiscence, and achieve a better cosmetic outcome.  The necessary placement of these retention sutures is an indication for complex repair. Location Indication Override (Is Already Calculated Based On Selected Body Location): Area H Area H Indication Text: Tumors in this location are included in Area H (eyelids, eyebrows, nose, lips, chin, ear, pre-auricular, post-auricular, temple, genitalia, hands, feet, ankles and areola).  Tissue conservation is critical in these anatomic locations. Area M Indication Text: Tumors in this location are included in Area M (cheek, forehead, scalp, neck, jawline and pretibial skin).  Mohs surgery is indicated for tumors in these anatomic locations. Area L Indication Text: Tumors in this location are included in Area L (trunk and extremities).  Mohs surgery is indicated for larger tumors, or tumors with aggressive histologic features, in these anatomic locations. Tumor Debulked?: curette Depth Of Tumor Invasion (For Histology): dermis Perineural Invasion (For Histology - Be Specific If Possible): absent Stage 1: Number Of Blocks?: 2 Special Stains Stage 1 - Results: Base On Clearance Noted Above Stage 2: Additional Anesthesia Type: 1% lidocaine with epinephrine and a 1:10 solution of 8.4% sodium bicarbonate Staging Info: By selecting yes to the question above you will include information on AJCC 8 tumor staging in your Mohs note. Information on tumor staging will be automatically added for SCCs on the head and neck. AJCC 8 includes tumor size, tumor depth, perineural involvement and bone invasion. Tumor Depth: Less than 6mm from granular layer and no invasion beyond the subcutaneous fat Was The Patient On Physician Recommended Anticoagulation Therapy?: Please Select the Appropriate Response Medical Necessity Statement: Based on the clinical judgement of myself and the referring provider, Mohs surgery is the most appropriate treatment for this cancer compared to other treatments. Alternatives Discussed Intro (Do Not Add Period): I discussed alternative treatments to Mohs surgery and specifically discussed the risks and benefits of Consent 1/Introductory Paragraph: The rationale for Mohs was explained to the patient and consent was obtained. The technique, risks, benefits and alternatives to therapy were discussed in detail. Specifically, the risks of infection, scarring, tissue deformity, bleeding, prolonged wound healing, incomplete removal, allergy to anesthesia or bandage material, nerve injury and recurrence were addressed. Prior to the procedure, the treatment site was clearly identified and confirmed by the patient with mirror. All components of Universal Protocol/PAUSE Rule completed. Consent 2/Introductory Paragraph: Mohs surgery was explained to the patient and consent was obtained. The risks, benefits and alternatives to therapy were discussed in detail. Specifically, the risks of infection, scarring, bleeding, prolonged wound healing, incomplete removal, allergy to anesthesia, nerve injury and recurrence were addressed. Prior to the procedure, the treatment site was clearly identified and confirmed by the patient. All components of Universal Protocol/PAUSE Rule completed. Consent 3/Introductory Paragraph: I gave the patient a chance to ask questions they had about the procedure.  Following this I explained the Mohs procedure and consent was obtained. The risks, benefits and alternatives to therapy were discussed in detail. Specifically, the risks of infection, scarring, bleeding, prolonged wound healing, incomplete removal, allergy to anesthesia, nerve injury and recurrence were addressed. Prior to the procedure, the treatment site was clearly identified and confirmed by the patient. All components of Universal Protocol/PAUSE Rule completed. Consent (Temporal Branch)/Introductory Paragraph: The rationale for Mohs was explained to the patient and consent was obtained. The risks, benefits and alternatives to therapy were discussed in detail. Specifically, the risks of damage to the temporal branch of the facial nerve, infection, scarring, bleeding, prolonged wound healing, incomplete removal, allergy to anesthesia, and recurrence were addressed. Prior to the procedure, the treatment site was clearly identified and confirmed by the patient. All components of Universal Protocol/PAUSE Rule completed. Consent (Marginal Mandibular)/Introductory Paragraph: The rationale for Mohs was explained to the patient and consent was obtained. The risks, benefits and alternatives to therapy were discussed in detail. Specifically, the risks of damage to the marginal mandibular branch of the facial nerve, infection, scarring, bleeding, prolonged wound healing, incomplete removal, allergy to anesthesia, and recurrence were addressed. Prior to the procedure, the treatment site was clearly identified and confirmed by the patient. All components of Universal Protocol/PAUSE Rule completed. Consent (Spinal Accessory)/Introductory Paragraph: The rationale for Mohs was explained to the patient and consent was obtained. The risks, benefits and alternatives to therapy were discussed in detail. Specifically, the risks of damage to the spinal accessory nerve, infection, scarring, bleeding, prolonged wound healing, incomplete removal, allergy to anesthesia, and recurrence were addressed. Prior to the procedure, the treatment site was clearly identified and confirmed by the patient. All components of Universal Protocol/PAUSE Rule completed. Consent (Near Eyelid Margin)/Introductory Paragraph: The rationale for Mohs was explained to the patient and consent was obtained. The risks, benefits and alternatives to therapy were discussed in detail. Specifically, the risks of ectropion or eyelid deformity, infection, scarring, bleeding, prolonged wound healing, incomplete removal, allergy to anesthesia, nerve injury and recurrence were addressed. Prior to the procedure, the treatment site was clearly identified and confirmed by the patient. All components of Universal Protocol/PAUSE Rule completed. Consent (Ear)/Introductory Paragraph: The rationale for Mohs was explained to the patient and consent was obtained. The risks, benefits and alternatives to therapy were discussed in detail. Specifically, the risks of ear deformity, infection, scarring, bleeding, prolonged wound healing, incomplete removal, allergy to anesthesia, nerve injury and recurrence were addressed. Prior to the procedure, the treatment site was clearly identified and confirmed by the patient. All components of Universal Protocol/PAUSE Rule completed. Consent (Nose)/Introductory Paragraph: The rationale for Mohs was explained to the patient and consent was obtained. The risks, benefits and alternatives to therapy were discussed in detail. Specifically, the risks of nasal deformity, changes in the flow of air through the nose, infection, scarring, bleeding, prolonged wound healing, incomplete removal, allergy to anesthesia, nerve injury and recurrence were addressed. Prior to the procedure, the treatment site was clearly identified and confirmed by the patient. All components of Universal Protocol/PAUSE Rule completed. Consent (Lip)/Introductory Paragraph: The rationale for Mohs was explained to the patient and consent was obtained. The risks, benefits and alternatives to therapy were discussed in detail. Specifically, the risks of lip deformity, changes in the oral aperture, infection, scarring, bleeding, prolonged wound healing, incomplete removal, allergy to anesthesia, nerve injury and recurrence were addressed. Prior to the procedure, the treatment site was clearly identified and confirmed by the patient. All components of Universal Protocol/PAUSE Rule completed. Consent (Scalp)/Introductory Paragraph: The rationale for Mohs was explained to the patient and consent was obtained. The risks, benefits and alternatives to therapy were discussed in detail. Specifically, the risks of changes in hair growth pattern secondary to repair, infection, scarring, bleeding, prolonged wound healing, incomplete removal, allergy to anesthesia, nerve injury and recurrence were addressed. Prior to the procedure, the treatment site was clearly identified and confirmed by the patient. All components of Universal Protocol/PAUSE Rule completed. Detail Level: Detailed Postop Diagnosis: same Anesthesia Volume In Cc: 6 Hemostasis: Electrocautery Estimated Blood Loss (Cc): minimal Stage 6: Additional Anesthesia Type: 1% lidocaine with 1:100,000 epinephrine and a 1:10 solution of 8.4% sodium bicarbonate Repair Hemostasis (Optional): Electrodesiccation Brow Lift Text: A midfrontal incision was made medially to the defect to allow access to the tissues just superior to the left eyebrow. Following careful dissection inferiorly in a supraperiosteal plane to the level of the left eyebrow, several 3-0 monocryl sutures were used to resuspend the eyebrow orbicularis oculi muscular unit to the superior frontal bone periosteum. This resulted in an appropriate reapproximation of static eyebrow symmetry and correction of the left brow ptosis. Deep Sutures: 5-0 Monocryl Epidermal Sutures: 5-0 Fast Absorbing Gut Suturegard Intro: Intraoperative tissue expansion was performed, utilizing the SUTUREGARD device, in order to reduce wound tension. Suturegard Body: The suture ends were repeatedly re-tightened and re-clamped to achieve the desired tissue expansion. Hemigard Intro: Due to skin fragility and wound tension, it was decided to use HEMIGARD adhesive retention suture devices to permit a linear closure. The skin was cleaned and dried for a 6cm distance away from the wound. Excessive hair, if present, was removed to allow for adhesion. Hemigard Postcare Instructions: The HEMIGARD strips are to remain completely dry for at least 5-7 days. Donor Site Anesthesia Type: same as repair anesthesia Graft Donor Site Dermal Sutures (Optional): 4-0 Monocryl Graft Donor Site Epidermal Sutures (Optional): Dermabond Epidermal Closure Graft Donor Site (Optional): running Graft Donor Site Bandage (Optional-Leave Blank If You Don't Want In Note): Pressure bandage was applied to the donor site. Closure 2 Information: This tab is for additional flaps and grafts, including complex repair and grafts and complex repair and flaps. You can also specify a different location for the additional defect, if the location is the same you do not need to select a new one. We will insert the automated text for the repair you select below just as we do for solitary flaps and grafts. Please note that at this time if you select a location with a different insurance zone you will need to override the ICD10 and CPT if appropriate. Closure 3 Information: This tab is for additional flaps and grafts above and beyond our usual structured repairs.  Please note if you enter information here it will not currently bill and you will need to add the billing information manually. Wound Care: Aquaphor Dressing: pressure dressing Wound Care (No Sutures): Petrolatum Dressing (No Sutures): dry sterile dressing Unna Boot Text: An Unna boot was placed to help immobilize the limb and facilitate more rapid healing. Home Suture Removal Text: Patient was provided instructions on removing sutures and will remove their sutures at home.  If they have any questions or difficulties they will call the office. Post-Care Instructions: We reviewed with the patient in detail post-care instructions. Patient is not to engage in any heavy lifting, exercise, or swimming for the next 7 days. Should the patient develop any fevers, chills, increasing redness/swelling, bleeding, severe pain patient will contact the office immediately. Pain Refusal Text: I offered to prescribe pain medication but the patient refused to take this medication. Mauc Instructions: By selecting yes to the question below the MAUC number will be added into the note.  This will be calculated automatically based on the diagnosis chosen, the size entered, the body zone selected (H,M,L) and the specific indications you chose. You will also have the option to override the Mohs AUC if you disagree with the automatically calculated number and this option is found in the Case Summary tab. Where Do You Want The Question To Include Opioid Counseling Located?: Case Summary Tab Eye Protection Verbiage: Before proceeding with the stage, a plastic scleral shield was inserted. The globe was anesthetized with a few drops of 1% lidocaine with 1:100,000 epinephrine. Then, an appropriate sized scleral shield was chosen and coated with lacrilube ointment. The shield was gently inserted and left in place for the duration of each stage. After the stage was completed, the shield was gently removed. Mohs Method Verbiage: An incision at a 45 degree angle following the standard Mohs approach was done and the specimen was harvested as a microscopic controlled layer.  All histological findings, and if present, perineural invasion or presence of scar is noted on the Mohs map. Surgeon/Pathologist Verbiage (Will Incorporate Name Of Surgeon From Intro If Not Blank): operated in two distinct and integrated capacities as the surgeon and pathologist. Mohs Histo Method Verbiage: Each section was then chromacoded and processed in the Mohs lab using the Mohs protocol and submitted for tissue processing. Subsequent Stages Histo Method Verbiage: Using a similar technique to that described above, a thin layer of tissue was removed from all areas where tumor was visible on the previous stage.  The tissue was again oriented, mapped, dyed, and processed as above. Mohs Rapid Report Verbiage: The area of clinically evident tumor was marked with skin marking ink and appropriately hatched.  The initial incision was made following the Mohs approach through the skin.  The specimen was taken to the lab, divided into the necessary number of pieces, chromacoded and processed according to the Mohs protocol.  This was repeated in successive stages until a tumor free defect was achieved. Complex Repair Preamble Text (Leave Blank If You Do Not Want): Extensive wide undermining was performed. Intermediate Repair Preamble Text (Leave Blank If You Do Not Want): Undermining was performed with blunt dissection. Graft Cartilage Fenestration Text: The cartilage was fenestrated with a 1 or 2mm punch biopsy to help facilitate graft survival and healing. Non-Graft Cartilage Fenestration Text: The cartilage was fenestrated with a 1 or 2mm punch biopsy to help facilitate healing. Secondary Intention Text (Leave Blank If You Do Not Want): We discussed various closure modalities with the patient, including healing by second intention, primary closure, skin graft, and various flaps.  After discussion of the risks and benefits of these various options, the decision was made to allow the wound to heal by second intention. No Repair - Repaired With Adjacent Surgical Defect Text (Leave Blank If You Do Not Want): After obtaining clear surgical margins the defect was repaired concurrently with another surgical defect which was in close approximation. Adjacent Tissue Transfer Text: The defect edges were debeveled with a #15 scalpel blade.  Given the location of the defect and the proximity to free margins an adjacent tissue transfer was deemed most appropriate.  Using a sterile surgical marker, an appropriate flap was drawn incorporating the defect and placing the expected incisions within the relaxed skin tension lines where possible.    The area thus outlined was incised deep to adipose tissue with a #15 scalpel blade.  The skin margins were undermined to an appropriate distance in all directions utilizing iris scissors. Advancement Flap (Single) Text: We discussed various closure modalities with the patient, including healing by second intention, primary closure, skin graft and various flaps.  The location and configuration of the defect indicated that an advancement flap would result in the least disturbance of the position and function of the surrounding anatomic structures, and provide the best result.  The technique, its benefits, alternatives and risks were discussed with the patient.  The patient underwent the procedure as follows: The patient was positioned supine on the operating room table.  The area of the defect and the surrounding skin were anesthetized with buffered 1% lidocaine with epinephrine.  The area was washed with chlorhexidine.  Sterile drapes were applied. \\n\\nThe wound edges were debeveled and extensively undermined.  An advancement flap was designed, incised, and elevated.  Tissue was advanced and carried over to close the defect.  Hemostasis was achieved with spot electrodesiccation.  The flap was advanced into position to cover the primary defect and a key suture was used to secure the flap into place.  Additional buried interrupted sutures were used to close the arms of the flap by the rule of halves to share out the unequal lengths.  The standing cones so created by the design of the flap was removed to fat by triangulation.  Final cutaneous approximation was achieved.  The closure was manually tested and found to be sound for strength and hemostasis. Advancement Flap (Double) Text: We discussed various closure modalities with the patient, including healing by second intention, primary closure, skin graft and various flaps.  The location and configuration of the defect indicated that a double advancement flap would result in the least disturbance of the position and function of the surrounding anatomic structures, and provide the best result.  The technique, its benefits, alternatives and risks were discussed with the patient.  The patient underwent the procedure as follows: The patient was positioned supine on the operating room table.  The area of the defect and the surrounding skin were anesthetized with buffered 1% lidocaine with epinephrine.  The area was washed with chlorhexidine.  Sterile drapes were applied. \\n\\nThe wound edges were debeveled and extensively undermined.  A double advancement flap was designed, incised, and elevated. Tissue was advanced and carried over to close the defect.  Hemostasis was achieved with spot electrodesiccation.  The flap was advanced into position to cover the primary defect and a key suture was used to secure the flap into place.  Additional buried interrupted sutures were used to close the arms of the flap by the rule of halves to share out the unequal lengths.  The standing cones so created by the design of the flap was removed to fat by triangulation.  Final cutaneous approximation was achieved.  The closure was manually tested and found to be sound for strength and hemostasis. Advancement-Rotation Flap Text: The defect edges were debeveled with a #15 scalpel blade.  Given the location of the defect, shape of the defect and the proximity to free margins an advancement-rotation flap was deemed most appropriate.  Using a sterile surgical marker, an appropriate flap was drawn incorporating the defect and placing the expected incisions within the relaxed skin tension lines where possible. The area thus outlined was incised deep to adipose tissue with a #15 scalpel blade.  The skin margins were undermined to an appropriate distance in all directions utilizing iris scissors. Alar Island Pedicle Flap Text: The defect edges were debeveled with a #15 scalpel blade.  Given the location of the defect, shape of the defect and the proximity to the alar rim an island pedicle advancement flap was deemed most appropriate.  Using a sterile surgical marker, an appropriate advancement flap was drawn incorporating the defect, outlining the appropriate donor tissue and placing the expected incisions within the nasal ala running parallel to the alar rim. The area thus outlined was incised with a #15 scalpel blade.  The skin margins were undermined minimally to an appropriate distance in all directions around the primary defect and laterally outward around the island pedicle utilizing iris scissors.  There was minimal undermining beneath the pedicle flap. A-T Advancement Flap Text: The defect edges were debeveled with a #15 scalpel blade.  Given the location of the defect, shape of the defect and the proximity to free margins an A-T advancement flap was deemed most appropriate.  Using a sterile surgical marker, an appropriate advancement flap was drawn incorporating the defect and placing the expected incisions within the relaxed skin tension lines where possible.    The area thus outlined was incised deep to adipose tissue with a #15 scalpel blade.  The skin margins were undermined to an appropriate distance in all directions utilizing iris scissors. Banner Transposition Flap Text: The defect edges were debeveled with a #15 scalpel blade.  Given the location of the defect and the proximity to free margins a Banner transposition flap was deemed most appropriate.  Using a sterile surgical marker, an appropriate flap drawn around the defect. The area thus outlined was incised deep to adipose tissue with a #15 scalpel blade.  The skin margins were undermined to an appropriate distance in all directions utilizing iris scissors. Bilateral Helical Rim Advancement Flap Text: We discussed various closure modalities with the patient, including healing by second intention, primary closure, skin graft and various flaps.  The location and configuration of the defect indicated that a bilateral (double) helical rim advancement flap would result in the least disturbance of the position and function of the surrounding anatomic structures, and provide the best result.  The technique, its benefits, alternatives and risks were discussed with the patient.  The patient underwent the procedure as follows: The patient was positioned supine on the operating room table.  The area of the defect and the surrounding skin were anesthetized with buffered 1% lidocaine with epinephrine.  The area was washed with chlorhexidine.  Sterile drapes were applied. \\n\\nThe flap was designed, incised and elevated at the rim of the helix.  Hemostasis was achieved with spot electrodesiccation.  Tissue was carried over to close the defect.  The flap was advanced into position to cover the primary defect and a key suture was used to secure the flap into place.  Additional buried interrupted sutures were used to close the flap.  The standing cones so created by the design of the flap was removed to fat by triangulation.  Final cutaneous approximation was achieved.  The closure was manually tested and found to be sound for strength and hemostasis. Bilateral Rotation Flap Text: The defect edges were debeveled with a #15 scalpel blade. Given the location of the defect, shape of the defect and the proximity to free margins a bilateral rotation flap was deemed most appropriate. Using a sterile surgical marker, an appropriate rotation flap was drawn incorporating the defect and placing the expected incisions within the relaxed skin tension lines where possible. The area thus outlined was incised deep to adipose tissue with a #15 scalpel blade. The skin margins were undermined to an appropriate distance in all directions utilizing iris scissors. Following this, the designed flap was rotated and carried over into the primary defect and sutured into place. Bilobed Flap Text: We discussed various closure modalities with the patient, including healing by second intention, primary closure, skin graft and various flaps.  The location and configuration of the defect indicated that a bilobed transposition flap would result in the least disturbance of the position and function of the surrounding anatomic structures, and provide the best result.  The technique, its benefits, alternatives and risks were discussed with the patient.  The patient underwent the procedure as follows: The patient was positioned supine on the operating room table.  The area of the defect and the surrounding skin were anesthetized with buffered 1% lidocaine with epinephrine.  The area was washed with chlorhexidine.  Sterile drapes were applied. \\n\\nThe wound edges were debeveled and extensively undermined.  A bilobed transposition flap was designed, incised, and elevated.  Hemostasis was achieved with spot electrodesiccation.  The tertiary defect that was created in design of the flap was closed using buried interrupted sutures.  Tissue was carried over to close the defect.  The flap was then transposed to cover the primary defect, trimmed to more accurately fit the defect, and secured in position.  The standing cone so created was removed to fat by triangulation.  The 2nd lobe of the flap was trimmed to fit the secondary defect created in the flap design.  Additional buried interrupted sutures were used to achieve dermal wound apposition and secure the flap in place.  Final cutaneous approximation was achieved with running epidermal sutures.  The closure was manually tested and found to be sound for strength and hemostasis. Bi-Rhombic Flap Text: We discussed various closure modalities with the patient, including healing by second intention, primary closure, skin graft and various flaps.  The location and configuration of the defect indicated that a double rhombic transposition flap would result in the least disturbance of the position and function of the surrounding anatomic structures, and provide the best result.  The technique, its benefits, alternatives and risks were discussed with the patient.  The patient underwent the procedure as follows: The patient was positioned supine on the operating room table.  The area of the defect and the surrounding skin were anesthetized with buffered 1% lidocaine with epinephrine.  The area was washed with chlorhexidine.  Sterile drapes were applied. \\n\\nThe wound edges were debeveled and extensively undermined.  A double rhombic transposition flap was designed, incised, and elevated.  Hemostasis was achieved with spot electrodesiccation.  The flaps were transposed into position to cover the primary defect and a key suture was used to secure the flap into place.  Additional buried interrupted sutures were used to close each flap.  The standing cones so created by the design of the flap was removed to fat by triangulation.  Final cutaneous approximation was achieved.  The closure was manually tested and found to be sound for strength and hemostasis. Burow's Advancement Flap Text: We discussed various closure modalities with the patient, including healing by second intention, primary closure, skin graft and various flaps.  The location and configuration of the defect indicated that a Burow's advancement flap would result in the \\nleast disturbance of the position and function of the surrounding anatomic structures, and provide the best result.  The technique, its benefits, alternatives and risks were discussed with the patient.  The patient underwent the procedure as follows: The patient was positioned supine on the operating room table.  The area of the defect and the surrounding skin were anesthetized with buffered 1% lidocaine with epinephrine.  The area was washed with chlorhexidine.  Sterile drapes were applied. \\n\\nThe wound edges were debeveled and extensively undermined.  A Burow's advancement flap was designed, incised, and elevated. Tissue was carried over and advanced to close the defect. Hemostasis was achieved with spot electrodesiccation.  The flap was advanced into position to cover the primary defect and a key suture was used to secure the flap into place.  Additional buried interrupted sutures were used to close the arms of the flap by the rule of halves to share out the unequal lengths.  The standing cones so created by the design of the flap was removed to fat by triangulation.  Final cutaneous approximation was achieved.  The closure was manually tested and found to be sound for strength and hemostasis. Chonodrocutaneous Helical Advancement Flap Text: The defect edges were debeveled with a #15 scalpel blade.  Given the location of the defect and the proximity to free margins a chondrocutaneous helical advancement flap was deemed most appropriate.  Using a sterile surgical marker, the appropriate advancement flap was drawn incorporating the defect and placing the expected incisions within the relaxed skin tension lines where possible.    The area thus outlined was incised deep to adipose tissue with a #15 scalpel blade.  The skin margins were undermined to an appropriate distance in all directions utilizing iris scissors. Crescentic Advancement Flap Text: The defect edges were debeveled with a #15 scalpel blade.  Given the location of the defect and the proximity to free margins a crescentic advancement flap was deemed most appropriate.  Using a sterile surgical marker, the appropriate advancement flap was drawn incorporating the defect and placing the expected incisions within the relaxed skin tension lines where possible.    The area thus outlined was incised deep to adipose tissue with a #15 scalpel blade.  The skin margins were undermined to an appropriate distance in all directions utilizing iris scissors. Dorsal Nasal Flap Text: The defect edges were debeveled with a #15 scalpel blade.  Given the location of the defect and the proximity to free margins a dorsal nasal flap was deemed most appropriate.  Using a sterile surgical marker, an appropriate dorsal nasal flap was drawn around the defect.    The area thus outlined was incised deep to adipose tissue with a #15 scalpel blade.  The skin margins were undermined to an appropriate distance in all directions utilizing iris scissors. Double Island Pedicle Flap Text: We discussed various closure modalities with the patient, including healing by second intention, primary closure, skin graft and various flaps.  The location and configuration of the defect indicated that a double or bilateral island pedicle flap would result in the least disturbance of the position and function of the surrounding anatomic structures, and provide the best result.  The technique, its benefits, alternatives and risks were discussed with the patient.  The patient underwent the procedure as follows: The patient was positioned supine on the operating room table.  The area of the defect and the surrounding skin were anesthetized with buffered 1% lidocaine with epinephrine.  The area was washed with chlorhexidine.  Sterile drapes were applied. \\n\\nThe wound edges were debeveled and extensively undermined.    \\nA double island pedicle flap was designed and incised down to subcutaneous fat, severing the flap entirely from surrounding epidermal and dermal attachments.  Careful deep undermining was performed to create a single subcutaneous pedicle on either side of the defect.  The deepest portion of each subcutaneous pedicle was angled toward the primary defect in order to create hinge-mobility for advancement of the flap.  Extreme care was taken to preserve a portion of the axial plexus to optimize adequate vascular supply to the flap.  Hemostasis was achieved with spot electrodesiccation.  The double island pedicle flaps were advanced into position to cover the primary defect and a key suture was placed to close the secondary defect.  The flap was carried over to close the defect.  The flap was trimmed to fit the contour of the primary defect.  Additional buried interrupted sutures were used to secure the flap into place and close the secondary defect created by the flap advancement.  Final cutaneous approximation was achieved.  The closure was manually tested and found to be sound for strength and hemostasis. Double O-Z Flap Text: The defect edges were debeveled with a #15 scalpel blade.  Given the location of the defect, shape of the defect and the proximity to free margins a Double O-Z flap was deemed most appropriate.  Using a sterile surgical marker, an appropriate transposition flap was drawn incorporating the defect and placing the expected incisions within the relaxed skin tension lines where possible. The area thus outlined was incised deep to adipose tissue with a #15 scalpel blade.  The skin margins were undermined to an appropriate distance in all directions utilizing iris scissors. Double O-Z Plasty Text: We discussed various closure modalities with the patient, including healing by second intention, primary closure, skin graft and various flaps.  The location and configuration of the defect indicated that a double O-Z rotation flap would result in the least disturbance of the position and function of the surrounding anatomic structures, and provide the best result.  The technique, its benefits, alternatives and risks were discussed with the patient.  The patient underwent the procedure as follows: The patient was positioned supine on the operating room table.  The area of the defect and the surrounding skin were anesthetized with buffered 1% lidocaine with epinephrine.  The area was washed with chlorhexidine.  Sterile drapes were applied. \\n\\nThe defect edges were debeveled with a #15 scalpel blade.  Given the location of the defect, shape of the defect and the proximity to free margins a Double O-Z plasty (double transposition flap) was deemed most appropriate.  Using a sterile surgical marker, the appropriate transposition flaps were drawn incorporating the defect and placing the expected incisions within the relaxed skin tension lines where possible. The area thus outlined was incised deep to adipose tissue with a #15 scalpel blade.  The skin margins were undermined to an appropriate distance in all directions utilizing iris scissors.  Hemostasis was achieved with electrocautery.  The flaps were then transposed into place, one clockwise and the other counterclockwise, and anchored with interrupted buried subcutaneous sutures. Double Z Plasty Text: The lesion was extirpated to the level of the fat with a #15 scalpel blade. Given the location of the defect, shape of the defect and the proximity to free margins a double Z-plasty was deemed most appropriate for repair. Using a sterile surgical marker, the appropriate transposition arms of the double Z-plasty were drawn incorporating the defect and placing the expected incisions within the relaxed skin tension lines where possible. The area thus outlined was incised deep to adipose tissue with a #15 scalpel blade. The skin margins were undermined to an appropriate distance in all directions utilizing iris scissors. The opposing transposition arms were then transposed and carried over into place in opposite direction and anchored with interrupted buried subcutaneous sutures. Ear Star Wedge Flap Text: The defect edges were debeveled with a #15 blade scalpel.  Given the location of the defect and the proximity to free margins (helical rim) an ear star wedge flap was deemed most appropriate.  Using a sterile surgical marker, the appropriate flap was drawn incorporating the defect and placing the expected incisions between the helical rim and antihelix where possible.  The area thus outlined was incised through and through with a #15 scalpel blade. Flip-Flop Flap Text: The defect edges were debeveled with a #15 blade scalpel.  Given the location of the defect and the proximity to free margins a flip-flop flap was deemed most appropriate. Using a sterile surgical marker, the appropriate flap was drawn incorporating the defect and placing the expected incisions between the helical rim and antihelix where possible.  The area thus outlined was incised through and through with a #15 scalpel blade. Following this, the designed flap was carried over into the primary defect and sutured into place. Hatchet Flap Text: The defect edges were debeveled with a #15 scalpel blade.  Given the location of the defect, shape of the defect and the proximity to free margins a hatchet flap was deemed most appropriate.  Using a sterile surgical marker, an appropriate hatchet flap was drawn incorporating the defect and placing the expected incisions within the relaxed skin tension lines where possible.    The area thus outlined was incised deep to adipose tissue with a #15 scalpel blade.  The skin margins were undermined to an appropriate distance in all directions utilizing iris scissors. Helical Rim Advancement Flap Text: We discussed various closure modalities with the patient, including healing by second intention, primary closure, skin graft and various flaps.  The location and configuration of the defect indicated that a helical rim advancement flap would result in the least disturbance of the position and function of the surrounding anatomic structures, and provide the best result.  The technique, its benefits, alternatives and risks were discussed with the patient.  The patient underwent the procedure as follows: The patient was positioned supine on the operating room table.  The area of the defect and the surrounding skin were anesthetized with buffered 1% lidocaine with epinephrine.  The area was washed with chlorhexidine.  Sterile drapes were applied. \\n\\nThe flap was designed, incised and elevated at the rim of the helix.  Hemostasis was achieved with spot electrodesiccation.  The flap was advanced into position to cover the primary defect and a key suture was used to secure the flap into place.  Tissue was carried over to close the defect.  Additional buried interrupted sutures were used to close the flap.  The standing cones so created by the design of the flap was removed to fat by triangulation.  Final cutaneous approximation was achieved.  The closure was manually tested and found to be sound for strength and hemostasis. H Plasty Text: Given the location of the defect, shape of the defect and the proximity to free margins a H-plasty was deemed most appropriate for repair.  Using a sterile surgical marker, the appropriate advancement arms of the H-plasty were drawn incorporating the defect and placing the expected incisions within the relaxed skin tension lines where possible. The area thus outlined was incised deep to adipose tissue with a #15 scalpel blade. The skin margins were undermined to an appropriate distance in all directions utilizing iris scissors.  The opposing advancement arms were then advanced into place in opposite direction and anchored with interrupted buried subcutaneous sutures. Island Pedicle Flap Text: We discussed various closure modalities with the patient, including healing by second intention, primary closure, skin graft and various flaps.  The location and configuration of the defect indicated that a island pedicle flap would result in the least disturbance of the position and function of the surrounding anatomic structures, and provide the best result.  The technique, its benefits, alternatives and risks were discussed with the patient.  The patient underwent the procedure as follows: The patient was positioned supine on the operating room table.  The area of the defect and the surrounding skin were anesthetized with buffered 1% lidocaine with epinephrine.  The area was washed with chlorhexidine.  Sterile drapes were applied. \\n\\nThe wound edges were debeveled and extensively undermined.    \\nAn island pedicle flap was designed and incised down to subcutaneous fat, severing the flap entirely from surrounding epidermal and dermal attachments.  Careful deep undermining was performed to create a single subcutaneous pedicle.  The deepest portion of the subcutaneous pedicle was angled toward the primary defect in order to create hinge-mobility for advancement of the flap.  Extreme care was taken to preserve a portion of the axial plexus to optimize adequate vascular supply to the flap.  Hemostasis was achieved with spot electrodesiccation.  The island pedicle flap was advanced into position to cover the primary defect and a key suture was placed to close the secondary defect.  The flap was carried over to close the defect.  The flap was trimmed to fit the contour of the primary defect.  Additional buried interrupted sutures were used to secure the flap into place and close the secondary defect created by the flap advancement.  Final cutaneous approximation was achieved.  The closure was manually tested and found to be sound for strength and hemostasis. Island Pedicle Flap With Canthal Suspension Text: The defect edges were debeveled with a #15 scalpel blade.  Given the location of the defect, shape of the defect and the proximity to free margins an island pedicle advancement flap was deemed most appropriate.  Using a sterile surgical marker, an appropriate advancement flap was drawn incorporating the defect, outlining the appropriate donor tissue and placing the expected incisions within the relaxed skin tension lines where possible. The area thus outlined was incised deep to adipose tissue with a #15 scalpel blade.  The skin margins were undermined to an appropriate distance in all directions around the primary defect and laterally outward around the island pedicle utilizing iris scissors.  There was minimal undermining beneath the pedicle flap. A suspension suture was placed in the canthal tendon to prevent tension and prevent ectropion. Island Pedicle Flap-Requiring Vessel Identification Text: The defect edges were debeveled with a #15 scalpel blade.  Given the location of the defect, shape of the defect and the proximity to free margins an island pedicle advancement flap was deemed most appropriate.  Using a sterile surgical marker, an appropriate advancement flap was drawn, based on the axial vessel mentioned above, incorporating the defect, outlining the appropriate donor tissue and placing the expected incisions within the relaxed skin tension lines where possible.    The area thus outlined was incised deep to adipose tissue with a #15 scalpel blade.  The skin margins were undermined to an appropriate distance in all directions around the primary defect and laterally outward around the island pedicle utilizing iris scissors.  There was minimal undermining beneath the pedicle flap. Keystone Flap Text: We discussed various closure modalities with the patient, including healing by second intention, primary closure, skin graft and various flaps.  The location and configuration of the defect indicated that a Keystone flap would result in the least disturbance of the position and function of the surrounding anatomic structures, and provide the best result.  The technique, its benefits, alternatives and risks were discussed with the patient.  The patient underwent the procedure as follows: The patient was positioned supine on the operating room table.  The area of the defect and the surrounding skin were anesthetized with buffered 1% lidocaine with epinephrine.  The area was washed with chlorhexidine.  Sterile drapes were applied. \\n\\nThe defect edges were debeveled with a #15 scalpel blade.  Given the location of the defect, shape of the defect a keystone flap was deemed most appropriate.  Using a sterile surgical marker, an appropriate keystone flap was drawn incorporating the defect, outlining the appropriate donor tissue and placing the expected incisions within the relaxed skin tension lines where possible. The area thus outlined was incised deep to adipose tissue with a #15 scalpel blade.  The skin margins were undermined to an appropriate distance in all directions around the primary defect and laterally outward around the flap utilizing iris scissors.  The flap was carried over to close the defect. Melolabial Transposition Flap Text: We discussed various closure modalities with the patient, including healing by second intention, primary closure, skin graft and various flaps.  The location and configuration of the defect indicated that Cheek to nose (Melolabial) Transposition flap would result in the least disturbance of the position and function of the surrounding anatomic structures, and provide the best result.  The technique, its benefits, alternatives and risks were discussed with the patient.  The patient underwent the procedure as follows: The patient was positioned supine on the operating room table.  The area of the defect and the surrounding skin were anesthetized with buffered 1% lidocaine with epinephrine.  The area was washed with chlorhexidine.  Sterile drapes were applied. \\n\\nThe wound edges were debeveled and extensively undermined.  Melolabbial transposition flap was designed, incised, and elevated.  Hemostasis was achieved with spot electrodesiccation.  The flap was transposed into position to cover the primary defect and a key suture was used to secure the flap into place.  Tissue was carried over to close the defect.  Additional buried interrupted sutures were used to close the flap.  The standing cones so created by the design of the flap was removed to fat by triangulation.  Final cutaneous approximation was achieved.  The closure was manually tested and found to be sound for strength and hemostasis.\\n\\nThe defect edges were debeveled with a #15 scalpel blade.  Given the location of the defect and the proximity to free margins a melolabial flap was deemed most appropriate.  Using a sterile surgical marker, an appropriate melolabial transposition flap was drawn incorporating the defect.    The area thus outlined was incised deep to adipose tissue with a #15 scalpel blade.  The skin margins were undermined to an appropriate distance in all directions utilizing iris scissors. Mercedes Flap Text: We discussed various closure modalities with the patient, including healing by second intention, primary closure, skin graft and various flaps.  The location and configuration of the defect indicated that a three-point advancement flap (Mercedes) would result in the least disturbance of the position and function of the surrounding anatomic structures, and provide the best result.  The technique, its benefits, alternatives and risks were discussed with the patient.  The patient underwent the procedure as follows: The patient was positioned supine on the operating room table.  The area of the defect and the surrounding skin were anesthetized with buffered 1% lidocaine with epinephrine.  The area was washed with chlorhexidine.  Sterile drapes were applied. \\n\\nThe wound edges were debeveled and extensively undermined.  A three point advancement flap was designed, incised, and elevated.  Hemostasis was achieved with spot electrodesiccation.  Each point of the flap was advanced into position to cover the primary defect and a key suture was used to secure the flap into place.  Tissue was carried over to close the defect.  Additional buried interrupted sutures were used to close the arms of the flap by the rule of halves to share out the unequal lengths.  The standing cones so created by the design of the flap was removed to fat by triangulation.  Final cutaneous approximation was achieved.  The closure was manually tested and found to be sound for strength and hemostasis. Modified Advancement Flap Text: The defect edges were debeveled with a #15 scalpel blade.  Given the location of the defect, shape of the defect and the proximity to free margins a modified advancement flap was deemed most appropriate.  Using a sterile surgical marker, an appropriate advancement flap was drawn incorporating the defect and placing the expected incisions within the relaxed skin tension lines where possible.    The area thus outlined was incised deep to adipose tissue with a #15 scalpel blade.  The skin margins were undermined to an appropriate distance in all directions utilizing iris scissors. Mucosal Advancement Flap Text: We discussed various closure modalities with the patient, including healing by second intention, primary closure, skin graft and various flaps.  The location and configuration of the defect indicated that a mucosal advancement flap would result in the least disturbance of the position and function of the surrounding anatomic structures, and provide the best result.  The technique, its benefits, alternatives and risks were discussed with the patient.  The patient underwent the procedure as follows: The patient was positioned supine on the operating room table.  The area of the defect and the surrounding skin were anesthetized with buffered 1% lidocaine with epinephrine.  The area was washed with chlorhexidine.  Sterile drapes were applied. \\n\\nThe wound edges were debeveled and extensively undermined.  A mucosal flap was designed, incised, and elevated.  Hemostasis was achieved with spot electrodesiccation.  The flap was advanced into position to cover the primary defect and a key suture was used to secure the flap into place.  Additional buried interrupted sutures were used to close the remainder of the flap.  The standing cones so created by the design of the flap was removed to fat by triangulation.  Final cutaneous approximation was achieved.  The closure was manually tested and found to be sound for strength and hemostasis. Muscle Hinge Flap Text: The defect edges were debeveled with a #15 scalpel blade.  Given the size, depth and location of the defect and the proximity to free margins a muscle hinge flap was deemed most appropriate.  Using a sterile surgical marker, an appropriate hinge flap was drawn incorporating the defect. The area thus outlined was incised with a #15 scalpel blade.  The skin margins were undermined to an appropriate distance in all directions utilizing iris scissors. Mustarde Flap Text: The defect edges were debeveled with a #15 scalpel blade.  Given the size, depth and location of the defect and the proximity to free margins a Mustarde flap was deemed most appropriate.  Using a sterile surgical marker, an appropriate flap was drawn incorporating the defect. The area thus outlined was incised with a #15 scalpel blade.  The skin margins were undermined to an appropriate distance in all directions utilizing iris scissors. Nasal Turnover Hinge Flap Text: The defect edges were debeveled with a #15 scalpel blade.  Given the size, depth, location of the defect and the defect being full thickness a nasal turnover hinge flap was deemed most appropriate.  Using a sterile surgical marker, an appropriate hinge flap was drawn incorporating the defect. The area thus outlined was incised with a #15 scalpel blade. The flap was designed to recreate the nasal mucosal lining and the alar rim. The skin margins were undermined to an appropriate distance in all directions utilizing iris scissors. Nasalis-Muscle-Based Myocutaneous Island Pedicle Flap Text: Using a #15 blade, an incision was made around the donor flap to the level of the nasalis muscle. Wide lateral undermining was then performed in both the subcutaneous plane above the nasalis muscle, and in a submuscular plane just above periosteum. This allowed the formation of a free nasalis muscle axial pedicle (based on the angular artery) which was still attached to the actual cutaneous flap, increasing its mobility and vascular viability. Hemostasis was obtained with pinpoint electrocoagulation. The flap was mobilized and tunnelled into position and the pivotal anchor points positioned and stabilized with buried interrupted sutures. Subcutaneous and dermal tissues were closed in a multilayered fashion with sutures. Tissue redundancies were excised, and the epidermal edges were apposed without significant tension and sutured with sutures.  The primary and secondary sites were closed with subcutaneous, dermal and epidermal sutures. Nasalis Myocutaneous Flap Text: Using a #15 blade, an incision was made around the donor flap to the level of the nasalis muscle. Wide lateral undermining was then performed in both the subcutaneous plane above the nasalis muscle, and in a submuscular plane just above periosteum. This allowed the formation of a free nasalis muscle axial pedicle which was still attached to the actual cutaneous flap, increasing its mobility and vascular viability. Hemostasis was obtained with pinpoint electrocoagulation. The flap was mobilized into position and the pivotal anchor points positioned and stabilized with buried interrupted sutures. Subcutaneous and dermal tissues were closed in a multilayered fashion with sutures. Tissue redundancies were excised, and the epidermal edges were apposed without significant tension and sutured with sutures. Nasolabial Transposition Flap Text: The defect edges were debeveled with a #15 scalpel blade.  Given the size, depth and location of the defect and the proximity to free margins a nasolabial transposition flap was deemed most appropriate. Using a sterile surgical marker, an appropriate flap was drawn incorporating the defect. The area thus outlined was incised with a #15 scalpel blade. The skin margins were undermined to an appropriate distance in all directions utilizing iris scissors. Following this, the designed flap was carried into the primary defect and sutured into place. Orbicularis Oris Muscle Flap Text: The defect edges were debeveled with a #15 scalpel blade.  Given that the defect affected the competency of the oral sphincter an obicularis oris muscle flap was deemed most appropriate to restore this competency and normal muscle function.  Using a sterile surgical marker, an appropriate flap was drawn incorporating the defect. The area thus outlined was incised with a #15 scalpel blade. O-T Advancement Flap Text: We discussed various closure modalities with the patient, including healing by second intention, primary closure, skin graft and various flaps.  The location and configuration of the defect indicated that an O-T advancement flap would result in the least disturbance of the position and function of the surrounding anatomic structures, and provide the best result.  The technique, its benefits, alternatives and risks were discussed with the patient.  The patient underwent the procedure as follows: The patient was positioned supine on the operating room table.  The area of the defect and the surrounding skin were anesthetized with buffered 1% lidocaine with epinephrine.  The area was washed with chlorhexidine.  Sterile drapes were applied. \\n\\nThe wound edges were debeveled and extensively undermined.  A O-T advancement flap was designed, incised, and elevated.  Tissue was advanced and carried over to close the defect. Hemostasis was achieved with spot electrodesiccation.  The flap was advanced into position to cover the primary defect and a key suture was used to secure the flap into place.  Additional buried interrupted sutures were used to close the arms of the flap by the rule of halves to share out the unequal lengths.  The standing cones so created by the design of the flap was removed to fat by triangulation.  Final cutaneous approximation was achieved.  The closure was manually tested and found to be sound for strength and hemostasis. O-T Plasty Text: The defect edges were debeveled with a #15 scalpel blade.  Given the location of the defect, shape of the defect and the proximity to free margins an O-T plasty was deemed most appropriate.  Using a sterile surgical marker, an appropriate O-T plasty was drawn incorporating the defect and placing the expected incisions within the relaxed skin tension lines where possible.    The area thus outlined was incised deep to adipose tissue with a #15 scalpel blade.  The skin margins were undermined to an appropriate distance in all directions utilizing iris scissors. O-L Flap Text: We discussed various closure modalities with the patient, including healing by second intention, primary closure, skin graft and various flaps.  The location and configuration of the defect indicated that an O-L advancement flap would result in the least disturbance of the position and function of the surrounding anatomic structures, and provide the best result.  The technique, its benefits, alternatives and risks were discussed with the patient.  The patient underwent the procedure as follows: The patient was positioned supine on the operating room table.  The area of the defect and the surrounding skin were anesthetized with buffered 1% lidocaine with epinephrine.  The area was washed with chlorhexidine.  Sterile drapes were applied. \\n\\nThe wound edges were debeveled and extensively undermined.  A O-L advancement flap was designed, incised, and elevated.  Tissue was advanced and carried over to close the defect. Hemostasis was achieved with spot electrodesiccation.  The flap was advanced into position to cover the primary defect and a key suture was used to secure the flap into place.  Additional buried interrupted sutures were used to close the arms of the flap by the rule of halves to share out the unequal lengths.  The standing cones so created by the design of the flap was removed to fat by triangulation.  Final cutaneous approximation was achieved.  The closure was manually tested and found to be sound for strength and hemostasis. O-Z Flap Text: The defect edges were debeveled with a #15 scalpel blade.  Given the location of the defect, shape of the defect and the proximity to free margins an O-Z flap was deemed most appropriate.  Using a sterile surgical marker, an appropriate transposition flap was drawn incorporating the defect and placing the expected incisions within the relaxed skin tension lines where possible. The area thus outlined was incised deep to adipose tissue with a #15 scalpel blade.  The skin margins were undermined to an appropriate distance in all directions utilizing iris scissors. O-Z Plasty Text: The defect edges were debeveled with a #15 scalpel blade.  Given the location of the defect, shape of the defect and the proximity to free margins an O-Z plasty (double transposition flap) was deemed most appropriate.  Using a sterile surgical marker, the appropriate transposition flaps were drawn incorporating the defect and placing the expected incisions within the relaxed skin tension lines where possible.    The area thus outlined was incised deep to adipose tissue with a #15 scalpel blade.  The skin margins were undermined to an appropriate distance in all directions utilizing iris scissors.  Hemostasis was achieved with electrocautery.  The flaps were then transposed into place, one clockwise and the other counterclockwise, and anchored with interrupted buried subcutaneous sutures. Peng Advancement Flap Text: The defect edges were debeveled with a #15 scalpel blade.  Given the location of the defect, shape of the defect and the proximity to free margins a Peng advancement flap was deemed most appropriate.  Using a sterile surgical marker, an appropriate advancement flap was drawn incorporating the defect and placing the expected incisions within the relaxed skin tension lines where possible. The area thus outlined was incised deep to adipose tissue with a #15 scalpel blade.  The skin margins were undermined to an appropriate distance in all directions utilizing iris scissors. Rectangular Flap Text: The defect edges were debeveled with a #15 scalpel blade. Given the location of the defect and the proximity to free margins a rectangular flap was deemed most appropriate. Using a sterile surgical marker, an appropriate rectangular flap was drawn incorporating the defect. The area thus outlined was incised deep to adipose tissue with a #15 scalpel blade. The skin margins were undermined to an appropriate distance in all directions utilizing iris scissors. Following this, the designed flap was carried over into the primary defect and sutured into place. Rhombic Flap Text: We discussed various closure modalities with the patient, including healing by second intention, primary closure, skin graft and various flaps.  The location and configuration of the defect indicated that a rhombic transposition flap would result in the least disturbance of the position and function of the surrounding anatomic structures, and provide the best result.  The technique, its benefits, alternatives and risks were discussed with the patient.  The patient underwent the procedure as follows: The patient was positioned supine on the operating room table.  The area of the defect and the surrounding skin were anesthetized with buffered 1% lidocaine with epinephrine.  The area was washed with chlorhexidine.  Sterile drapes were applied. \\n\\nThe wound edges were debeveled and extensively undermined.  A rhombic transposition flap was designed, incised, and elevated.  Hemostasis was achieved with spot electrodesiccation.  The flap was transposed into position to cover the primary defect and a key suture was used to secure the flap into place.  Tissue was carried over to close the defect.  Additional buried interrupted sutures were used to close the flap.  The standing cones so created by the design of the flap was removed to fat by triangulation.  Final cutaneous approximation was achieved.  The closure was manually tested and found to be sound for strength and hemostasis. Rhomboid Transposition Flap Text: The defect edges were debeveled with a #15 scalpel blade.  Given the location of the defect and the proximity to free margins a rhomboid transposition flap was deemed most appropriate.  Using a sterile surgical marker, an appropriate rhomboid flap was drawn incorporating the defect.    The area thus outlined was incised deep to adipose tissue with a #15 scalpel blade.  The skin margins were undermined to an appropriate distance in all directions utilizing iris scissors. Rotation Flap Text: We discussed various closure modalities with the patient, including healing by second intention, primary closure, skin graft and various flaps.  The location and configuration of the defect indicated that an rotation flap would result in the least disturbance of the position and function of the surrounding anatomic structures, and provide the best result.  The technique, its benefits, alternatives and risks were discussed with the patient.  The patient underwent the procedure as follows: The patient was positioned supine on the operating room table.  The area of the defect and the surrounding skin were anesthetized with buffered 1% lidocaine with epinephrine.  The area was washed with chlorhexidine.  Sterile drapes were applied. \\n\\nThe wound edges were debeveled and extensively undermined.  A rotation flap was designed, incised, and elevated.  Hemostasis was achieved with spot electrodesiccation.  The flap was rotated into position to cover the primary defect and a key suture was used to secure the flap into place.  Tissue was carried over to close the defect.  Additional buried interrupted sutures were used to close the arms of the flap by the rule of halves to share out the unequal lengths.  The standing cones so created by the design of the flap was removed to fat by triangulation.  Final cutaneous approximation was achieved.  The closure was manually tested and found to be sound for strength and hemostasis. Spiral Flap Text: The defect edges were debeveled with a #15 scalpel blade.  Given the location of the defect, shape of the defect and the proximity to free margins a spiral flap was deemed most appropriate.  Using a sterile surgical marker, an appropriate rotation flap was drawn incorporating the defect and placing the expected incisions within the relaxed skin tension lines where possible. The area thus outlined was incised deep to adipose tissue with a #15 scalpel blade.  The skin margins were undermined to an appropriate distance in all directions utilizing iris scissors. Staged Advancement Flap Text: The defect edges were debeveled with a #15 scalpel blade.  Given the location of the defect, shape of the defect and the proximity to free margins a staged advancement flap was deemed most appropriate.  Using a sterile surgical marker, an appropriate advancement flap was drawn incorporating the defect and placing the expected incisions within the relaxed skin tension lines where possible. The area thus outlined was incised deep to adipose tissue with a #15 scalpel blade.  The skin margins were undermined to an appropriate distance in all directions utilizing iris scissors. Star Wedge Flap Text: The defect edges were debeveled with a #15 scalpel blade.  Given the location of the defect, shape of the defect and the proximity to free margins a star wedge flap was deemed most appropriate.  Using a sterile surgical marker, an appropriate rotation flap was drawn incorporating the defect and placing the expected incisions within the relaxed skin tension lines where possible. The area thus outlined was incised deep to adipose tissue with a #15 scalpel blade.  The skin margins were undermined to an appropriate distance in all directions utilizing iris scissors. Transposition Flap Text: We discussed various closure modalities with the patient, including healing by second intention, primary closure, skin graft and various flaps.  The location and configuration of the defect indicated that an transposition flap would result in the least disturbance of the position and function of the surrounding anatomic structures, and provide the best result.  The technique, its benefits, alternatives and risks were discussed with the patient.  The patient underwent the procedure as follows: The patient was positioned supine on the operating room table.  The area of the defect and the surrounding skin were anesthetized with buffered 1% lidocaine with epinephrine.  The area was washed with chlorhexidine.  Sterile drapes were applied. \\n\\nThe wound edges were debeveled and extensively undermined.  A transposition flap was designed, incised, and elevated.  Hemostasis was achieved with spot electrodesiccation.  The flap was transposed into position to cover the primary defect and a key suture was used to secure the flap into place.  Tissue was carried over to close the defect.  Additional buried interrupted sutures were used to close the flap.  The standing cones so created by the design of the flap was removed to fat by triangulation.  Final cutaneous approximation was achieved.  The closure was manually tested and found to be sound for strength and hemostasis. Trilobed Flap Text: We discussed various closure modalities with the patient, including healing by second intention, primary closure, skin graft and various flaps.  The location and configuration of the defect indicated that a trilobed transposition flap would result in the least disturbance of the position and function of the surrounding anatomic structures, and provide the best result.  The technique, its benefits, alternatives and risks were discussed with the patient.  The patient underwent the procedure as follows: The patient was positioned supine on the operating room table.  The area of the defect and the surrounding skin were anesthetized with buffered 1% lidocaine with epinephrine.  The area was washed with chlorhexidine.  Sterile drapes were applied. \\n\\nThe wound edges were debeveled and extensively undermined.  A trilobed transposition flap was designed, incised, and elevated.  Hemostasis was achieved with spot electrodesiccation.  The quaterinary and tertiary defects created in design of the flap were closed using buried interrupted sutures.  The flap was then transposed to cover the primary defect, trimmed to more accurately fit the defect, and secured in position.  Tissue was carried over to close the defect. The standing cone so created was removed to fat by triangulation.  The 2nd lobe of the flap was trimmed to fit the secondary defect created in the flap design.  Additional buried interrupted sutures were used to achieve dermal wound apposition and secure the flap in place.  Final cutaneous approximation was achieved with running epidermal sutures.  The closure was manually tested and found to be sound for strength and hemostasis. V-Y Flap Text: The defect edges were debeveled with a #15 scalpel blade.  Given the location of the defect, shape of the defect and the proximity to free margins a V-Y flap was deemed most appropriate.  Using a sterile surgical marker, an appropriate advancement flap was drawn incorporating the defect and placing the expected incisions within the relaxed skin tension lines where possible.    The area thus outlined was incised deep to adipose tissue with a #15 scalpel blade.  The skin margins were undermined to an appropriate distance in all directions utilizing iris scissors. V-Y Plasty Text: The defect edges were debeveled with a #15 scalpel blade.  Given the location of the defect, shape of the defect and the proximity to free margins an V-Y advancement flap was deemed most appropriate.  Using a sterile surgical marker, an appropriate advancement flap was drawn incorporating the defect and placing the expected incisions within the relaxed skin tension lines where possible.    The area thus outlined was incised deep to adipose tissue with a #15 scalpel blade.  The skin margins were undermined to an appropriate distance in all directions utilizing iris scissors. W Plasty Text: The lesion was extirpated to the level of the fat with a #15 scalpel blade.  Given the location of the defect, shape of the defect and the proximity to free margins a W-plasty was deemed most appropriate for repair.  Using a sterile surgical marker, the appropriate transposition arms of the W-plasty were drawn incorporating the defect and placing the expected incisions within the relaxed skin tension lines where possible.    The area thus outlined was incised deep to adipose tissue with a #15 scalpel blade.  The skin margins were undermined to an appropriate distance in all directions utilizing iris scissors.  The opposing transposition arms were then transposed into place in opposite direction and anchored with interrupted buried subcutaneous sutures. Z Plasty Text: The lesion was extirpated to the level of the fat with a #15 scalpel blade.  Given the location of the defect, shape of the defect and the proximity to free margins a Z-plasty was deemed most appropriate for repair.  Using a sterile surgical marker, the appropriate transposition arms of the Z-plasty were drawn incorporating the defect and placing the expected incisions within the relaxed skin tension lines where possible.    The area thus outlined was incised deep to adipose tissue with a #15 scalpel blade.  The skin margins were undermined to an appropriate distance in all directions utilizing iris scissors.  The opposing transposition arms were then transposed into place in opposite direction and anchored with interrupted buried subcutaneous sutures. Zygomaticofacial Flap Text: Given the location of the defect, shape of the defect and the proximity to free margins a zygomaticofacial flap was deemed most appropriate for repair.  Using a sterile surgical marker, the appropriate flap was drawn incorporating the defect and placing the expected incisions within the relaxed skin tension lines where possible. The area thus outlined was incised deep to adipose tissue with a #15 scalpel blade with preservation of a vascular pedicle.  The skin margins were undermined to an appropriate distance in all directions utilizing iris scissors.  The flap was then placed into the defect and anchored with interrupted buried subcutaneous sutures. Abbe Flap (Lower To Upper Lip) Text: The defect of the upper lip was assessed and measured.  Given the location and size of the defect, an Abbe flap was deemed most appropriate.  Using a sterile surgical marker, an appropriate Abbe flap was measured and drawn on the lower lip. Local anesthesia was then infiltrated. A scalpel was then used to incise the upper lip through and through the skin, vermilion, muscle and mucosa, leaving the flap pedicled on the opposite side.  The flap was then rotated and transferred to the lower lip defect.  The flap was then sutured into place with a three layer technique, closing the orbicularis oris muscle layer with subcutaneous buried sutures, followed by a mucosal layer and an epidermal layer. Abbe Flap (Upper To Lower Lip) Text: The defect of the lower lip was assessed and measured.  Given the location and size of the defect, an Abbe flap was deemed most appropriate.  Using a sterile surgical marker, an appropriate Abbe flap was measured and drawn on the upper lip. Local anesthesia was then infiltrated.  A scalpel was then used to incise the upper lip through and through the skin, vermilion, muscle and mucosa, leaving the flap pedicled on the opposite side.  The flap was then rotated and transferred to the lower lip defect.  The flap was then sutured into place with a three layer technique, closing the orbicularis oris muscle layer with subcutaneous buried sutures, followed by a mucosal layer and an epidermal layer. Cheek Interpolation Flap Text: A decision was made to reconstruct the defect utilizing an interpolation axial flap and a staged reconstruction.  A telfa template was made of the defect.  This telfa template was then used to outline the Cheek Interpolation flap.  The donor area for the pedicle flap was then injected with anesthesia.  The flap was excised through the skin and subcutaneous tissue down to the layer of the underlying musculature.  The interpolation flap was carefully excised within this deep plane to maintain its blood supply.  The edges of the donor site were undermined.   The donor site was closed in a primary fashion.  The pedicle was then rotated into position and sutured.  Once the tube was sutured into place, adequate blood supply was confirmed with blanching and refill.  The pedicle was then wrapped with xeroform gauze and dressed appropriately with a telfa and gauze bandage to ensure continued blood supply and protect the attached pedicle. Cheek-To-Nose Interpolation Flap Text: We discussed various closure modalities with the patient, including healing by second intention, primary closure, skin graft and various flaps.  The location and configuration of the defect indicated that a cheek to nose interpolation flap would result in the least disturbance of the position and function of the surrounding anatomic structures, and provide the best result.  The technique, its benefits, alternatives and risks were discussed with the patient.  The patient underwent the procedure as follows: The patient was positioned supine on the operating room table.  The area of the defect and the surrounding skin were anesthetized with buffered 1% lidocaine with epinephrine.  The area was washed with chlorhexidine.  Sterile drapes were applied. \\n\\nA decision was made to reconstruct the defect utilizing an interpolation axial flap and a staged reconstruction.  A telfa template was made of the defect.  This telfa template was then used to outline the Cheek-To-Nose Interpolation flap.  The flap was excised through the skin and subcutaneous tissue down to the layer of the underlying musculature.  The interpolation flap was carefully excised within this deep plane to maintain its blood supply.  The edges of the donor site were undermined.   The donor site was closed in a primary fashion.  The pedicle was then rotated into position and sutured.  The flap was carried over to close the defect.  Once the tube was sutured into place, adequate blood supply was confirmed with blanching and refill.  The pedicle was then wrapped with xeroform gauze and dressed appropriately with a telfa and gauze bandage to ensure continued blood supply and protect the attached pedicle.  The second stage of the flap (takedown) would occur after sufficient blood supply has been established, at about 3 weeks. Estlander Flap (Lower To Upper Lip) Text: The defect of the lower lip was assessed and measured.  Given the location and size of the defect, an Estlander flap was deemed most appropriate.  Using a sterile surgical marker, an appropriate Estlander flap was measured and drawn on the upper lip. Local anesthesia was then infiltrated. A scalpel was then used to incise the lateral aspect of the flap, through skin, muscle and mucosa, leaving the flap pedicled medially.  The flap was then rotated and positioned to fill the lower lip defect.  The flap was then sutured into place with a three layer technique, closing the orbicularis oris muscle layer with subcutaneous buried sutures, followed by a mucosal layer and an epidermal layer. Interpolation Flap Text: A decision was made to reconstruct the defect utilizing an interpolation axial flap and a staged reconstruction.  A telfa template was made of the defect.  This telfa template was then used to outline the interpolation flap.  The flap was excised through the skin and subcutaneous tissue down to the layer of the underlying musculature.  The interpolation flap was carefully excised within this deep plane to maintain its blood supply.  The edges of the donor site were undermined.   The donor site was closed in a primary fashion.  The pedicle was then rotated into position and sutured.  Once the tube was sutured into place, adequate blood supply was confirmed with blanching and refill.  The pedicle was then wrapped with xeroform gauze and dressed appropriately with a telfa and gauze bandage to ensure continued blood supply and protect the attached pedicle. Melolabial Interpolation Flap Text: We discussed various closure modalities with the patient, including healing by second intention, primary closure, skin graft and various flaps.  The location and configuration of the defect indicated that a melolabial interpolation flap would result in the least disturbance of the position and function of the surrounding anatomic structures, and provide the best result.  The technique, its benefits, alternatives and risks were discussed with the patient.  The patient underwent the procedure as follows: The patient was positioned supine on the operating room table.  The area of the defect and the surrounding skin were anesthetized with buffered 1% lidocaine with epinephrine.  The area was washed with chlorhexidine.  Sterile drapes were applied. \\n\\n\\nA decision was made to reconstruct the defect utilizing an interpolation axial flap and a staged reconstruction.  A telfa template was made of the defect.  This telfa template was then used to outline the melolabial interpolation flap.  The donor and recipient areas for the pedicle flap was then injected with anesthesia.  The flap was excised through the skin and subcutaneous tissue down to the layer of the underlying musculature.  The pedicle flap was carefully excised within this deep plane to maintain its blood supply.  The edges of the donor site were undermined.   The donor site was closed in a primary fashion.  The pedicle was then rotated into position and sutured. The flap was carried over to close the defect. Once the tube was sutured into place, adequate blood supply was confirmed with blanching and refill.  The pedicle was then wrapped with xeroform gauze and dressed appropriately with a telfa and gauze bandage to ensure continued blood supply and protect the attached pedicle.  The second stage of the flap (takedown) would occur after sufficient blood supply has been established, at about 3 weeks. Mastoid Interpolation Flap Text: A decision was made to reconstruct the defect utilizing an interpolation axial flap and a staged reconstruction.  A telfa template was made of the defect.  This telfa template was then used to outline the mastoid interpolation flap.  The donor area for the pedicle flap was then injected with anesthesia.  The flap was excised through the skin and subcutaneous tissue down to the layer of the underlying musculature.  The pedicle flap was carefully excised within this deep plane to maintain its blood supply.  The edges of the donor site were undermined.   The donor site was closed in a primary fashion.  The pedicle was then rotated into position and sutured. The flap was carried over to close the defect. Once the tube was sutured into place, adequate blood supply was confirmed with blanching and refill.  The pedicle was then wrapped with xeroform gauze and dressed appropriately with a telfa and gauze bandage to ensure continued blood supply and protect the attached pedicle. Paramedian Forehead Flap Text: We discussed various closure modalities with the patient, including healing by second intention, primary closure, skin graft and various flaps.  The location and configuration of the defect indicated that a paramedian forehead (interpolation) flap would result in the least disturbance of the position and function of the surrounding anatomic structures, and provide the best result.  The technique, its benefits, alternatives and risks were discussed with the patient.  The patient underwent the procedure as follows: The patient was positioned supine on the operating room table.  The area of the defect and the surrounding skin were anesthetized with buffered 1% lidocaine with epinephrine.  The area was washed with chlorhexidine.  Sterile drapes were applied. \\n\\nA telfa template was made of the defect.  This telfa template was then used to outline the paramedian forehead pedicle flap.  The flap was excised through the skin and subcutaneous tissue down to the layer of the underlying musculature.  The pedicle flap was carefully excised within this deep plane to maintain its blood supply.  The edges of the donor site were undermined.   The donor site was closed in a primary fashion.  The pedicle was then rotated into position and sutured.  The flap was carried over to close the defect. \\n Once the tube was sutured into place, adequate blood supply was confirmed with blanching and refill.  The pedicle was then wrapped with xeroform gauze and dressed appropriately with a telfa and gauze bandage to ensure continued blood supply and protect the attached pedicle.  The second stage of the flap (takedown) would occur after sufficient blood supply has been established, at about 3 weeks. Posterior Auricular Interpolation Flap Text: We discussed various closure modalities with the patient, including healing by second intention, primary closure, skin graft and various flaps.  The location and configuration of the defect indicated that a post-auricular interpolation flap would result in the least disturbance of the position and function of the surrounding anatomic structures, and provide the best result.  The technique, its benefits, alternatives and risks were discussed with the patient.  The patient underwent the procedure as follows: The patient was positioned supine on the operating room table.  The area of the defect and the surrounding skin were anesthetized with buffered 1% lidocaine with epinephrine.  The area was washed with chlorhexidine.  Sterile drapes were applied. \\n\\nA decision was made to reconstruct the defect utilizing an interpolation axial flap and a staged reconstruction.  A telfa template was made of the defect.  This telfa template was then used to outline the posterior auricular interpolation flap.  The donor area for the pedicle flap was then injected with anesthesia.  The flap was excised through the skin and subcutaneous tissue down to the layer of the underlying musculature.  The pedicle flap was carefully excised within this deep plane to maintain its blood supply.  The edges of the donor site were undermined.   The donor site was closed in a primary fashion.  The pedicle was then rotated into position and sutured.  The flap was carried over to close the defect. Once the tube was sutured into place, adequate blood supply was confirmed with blanching and refill.  The pedicle was then wrapped with xeroform gauze and dressed appropriately with a telfa and gauze bandage to ensure continued blood supply and protect the attached pedicle.  The second stage of the flap (takedown) would occur after sufficient blood supply has been established, at about 3 weeks. Cheiloplasty (Complex) Text: A decision was made to reconstruct the defect with a  cheiloplasty.  The defect was undermined extensively.  Additional obicularis oris muscle was excised with a 15 blade scalpel.  The defect was converted into a full thickness wedge to facilite a better cosmetic result.  Small vessels were then tied off with 5-0 monocyrl. The obicularis oris, superficial fascia, adipose and dermis were then reapproximated.  After the deeper layers were approximated the epidermis was reapproximated with particular care given to realign the vermilion border. Cheiloplasty (Less Than 50%) Text: A decision was made to reconstruct the defect with a  cheiloplasty.  The defect was undermined extensively.  Additional obicularis oris muscle was excised with a 15 blade scalpel.  The defect was converted into a full thickness wedge, of less than 50% of the vertical height of the lip, to facilite a better cosmetic result.  Small vessels were then tied off with 5-0 monocyrl. The obicularis oris, superficial fascia, adipose and dermis were then reapproximated.  After the deeper layers were approximated the epidermis was reapproximated with particular care given to realign the vermilion border. Ear Wedge Repair Text: We discussed various closure modalities with the patient, including healing by second intention, primary closure, skin graft and various flaps.  The location and configuration of the defect indicated that a Ear Wedge Repair / Complex Layered Linear Closure would result in the least disturbance of the position and function of the surrounding anatomic structures, and provide the best result.  The technique, its benefits, alternatives and risks were discussed with the patient.  The patient underwent the procedure as follows: The patient was positioned supine on the operating room table.  The area of the defect and the surrounding skin were anesthetized with buffered 1% lidocaine with epinephrine.  The area was washed with chlorhexidine.  Sterile drapes were applied. \\n\\nA wedge excision was completed by carrying down an excision through the full thickness of the ear and cartilage with an inward facing Burow's triangle.  Retension/plication sutures were placed in the cartilage plane to reduce tension.  The wound was then closed in a layered fashion with subcutaneous buried interrupted sutures and epidermal layer with running sutures. Full Thickness Lip Wedge Repair (Flap) Text: Given the location of the defect and the proximity to free margins a full thickness wedge repair was deemed most appropriate.  Using a sterile surgical marker, the appropriate repair was drawn incorporating the defect and placing the expected incisions perpendicular to the vermilion border.  The vermilion border was also meticulously outlined to ensure appropriate reapproximation during the repair.  The area thus outlined was incised through and through with a #15 scalpel blade.  The muscularis and dermis were reaproximated with deep sutures following hemostasis. Care was taken to realign the vermilion border before proceeding with the superficial closure.  Once the vermilion was realigned the superfical and mucosal closure was finished. Burow's Graft Text: The defect edges were debeveled with a #15 scalpel blade.  Given the location of the defect, shape of the defect, the proximity to free margins and the presence of a standing cone deformity a Burow's full thickness skin graft was deemed most appropriate. The standing cone was removed and this tissue was then trimmed to the shape of the primary defect. The adipose tissue was also removed until only dermis and epidermis were left.  The skin margins of the secondary defect were undermined to an appropriate distance in all directions utilizing iris scissors.  The secondary defect was closed with interrupted buried subcutaneous sutures.  The skin edges were then re-apposed with running  sutures.  The full thickness skin graft was then placed in the primary defect and oriented appropriately. Cartilage Graft Text: PLEASE NOTE THAT TWO SEPARATE AND DISTINCT GRAFTS WERE USED IN THE REPAIR OF THIS DEFECT ON THE ALA. 1. CARTILAGE GRAFT, AND 2. FULL THICKNESS SKIN GRAFT. CARTILAGE WAS NEEDED TO STABLIZE THE ALA AND PREVENT ALAR RETRACTION AND FUNCTIONAL AND COSMETIC MORBIDITY. A FULL THICKNESS SKIN GRAFT IS USED TO COVER THE ENTIRE DEFECT ONCE CARTILAGE GRAFT IS IN PLACE. PLEASE NOTE THIS IS NOT A COMPOSITE GRAFT.\\n\\nThe surgical defect and surrounding skin were prepped with Hibiclens. The choice of the repair was performed because damaged skin surrounding defect made closure difficult, because inelasticity of skin made closure difficult, because there is insufficient tissue mobility to close the wound with local tissue, to avoid a deforming, depressed, and contracted scar, to avoid anatomic distortion and/or functional deficit in adjacent fixed structures, to avoid disruption to anatomic adjacent free margins, to preserve normal anatomy, to protect underlying structures where insufficient local skin is available for primary direct repair, to reduce subcutaneous dead space to reduce risk of hematoma, to reduce tension to skin to allow closure, and to reduce tension to enhance both functional and cosmetic results. Due to the defect being at the alar rim, a cartilage graft is necessary to prevent alar notching and significant cosmetic and functional morbidity. Given the location of the defect, shape of the defect, and the lack of supportive tissue at the free margin, cartilage defect a cartilage graft was deemed most appropriate. An appropriate donor site was identified at the ear antihelix, cleansed, and anesthetized. The cartilage graft was then harvested and transferred to the recipient site, oriented appropriately and then sutured into place. The donor site was then repaired using a primary closure. Composite Graft Text: The defect edges were debeveled with a #15 scalpel blade.  Given the location of the defect, shape of the defect, the proximity to free margins and the fact the defect was full thickness a composite graft was deemed most appropriate.  The defect was outline and then transferred to the donor site.  A full thickness graft was then excised from the donor site. The graft was then placed in the primary defect, oriented appropriately and then sutured into place.  The secondary defect was then repaired using a primary closure. Epidermal Autograft Text: The defect edges were debeveled with a #15 scalpel blade.  Given the location of the defect, shape of the defect and the proximity to free margins an epidermal autograft was deemed most appropriate.  Using a sterile surgical marker, the primary defect shape was transferred to the donor site. The epidermal graft was then harvested.  The skin graft was then placed in the primary defect and oriented appropriately. Dermal Autograft Text: The defect edges were debeveled with a #15 scalpel blade.  Given the location of the defect, shape of the defect and the proximity to free margins a dermal autograft was deemed most appropriate.  Using a sterile surgical marker, the primary defect shape was transferred to the donor site. The area thus outlined was incised deep to adipose tissue with a #15 scalpel blade.  The harvested graft was then trimmed of adipose and epidermal tissue until only dermis was left.  The skin graft was then placed in the primary defect and oriented appropriately. Ftsg Text: We discussed various closure modalities with the patient, including healing by second intention, primary closure, skin graft and various flaps.  The location and configuration of the defect indicated that a Full Thickness Skin Graft would result in the least disturbance of the position and function of the surrounding anatomic structures, and provide the best result.  The technique, its benefits, alternatives and risks were discussed with the patient.  The patient underwent the procedure as follows: The patient was positioned supine on the operating room table.  The area of the defect and the surrounding skin were anesthetized with buffered 1% lidocaine with epinephrine.  The area was washed with chlorhexidine.  Sterile drapes were applied. \\n\\nThe defect edges were debeveled with a #15 scalpel blade.  Using a sterile surgical marker, the primary defect shape was transferred to the donor site. The area thus outlined was incised with a #15 scalpel blade.  The harvested graft was then trimmed of adipose tissue until only dermis and epidermis was left.  The skin margins of the secondary defect were undermined to an appropriate distance in all directions utilizing iris scissors.  The secondary defect was closed with an intermediate layered closure.  The skin graft was then placed and sewn in the primary defect and oriented appropriately with running fast absorbing sutures. Pinch Graft Text: The defect edges were debeveled with a #15 scalpel blade. Given the location of the defect, shape of the defect and the proximity to free margins a pinch graft was deemed most appropriate. Using a sterile surgical marker, the primary defect shape was transferred to the donor site. The area thus outlined was incised deep to adipose tissue with a #15 scalpel blade.  The harvested graft was then trimmed of adipose tissue until only dermis and epidermis was left. The skin margins of the secondary defect were undermined to an appropriate distance in all directions utilizing iris scissors.  The secondary defect was closed with interrupted buried subcutaneous sutures.  The skin edges were then re-apposed with running  sutures.  The skin graft was then placed in the primary defect and oriented appropriately. Skin Substitute Text: The rationale for the use of skin substitute graft was explained to the patient and consent was obtained. The risks, benefits and alternatives to therapy were discussed in detail. Specifically, the risks of infection, scarring, bleeding, prolonged wound healing were addressed. Prior to the procedure, the treatment site was clearly identified and confirmed by the patient. All components of Universal Protocol/PAUSE Rule completed.\\n\\nThe area was prepped with chlorhexidine. The choice of the repair was performed because damaged skin surrounding defect made closure difficult, because there is insufficient tissue mobility to close the wound with local tissue, because the wound is located in an area of active movement and a simple closure will increase the risk of dehiscence from repeated tension vectors in opposite directions, because the wound edges would not approximate with digital pressure due to a combination of the size of the wound and the tightness of the skin surrounding the wound making primary closure unattainable, to close large gap created by lesion removal, to preserve the functional anatomy, to preserve normal anatomy, to protect underlying structures where insufficient local skin is available for primary direct repair, and to reduce tension to enhance both functional and cosmetic results.\\n\\nSpecifically, on the lower leg, the choice of repair was performed because there is insufficient tissue mobility to close the wound with local tissue or reconstruction due to tightness of the leg, poor oxygenation, and presence of lower extremity edema. The use of skin substitute allograft was deemed medically necessary to support granulation tissue regeneration thereby enhancing the speed of healing, minimize infection and other wound-related complications.  Given the defect's location, size and depth of the defect and pt's history of poor wound healing, skin substitute graft application was deemed most appropriate. The wound surface area is complex and variable, creating a larger surface area than what can be reasonably calculated mathematically. This can warrant additional amount of allograft to ensure proper contact with the wound base, both at the deep and peripheral margins to ensure proper coverage with the tissue substitute and provide the patient with the most optimal healing. Based on this, the quantity and number of units were selected and utilized with great discretion by the Mohs surgeon. The graft material was selected to best fit the size/depth of the defect. The graft was then placed in the primary defect and oriented appropriately, reconstituted with saline and folded up on itself to ensure complete coverage of wound with zero wasted units.  The skin substitute graft was then covered with a waterproof bandage which will be left on until pt's follow up appointment.\\n\\nI reviewed with the patient in detail post-care instructions.  Should the patient develop any fevers, chills, bleeding, severe pain patient will contact the office immediately. Split-Thickness Skin Graft Text: The defect edges were debeveled with a #15 scalpel blade.  Given the location of the defect, shape of the defect and the proximity to free margins a split thickness skin graft was deemed most appropriate.  Using a sterile surgical marker, the primary defect shape was transferred to the donor site. The split thickness graft was then harvested.  The skin graft was then placed in the primary defect and oriented appropriately. Tissue Cultured Epidermal Autograft Text: The defect edges were debeveled with a #15 scalpel blade.  Given the location of the defect, shape of the defect and the proximity to free margins a tissue cultured epidermal autograft was deemed most appropriate.  The graft was then trimmed to fit the size of the defect.  The graft was then placed in the primary defect and oriented appropriately. Xenograft Text: We discussed various closure modalities with the patient, including healing by second intention, primary closure, skin graft and various flaps, and felt that the location and configuration of the defect indicated that a Xenograft would result in the least disturbance of the position and function of the surrounding anatomic structures and provide the best result.  The technique, its benefits, alternatives and risks were discussed with the patient.  Appropriate instructions were given, informed consent was obtained, and then the patient underwent the procedure as follows: The patient was taken to the operative suite and placed supine on the operating room table.  The area of the defect and the surrounding skin was anesthetized with buffered 0.5% lidocaine with epinephrine.  The area was washed with chlorhexidine.  Sterile drapes were applied. \\n\\nThe wound edges of the Mohs surgery defect were debeveled and hemostasis was achieved using spot electrodesiccation as necessary.  The Xenograft was trimmed to accurately fit the primary Mohs surgical defect.  The graft was secured in position using 5-0 Fast Absorbing gut tacking sutures. The closure was manually tested and found to be sound for strength and hemostasis. Complex Repair And Flap Additional Text (Will Appearing After The Standard Complex Repair Text): The complex repair was not sufficient to completely close the primary defect. The remaining additional defect was repaired with the flap mentioned below. Complex Repair And Graft Additional Text (Will Appearing After The Standard Complex Repair Text): The complex repair was not sufficient to completely close the primary defect. The remaining additional defect was repaired with the graft mentioned below. Eyelid Full Thickness Repair - 73473: The eyelid defect was full thickness which required a wedge repair of the eyelid. Special care was taken to ensure that the eyelid margin was realligned when placing sutures. Eyelid Partial Thickness Repair - 93166: The eyelid defect was partial thickness which required a wedge repair of the eyelid. Special care was taken to ensure that the eyelid margin was realligned when placing sutures. Intermediate Repair And Flap Additional Text (Will Appearing After The Standard Complex Repair Text): The intermediate repair was not sufficient to completely close the primary defect. The remaining additional defect was repaired with the flap mentioned below. Intermediate Repair And Graft Additional Text (Will Appearing After The Standard Complex Repair Text): The intermediate repair was not sufficient to completely close the primary defect. The remaining additional defect was repaired with the graft mentioned below. Localized Dermabrasion With 15 Blade Text: The patient was draped in routine manner.  Localized dermabrasion using a 15 blade was performed in routine manner to papillary dermis. This spot dermabrasion is being performed to complete skin cancer reconstruction. It also will eliminate the other sun damaged precancerous cells that are known to be part of the regional effect of a lifetime's worth of sun exposure. This localized dermabrasion is therapeutic and should not be considered cosmetic in any regard. Localized Dermabrasion With Sand Papertext: The patient was draped in routine manner.  Localized dermabrasion using sterile sand paper was performed in routine manner to papillary dermis. This spot dermabrasion is being performed to complete skin cancer reconstruction. It also will eliminate the other sun damaged precancerous cells that are known to be part of the regional effect of a lifetime's worth of sun exposure. This localized dermabrasion is therapeutic and should not be considered cosmetic in any regard. Localized Dermabrasion With Wire Brush Text: The patient was draped in routine manner.  Localized dermabrasion using successive sandpaper performed in routine manner to papillary dermis. This spot dermabrasion is being performed to revise the scar and blur its margins. Purse String (Simple) Text: We discussed various closure modalities with the patient, including healing by second intention, primary closure, skin graft and various flaps, and felt that the location and configuration of the defect indicated that a purse-string closure would result in the least disturbance of the position and function of the surrounding anatomic structures and provide the best result.  The technique, its benefits, alternatives and risks were discussed with the patient.  Appropriate instructions were given, informed consent was obtained, and then the patient underwent the procedure as follows: The patient was taken to the operative suite and placed supine on the operating room table.  The area of the defect and the surrounding skin was anesthetized with buffered 0.5% lidocaine with epinephrine.  The area was washed with chlorhexidine.  Sterile drapes were applied. \\n\\nThe wound edges of the Mohs surgery defect were debeveled. \\n Undermining was performed circumferentially around the surgical defect.  Closure of the subcutaneous was achieved with a deep buried purse string suture, which was then placed and tightened in the subcutaneous tissue.  A superficial layer of horizontal mattress suture was then placed through the epidermis in a purse-string fashion. Tarsorrhaphy Text: A tarsorrhaphy was performed using Frost sutures. Manual Repair Warning Statement: We plan on removing the manually selected variable below in favor of our much easier automatic structured text blocks found in the previous tab. We decided to do this to help make the flow better and give you the full power of structured data. Manual selection is never going to be ideal in our platform and I would encourage you to avoid using manual selection from this point on, especially since I will be sunsetting this feature. It is important that you do one of two things with the customized text below. First, you can save all of the text in a word file so you can have it for future reference. Second, transfer the text to the appropriate area in the Library tab. Lastly, if there is a flap or graft type which we do not have you need to let us know right away so I can add it in before the variable is hidden. No need to panic, we plan to give you roughly 6 months to make the change. Same Histology In Subsequent Stages Text: The pattern and morphology of the tumor is as described in the first stage. No Residual Tumor Seen Histology Text: There were no malignant cells seen in the sections examined.  No perineural invasion was seen.  All margins examined were clear. Inflammation Suggestive Of Cancer Camouflage Histology Text: There was a dense lymphocytic infiltrate which prevented adequate histologic evaluation of adjacent structures.  Residual cancer cells may be masked by the dense inflammation. Follicular Atypia Histology Text: There is cellular atypia present with extension down to the hair follicles. Incidental Superficial Basal Cell Carcinoma Histology Text: Incidentally, a superficial basal cell carcinoma demonstrating hyperchromatic nuclei and peripheral palasading is seen within the epidermis.  Tumor Pattern: Small nests attached to the undersurface of the epidermis Incidental Squamous Cell Carcinoma In Situ Histology Text: Incidentally, a squamous cell carcinoma in situ is present demonstrating full thickness atypia within the epidermis. Bcc Histology Text: There were nests and cords of atypical hyperchromatic basaloid cells present with peripheral palisading and retraction within fibromyxoid stroma.  The area of positive cancer is as marked on the Mohs map. Bcc Infiltrative Histology Text: There were infiltrating nests and cords of basaloid cells demonstrating an infiltrative pattern into the dermis.  The area of positive cancer is as marked on the Mohs map. Bcc Micronodular Histology Text: There were small nests and cords of atypical hyperchromatic basaloid cells present with peripheral palisading and retraction within fibromyxoid stroma.  The area of positive cancer is as marked on the Mohs map. Bcc  Morpheaform/Sclerosing Histology Text: There were nests and cords of basaloid cells demonstrating an morpheaform/sclerosing/infiltrative pattern.  The area of positive cancer is as marked on the Mohs map. Bcc  Nodular Histology Text: There were nests and cords of atypical hyperchromatic basaloid cells present with peripheral palisading and retraction within fibromyxoid stroma; there is a nodular pattern.  The area of positive cancer is as marked on the Mohs map. Bcc Pigmented Histology Text: There were nests and cords of atypical hyperchromatic pigmented basaloid cells present with peripheral palisading and retraction within fibromyxoid stroma.  The area of positive cancer is as marked on the Mohs map. Bcc Superficial Histology Text: Superficial basaloid nests were seen at the basal layer of the epidermis demonstrating peripheral palisating and/or clefting.  The area of positive cancer is as marked on the Mohs map. Bcc Superficial Pigmented Histology Text: There were superficial nests of hyperchromatic basaloid cells present with peripheral palisading and retraction within fibromyxoid stroma.  The area of positive cancer is as marked on the Mohs map. Scc Histology Text: There were aggregates and nests of atypical, pleomorphic squamous cells.  The area of positive cancer is as marked on the Mohs map. The area of positive cancer is as marked on the Mohs map. Scc Well Differentiated Histology Text: There were nests of invasive well-differentiated atypical keratinocytes seen in the dermis. Scc Moderately Differentiated Histology Text: There were nests of invasive moderately-differentiated atypical keratinocytes seen.   The area of positive cancer is as marked on the Mohs map. Scc Poorly Differentiated Histology Text: There were nests and single cells of invasive pooly differentiated atypical keratinocytes seen.   The area of positive cancer is as marked on the Mohs map. Scc In Situ Histology Text: Full thickness epidermal atypia was seen composed of atypical keratinocytes.   The area of positive cancer is as marked on the Mohs map. Scc In Situ With Follicular Extension Histology Text: Full thickness epidermal atypia was seen composed of atypical keratinocytes; some invading into the hair follicle, demonstrating follicular extension.   The area of positive cancer is as marked on the Mohs map. Mart-1 - Positive Histology Text: MART-1 staining demonstrates areas of higher density and clustering of melanocytes with Pagetoid spread upwards within the epidermis. The surgical margins are positive for tumor cells. Mart-1 - Negative Histology Text: MART-1 staining demonstrates a normal density and pattern of melanocytes along the dermal-epidermal junction. The surgical margins are negative for tumor cells. Information: Selecting Yes will display possible errors in your note based on the variables you have selected. This validation is only offered as a suggestion for you. PLEASE NOTE THAT THE VALIDATION TEXT WILL BE REMOVED WHEN YOU FINALIZE YOUR NOTE. IF YOU WANT TO FAX A PRELIMINARY NOTE YOU WILL NEED TO TOGGLE THIS TO 'NO' IF YOU DO NOT WANT IT IN YOUR FAXED NOTE. Bill 59 Modifier?: No - Continue to Bill 79 Modifier

## 2024-09-20 DIAGNOSIS — F51.01 PRIMARY INSOMNIA: ICD-10-CM

## 2024-09-20 RX ORDER — LORAZEPAM 1 MG/1
TABLET ORAL
Qty: 90 TABLET | Refills: 0 | Status: SHIPPED | OUTPATIENT
Start: 2024-09-20 | End: 2024-10-18

## 2024-09-21 DIAGNOSIS — F41.8 ANXIETY ASSOCIATED WITH DEPRESSION: ICD-10-CM

## 2024-09-23 RX ORDER — QUETIAPINE FUMARATE 100 MG/1
TABLET, FILM COATED ORAL
Qty: 90 TABLET | Refills: 2 | Status: SHIPPED | OUTPATIENT
Start: 2024-09-23

## 2024-09-23 RX ORDER — BUSPIRONE HYDROCHLORIDE 5 MG/1
5 TABLET ORAL 3 TIMES DAILY PRN
Qty: 90 TABLET | Refills: 2 | Status: SHIPPED | OUTPATIENT
Start: 2024-09-23

## 2024-09-23 RX ORDER — MONTELUKAST SODIUM 4 MG/1
1 TABLET, CHEWABLE ORAL 2 TIMES DAILY
Qty: 60 TABLET | Refills: 2 | Status: SHIPPED | OUTPATIENT
Start: 2024-09-23

## 2024-10-02 ENCOUNTER — TRANSCRIBE ORDERS (OUTPATIENT)
Dept: ADMINISTRATIVE | Facility: HOSPITAL | Age: 89
End: 2024-10-02
Payer: MEDICARE

## 2024-10-08 ENCOUNTER — TRANSCRIBE ORDERS (OUTPATIENT)
Dept: ADMINISTRATIVE | Facility: HOSPITAL | Age: 89
End: 2024-10-08
Payer: MEDICARE

## 2024-10-08 DIAGNOSIS — J84.10 PULMONARY FIBROSIS: Primary | ICD-10-CM

## 2024-10-18 SDOH — HEALTH STABILITY: PHYSICAL HEALTH: ON AVERAGE, HOW MANY DAYS PER WEEK DO YOU ENGAGE IN MODERATE TO STRENUOUS EXERCISE (LIKE A BRISK WALK)?: 0 DAYS

## 2024-10-18 ASSESSMENT — PATIENT HEALTH QUESTIONNAIRE - PHQ9
SUM OF ALL RESPONSES TO PHQ9 QUESTIONS 1 & 2: 2
2. FEELING DOWN, DEPRESSED OR HOPELESS: NOT AT ALL
SUM OF ALL RESPONSES TO PHQ QUESTIONS 1-9: 2
SUM OF ALL RESPONSES TO PHQ QUESTIONS 1-9: 2
1. LITTLE INTEREST OR PLEASURE IN DOING THINGS: MORE THAN HALF THE DAYS
SUM OF ALL RESPONSES TO PHQ QUESTIONS 1-9: 2
SUM OF ALL RESPONSES TO PHQ QUESTIONS 1-9: 2

## 2024-10-18 ASSESSMENT — LIFESTYLE VARIABLES
HOW MANY STANDARD DRINKS CONTAINING ALCOHOL DO YOU HAVE ON A TYPICAL DAY: 0
HOW MANY STANDARD DRINKS CONTAINING ALCOHOL DO YOU HAVE ON A TYPICAL DAY: PATIENT DOES NOT DRINK
HOW OFTEN DO YOU HAVE A DRINK CONTAINING ALCOHOL: 1
HOW OFTEN DO YOU HAVE SIX OR MORE DRINKS ON ONE OCCASION: 1
HOW OFTEN DO YOU HAVE A DRINK CONTAINING ALCOHOL: NEVER

## 2024-10-19 DIAGNOSIS — E78.2 MIXED HYPERLIPIDEMIA: ICD-10-CM

## 2024-10-19 DIAGNOSIS — E55.9 VITAMIN D DEFICIENCY: ICD-10-CM

## 2024-10-21 NOTE — TELEPHONE ENCOUNTER
Last OV 8/22/2024  Next OV 10/23/2024      Requested Prescriptions     Pending Prescriptions Disp Refills    Vitamin D, Ergocalciferol, 07175 units CAPS 4 capsule 5     Sig: Take 1 capsule by mouth once a week    esomeprazole (NEXIUM) 40 MG delayed release capsule 90 capsule 1     Sig: Take 1 capsule by mouth daily    atorvastatin (LIPITOR) 80 MG tablet 30 tablet 5     Sig: Take 1 tablet by mouth at bedtime    escitalopram (LEXAPRO) 20 MG tablet 30 tablet 5     Sig: Take 1 tablet by mouth daily

## 2024-10-22 DIAGNOSIS — F51.01 PRIMARY INSOMNIA: ICD-10-CM

## 2024-10-23 ENCOUNTER — OFFICE VISIT (OUTPATIENT)
Dept: INTERNAL MEDICINE | Age: 89
End: 2024-10-23

## 2024-10-23 VITALS
HEIGHT: 64 IN | DIASTOLIC BLOOD PRESSURE: 74 MMHG | OXYGEN SATURATION: 90 % | HEART RATE: 70 BPM | BODY MASS INDEX: 27.83 KG/M2 | TEMPERATURE: 97.2 F | SYSTOLIC BLOOD PRESSURE: 124 MMHG | WEIGHT: 163 LBS

## 2024-10-23 DIAGNOSIS — F02.B2 MODERATE LATE ONSET ALZHEIMER'S DEMENTIA WITH PSYCHOTIC DISTURBANCE (HCC): ICD-10-CM

## 2024-10-23 DIAGNOSIS — G30.1 MODERATE LATE ONSET ALZHEIMER'S DEMENTIA WITH PSYCHOTIC DISTURBANCE (HCC): ICD-10-CM

## 2024-10-23 DIAGNOSIS — F41.8 ANXIETY ASSOCIATED WITH DEPRESSION: ICD-10-CM

## 2024-10-23 DIAGNOSIS — E78.2 MIXED HYPERLIPIDEMIA: ICD-10-CM

## 2024-10-23 DIAGNOSIS — Z00.00 MEDICARE ANNUAL WELLNESS VISIT, SUBSEQUENT: Primary | ICD-10-CM

## 2024-10-23 DIAGNOSIS — Z23 INFLUENZA VACCINE NEEDED: ICD-10-CM

## 2024-10-23 DIAGNOSIS — J96.11 CHRONIC RESPIRATORY FAILURE WITH HYPOXIA: ICD-10-CM

## 2024-10-23 DIAGNOSIS — I10 HYPERTENSION, ESSENTIAL: ICD-10-CM

## 2024-10-23 DIAGNOSIS — Z23 NEED FOR PNEUMOCOCCAL VACCINATION: ICD-10-CM

## 2024-10-23 DIAGNOSIS — Z79.899 POLYPHARMACY: ICD-10-CM

## 2024-10-23 DIAGNOSIS — N32.81 OVERACTIVE BLADDER: ICD-10-CM

## 2024-10-23 DIAGNOSIS — K21.9 GASTROESOPHAGEAL REFLUX DISEASE WITHOUT ESOPHAGITIS: ICD-10-CM

## 2024-10-23 ASSESSMENT — PATIENT HEALTH QUESTIONNAIRE - PHQ9
9. THOUGHTS THAT YOU WOULD BE BETTER OFF DEAD, OR OF HURTING YOURSELF: NOT AT ALL
4. FEELING TIRED OR HAVING LITTLE ENERGY: NOT AT ALL
SUM OF ALL RESPONSES TO PHQ9 QUESTIONS 1 & 2: 0
2. FEELING DOWN, DEPRESSED OR HOPELESS: NOT AT ALL
6. FEELING BAD ABOUT YOURSELF - OR THAT YOU ARE A FAILURE OR HAVE LET YOURSELF OR YOUR FAMILY DOWN: NOT AT ALL
8. MOVING OR SPEAKING SO SLOWLY THAT OTHER PEOPLE COULD HAVE NOTICED. OR THE OPPOSITE, BEING SO FIGETY OR RESTLESS THAT YOU HAVE BEEN MOVING AROUND A LOT MORE THAN USUAL: NOT AT ALL
3. TROUBLE FALLING OR STAYING ASLEEP: NOT AT ALL
7. TROUBLE CONCENTRATING ON THINGS, SUCH AS READING THE NEWSPAPER OR WATCHING TELEVISION: NOT AT ALL
5. POOR APPETITE OR OVEREATING: NOT AT ALL
SUM OF ALL RESPONSES TO PHQ QUESTIONS 1-9: 0
10. IF YOU CHECKED OFF ANY PROBLEMS, HOW DIFFICULT HAVE THESE PROBLEMS MADE IT FOR YOU TO DO YOUR WORK, TAKE CARE OF THINGS AT HOME, OR GET ALONG WITH OTHER PEOPLE: NOT DIFFICULT AT ALL
1. LITTLE INTEREST OR PLEASURE IN DOING THINGS: NOT AT ALL
SUM OF ALL RESPONSES TO PHQ QUESTIONS 1-9: 0

## 2024-10-23 NOTE — PROGRESS NOTES
Chief Complaint   Patient presents with    Medicare AWV    Other    Shortness of Breath     Pt's 02 is dropping was 89 then went to 90        HPI: Patient is here today for Medicare annual wellness visit she is here with her daughter and granddaughter.  Difficult to  her oxygen but later we were able to pick it up better stayed in the 90s she does have oxygen at home and is not wearing it right now grief confusion anxiety these things actually seem a little better today she is stable in terms of arthritis symptoms she is weak and frail with some gradual decline 89 will be 89 in December    Past Medical History:   Diagnosis Date    Abnormal mammogram     Anxiety     Anxiety associated with depression     Artery disease, cerebral     Ataxia     Benign diastolic hypertension     Carpal tunnel syndrome     idiopathic peripheral    Cellulitis of face     Chronic back pain     COPD (chronic obstructive pulmonary disease) (HCC)     Depression     Disc degeneration, lumbosacral     Diverticulosis     Dysphagia     Esophageal reflux     Fever blister     Herpes simplex     Herpes zoster     Hypertension, essential     Hypertensive heart disease without CHF     Hypoxia     Idiopathic peripheral neuropathy     2017    Insomnia     Major depressive disorder, recurrent episode, moderate (HCC)     Mixed hyperlipidemia     Osteoporosis     Ovarian failure     Oxygen dependent     Pulmonary fibrosis (HCC)     Sciatica     Sleep apnea     Transient cerebral ischemic attack     Urinary incontinence     Weakness of both legs        Past Surgical History:   Procedure Laterality Date    COLONOSCOPY  06/12/2015    [facility], repeat as needed    COLONOSCOPY N/A 10/21/2020    Dr REANNA Spicer-Diverticular disease, random colon bx negative    HYSTERECTOMY (CERVIX STATUS UNKNOWN)      LUNG SEGMENTECTOMY      lung segmental resection    UPPER GASTROINTESTINAL ENDOSCOPY N/A 10/21/2020    Dr REANNA Spicer-mild Gastritis, otherwise normal, NEG h pylori,

## 2024-10-24 RX ORDER — LORAZEPAM 1 MG/1
TABLET ORAL
Qty: 90 TABLET | Refills: 0 | OUTPATIENT
Start: 2024-10-24 | End: 2024-11-22

## 2024-10-24 RX ORDER — ERGOCALCIFEROL 1.25 MG/1
1 CAPSULE ORAL WEEKLY
Qty: 4 CAPSULE | Refills: 5 | Status: SHIPPED | OUTPATIENT
Start: 2024-10-24

## 2024-10-24 RX ORDER — ESOMEPRAZOLE MAGNESIUM 40 MG/1
40 CAPSULE, DELAYED RELEASE ORAL DAILY
Qty: 90 CAPSULE | Refills: 1 | Status: SHIPPED | OUTPATIENT
Start: 2024-10-24

## 2024-10-24 RX ORDER — ATORVASTATIN CALCIUM 80 MG/1
80 TABLET, FILM COATED ORAL NIGHTLY
Qty: 30 TABLET | Refills: 5 | Status: SHIPPED | OUTPATIENT
Start: 2024-10-24 | End: 2025-04-22

## 2024-10-24 RX ORDER — ESCITALOPRAM OXALATE 20 MG/1
20 TABLET ORAL DAILY
Qty: 30 TABLET | Refills: 5 | Status: SHIPPED | OUTPATIENT
Start: 2024-10-24

## 2024-10-29 ENCOUNTER — HOSPITAL ENCOUNTER (OUTPATIENT)
Dept: PULMONOLOGY | Facility: HOSPITAL | Age: 89
Discharge: HOME | End: 2024-10-29
Payer: OTHER MISCELLANEOUS

## 2024-10-29 DIAGNOSIS — J84.10 PULMONARY FIBROSIS: ICD-10-CM

## 2024-10-29 PROCEDURE — 94729 DIFFUSING CAPACITY: CPT

## 2024-10-29 PROCEDURE — 94060 EVALUATION OF WHEEZING: CPT

## 2024-10-29 PROCEDURE — 94726 PLETHYSMOGRAPHY LUNG VOLUMES: CPT

## 2024-10-29 PROCEDURE — 94618 PULMONARY STRESS TESTING: CPT

## 2024-10-29 RX ORDER — ALBUTEROL SULFATE 0.83 MG/ML
2.5 SOLUTION RESPIRATORY (INHALATION) ONCE
Status: COMPLETED | OUTPATIENT
Start: 2024-10-29 | End: 2024-10-29

## 2024-10-29 RX ADMIN — ALBUTEROL SULFATE 2.5 MG: 2.5 SOLUTION RESPIRATORY (INHALATION) at 13:30

## 2024-10-29 NOTE — PROCEDURES
Exercise Oximetry    Patient Name:La Muller   MRN: [id number]   Date: 10/29/24             ROOM AIR BASELINE   SpO2% 94   Heart Rate 83   Blood Pressure 138/64     EXERCISE ON ROOM AIR SpO2% EXERCISE ON O2 @  LPM SpO2%   1 MINUTE 90 1 MINUTE    2 MINUTES 91 2 MINUTES    3 MINUTES 91 3 MINUTES    4 MINUTES 92 4 MINUTES    5 MINUTES 90 5 MINUTES    6 MINUTES 91 6 MINUTES               Distance Walked  150 feet Distance Walked   Dyspnea (Rianna Scale)  8 Dyspnea (Rianna Scale)   Fatigue (Rianna Scale)  8 Fatigue (Rianna Scale)   SpO2% Post Exercise  94 SpO2% Post Exercise   HR Post Exercise  84 HR Post Exercise   Time to Recovery  8 minutes Time to Recovery     Comments: Patient had to take rest at 4 minutes

## 2024-11-07 PROBLEM — R11.10 CHRONIC VOMITING: Status: RESOLVED | Noted: 2020-09-22 | Resolved: 2024-11-07

## 2024-11-07 PROBLEM — R53.83 LETHARGY: Status: RESOLVED | Noted: 2021-10-27 | Resolved: 2024-11-07

## 2024-11-07 PROBLEM — N17.9 AKI (ACUTE KIDNEY INJURY) (HCC): Status: RESOLVED | Noted: 2024-04-22 | Resolved: 2024-11-07

## 2024-11-07 PROBLEM — S09.93XA INJURY OF FACE: Status: RESOLVED | Noted: 2023-12-28 | Resolved: 2024-11-07

## 2024-11-07 ASSESSMENT — ENCOUNTER SYMPTOMS
SINUS PRESSURE: 0
COUGH: 0
SHORTNESS OF BREATH: 0
EYE DISCHARGE: 0
EYE REDNESS: 0
ABDOMINAL PAIN: 0
BACK PAIN: 1
ABDOMINAL DISTENTION: 0

## 2024-11-08 ENCOUNTER — TELEPHONE (OUTPATIENT)
Dept: INTERNAL MEDICINE | Age: 89
End: 2024-11-08

## 2024-11-08 NOTE — ADDENDUM NOTE
Addended by: DENISA VILLANUEVA on: 11/7/2024 09:38 PM     Modules accepted: Orders, Level of Service

## 2024-11-08 NOTE — PATIENT INSTRUCTIONS
cataracts, macular degeneration, and other eye disorders.  A preventive dental visit is recommended every 6 months.  Try to get at least 150 minutes of exercise per week or 10,000 steps per day on a pedometer .  Order or download the FREE \"Exercise & Physical Activity: Your Everyday Guide\" from [employer]. Call 1-207.970.9783 or search [employer] online.  You need 7072-3622 mg of calcium and 4954-9321 IU of vitamin D per day. It is possible to meet your calcium requirement with diet alone, but a vitamin D supplement is usually necessary to meet this goal.  When exposed to the sun, use a sunscreen that protects against both UVA and UVB radiation with an SPF of 30 or greater. Reapply every 2 to 3 hours or after sweating, drying off with a towel, or swimming.  Always wear a seat belt when traveling in a car. Always wear a helmet when riding a bicycle or motorcycle.

## 2024-11-08 NOTE — TELEPHONE ENCOUNTER
----- Message from Dr. Gini Haji MD sent at 11/7/2024  9:30 PM CST -----  Call her daughter and let her know patient does not need zetia anymore and can take what has left and then stop it -- also see if she thinks needs xyzal - I would try stopping it as can contribute to confusion.

## 2024-11-11 RX ORDER — MIRABEGRON 25 MG/1
25 TABLET, FILM COATED, EXTENDED RELEASE ORAL DAILY
Qty: 90 TABLET | Refills: 3 | Status: SHIPPED | OUTPATIENT
Start: 2024-11-11

## 2024-12-23 DIAGNOSIS — F41.8 ANXIETY ASSOCIATED WITH DEPRESSION: ICD-10-CM

## 2024-12-23 DIAGNOSIS — R41.3 MEMORY LOSS: ICD-10-CM

## 2024-12-23 RX ORDER — COLESTIPOL HYDROCHLORIDE 1 G/1
1 TABLET ORAL 2 TIMES DAILY
Qty: 60 TABLET | Refills: 5 | Status: SHIPPED | OUTPATIENT
Start: 2024-12-23

## 2024-12-23 RX ORDER — QUETIAPINE FUMARATE 100 MG/1
TABLET, FILM COATED ORAL
Qty: 90 TABLET | Refills: 1 | Status: SHIPPED | OUTPATIENT
Start: 2024-12-23

## 2024-12-23 RX ORDER — BUSPIRONE HYDROCHLORIDE 5 MG/1
5 TABLET ORAL 3 TIMES DAILY PRN
Qty: 90 TABLET | Refills: 1 | Status: SHIPPED | OUTPATIENT
Start: 2024-12-23

## 2024-12-23 RX ORDER — DONEPEZIL HYDROCHLORIDE 10 MG/1
TABLET, FILM COATED ORAL
Qty: 30 TABLET | Refills: 5 | Status: SHIPPED | OUTPATIENT
Start: 2024-12-23

## 2025-01-21 DIAGNOSIS — I10 HYPERTENSION, ESSENTIAL: ICD-10-CM

## 2025-01-21 RX ORDER — LEVOTHYROXINE SODIUM 25 UG/1
TABLET ORAL
Qty: 44 TABLET | Refills: 5 | Status: SHIPPED | OUTPATIENT
Start: 2025-01-21

## 2025-01-21 RX ORDER — CLOPIDOGREL BISULFATE 75 MG/1
TABLET ORAL
Qty: 30 TABLET | Refills: 5 | Status: SHIPPED | OUTPATIENT
Start: 2025-01-21

## 2025-01-25 ASSESSMENT — PATIENT HEALTH QUESTIONNAIRE - PHQ9
1. LITTLE INTEREST OR PLEASURE IN DOING THINGS: SEVERAL DAYS
6. FEELING BAD ABOUT YOURSELF - OR THAT YOU ARE A FAILURE OR HAVE LET YOURSELF OR YOUR FAMILY DOWN: NOT AT ALL
SUM OF ALL RESPONSES TO PHQ QUESTIONS 1-9: 8
2. FEELING DOWN, DEPRESSED OR HOPELESS: NOT AT ALL
4. FEELING TIRED OR HAVING LITTLE ENERGY: MORE THAN HALF THE DAYS
9. THOUGHTS THAT YOU WOULD BE BETTER OFF DEAD, OR OF HURTING YOURSELF: NOT AT ALL
9. THOUGHTS THAT YOU WOULD BE BETTER OFF DEAD, OR OF HURTING YOURSELF: NOT AT ALL
7. TROUBLE CONCENTRATING ON THINGS, SUCH AS READING THE NEWSPAPER OR WATCHING TELEVISION: SEVERAL DAYS
5. POOR APPETITE OR OVEREATING: MORE THAN HALF THE DAYS
4. FEELING TIRED OR HAVING LITTLE ENERGY: MORE THAN HALF THE DAYS
SUM OF ALL RESPONSES TO PHQ QUESTIONS 1-9: 8
SUM OF ALL RESPONSES TO PHQ QUESTIONS 1-9: 8
10. IF YOU CHECKED OFF ANY PROBLEMS, HOW DIFFICULT HAVE THESE PROBLEMS MADE IT FOR YOU TO DO YOUR WORK, TAKE CARE OF THINGS AT HOME, OR GET ALONG WITH OTHER PEOPLE: NOT DIFFICULT AT ALL
SUM OF ALL RESPONSES TO PHQ QUESTIONS 1-9: 8
10. IF YOU CHECKED OFF ANY PROBLEMS, HOW DIFFICULT HAVE THESE PROBLEMS MADE IT FOR YOU TO DO YOUR WORK, TAKE CARE OF THINGS AT HOME, OR GET ALONG WITH OTHER PEOPLE: NOT DIFFICULT AT ALL
6. FEELING BAD ABOUT YOURSELF - OR THAT YOU ARE A FAILURE OR HAVE LET YOURSELF OR YOUR FAMILY DOWN: NOT AT ALL
3. TROUBLE FALLING OR STAYING ASLEEP: MORE THAN HALF THE DAYS
2. FEELING DOWN, DEPRESSED OR HOPELESS: NOT AT ALL
8. MOVING OR SPEAKING SO SLOWLY THAT OTHER PEOPLE COULD HAVE NOTICED. OR THE OPPOSITE, BEING SO FIGETY OR RESTLESS THAT YOU HAVE BEEN MOVING AROUND A LOT MORE THAN USUAL: NOT AT ALL
1. LITTLE INTEREST OR PLEASURE IN DOING THINGS: SEVERAL DAYS
8. MOVING OR SPEAKING SO SLOWLY THAT OTHER PEOPLE COULD HAVE NOTICED. OR THE OPPOSITE - BEING SO FIDGETY OR RESTLESS THAT YOU HAVE BEEN MOVING AROUND A LOT MORE THAN USUAL: NOT AT ALL
SUM OF ALL RESPONSES TO PHQ QUESTIONS 1-9: 8
7. TROUBLE CONCENTRATING ON THINGS, SUCH AS READING THE NEWSPAPER OR WATCHING TELEVISION: SEVERAL DAYS
3. TROUBLE FALLING OR STAYING ASLEEP: MORE THAN HALF THE DAYS
5. POOR APPETITE OR OVEREATING: MORE THAN HALF THE DAYS
SUM OF ALL RESPONSES TO PHQ9 QUESTIONS 1 & 2: 1

## 2025-01-28 ENCOUNTER — OFFICE VISIT (OUTPATIENT)
Dept: INTERNAL MEDICINE | Age: 89
End: 2025-01-28

## 2025-01-28 VITALS
WEIGHT: 159 LBS | OXYGEN SATURATION: 93 % | HEART RATE: 90 BPM | BODY MASS INDEX: 27.14 KG/M2 | DIASTOLIC BLOOD PRESSURE: 70 MMHG | SYSTOLIC BLOOD PRESSURE: 130 MMHG | HEIGHT: 64 IN

## 2025-01-28 DIAGNOSIS — J96.11 CHRONIC RESPIRATORY FAILURE WITH HYPOXIA: ICD-10-CM

## 2025-01-28 DIAGNOSIS — F51.01 PRIMARY INSOMNIA: ICD-10-CM

## 2025-01-28 DIAGNOSIS — E03.9 ACQUIRED HYPOTHYROIDISM: ICD-10-CM

## 2025-01-28 DIAGNOSIS — Z71.89 ACP (ADVANCE CARE PLANNING): ICD-10-CM

## 2025-01-28 DIAGNOSIS — F02.B2 MODERATE LATE ONSET ALZHEIMER'S DEMENTIA WITH PSYCHOTIC DISTURBANCE (HCC): ICD-10-CM

## 2025-01-28 DIAGNOSIS — G30.1 MODERATE LATE ONSET ALZHEIMER'S DEMENTIA WITH PSYCHOTIC DISTURBANCE (HCC): ICD-10-CM

## 2025-01-28 DIAGNOSIS — Z02.83 ENCOUNTER FOR DRUG SCREENING: Primary | ICD-10-CM

## 2025-01-28 DIAGNOSIS — F33.1 MAJOR DEPRESSIVE DISORDER, RECURRENT EPISODE, MODERATE (HCC): ICD-10-CM

## 2025-01-28 DIAGNOSIS — J43.8 OTHER EMPHYSEMA (HCC): ICD-10-CM

## 2025-01-28 DIAGNOSIS — I73.9 PERIPHERAL VASCULAR DISEASE (HCC): ICD-10-CM

## 2025-01-28 LAB
ALCOHOL URINE: ABNORMAL
AMPHETAMINE SCREEN URINE: ABNORMAL
BARBITURATE SCREEN URINE: ABNORMAL
BENZODIAZEPINE SCREEN, URINE: ABNORMAL
BUPRENORPHINE URINE: ABNORMAL
COCAINE METABOLITE SCREEN URINE: ABNORMAL
FENTANYL SCREEN, URINE: ABNORMAL
GABAPENTIN SCREEN, URINE: ABNORMAL
MDMA, URINE: ABNORMAL
METHADONE SCREEN, URINE: ABNORMAL
METHAMPHETAMINE, URINE: ABNORMAL
OPIATE SCREEN URINE: ABNORMAL
OXYCODONE SCREEN URINE: ABNORMAL
PHENCYCLIDINE SCREEN URINE: ABNORMAL
PROPOXYPHENE SCREEN, URINE: ABNORMAL
SYNTHETIC CANNABINOIDS(K2) SCREEN, URINE: ABNORMAL
THC SCREEN, URINE: ABNORMAL
TRAMADOL SCREEN URINE: ABNORMAL
TRICYCLIC ANTIDEPRESSANTS, UR: ABNORMAL

## 2025-01-28 RX ORDER — LEVOTHYROXINE SODIUM 25 UG/1
25 TABLET ORAL DAILY
Qty: 90 TABLET | Refills: 3 | Status: SHIPPED | OUTPATIENT
Start: 2025-01-28

## 2025-01-28 RX ORDER — LORAZEPAM 1 MG/1
TABLET ORAL
Qty: 90 TABLET | Refills: 0 | Status: CANCELLED | OUTPATIENT
Start: 2025-01-28 | End: 2025-02-25

## 2025-01-28 RX ORDER — EZETIMIBE 10 MG/1
10 TABLET ORAL DAILY
COMMUNITY
Start: 2025-01-21

## 2025-01-28 RX ORDER — MIRABEGRON 25 MG/1
25 TABLET, FILM COATED, EXTENDED RELEASE ORAL DAILY
Qty: 90 TABLET | Refills: 3 | Status: SHIPPED | OUTPATIENT
Start: 2025-01-28

## 2025-01-28 SDOH — ECONOMIC STABILITY: FOOD INSECURITY: WITHIN THE PAST 12 MONTHS, YOU WORRIED THAT YOUR FOOD WOULD RUN OUT BEFORE YOU GOT MONEY TO BUY MORE.: NEVER TRUE

## 2025-01-28 SDOH — ECONOMIC STABILITY: FOOD INSECURITY: WITHIN THE PAST 12 MONTHS, THE FOOD YOU BOUGHT JUST DIDN'T LAST AND YOU DIDN'T HAVE MONEY TO GET MORE.: NEVER TRUE

## 2025-01-28 NOTE — PATIENT INSTRUCTIONS

## 2025-01-28 NOTE — PROGRESS NOTES
Chief Complaint   Patient presents with    Follow-up     DOL recert       HPI:   History of Present Illness  The patient presents for evaluation of pulmonary fibrosis, COPD, diarrhea, hypothyroidism, and medication management. She is accompanied by her daughter.    She reports experiencing fatigue and shortness of breath, which she attributes to her pulmonary fibrosis and COPD. She notes that these symptoms have been stable for a significant period.    She also mentions occasional confusion, particularly on challenging days or when her routine is disrupted. Despite these issues, she maintains a positive outlook and engages in activities such as reading and watching television. She does not report excessive daytime sleepiness.    She experiences diarrhea when there are changes in her diet. She is currently on Colestid, which appears to be effective in managing this symptom.    She has been on Synthroid for approximately 10 years, following her 's death. Her daughter expresses concern about the potential risk of osteoporosis associated with long-term use of Synthroid, given the patient's sister's history of severe osteoporosis leading to vertebral collapse.    She is seeking a refill of her lorazepam prescription, which she takes once daily at night, despite the prescription indicating a three times daily dosage. She also requires a refill of her Myrbetriq prescription.    FAMILY HISTORY  Her sister had osteoporosis so severe that her backbone collapsed.    MEDICATIONS  Current: Synthroid, lorazepam, Myrbetriq, Colestid       Past Medical History:   Diagnosis Date    Abnormal mammogram     Anxiety     Anxiety associated with depression     Artery disease, cerebral     Ataxia     Benign diastolic hypertension     Carpal tunnel syndrome     idiopathic peripheral    Cellulitis of face     Chronic back pain     COPD (chronic obstructive pulmonary disease) (HCC)     Depression     Disc degeneration, lumbosacral

## 2025-01-29 RX ORDER — LORAZEPAM 1 MG/1
1 TABLET ORAL NIGHTLY
Qty: 90 TABLET | Refills: 0 | Status: SHIPPED | OUTPATIENT
Start: 2025-01-28 | End: 2025-04-28

## 2025-02-18 DIAGNOSIS — I10 HYPERTENSION, ESSENTIAL: ICD-10-CM

## 2025-02-18 DIAGNOSIS — F41.8 ANXIETY ASSOCIATED WITH DEPRESSION: ICD-10-CM

## 2025-02-18 RX ORDER — LOSARTAN POTASSIUM AND HYDROCHLOROTHIAZIDE 12.5; 1 MG/1; MG/1
TABLET ORAL
Qty: 30 TABLET | Refills: 5 | Status: SHIPPED | OUTPATIENT
Start: 2025-02-18

## 2025-02-18 RX ORDER — BUSPIRONE HYDROCHLORIDE 5 MG/1
5 TABLET ORAL 3 TIMES DAILY PRN
Qty: 90 TABLET | Refills: 1 | Status: SHIPPED | OUTPATIENT
Start: 2025-02-18

## 2025-02-18 RX ORDER — QUETIAPINE FUMARATE 100 MG/1
TABLET, FILM COATED ORAL
Qty: 90 TABLET | Refills: 1 | Status: SHIPPED | OUTPATIENT
Start: 2025-02-18

## 2025-03-17 RX ORDER — EZETIMIBE 10 MG/1
10 TABLET ORAL DAILY
Qty: 30 TABLET | Refills: 5 | Status: SHIPPED | OUTPATIENT
Start: 2025-03-17

## 2025-04-21 DIAGNOSIS — F41.8 ANXIETY ASSOCIATED WITH DEPRESSION: ICD-10-CM

## 2025-04-22 RX ORDER — QUETIAPINE FUMARATE 100 MG/1
TABLET, FILM COATED ORAL
Qty: 90 TABLET | Refills: 1 | Status: SHIPPED | OUTPATIENT
Start: 2025-04-22

## 2025-04-22 RX ORDER — BUSPIRONE HYDROCHLORIDE 5 MG/1
5 TABLET ORAL 3 TIMES DAILY PRN
Qty: 90 TABLET | Refills: 1 | Status: SHIPPED | OUTPATIENT
Start: 2025-04-22

## 2025-04-27 DIAGNOSIS — I10 HYPERTENSION, ESSENTIAL: ICD-10-CM

## 2025-04-27 DIAGNOSIS — F51.01 PRIMARY INSOMNIA: ICD-10-CM

## 2025-04-30 ENCOUNTER — OFFICE VISIT (OUTPATIENT)
Dept: INTERNAL MEDICINE | Age: 89
End: 2025-04-30
Payer: MEDICARE

## 2025-04-30 VITALS
BODY MASS INDEX: 27.29 KG/M2 | DIASTOLIC BLOOD PRESSURE: 74 MMHG | HEART RATE: 84 BPM | SYSTOLIC BLOOD PRESSURE: 128 MMHG | WEIGHT: 159 LBS | TEMPERATURE: 97.4 F | OXYGEN SATURATION: 93 %

## 2025-04-30 DIAGNOSIS — F41.8 ANXIETY ASSOCIATED WITH DEPRESSION: ICD-10-CM

## 2025-04-30 DIAGNOSIS — F51.01 PRIMARY INSOMNIA: ICD-10-CM

## 2025-04-30 DIAGNOSIS — K52.9 CHRONIC DIARRHEA: ICD-10-CM

## 2025-04-30 DIAGNOSIS — E03.9 ACQUIRED HYPOTHYROIDISM: ICD-10-CM

## 2025-04-30 DIAGNOSIS — N32.81 OVERACTIVE BLADDER: ICD-10-CM

## 2025-04-30 DIAGNOSIS — E55.9 VITAMIN D DEFICIENCY: ICD-10-CM

## 2025-04-30 DIAGNOSIS — J84.10 PULMONARY FIBROSIS (HCC): Primary | ICD-10-CM

## 2025-04-30 DIAGNOSIS — J96.11 CHRONIC RESPIRATORY FAILURE WITH HYPOXIA (HCC): ICD-10-CM

## 2025-04-30 PROCEDURE — 1036F TOBACCO NON-USER: CPT | Performed by: INTERNAL MEDICINE

## 2025-04-30 PROCEDURE — G8417 CALC BMI ABV UP PARAM F/U: HCPCS | Performed by: INTERNAL MEDICINE

## 2025-04-30 PROCEDURE — 1123F ACP DISCUSS/DSCN MKR DOCD: CPT | Performed by: INTERNAL MEDICINE

## 2025-04-30 PROCEDURE — 99214 OFFICE O/P EST MOD 30 MIN: CPT | Performed by: INTERNAL MEDICINE

## 2025-04-30 PROCEDURE — G8427 DOCREV CUR MEDS BY ELIG CLIN: HCPCS | Performed by: INTERNAL MEDICINE

## 2025-04-30 PROCEDURE — 1159F MED LIST DOCD IN RCRD: CPT | Performed by: INTERNAL MEDICINE

## 2025-04-30 PROCEDURE — 1090F PRES/ABSN URINE INCON ASSESS: CPT | Performed by: INTERNAL MEDICINE

## 2025-04-30 RX ORDER — MIRABEGRON 25 MG/1
25 TABLET, FILM COATED, EXTENDED RELEASE ORAL DAILY
Qty: 90 TABLET | Refills: 3 | Status: CANCELLED | OUTPATIENT
Start: 2025-04-30

## 2025-04-30 RX ORDER — LORAZEPAM 1 MG/1
1 TABLET ORAL NIGHTLY
Qty: 90 TABLET | Refills: 0 | Status: CANCELLED | OUTPATIENT
Start: 2025-04-30 | End: 2025-07-29

## 2025-04-30 SDOH — ECONOMIC STABILITY: FOOD INSECURITY: WITHIN THE PAST 12 MONTHS, YOU WORRIED THAT YOUR FOOD WOULD RUN OUT BEFORE YOU GOT MONEY TO BUY MORE.: NEVER TRUE

## 2025-04-30 SDOH — ECONOMIC STABILITY: FOOD INSECURITY: WITHIN THE PAST 12 MONTHS, THE FOOD YOU BOUGHT JUST DIDN'T LAST AND YOU DIDN'T HAVE MONEY TO GET MORE.: NEVER TRUE

## 2025-04-30 ASSESSMENT — PATIENT HEALTH QUESTIONNAIRE - PHQ9
SUM OF ALL RESPONSES TO PHQ QUESTIONS 1-9: 0
SUM OF ALL RESPONSES TO PHQ QUESTIONS 1-9: 0
3. TROUBLE FALLING OR STAYING ASLEEP: NOT AT ALL
SUM OF ALL RESPONSES TO PHQ QUESTIONS 1-9: 0
6. FEELING BAD ABOUT YOURSELF - OR THAT YOU ARE A FAILURE OR HAVE LET YOURSELF OR YOUR FAMILY DOWN: NOT AT ALL
5. POOR APPETITE OR OVEREATING: NOT AT ALL
SUM OF ALL RESPONSES TO PHQ QUESTIONS 1-9: 0
4. FEELING TIRED OR HAVING LITTLE ENERGY: NOT AT ALL
7. TROUBLE CONCENTRATING ON THINGS, SUCH AS READING THE NEWSPAPER OR WATCHING TELEVISION: NOT AT ALL
1. LITTLE INTEREST OR PLEASURE IN DOING THINGS: NOT AT ALL
2. FEELING DOWN, DEPRESSED OR HOPELESS: NOT AT ALL
9. THOUGHTS THAT YOU WOULD BE BETTER OFF DEAD, OR OF HURTING YOURSELF: NOT AT ALL
8. MOVING OR SPEAKING SO SLOWLY THAT OTHER PEOPLE COULD HAVE NOTICED. OR THE OPPOSITE, BEING SO FIGETY OR RESTLESS THAT YOU HAVE BEEN MOVING AROUND A LOT MORE THAN USUAL: NOT AT ALL
10. IF YOU CHECKED OFF ANY PROBLEMS, HOW DIFFICULT HAVE THESE PROBLEMS MADE IT FOR YOU TO DO YOUR WORK, TAKE CARE OF THINGS AT HOME, OR GET ALONG WITH OTHER PEOPLE: NOT DIFFICULT AT ALL

## 2025-04-30 NOTE — PROGRESS NOTES
Chief Complaint   Patient presents with    3 Month Follow-Up        Oxygen recert:   resting 94%,  walking 88%,  recovery 93%       HPI: Pt is here today to f/u pulmonary fibrosis and other medical problems.  Pt seems better with mood.  Overall about the same.     Past Medical History:   Diagnosis Date    Abnormal mammogram     Anxiety     Anxiety associated with depression     Artery disease, cerebral     Ataxia     Benign diastolic hypertension     Carpal tunnel syndrome     idiopathic peripheral    Cellulitis of face     Chronic back pain     COPD (chronic obstructive pulmonary disease) (HCC)     Depression     Disc degeneration, lumbosacral     Diverticulosis     Dysphagia     Esophageal reflux     Fever blister     Herpes simplex     Herpes zoster     Hypertension, essential     Hypertensive heart disease without CHF     Hypoxia     Idiopathic peripheral neuropathy     2017    Insomnia     Major depressive disorder, recurrent episode, moderate (HCC)     Mixed hyperlipidemia     Osteoporosis     Ovarian failure     Oxygen dependent     Pulmonary fibrosis (HCC)     Sciatica     Sleep apnea     Transient cerebral ischemic attack     Urinary incontinence     Weakness of both legs        Past Surgical History:   Procedure Laterality Date    COLONOSCOPY  06/12/2015    [facility], repeat as needed    COLONOSCOPY N/A 10/21/2020    Dr REANNA Spicer-Diverticular disease, random colon bx negative    HYSTERECTOMY (CERVIX STATUS UNKNOWN)      LUNG SEGMENTECTOMY      lung segmental resection    UPPER GASTROINTESTINAL ENDOSCOPY N/A 10/21/2020    Dr REANNA Spicer-mild Gastritis, otherwise normal, NEG h pylori, NEG celiac       Family History   Problem Relation Age of Onset    Heart Failure Mother     Diabetes Mother     Heart Disease Mother     Diabetes Father     Heart Disease Father     Colon Polyps Neg Hx     Colon Cancer Neg Hx        Social History     Socioeconomic History    Marital status:      Spouse name: Not on file

## 2025-05-01 RX ORDER — MIRABEGRON 25 MG/1
25 TABLET, FILM COATED, EXTENDED RELEASE ORAL DAILY
Qty: 90 TABLET | Refills: 3 | Status: SHIPPED | OUTPATIENT
Start: 2025-05-01

## 2025-05-01 RX ORDER — ESOMEPRAZOLE MAGNESIUM 40 MG/1
40 CAPSULE, DELAYED RELEASE ORAL DAILY
Qty: 90 CAPSULE | Refills: 1 | Status: SHIPPED | OUTPATIENT
Start: 2025-05-01

## 2025-05-01 RX ORDER — LORAZEPAM 1 MG/1
1 TABLET ORAL NIGHTLY
Qty: 90 TABLET | Refills: 0 | Status: SHIPPED | OUTPATIENT
Start: 2025-05-01 | End: 2025-07-30

## 2025-05-01 RX ORDER — LOSARTAN POTASSIUM AND HYDROCHLOROTHIAZIDE 12.5; 1 MG/1; MG/1
TABLET ORAL
Qty: 30 TABLET | Refills: 5 | Status: SHIPPED | OUTPATIENT
Start: 2025-05-01

## 2025-05-01 RX ORDER — ESCITALOPRAM OXALATE 20 MG/1
20 TABLET ORAL DAILY
Qty: 30 TABLET | Refills: 5 | Status: SHIPPED | OUTPATIENT
Start: 2025-05-01

## 2025-05-12 RX ORDER — COLESTIPOL HYDROCHLORIDE 1 G/1
TABLET ORAL
Qty: 180 TABLET | Refills: 3 | Status: SHIPPED | OUTPATIENT
Start: 2025-05-12

## 2025-05-12 RX ORDER — MIRABEGRON 50 MG/1
50 TABLET, FILM COATED, EXTENDED RELEASE ORAL DAILY
Qty: 90 TABLET | Refills: 3 | Status: SHIPPED | OUTPATIENT
Start: 2025-05-12

## 2025-06-23 DIAGNOSIS — R41.3 MEMORY LOSS: ICD-10-CM

## 2025-06-23 DIAGNOSIS — F41.8 ANXIETY ASSOCIATED WITH DEPRESSION: ICD-10-CM

## 2025-06-23 RX ORDER — BUSPIRONE HYDROCHLORIDE 5 MG/1
5 TABLET ORAL 3 TIMES DAILY PRN
Qty: 90 TABLET | Refills: 1 | Status: SHIPPED | OUTPATIENT
Start: 2025-06-23

## 2025-06-23 RX ORDER — QUETIAPINE FUMARATE 100 MG/1
TABLET, FILM COATED ORAL
Qty: 90 TABLET | Refills: 1 | Status: SHIPPED | OUTPATIENT
Start: 2025-06-23

## 2025-06-23 RX ORDER — DONEPEZIL HYDROCHLORIDE 10 MG/1
TABLET, FILM COATED ORAL
Qty: 30 TABLET | Refills: 5 | Status: SHIPPED | OUTPATIENT
Start: 2025-06-23

## 2025-06-30 ENCOUNTER — TELEPHONE (OUTPATIENT)
Dept: INTERNAL MEDICINE | Age: 89
End: 2025-06-30

## 2025-06-30 DIAGNOSIS — M25.512 ACUTE PAIN OF LEFT SHOULDER: Primary | ICD-10-CM

## 2025-06-30 NOTE — TELEPHONE ENCOUNTER
Pat called stating pt is having left shoulder pain, fall 6 weeks ago, no pain at that time. Week later she would get an aching pain. Goes and comes, 3 weeks. Call [place] back

## 2025-06-30 NOTE — TELEPHONE ENCOUNTER
I can see her and /or order a left shoulder xray and a cxr pa and lateral - I'm glad to see her today- or send xrays to wherever wants get them- if here sent stat

## 2025-07-08 NOTE — TELEPHONE ENCOUNTER
Darci Graves called requesting a refill of the below medication which has been pended for you:     Requested Prescriptions     Pending Prescriptions Disp Refills    mirabegron (MYRBETRIQ) 50 MG TB24 90 tablet 1     Sig: TAKE 1 TABLET DAILY    escitalopram (LEXAPRO) 20 MG tablet 90 tablet 1     Sig: TAKE 1 TABLET DAILY    mirtazapine (REMERON) 30 MG tablet 90 tablet 1     Sig: TAKE 1 TABLET NIGHTLY    busPIRone (BUSPAR) 5 MG tablet 180 tablet 1     Sig: Take 1 tablet by mouth 3 times daily as needed (anxiety)    LORazepam (ATIVAN) 1 MG tablet 90 tablet 0     Sig: TAKE 1 TABLET BY MOUTH EVERY 8 HOURS AS NEEDED    atorvastatin (LIPITOR) 20 MG tablet 90 tablet 1     Sig: Take 0.5 tablets by mouth at bedtime       Last Appointment Date: 7/11/2022  Next Appointment Date: 10/11/2022    Allergies   Allergen Reactions    Augmentin [Amoxicillin-Pot Clavulanate]     Biaxin [Clarithromycin]     Cefdinir     Effexor [Venlafaxine]     Erythromycin     Lortab [Hydrocodone-Acetaminophen]     Naprosyn [Naproxen] If any questions arise, call your provider.  If your provider is not available, please feel free to call [facility] at [phone].    MEDICATIONS: Resume taking daily medication.  Take prescribed pain medication with food.  If no medication is prescribed, you may take non-aspirin pain medication if needed.  PAIN MEDICATION CAN BE VERY CONSTIPATING.  Take a stool softener or laxative such as senokot, pericolace, or milk of magnesia if needed.    Last pain medication given at     Tylenol given at 9:00 AM. Okay for next dose at 3:00 PM.     Toradol (similar to Ibuprofen/ Motrin) given at 12:00 PM. Okay for next dose of Ibuprofen/ Motrin at 6:00 PM.    What to Expect Post Anesthesia    Rest and take it easy for the first 24 hours.  A responsible adult is recommended to remain with you during that time.  It is normal to feel sleepy.  We encourage you to not do anything that requires balance, judgment or coordination.    FOR 24 HOURS DO NOT:  Drive, operate machinery or run household appliances.  Drink beer or alcoholic beverages.  Make important decisions or sign legal documents.    To avoid nausea, slowly advance diet as tolerated, avoiding spicy or greasy foods for the first day.  Add more substantial food to your diet according to your provider's instructions.  Babies can be fed formula or breast milk as soon as they are hungry.  INCREASE FLUIDS AND FIBER TO AVOID CONSTIPATION.    MILD FLU-LIKE SYMPTOMS ARE NORMAL.  YOU MAY EXPERIENCE GENERALIZED MUSCLE ACHES, THROAT IRRITATION, HEADACHE AND/OR SOME NAUSEA.

## 2025-07-18 DIAGNOSIS — F51.01 PRIMARY INSOMNIA: ICD-10-CM

## 2025-07-21 DIAGNOSIS — I10 HYPERTENSION, ESSENTIAL: ICD-10-CM

## 2025-07-21 RX ORDER — CLOPIDOGREL BISULFATE 75 MG/1
75 TABLET ORAL DAILY
Qty: 30 TABLET | Refills: 5 | Status: SHIPPED | OUTPATIENT
Start: 2025-07-21

## 2025-07-21 RX ORDER — LORAZEPAM 1 MG/1
1 TABLET ORAL NIGHTLY
Qty: 90 TABLET | Refills: 0 | Status: SHIPPED | OUTPATIENT
Start: 2025-07-21 | End: 2025-10-19

## 2025-07-30 ENCOUNTER — HOSPITAL ENCOUNTER (OUTPATIENT)
Dept: GENERAL RADIOLOGY | Age: 89
Discharge: HOME | End: 2025-07-30
Payer: MEDICARE

## 2025-07-30 DIAGNOSIS — R07.89 CHEST WALL PAIN: ICD-10-CM

## 2025-07-30 DIAGNOSIS — M25.512 ACUTE PAIN OF LEFT SHOULDER: ICD-10-CM

## 2025-07-30 DIAGNOSIS — M25.512 ACUTE PAIN OF LEFT SHOULDER: Primary | ICD-10-CM

## 2025-07-30 PROCEDURE — 73030 X-RAY EXAM OF SHOULDER: CPT

## 2025-07-30 PROCEDURE — 71046 X-RAY EXAM CHEST 2 VIEWS: CPT

## 2025-08-09 DIAGNOSIS — N32.81 OVERACTIVE BLADDER: ICD-10-CM

## 2025-08-11 RX ORDER — MIRABEGRON 50 MG/1
50 TABLET, FILM COATED, EXTENDED RELEASE ORAL DAILY
Qty: 90 TABLET | Refills: 3 | Status: SHIPPED | OUTPATIENT
Start: 2025-08-11

## 2025-08-19 DIAGNOSIS — F41.8 ANXIETY ASSOCIATED WITH DEPRESSION: ICD-10-CM

## 2025-08-19 RX ORDER — QUETIAPINE FUMARATE 100 MG/1
TABLET, FILM COATED ORAL
Qty: 90 TABLET | Refills: 1 | Status: SHIPPED | OUTPATIENT
Start: 2025-08-19

## 2025-08-19 RX ORDER — BUSPIRONE HYDROCHLORIDE 5 MG/1
5 TABLET ORAL 3 TIMES DAILY PRN
Qty: 90 TABLET | Refills: 1 | Status: SHIPPED | OUTPATIENT
Start: 2025-08-19

## (undated) DEVICE — ENDO KIT,LOURDES HOSPITAL: Brand: MEDLINE INDUSTRIES, INC.

## (undated) DEVICE — FORCEPS BX L240CM JAW DIA2.4MM ORNG L CAP W/ NDL DISP RAD